# Patient Record
Sex: MALE | Race: WHITE | NOT HISPANIC OR LATINO | Employment: OTHER | URBAN - METROPOLITAN AREA
[De-identification: names, ages, dates, MRNs, and addresses within clinical notes are randomized per-mention and may not be internally consistent; named-entity substitution may affect disease eponyms.]

---

## 2017-01-13 ENCOUNTER — AMBULATORY - HEALTHEAST (OUTPATIENT)
Dept: SCHEDULING | Facility: CLINIC | Age: 32
End: 2017-01-13

## 2017-01-13 DIAGNOSIS — R69 DISEASE: ICD-10-CM

## 2017-01-19 ENCOUNTER — COMMUNICATION - HEALTHEAST (OUTPATIENT)
Dept: TELEHEALTH | Facility: CLINIC | Age: 32
End: 2017-01-19

## 2017-01-19 ENCOUNTER — OFFICE VISIT - HEALTHEAST (OUTPATIENT)
Dept: OTHER | Facility: CLINIC | Age: 32
End: 2017-01-19

## 2017-01-19 DIAGNOSIS — R69 DISEASE: ICD-10-CM

## 2017-02-25 ENCOUNTER — APPOINTMENT (OUTPATIENT)
Dept: GENERAL RADIOLOGY | Facility: CLINIC | Age: 32
End: 2017-02-25
Attending: EMERGENCY MEDICINE
Payer: COMMERCIAL

## 2017-02-25 ENCOUNTER — HOSPITAL ENCOUNTER (EMERGENCY)
Facility: CLINIC | Age: 32
Discharge: HOME | End: 2017-02-25
Attending: EMERGENCY MEDICINE | Admitting: EMERGENCY MEDICINE
Payer: COMMERCIAL

## 2017-02-25 VITALS
TEMPERATURE: 97.6 F | BODY MASS INDEX: 35.06 KG/M2 | SYSTOLIC BLOOD PRESSURE: 141 MMHG | OXYGEN SATURATION: 100 % | WEIGHT: 303 LBS | DIASTOLIC BLOOD PRESSURE: 88 MMHG | HEART RATE: 105 BPM | HEIGHT: 78 IN | RESPIRATION RATE: 16 BRPM

## 2017-02-25 DIAGNOSIS — Z95.828 STATUS POST PERIPHERALLY INSERTED CENTRAL CATHETER (PICC) CENTRAL LINE PLACEMENT: ICD-10-CM

## 2017-02-25 PROCEDURE — 93005 ELECTROCARDIOGRAM TRACING: CPT

## 2017-02-25 PROCEDURE — 99284 EMERGENCY DEPT VISIT MOD MDM: CPT

## 2017-02-25 PROCEDURE — 71020 XR CHEST 2 VW: CPT

## 2017-02-25 RX ORDER — DOXYCYCLINE 25 MG/5ML
POWDER, FOR SUSPENSION ORAL 2 TIMES DAILY
Status: ON HOLD | COMMUNITY
End: 2017-06-05

## 2017-02-25 ASSESSMENT — ENCOUNTER SYMPTOMS
COLOR CHANGE: 1
CONFUSION: 1

## 2017-02-25 NOTE — ED AVS SNAPSHOT
[facility] Emergency Department    [address]    [facility] [address]    Phone:  [phone]    Fax:  [phone]                                       Jonny Gramajo   MRN: [id number]    Department:  [facility] Emergency Department   Date of Visit:  2/25/2017           Patient Information     Date Of Birth          1985        Your diagnoses for this visit were:     Status post peripherally inserted central catheter (PICC) central line placement        You were seen by Jorden Kumari MD.      Follow-up Information     Follow up with [facility].    Why:  As needed    Contact information:    [address]  [facility] [address]        24 Hour Appointment Hotline       To make an appointment at any [facility] clinic, call [phone]). If you don't have a family doctor or clinic, we will help you find one. [facility] clinics are conveniently located to serve the needs of you and your family.             Review of your medicines      Our records show that you are taking the medicines listed below. If these are incorrect, please call your family doctor or clinic.        Dose / Directions Last dose taken    ADDERALL PO        Refills:  0        clonazePAM 0.5 MG tablet   Commonly known as:  klonoPIN   Dose:  0.25-0.5 mg   Quantity:  20 tablet        Take 0.5-1 tablets (0.25-0.5 mg) by mouth 3 times daily as needed for anxiety   Refills:  0        doxycycline monohydrate 25 MG/5ML Susr   Commonly known as:  VIBRAMYCIN        Take by mouth 2 times daily   Refills:  0        metroNIDAZOLE 5 mg/mL   Commonly known as:  FLAGYL        Inject into the vein every 6 hours   Refills:  0        ROCEPHIN IV        Refills:  0        VALTREX PO        Refills:  0                Procedures and tests performed during your visit     EKG 12 lead    XR Chest 2 Views      Orders Needing Specimen Collection     None      Pending Results     Date  and Time Order Name Status Description    2/25/2017 2019 XR Chest 2 Views Preliminary             Pending Culture Results     No orders found from 2/23/2017 to 2/26/2017.             Test Results from your hospital stay     2/25/2017  9:11 PM - Interface, Radiant Ib      Narrative     CHEST TWO VIEW   2/25/2017 8:56 PM     HISTORY: Evaluate PICC position.    COMPARISON: None.    FINDINGS: The tip of the PICC line is in good position in the mid to  distal SVC. Heart and lungs negative.        Impression     IMPRESSION: PICC line in the mid to distal SVC in good position.                Clinical Quality Measure: Blood Pressure Screening     Your blood pressure was checked while you were in the emergency department today. The last reading we obtained was  BP: 141/88 . Please read the guidelines below about what these numbers mean and what you should do about them.  If your systolic blood pressure (the top number) is less than 120 and your diastolic blood pressure (the bottom number) is less than 80, then your blood pressure is normal. There is nothing more that you need to do about it.  If your systolic blood pressure (the top number) is 120-139 or your diastolic blood pressure (the bottom number) is 80-89, your blood pressure may be higher than it should be. You should have your blood pressure rechecked within a year by a primary care provider.  If your systolic blood pressure (the top number) is 140 or greater or your diastolic blood pressure (the bottom number) is 90 or greater, you may have high blood pressure. High blood pressure is treatable, but if left untreated over time it can put you at risk for heart attack, stroke, or kidney failure. You should have your blood pressure rechecked by a primary care provider within the next 4 weeks.  If your provider in the emergency department today gave you specific instructions to follow-up with your doctor or provider even sooner than that, you should follow that  "instruction and not wait for up to 4 weeks for your follow-up visit.        Thank you for choosing [facility]       Thank you for choosing [facility] for your care. Our goal is always to provide you with excellent care. Hearing back from our patients is one way we can continue to improve our services. Please take a few minutes to complete the written survey that you may receive in the mail after you visit with us. Thank you!        [employer] Information     [employer] lets you send messages to your doctor, view your test results, renew your prescriptions, schedule appointments and more. To sign up, go to [email or web address] . Click on \"Log in\" on the left side of the screen, which will take you to the Welcome page. Then click on \"Sign up Now\" on the right side of the page.     You will be asked to enter the access code listed below, as well as some personal information. Please follow the directions to create your username and password.     Your access code is: [email or web address]  Expires: 2017  9:20 PM     Your access code will  in 90 days. If you need help or a new code, please call your [facility] clinic or [phone].        Care EveryWhere ID     This is your Care EveryWhere ID. This could be used by other organizations to access your [facility] medical records  [id number]        After Visit Summary       This is your record. Keep this with you and show to your community pharmacist(s) and doctor(s) at your next visit.                  "

## 2017-02-25 NOTE — ED AVS SNAPSHOT
[facility] Emergency Department    [address]    [facility] [address]    Phone:  [phone]    Fax:  [phone]                                       Jonny Gramajo   MRN: [id number]    Department:  [facility] Emergency Department   Date of Visit:  2/25/2017           After Visit Summary Signature Page     I have received my discharge instructions, and my questions have been answered. I have discussed any challenges I see with this plan with the nurse or doctor.    ..........................................................................................................................................  Patient/Patient Representative Signature      ..........................................................................................................................................  Patient Representative Print Name and Relationship to Patient    ..................................................               ................................................  Date                                            Time    ..........................................................................................................................................  Reviewed by Signature/Title    ...................................................              ..............................................  Date                                                            Time

## 2017-02-26 NOTE — ED PROVIDER NOTES
"  History     Chief Complaint:  Chest pain and vascular access problem    HPI   Jonny Gramajo is a 31 year old male with a history of chronic lyme disease started on aggressive treatment on 1/13/17 and pre-diabetic who presents to the emergency department today for evaluation of chest pain and vascular access problem. Mr. Gramajo had a picc line on left arm due to having lyme disease for 24 years. Today, he had his picc line came out causing bleeding and after began to feel flushed, feverish, mild chest pain, and mild confusion prompting visit. He also reports redness of the right arm at picc line site. Here, patient states he thinks chest pain does not need workup and declines workup.     CARDIAC RISK FACTORS  SEX:                  Male  Tobacco:             Negative  Hypertension:        Positive  Diabetes:             Negative  Lipids:                Negative  Personal History:  Negative  Family History:       Negative    Allergies:  NKDA    Medications:    Metronidazole (Flagyl) 5 Mg/Ml  Valacyclovir Hcl (Valtrex Po)  Ceftriaxone Sodium (Rocephin Iv)  Doxycycline Monohydrate (Vibramycin) 25 Mg/5ml Susr  Amphetamine-Dextroamphetamine (Adderall Po)  Clonazepam (Klonopin) 0.5 Mg Tablet    Past Medical History:    Anxiety  Depression  Hypertension  Lyme disease    Past Surgical History:    Testical recessed    Family History:    Parkinson's disease    Social History:  Marital Status:   [2]  Tobacco: Negative  Alcohol: Negative  Presents alone.   PCP: [facility]    Review of Systems   Cardiovascular: Positive for chest pain.   Skin: Positive for color change.   Psychiatric/Behavioral: Positive for confusion.   All other systems reviewed and are negative.    Physical Exam   First Vitals:  BP: 141/88  Pulse: 105  Heart Rate: 105  Temp: 97.6  F (36.4  C)  Resp: 16  Height: 198.1 cm (6' 6\")  Weight: (!) 137.4 kg (303 lb)  SpO2: 100 %    Physical Exam   General: Patient is alert and interactive " when I enter the room  Head:  The scalp, face, and head appear normal  Eyes:  The pupils are equal, round, and reactive to light    Conjunctivae and sclerae are normal  ENT:    External acoustic canals are normal    The oropharynx is normal without erythema.     Uvula is in the midline  Neck:  Normal range of motion  CV:  Regular rate. S1/S2. No murmurs.   Resp:  Lungs are clear without wheezes or rales. No distress  GI:  Abdomen is soft, no rigidity, guarding, or rebound    No distension. No tenderness to palpation in any quadrant.     MS:  Normal tone. Joints grossly normal without effusions. No arm swelling    No asymmetric leg swelling, calf or thigh tenderness.      Normal motor assessment of all extremities.  Skin:  No rash or lesions noted. Normal capillary refill noted. PICC site is c/d/i.  Minimal redness, no pus.    Neuro: Speech is normal and fluent. Face is symmetric.     Moving all extremities well.   Psych:  Awake. Alert.  Normal affect.  Appropriate interactions.  Lymph: No anterior cervical lymphadenopathy noted      Emergency Department Course   Imaging:  Radiographic findings were communicated with the patient who voiced understanding of the findings.    Chest X-Ray. 2 Views:   Picc line in the mid to distal SVC in good position.   Preliminary reading per radiology.     Emergency Department Course:  Nursing notes and vitals reviewed.    19:39 I performed an exam of the patient as documented above.     Nurse reports patients IV flushed well.     The patient was taken for x-ray, see imaging results above.      21:07 Recheck patient. I personally reviewed the imaging results with the Patient and answered all related questions prior to discharge. Plan explained to the Patient. Patient discharged home with instructions regarding supportive care, medications, and reasons to return. The importance of close follow-up was reviewed.   Impression & Plan    Medical Decision Making:  Jonny Gramajo is a 31 year  old male who presents for evaluation of PICC issue.  Discussed his twinges of CP and he declines workup for this.  No arm swelling to suggest DVT.  Broad differential of course considered, highest on my list includes PE, PTX, PICC infx, etc.   Xrays shows good alignment of PICC.   I doubt worrisome etiologies such as ACS, etc.    Plan is home, normal care of PICC.        Diagnosis:    ICD-10-CM    1. Status post peripherally inserted central catheter (PICC) central line placement Z95.828        Disposition:  discharged to home    Scribe Disclosure:  I, Fina Hernandez, am serving as a scribe at 07:39 PM on 2/25/2017 to document services personally performed by Jorden Kumari MD, based on my observations and the provider's statements to me.  Fina Hernandez  2/25/2017   [facility] EMERGENCY DEPARTMENT       Jorden Kumari MD  02/25/17 [id number]

## 2017-02-26 NOTE — ED NOTES
"PICC line dressing changed with sterile technique. Flushed with saline 20cc, without resistance.  Also PICC jayna blood WDL.  Pt requesting a \"vial of blood\" to take home Pt advised that is not possible and to follow up with his PCP.  "

## 2017-02-26 NOTE — ED NOTES
"Pt awoke around 1400 and had scratched his arm pulling his picc out 6\". Pt reports chest pressure after getting OOB and pulling at picc line. Pt states hx anxiety. Denies SOB or diaphoresis. Pt states PICC line is for chronic lyme disease. ABC in tact.  "

## 2017-02-27 LAB — INTERPRETATION ECG - MUSE: NORMAL

## 2017-03-03 ENCOUNTER — HOSPITAL ENCOUNTER (EMERGENCY)
Facility: CLINIC | Age: 32
Discharge: HOME | End: 2017-03-04
Attending: EMERGENCY MEDICINE | Admitting: EMERGENCY MEDICINE
Payer: COMMERCIAL

## 2017-03-03 DIAGNOSIS — Z95.828 STATUS POST PERIPHERALLY INSERTED CENTRAL CATHETER (PICC) CENTRAL LINE PLACEMENT: ICD-10-CM

## 2017-03-03 PROCEDURE — 99283 EMERGENCY DEPT VISIT LOW MDM: CPT | Mod: 25

## 2017-03-03 RX ORDER — CEFTRIAXONE 2 G/1
2 INJECTION, POWDER, FOR SOLUTION INTRAMUSCULAR; INTRAVENOUS ONCE
Status: COMPLETED | OUTPATIENT
Start: 2017-03-03 | End: 2017-03-04

## 2017-03-03 NOTE — ED AVS SNAPSHOT
[facility] Emergency Department    [address]    [facility] [address]    Phone:  [phone]    Fax:  [phone]                                       Jonny Gramajo   MRN: [id number]    Department:  [facility] Emergency Department   Date of Visit:  3/3/2017           After Visit Summary Signature Page     I have received my discharge instructions, and my questions have been answered. I have discussed any challenges I see with this plan with the nurse or doctor.    ..........................................................................................................................................  Patient/Patient Representative Signature      ..........................................................................................................................................  Patient Representative Print Name and Relationship to Patient    ..................................................               ................................................  Date                                            Time    ..........................................................................................................................................  Reviewed by Signature/Title    ...................................................              ..............................................  Date                                                            Time

## 2017-03-03 NOTE — ED AVS SNAPSHOT
[facility] Emergency Department    [address]    [facility] [address]    Phone:  [phone]    Fax:  [phone]                                       Jonny Gramajo   MRN: [id number]    Department:  [facility] Emergency Department   Date of Visit:  3/3/2017           Patient Information     Date Of Birth          1985        Your diagnoses for this visit were:     Status post peripherally inserted central catheter (PICC) central line placement        You were seen by Morgan Aguirre MD and Gaurang Fleming MD.      Follow-up Information     Follow up with [facility].    Contact information:    [address]  [facility] [address]        24 Hour Appointment Hotline       To make an appointment at any [facility] clinic, call [phone] ([phone]). If you don't have a family doctor or clinic, we will help you find one. [facility] clinics are conveniently located to serve the needs of you and your family.             Review of your medicines      Our records show that you are taking the medicines listed below. If these are incorrect, please call your family doctor or clinic.        Dose / Directions Last dose taken    ADDERALL PO        Refills:  0        clonazePAM 0.5 MG tablet   Commonly known as:  klonoPIN   Dose:  0.25-0.5 mg   Quantity:  20 tablet        Take 0.5-1 tablets (0.25-0.5 mg) by mouth 3 times daily as needed for anxiety   Refills:  0        doxycycline monohydrate 25 MG/5ML Susr   Commonly known as:  VIBRAMYCIN        Take by mouth 2 times daily   Refills:  0        metroNIDAZOLE 5 mg/mL   Commonly known as:  FLAGYL        Inject into the vein every 6 hours   Refills:  0        ROCEPHIN IV        Refills:  0        VALTREX PO        Refills:  0                Procedures and tests performed during your visit     XR Chest Port 1 View      Orders Needing Specimen Collection     None      Pending Results     No orders found for last  3 day(s).            Pending Culture Results     No orders found for last 3 day(s).             Test Results from your hospital stay     3/4/2017  2:00 AM - Interface, Radiant Ib      Narrative     XR CHEST PORT 1 VW    3/4/2017 1:57 AM     HISTORY: PICC line placement.    COMPARISON: 2/25/2017        Impression     IMPRESSION: Right PICC is in place, with tip in good position near the  SVC/RA junction.    IMANI RODRIGUES MD                Clinical Quality Measure: Blood Pressure Screening     Your blood pressure was checked while you were in the emergency department today. The last reading we obtained was  BP: (!) 155/109 . Please read the guidelines below about what these numbers mean and what you should do about them.  If your systolic blood pressure (the top number) is less than 120 and your diastolic blood pressure (the bottom number) is less than 80, then your blood pressure is normal. There is nothing more that you need to do about it.  If your systolic blood pressure (the top number) is 120-139 or your diastolic blood pressure (the bottom number) is 80-89, your blood pressure may be higher than it should be. You should have your blood pressure rechecked within a year by a primary care provider.  If your systolic blood pressure (the top number) is 140 or greater or your diastolic blood pressure (the bottom number) is 90 or greater, you may have high blood pressure. High blood pressure is treatable, but if left untreated over time it can put you at risk for heart attack, stroke, or kidney failure. You should have your blood pressure rechecked by a primary care provider within the next 4 weeks.  If your provider in the emergency department today gave you specific instructions to follow-up with your doctor or provider even sooner than that, you should follow that instruction and not wait for up to 4 weeks for your follow-up visit.        Thank you for choosing [facility]       Thank you for choosing [facility] for  "your care. Our goal is always to provide you with excellent care. Hearing back from our patients is one way we can continue to improve our services. Please take a few minutes to complete the written survey that you may receive in the mail after you visit with us. Thank you!        [employer] Information     [employer] lets you send messages to your doctor, view your test results, renew your prescriptions, schedule appointments and more. To sign up, go to [email or web address] . Click on \"Log in\" on the left side of the screen, which will take you to the Welcome page. Then click on \"Sign up Now\" on the right side of the page.     You will be asked to enter the access code listed below, as well as some personal information. Please follow the directions to create your username and password.     Your access code is: [email or web address]  Expires: 2017  9:20 PM     Your access code will  in 90 days. If you need help or a new code, please call your [facility] clinic or [phone].        Care EveryWhere ID     This is your Care EveryWhere ID. This could be used by other organizations to access your [facility] medical records  [id number]        After Visit Summary       This is your record. Keep this with you and show to your community pharmacist(s) and doctor(s) at your next visit.                  "

## 2017-03-04 ENCOUNTER — APPOINTMENT (OUTPATIENT)
Dept: GENERAL RADIOLOGY | Facility: CLINIC | Age: 32
End: 2017-03-04
Attending: EMERGENCY MEDICINE
Payer: COMMERCIAL

## 2017-03-04 VITALS
HEART RATE: 109 BPM | RESPIRATION RATE: 16 BRPM | BODY MASS INDEX: 35.02 KG/M2 | WEIGHT: 303 LBS | SYSTOLIC BLOOD PRESSURE: 133 MMHG | TEMPERATURE: 98.2 F | OXYGEN SATURATION: 100 % | DIASTOLIC BLOOD PRESSURE: 83 MMHG

## 2017-03-04 PROCEDURE — 36569 INSJ PICC 5 YR+ W/O IMAGING: CPT

## 2017-03-04 PROCEDURE — 27210209 ZZH KIT VALVED SINGLE LUMEN

## 2017-03-04 PROCEDURE — 40000940 XR CHEST PORT 1 VW

## 2017-03-04 PROCEDURE — 25000128 H RX IP 250 OP 636: Performed by: EMERGENCY MEDICINE

## 2017-03-04 PROCEDURE — 25000125 ZZHC RX 250: Performed by: EMERGENCY MEDICINE

## 2017-03-04 RX ORDER — LIDOCAINE 40 MG/G
CREAM TOPICAL
Status: DISCONTINUED | OUTPATIENT
Start: 2017-03-04 | End: 2017-03-04 | Stop reason: HOSPADM

## 2017-03-04 RX ORDER — HEPARIN SODIUM,PORCINE 10 UNIT/ML
2-5 VIAL (ML) INTRAVENOUS
Status: DISCONTINUED | OUTPATIENT
Start: 2017-03-04 | End: 2017-03-04 | Stop reason: HOSPADM

## 2017-03-04 RX ADMIN — LIDOCAINE 0.5 ML: 40 CREAM TOPICAL at 01:20

## 2017-03-04 RX ADMIN — CEFTRIAXONE 2 G: 2 INJECTION, POWDER, FOR SOLUTION INTRAMUSCULAR; INTRAVENOUS at 00:08

## 2017-03-04 NOTE — PROGRESS NOTES
[facility]   Procedure Note           Peripherally Inserted Central Line Catheter (PICC):       Jonny Gramajo  MRN# [id number]   March 4, 2017, 1:48 AM Indication: antibiotics           Pause for the cause: Consent for catheter placement procedure signed  Time out completed  Patient ID's verified using two distinct indicators  All necessary equipment is present  Site marked if extremity to be used has been predetermined   Type of line to be used: PICC   Full barrier precautions used: Yes   Skin preparation: Chloraprep   Date of insertion: March 4, 2017, 1:20 AM   Device type: Single lumen, valved, 4.0   Catheter brand: [employer]   Lot number: [id number]   Insertion location: Right basilic vein   Method of placement: MST  Ultrasound   Number of attempts: With ultrasound: 1   Without ultrasound: 0   Difficulty threading: No   Midline IV device: Dressing dry and intact  Chlorhexidine patch  Catheter securement device   Arm circumference: Adults 10 cm   Midline extremity circumference: 41 cm   Internal length: 45 cm   Midline visible catheter length: 2 cm   Total catheter length: 47 cm   Tip termination: SVC   Method of verification: Chest x-ray   Midline patency post placement: Positive blood return  Flushes without difficulty  Saline locked   Line flush: Line flush documented on the eMAR yes   Placement verified by: Physician   Catheter placed by: Susanne Hernandez PICC STAT RN   Discontinuation form initiated: Yes   Patient tolerance: Tolerated well      Summary:  This procedure was performed without difficulty and he tolerated the procedure well with no immediate complications.

## 2017-03-04 NOTE — ED PROVIDER NOTES
History     Chief Complaint:  Vascular access problem    HPI   Jonny Gramajo is a 31 year old male who is on IV Rocephin indefinitely for chronic lyme disease who presents with vascular access problem. The patient was in the ED at the beginning of the week because his PICC became dislodged. The PICC was rewrapped in the ED and the patient was sent home to make an appointment to have the PICC replaced. The patient went to sleep and woke up with the PICC completely out. The patient called his doctor to set up an appointment for 3 days ago, but is unable to provide an explanation as to why he has not gotten the PICC replaced. He has missed a weeks worth of Rocephin. He is receiving 2 grams of Rocephin twice a day. The patient would like the PICC replaced.    Allergies:  No known drug allergies.     Medications:    Flagyl  Valtrex  Rocephin IV  Vibramycin  Adderall  Klonopin     Past Medical History:    Anxiety  Depressive disorder  Hypertension  Lyme disease    Past Surgical History:    Genitourinary surgery    Family History:    History reviewed. No pertinent family history.    Social History:  Marital Status:   Presents to the ED alone  Tobacco Use: negative  Alcohol Use: negative  PCP: [facility]     Review of Systems   Constitutional:        Positive for PICC line dislodged   All other systems reviewed and are negative.      Physical Exam   First Vitals:  BP: 155/109 mmHg  Heart Rate: 109   Resp: 18  SpO2: 99% RA  Temp: 98.2  F (oral)       Physical Exam   Constitutional: He appears well-developed and well-nourished.   Cardiovascular: Normal rate, regular rhythm, normal heart sounds and intact distal pulses.  Exam reveals no gallop and no friction rub.    No murmur heard.  Pulmonary/Chest: Effort normal and breath sounds normal. No respiratory distress. He has no wheezes. He has no rales.   Musculoskeletal: Normal range of motion. He exhibits no edema.   Neurological: He is alert.   Skin:  Skin is warm and dry. No rash noted.   Psychiatric: He has a normal mood and affect.         Emergency Department Course   Imaging:  Radiographic findings were communicated with the patient who voiced understanding of the findings.    XR Chest Port 1 View  Right PICC is in place, with tip in good position near the  SVC/RA junction.  Report per radiology.    Interventions:  (0147) Rocephin, 2 g, IV  (0120) normal saline flush, 10 mLs, IV    Emergency Department Course:  Nursing notes and vitals reviewed.  I performed an exam of the patient as documented above.   A PICC line was placed.  The patient was sent for a chest x-ray while in the emergency department, findings above.   Findings and plan explained to the patient. Patient discharged home with instructions regarding supportive care, medications, and reasons to return. The importance of close follow-up was reviewed.     Impression & Plan    Medical Decision Makin year old who is requesting a PICC line placement. Apparently he needs it for Rocephin for chronic Lyme disease and he has been off it for 4-5 days now. He was unsure how his last PICC line got pulled out, but woke up with it completely out. PICC line nurse was called prior to me seeing him and they did place his new PICC line without difficulty. Patient is discharged. Follow up with his regular doctor so he can continue outpatient infusion.     Diagnosis:    ICD-10-CM    1. Status post peripherally inserted central catheter (PICC) central line placement Z95.828        Disposition:  Discharge to home.    IEliud, am serving as a scribe on 3/3/2017 at 11:34 PM to personally document services performed by Dr. Fleming based on my observations and the provider's statements to me.        Gaurang Fleming MD  17 0509

## 2017-03-04 NOTE — PROGRESS NOTES
Pt tolerated the procedure well. Good blood return. Flushes easily. Placement verified by x-ray. 4Fr single lumen, valved, power picc placed to right basilic vein. Arm circumference 41cm. Total length of catheter 47cm. Internal length 45cm, external length 2cm. SecurAcath and statlock in place for securement. OK to use.

## 2017-03-04 NOTE — ED NOTES
Pt states his PICC became dislodged last Saturday, pt states he has been unable to get into [facility] to have it replaced, has missed week's worth of Rocephin. Here for PICC placement. ABC's intact, alert and oriented X3.

## 2017-03-07 ENCOUNTER — AMBULATORY - HEALTHEAST (OUTPATIENT)
Dept: SCHEDULING | Facility: CLINIC | Age: 32
End: 2017-03-07

## 2017-03-07 DIAGNOSIS — A69.20 LYME DISEASE: ICD-10-CM

## 2017-03-08 DIAGNOSIS — A69.20 LYME DISEASE: Primary | ICD-10-CM

## 2017-03-08 LAB
BASOPHILS # BLD AUTO: 0 10E9/L (ref 0–0.2)
BASOPHILS NFR BLD AUTO: 0.1 %
DIFFERENTIAL METHOD BLD: ABNORMAL
EOSINOPHIL # BLD AUTO: 0 10E9/L (ref 0–0.7)
EOSINOPHIL NFR BLD AUTO: 0 %
ERYTHROCYTE [DISTWIDTH] IN BLOOD BY AUTOMATED COUNT: 12.1 % (ref 10–15)
HCT VFR BLD AUTO: 42.4 % (ref 40–53)
HGB BLD-MCNC: 15.1 G/DL (ref 13.3–17.7)
LYMPHOCYTES # BLD AUTO: 4.3 10E9/L (ref 0.8–5.3)
LYMPHOCYTES NFR BLD AUTO: 46.8 %
MCH RBC QN AUTO: 34.3 PG (ref 26.5–33)
MCHC RBC AUTO-ENTMCNC: 35.6 G/DL (ref 31.5–36.5)
MCV RBC AUTO: 96 FL (ref 78–100)
MONOCYTES # BLD AUTO: 1.2 10E9/L (ref 0–1.3)
MONOCYTES NFR BLD AUTO: 13.2 %
NEUTROPHILS # BLD AUTO: 3.7 10E9/L (ref 1.6–8.3)
NEUTROPHILS NFR BLD AUTO: 39.9 %
PLATELET # BLD AUTO: 207 10E9/L (ref 150–450)
RBC # BLD AUTO: 4.4 10E12/L (ref 4.4–5.9)
T3FREE SERPL-MCNC: NORMAL PG/ML (ref 2.3–4.2)
T4 SERPL-MCNC: NORMAL UG/DL (ref 4.5–13.9)
WBC # BLD AUTO: 9.2 10E9/L (ref 4–11)

## 2017-03-08 PROCEDURE — 84520 ASSAY OF UREA NITROGEN: CPT

## 2017-03-08 PROCEDURE — 36415 COLL VENOUS BLD VENIPUNCTURE: CPT

## 2017-03-08 PROCEDURE — 85025 COMPLETE CBC W/AUTO DIFF WBC: CPT

## 2017-03-08 PROCEDURE — 80076 HEPATIC FUNCTION PANEL: CPT

## 2017-03-08 PROCEDURE — 82565 ASSAY OF CREATININE: CPT

## 2017-03-08 PROCEDURE — 84443 ASSAY THYROID STIM HORMONE: CPT

## 2017-03-08 PROCEDURE — 84481 FREE ASSAY (FT-3): CPT

## 2017-03-08 PROCEDURE — 84436 ASSAY OF TOTAL THYROXINE: CPT

## 2017-03-09 LAB
ALBUMIN SERPL-MCNC: 4.3 G/DL (ref 3.4–5)
ALP SERPL-CCNC: 98 U/L (ref 40–150)
ALT SERPL W P-5'-P-CCNC: 89 U/L (ref 0–70)
AST SERPL W P-5'-P-CCNC: 35 U/L (ref 0–45)
BILIRUB DIRECT SERPL-MCNC: 0.1 MG/DL (ref 0–0.2)
BILIRUB SERPL-MCNC: 0.4 MG/DL (ref 0.2–1.3)
BUN SERPL-MCNC: 13 MG/DL (ref 7–30)
CREAT SERPL-MCNC: 0.88 MG/DL (ref 0.66–1.25)
GFR SERPL CREATININE-BSD FRML MDRD: NORMAL ML/MIN/1.7M2
PROT SERPL-MCNC: 6.6 G/DL (ref 6.8–8.8)
TSH SERPL DL<=0.05 MIU/L-ACNC: 1.18 MU/L (ref 0.4–4)

## 2017-03-09 NOTE — NURSING NOTE
Called [facility] at [phone] and talked with Bria informing her the correct tube for the free T3 & T4 total was not drawn. [facility]

## 2017-04-05 DIAGNOSIS — A69.20 LYME DISEASE: Primary | ICD-10-CM

## 2017-04-05 LAB
T3FREE SERPL-MCNC: 2.9 PG/ML (ref 2.3–4.2)
T4 SERPL-MCNC: 12.1 UG/DL (ref 4.5–13.9)

## 2017-04-05 PROCEDURE — 84436 ASSAY OF TOTAL THYROXINE: CPT | Performed by: INTERNAL MEDICINE

## 2017-04-05 PROCEDURE — 84481 FREE ASSAY (FT-3): CPT | Performed by: INTERNAL MEDICINE

## 2017-04-05 PROCEDURE — 36415 COLL VENOUS BLD VENIPUNCTURE: CPT | Performed by: INTERNAL MEDICINE

## 2017-04-05 PROCEDURE — 84443 ASSAY THYROID STIM HORMONE: CPT | Performed by: INTERNAL MEDICINE

## 2017-04-06 LAB — TSH SERPL DL<=0.05 MIU/L-ACNC: 0.87 MU/L (ref 0.4–4)

## 2017-05-04 DIAGNOSIS — A69.20 LYME DISEASE: Primary | ICD-10-CM

## 2017-05-04 LAB
ALBUMIN SERPL-MCNC: 4.3 G/DL (ref 3.4–5)
ALP SERPL-CCNC: 81 U/L (ref 40–150)
ALT SERPL W P-5'-P-CCNC: 74 U/L (ref 0–70)
ANION GAP SERPL CALCULATED.3IONS-SCNC: 10 MMOL/L (ref 3–14)
AST SERPL W P-5'-P-CCNC: 30 U/L (ref 0–45)
BILIRUB DIRECT SERPL-MCNC: <0.1 MG/DL (ref 0–0.2)
BILIRUB SERPL-MCNC: 0.5 MG/DL (ref 0.2–1.3)
BUN SERPL-MCNC: 8 MG/DL (ref 7–30)
CALCIUM SERPL-MCNC: 9.1 MG/DL (ref 8.5–10.1)
CHLORIDE SERPL-SCNC: 110 MMOL/L (ref 94–109)
CO2 SERPL-SCNC: 20 MMOL/L (ref 20–32)
CREAT SERPL-MCNC: 0.76 MG/DL (ref 0.66–1.25)
GFR SERPL CREATININE-BSD FRML MDRD: ABNORMAL ML/MIN/1.7M2
GLUCOSE SERPL-MCNC: 122 MG/DL (ref 70–99)
POTASSIUM SERPL-SCNC: 3.9 MMOL/L (ref 3.4–5.3)
PROT SERPL-MCNC: 7.1 G/DL (ref 6.8–8.8)
SODIUM SERPL-SCNC: 140 MMOL/L (ref 133–144)

## 2017-05-04 PROCEDURE — 36415 COLL VENOUS BLD VENIPUNCTURE: CPT | Performed by: FAMILY MEDICINE

## 2017-05-04 PROCEDURE — 82248 BILIRUBIN DIRECT: CPT | Performed by: FAMILY MEDICINE

## 2017-05-04 PROCEDURE — 80053 COMPREHEN METABOLIC PANEL: CPT | Performed by: FAMILY MEDICINE

## 2017-05-10 ENCOUNTER — APPOINTMENT (OUTPATIENT)
Dept: GENERAL RADIOLOGY | Facility: CLINIC | Age: 32
End: 2017-05-10
Attending: EMERGENCY MEDICINE
Payer: COMMERCIAL

## 2017-05-10 ENCOUNTER — HOSPITAL ENCOUNTER (EMERGENCY)
Facility: CLINIC | Age: 32
Discharge: HOME | End: 2017-05-10
Attending: EMERGENCY MEDICINE | Admitting: EMERGENCY MEDICINE
Payer: COMMERCIAL

## 2017-05-10 VITALS
BODY MASS INDEX: 34.7 KG/M2 | DIASTOLIC BLOOD PRESSURE: 84 MMHG | WEIGHT: 300.27 LBS | SYSTOLIC BLOOD PRESSURE: 139 MMHG | TEMPERATURE: 96.7 F | RESPIRATION RATE: 22 BRPM | OXYGEN SATURATION: 98 %

## 2017-05-10 DIAGNOSIS — Z95.828 STATUS POST PICC CENTRAL LINE PLACEMENT: ICD-10-CM

## 2017-05-10 PROCEDURE — 99283 EMERGENCY DEPT VISIT LOW MDM: CPT | Mod: 25

## 2017-05-10 PROCEDURE — 40000986 XR CHEST PORT 1 VW

## 2017-05-10 PROCEDURE — 99284 EMERGENCY DEPT VISIT MOD MDM: CPT | Mod: 25

## 2017-05-10 PROCEDURE — 27210209 ZZH KIT VALVED SINGLE LUMEN

## 2017-05-10 PROCEDURE — 36569 INSJ PICC 5 YR+ W/O IMAGING: CPT

## 2017-05-10 RX ORDER — LIDOCAINE 40 MG/G
CREAM TOPICAL
Status: DISCONTINUED | OUTPATIENT
Start: 2017-05-10 | End: 2017-05-10 | Stop reason: HOSPADM

## 2017-05-10 RX ORDER — HEPARIN SODIUM,PORCINE 10 UNIT/ML
2-5 VIAL (ML) INTRAVENOUS
Status: DISCONTINUED | OUTPATIENT
Start: 2017-05-10 | End: 2017-05-10 | Stop reason: HOSPADM

## 2017-05-10 ASSESSMENT — ENCOUNTER SYMPTOMS: FEVER: 0

## 2017-05-10 NOTE — ED PROVIDER NOTES
History     Chief Complaint:  Wound check     HPI   Jonny Gramajo is a 31 year old male with a history of lyme disease who presents to the emergency department today for evaluation of a wound check. The patient accidentally scratched out his PICC line two days ago and subsequently has not been receiving his antibiotic infusions. He has missed a total of 4-6 doses. Upon presentation, there is redness around the Pik site and comes to the ED for a new PICC line to be placed. The patient has no other concerns at this time.     Allergies:  No Known Drug Allergies     Medications:    Flagyl  Valtrex  Vibramycin  Klonopin  Ceftriaxone  Adderall    Past Medical History:    Anxiety  Depressive disorder  Hypertension  Lyme disease    Past Surgical History:    Testicle recessed     Family History:    History reviewed. No pertinent family history.     Social History:  The patient was accompanied to the ED by himself.  Smoking Status: Never  Smokeless Tobacco: Never  Alcohol Use: No  Marital Status:        Review of Systems   Constitutional: Negative for fever.   Skin:        Redness surrounding pik like right upper extremity   All other systems reviewed and are negative.    Physical Exam   First Vitals:  BP: 139/84  Heart Rate: 88  Temp: 96.7  F (35.9  C)  Resp: 22  Weight: (!) 136.2 kg (300 lb 4.3 oz)  SpO2: 98 %      Physical Exam  Constitutional:  Oriented to person, place, and time. Well appearing, comfortable.   HENT:   Head:    Normocephalic.   Mouth/Throat:   Oropharynx is clear and moist.   Eyes:    EOM are normal. Pupils are equal, round, and reactive to light.   Neck:    Neck supple.   Musculoskeletal:  Normal range of motion. No tenderness right upper extremity.   Neurological:   Alert and oriented to person, place, and time.           Moves all 4 extremities spontaneously    Skin:    Mild erythema irritation around previous PICC line right upper extremity.     Emergency Department Course      Imaging:  Radiology findings were communicated with the patient who voiced understanding of the findings.  Chest XR Port 1 View   IMPRESSION: A left upper extremity peripherally inserted central  catheter is present with distal tip in the superior vena cava.  Report per radiology     Emergency Department Course:  Nursing notes and vitals reviewed.  The patient was sent for a Chest XR Port 1 View while in the emergency department, results above.   I performed an exam of the patient as documented above.   0500: PICC line nurse arrives.   0558: Patient rechecked.   I discussed the treatment plan with the patient. They expressed understanding of this plan and consented to discharge. They will be discharged home with instructions for care and follow up. In addition, the patient will return to the emergency department if their symptoms persist, worsen, if new symptoms arise or if there is any concern.  All questions were answered.  I personally reviewed the imaging results with the Patient and answered all related questions prior to discharge.    Impression & Plan      Medical Decision Making:  Jonny Gramajo is a 31 year old male that has PICC line for chronic lyme disease for which he receives IV antibiotics. Patient likely accidentally pulled his line his line out and is her simply to have it replaced. He has mild local irritation likely due to adhesives. He has no pain along his right upper extremity. No concerns for DVT. PICC nurse was nice enough to drive from the [facility] to replace PICC line which was confirmed by chest x-ray. Patient will take his antibiotics as prescribed at home. I do not believe dosing needs to be given here. He is told to return for any worsening symptoms or new concerns.  Follow up with PMD.     Diagnosis:    ICD-10-CM    1. Status post PICC central line placement Z95.828      Disposition:   Discharged to home    Scribe Disclosure:  Yady DAVIS, am serving as a  scribe at 3:25 AM on 5/10/2017 to document services personally performed by Arash Gutierrez MD, based on my observations and the provider's statements to me.    5/10/2017   [facility] EMERGENCY DEPARTMENT       Arash Gutierrez MD  05/10/17 0642

## 2017-05-10 NOTE — ED NOTES
IN TRIAGE airway,breathing and circulation intact, without need for intervention . Alert and interacting appropriately for age and situation. Had a PIK line in and accidentally scratched out over night on early Monday . Has been getting treatments for Lyme disease. Missed 4-6 doses . Right arm looks red. Reaction to dressings needs new PIK line

## 2017-05-10 NOTE — ED AVS SNAPSHOT
[facility] Emergency Department    [address]    [facility] [address]    Phone:  [phone]    Fax:  [phone]                                       Jonny Gramajo   MRN: [id number]    Department:  [facility] Emergency Department   Date of Visit:  5/10/2017           After Visit Summary Signature Page     I have received my discharge instructions, and my questions have been answered. I have discussed any challenges I see with this plan with the nurse or doctor.    ..........................................................................................................................................  Patient/Patient Representative Signature      ..........................................................................................................................................  Patient Representative Print Name and Relationship to Patient    ..................................................               ................................................  Date                                            Time    ..........................................................................................................................................  Reviewed by Signature/Title    ...................................................              ..............................................  Date                                                            Time

## 2017-05-10 NOTE — ED NOTES
"Discussed patient concerns with him at length. Patient states he was exposed to lyme disease at 7 years old and diagnosed several years ago; states he has had multiple courses of antibiotics over the past several years and has been receiving IV antibiotics since January of this year; states he sees \"a Lyme-literate physician in a private practice\" who prescribes antibiotics based on his symptoms. States he feels since the PICC line was pulled out he has been experiencing \" a flare\" evidenced by \"tiredness, anxiety, and circadian rhythm disturbance\".  States he urgently needs PICC to be replace so he can continue his therapy.  "

## 2017-05-10 NOTE — PROGRESS NOTES
[facility] P[facility]  Procedure Note           Peripherally Inserted Central Line Catheter (PICC):       Jonny Gramajo  [id number]   May 10, 2017, 6:18 AM Indication: Medication administration           Pause for the cause: Consent for catheter placement procedure signed  Time out completed  Patient ID's verified using two distinct indicators  All necessary equipment is present  Site marked if extremity to be used has been predetermined   Type of line to be used: PICC   Full barrier precautions used: Yes   Skin preparation: Chloraprep   Date of insertion: May 10, 2017, 6:19 AM   Device type: Single lumen, valved, 4.0   Catheter brand: [employer]   Lot number: [id number]   Insertion location: Left basilic vein   Method of placement: Venipuncture  MST  Ultrasound   Number of attempts: With ultrasound: 1   Without ultrasound: 0   Difficulty threading: No   Midline IV device: Dressing dry and intact  Transparent semmipermeable dressing applied  Chlorhexidine patch  Catheter securement device   Arm circumference: Adults 10 cm   Midline extremity circumference: 41 cm   Internal length: 53 cm   Midline visible catheter length: 2 cm   Total catheter length: 55 cm   Tip termination: svc   Method of verification: Chest x-ray   Midline patency post placement: Positive blood return  Flushes without difficulty  Saline locked   Line flush: Line flush documented on the eMAR   Placement verified by: Physician   Catheter placed by: Jose Guadalupe Fowler   Discontinuation form initiated: Yes   Patient tolerance: Tolerated well  Intradermal injection   PICC Educational information to patient (Information from PICC package):  Yes   VAD flushing orders entered:  No     Summary:  This procedure was performed without difficulty. There were no complications. CXR showed tip in good position per ED MD. OK to use.      Recorded by Jose Guadalupe Fowler    Attestation:

## 2017-05-10 NOTE — ED AVS SNAPSHOT
[facility] Emergency Department    [address]    [facility] [address]    Phone:  [phone]    Fax:  [phone]                                       Jonny Gramajo   MRN: [id number]    Department:  [facility] Emergency Department   Date of Visit:  5/10/2017           Patient Information     Date Of Birth          1985        Your diagnoses for this visit were:     Status post PICC central line placement        You were seen by Arash Gutierrez MD.      Follow-up Information     Follow up with [facility]. Call in 1 week.    Contact information:    [address]  [facility] [id number]        Discharge References/Attachments     PERIPHERALLY INSERTED CENTRAL CATHETER (PICC), DISCHARGE INSTRUCTIONS FOR CARING FOR YOUR (ENGLISH)      24 Hour Appointment Hotline       To make an appointment at any [facility] clinic, call [phone] ([phone]). If you don't have a family doctor or clinic, we will help you find one. [facility] clinics are conveniently located to serve the needs of you and your family.             Review of your medicines      Our records show that you are taking the medicines listed below. If these are incorrect, please call your family doctor or clinic.        Dose / Directions Last dose taken    ADDERALL PO        Refills:  0        clonazePAM 0.5 MG tablet   Commonly known as:  klonoPIN   Dose:  0.25-0.5 mg   Quantity:  20 tablet        Take 0.5-1 tablets (0.25-0.5 mg) by mouth 3 times daily as needed for anxiety   Refills:  0        doxycycline monohydrate 25 MG/5ML Susr   Commonly known as:  VIBRAMYCIN        Take by mouth 2 times daily   Refills:  0        metroNIDAZOLE 5 mg/mL   Commonly known as:  FLAGYL        Inject into the vein every 6 hours   Refills:  0        ROCEPHIN IV        Refills:  0        VALTREX PO        Refills:  0                Procedures and tests performed during your visit     XR Chest Port 1 View       Orders Needing Specimen Collection     None      Pending Results     No orders found from 5/8/2017 to 5/11/2017.            Pending Culture Results     No orders found from 5/8/2017 to 5/11/2017.            Pending Results Instructions     If you had any lab results that were not finalized at the time of your Discharge, you can call the ED Lab Result RN at [phone]. You will be contacted by this team for any positive Lab results or changes in treatment. The nurses are available 7 days a week from 10A to 6:30P.  You can leave a message 24 hours per day and they will return your call.        Test Results From Your Hospital Stay        5/10/2017  6:18 AM      Narrative     CHEST SINGLE VIEW PORTABLE  5/10/2017 6:03 AM     HISTORY: Nurse placed peripherally inserted central catheter. Verify  tip placement.    COMPARISON: 3/4/2017.    FINDINGS: A left upper extremity peripherally inserted central  catheter is present with distal catheter tip in the superior vena  cava, near its junction with the right atrium. The lungs are clear.  Normal-sized cardiac silhouette.        Impression     IMPRESSION: A left upper extremity peripherally inserted central  catheter is present with distal tip in the superior vena cava.    EVI HIGUERA MD                Clinical Quality Measure: Blood Pressure Screening     Your blood pressure was checked while you were in the emergency department today. The last reading we obtained was  BP: 139/84 . Please read the guidelines below about what these numbers mean and what you should do about them.  If your systolic blood pressure (the top number) is less than 120 and your diastolic blood pressure (the bottom number) is less than 80, then your blood pressure is normal. There is nothing more that you need to do about it.  If your systolic blood pressure (the top number) is 120-139 or your diastolic blood pressure (the bottom number) is 80-89, your blood pressure may be higher than it  "should be. You should have your blood pressure rechecked within a year by a primary care provider.  If your systolic blood pressure (the top number) is 140 or greater or your diastolic blood pressure (the bottom number) is 90 or greater, you may have high blood pressure. High blood pressure is treatable, but if left untreated over time it can put you at risk for heart attack, stroke, or kidney failure. You should have your blood pressure rechecked by a primary care provider within the next 4 weeks.  If your provider in the emergency department today gave you specific instructions to follow-up with your doctor or provider even sooner than that, you should follow that instruction and not wait for up to 4 weeks for your follow-up visit.        Thank you for choosing [facility]       Thank you for choosing [facility] for your care. Our goal is always to provide you with excellent care. Hearing back from our patients is one way we can continue to improve our services. Please take a few minutes to complete the written survey that you may receive in the mail after you visit with us. Thank you!        [facility] Information     [facility] lets you send messages to your doctor, view your test results, renew your prescriptions, schedule appointments and more. To sign up, go to [email or web address] . Click on \"Log in\" on the left side of the screen, which will take you to the Welcome page. Then click on \"Sign up Now\" on the right side of the page.     You will be asked to enter the access code listed below, as well as some personal information. Please follow the directions to create your username and password.     Your access code is: [email or web address]  Expires: 2017 10:20 PM     Your access code will  in 90 days. If you need help or a new code, please call your [facility] clinic or [phone].        Care EveryWhere ID     This is your Care EveryWhere ID. This could be used by other organizations to access your [facility] " medical records  [id number]        After Visit Summary       This is your record. Keep this with you and show to your community pharmacist(s) and doctor(s) at your next visit.

## 2017-05-12 ENCOUNTER — HOSPITAL ENCOUNTER (EMERGENCY)
Facility: CLINIC | Age: 32
Discharge: HOME | End: 2017-05-13
Attending: NURSE PRACTITIONER | Admitting: NURSE PRACTITIONER
Payer: COMMERCIAL

## 2017-05-12 DIAGNOSIS — Z45.2 PERIPHERALLY INSERTED CENTRAL CATHETER IN PLACE: ICD-10-CM

## 2017-05-12 PROCEDURE — 99285 EMERGENCY DEPT VISIT HI MDM: CPT | Mod: 25

## 2017-05-12 PROCEDURE — 93005 ELECTROCARDIOGRAM TRACING: CPT

## 2017-05-12 RX ORDER — LORAZEPAM 2 MG/ML
1 INJECTION INTRAMUSCULAR ONCE
Status: COMPLETED | OUTPATIENT
Start: 2017-05-12 | End: 2017-05-13

## 2017-05-12 NOTE — ED AVS SNAPSHOT
[facility] Emergency Department    [address]    [facility] [address]    Phone:  [phone]    Fax:  [phone]                                       Jonny Gramajo   MRN: [id number]    Department:  [facility] Emergency Department   Date of Visit:  5/12/2017           Patient Information     Date Of Birth          1985        Your diagnoses for this visit were:     Peripherally inserted central catheter in place        You were seen by Maribell Joe, DAMIAN CNP.      Follow-up Information     Follow up with [facility] In 3 days.    Contact information:    [address]  [facility] [address]          Discharge Instructions       PICC placement. Be careful with the PICC, keep area clean and dry. Monitor for signs of infection.     24 Hour Appointment Hotline       To make an appointment at any [facility] clinic, call [phone] ([phone]). If you don't have a family doctor or clinic, we will help you find one. [facility] clinics are conveniently located to serve the needs of you and your family.             Review of your medicines      Our records show that you are taking the medicines listed below. If these are incorrect, please call your family doctor or clinic.        Dose / Directions Last dose taken    ADDERALL PO        Refills:  0        clonazePAM 0.5 MG tablet   Commonly known as:  klonoPIN   Dose:  0.25-0.5 mg   Quantity:  20 tablet        Take 0.5-1 tablets (0.25-0.5 mg) by mouth 3 times daily as needed for anxiety   Refills:  0        doxycycline monohydrate 25 MG/5ML Susr   Commonly known as:  VIBRAMYCIN        Take by mouth 2 times daily   Refills:  0        metroNIDAZOLE 5 mg/mL   Commonly known as:  FLAGYL        Inject into the vein every 6 hours   Refills:  0        ROCEPHIN IV        Refills:  0        VALTREX PO        Refills:  0                Procedures and tests performed during your visit     Chest  XR, 1 view  PORTABLE    EKG 12-lead, tracing only      Orders Needing Specimen Collection     None      Pending Results     Date and Time Order Name Status Description    5/13/2017 0137 Chest  XR, 1 view PORTABLE Preliminary             Pending Culture Results     No orders found for last 3 day(s).            Pending Results Instructions     If you had any lab results that were not finalized at the time of your Discharge, you can call the ED Lab Result RN at [phone]. You will be contacted by this team for any positive Lab results or changes in treatment. The nurses are available 7 days a week from 10A to 6:30P.  You can leave a message 24 hours per day and they will return your call.        Test Results From Your Hospital Stay        5/13/2017  1:58 AM      Narrative     XR CHEST PORT 1 VW  5/13/2017 1:55 AM      HISTORY: Check PICC line placement.     COMPARISON: 5/10/2017.    FINDINGS: Upright portable chest. A right PICC has been placed with  tip in the distal SVC in good position. The lungs are clear. No  pneumothorax. The heart size is normal.        Impression     IMPRESSION: Right PICC to distal SVC.                Clinical Quality Measure: Blood Pressure Screening     Your blood pressure was checked while you were in the emergency department today. The last reading we obtained was  BP: (!) 151/97 . Please read the guidelines below about what these numbers mean and what you should do about them.  If your systolic blood pressure (the top number) is less than 120 and your diastolic blood pressure (the bottom number) is less than 80, then your blood pressure is normal. There is nothing more that you need to do about it.  If your systolic blood pressure (the top number) is 120-139 or your diastolic blood pressure (the bottom number) is 80-89, your blood pressure may be higher than it should be. You should have your blood pressure rechecked within a year by a primary care provider.  If your systolic blood pressure (the  "top number) is 140 or greater or your diastolic blood pressure (the bottom number) is 90 or greater, you may have high blood pressure. High blood pressure is treatable, but if left untreated over time it can put you at risk for heart attack, stroke, or kidney failure. You should have your blood pressure rechecked by a primary care provider within the next 4 weeks.  If your provider in the emergency department today gave you specific instructions to follow-up with your doctor or provider even sooner than that, you should follow that instruction and not wait for up to 4 weeks for your follow-up visit.        Thank you for choosing [facility]       Thank you for choosing [facility] for your care. Our goal is always to provide you with excellent care. Hearing back from our patients is one way we can continue to improve our services. Please take a few minutes to complete the written survey that you may receive in the mail after you visit with us. Thank you!        [employer] Information     [employer] lets you send messages to your doctor, view your test results, renew your prescriptions, schedule appointments and more. To sign up, go to [email or web address] . Click on \"Log in\" on the left side of the screen, which will take you to the Welcome page. Then click on \"Sign up Now\" on the right side of the page.     You will be asked to enter the access code listed below, as well as some personal information. Please follow the directions to create your username and password.     Your access code is: [email or web address]  Expires: 2017 10:20 PM     Your access code will  in 90 days. If you need help or a new code, please call your [facility] clinic or [phone].        Care EveryWhere ID     This is your Care EveryWhere ID. This could be used by other organizations to access your [facility] medical records  [id number]        After Visit Summary       This is your record. Keep this with you and show to your community pharmacist(s) " and doctor(s) at your next visit.

## 2017-05-12 NOTE — ED AVS SNAPSHOT
[facility] Emergency Department    [address]    [facility] [address]    Phone:  [phone]    Fax:  [phone]                                       Jonny Gramajo   MRN: [id number]    Department:  [facility] Emergency Department   Date of Visit:  5/12/2017           After Visit Summary Signature Page     I have received my discharge instructions, and my questions have been answered. I have discussed any challenges I see with this plan with the nurse or doctor.    ..........................................................................................................................................  Patient/Patient Representative Signature      ..........................................................................................................................................  Patient Representative Print Name and Relationship to Patient    ..................................................               ................................................  Date                                            Time    ..........................................................................................................................................  Reviewed by Signature/Title    ...................................................              ..............................................  Date                                                            Time

## 2017-05-13 ENCOUNTER — APPOINTMENT (OUTPATIENT)
Dept: GENERAL RADIOLOGY | Facility: CLINIC | Age: 32
End: 2017-05-13
Attending: NURSE PRACTITIONER
Payer: COMMERCIAL

## 2017-05-13 VITALS
HEART RATE: 110 BPM | TEMPERATURE: 99 F | DIASTOLIC BLOOD PRESSURE: 94 MMHG | WEIGHT: 300.27 LBS | OXYGEN SATURATION: 100 % | RESPIRATION RATE: 20 BRPM | SYSTOLIC BLOOD PRESSURE: 153 MMHG | BODY MASS INDEX: 34.7 KG/M2

## 2017-05-13 PROCEDURE — 25000128 H RX IP 250 OP 636: Performed by: NURSE PRACTITIONER

## 2017-05-13 PROCEDURE — 96365 THER/PROPH/DIAG IV INF INIT: CPT

## 2017-05-13 PROCEDURE — 71010 XR CHEST PORT 1 VW: CPT

## 2017-05-13 PROCEDURE — 96375 TX/PRO/DX INJ NEW DRUG ADDON: CPT | Mod: 59

## 2017-05-13 PROCEDURE — 27210209 ZZH KIT VALVED SINGLE LUMEN

## 2017-05-13 PROCEDURE — 25000125 ZZHC RX 250: Performed by: NURSE PRACTITIONER

## 2017-05-13 PROCEDURE — 36569 INSJ PICC 5 YR+ W/O IMAGING: CPT

## 2017-05-13 RX ORDER — LIDOCAINE 40 MG/G
CREAM TOPICAL
Status: DISCONTINUED | OUTPATIENT
Start: 2017-05-13 | End: 2017-05-13 | Stop reason: HOSPADM

## 2017-05-13 RX ADMIN — LIDOCAINE HYDROCHLORIDE 1 ML: 10 INJECTION, SOLUTION EPIDURAL; INFILTRATION; INTRACAUDAL; PERINEURAL at 01:15

## 2017-05-13 RX ADMIN — FOLIC ACID: 5 INJECTION, SOLUTION INTRAMUSCULAR; INTRAVENOUS; SUBCUTANEOUS at 01:01

## 2017-05-13 RX ADMIN — LORAZEPAM 1 MG: 2 INJECTION INTRAMUSCULAR; INTRAVENOUS at 00:00

## 2017-05-13 NOTE — PROGRESS NOTES
[facility]   Procedure Note           Peripherally Inserted Central Line Catheter (PICC):       Jonny Gramajo  MRN# [id number]   May 13, 2017, 1:44 AM Indication: Medication administration           Pause for the cause: Consent for catheter placement procedure signed  Time out completed  Patient ID's verified using two distinct indicators  All necessary equipment is present  Site marked if extremity to be used has been predetermined   Type of line to be used: PICC   Full barrier precautions used: Yes   Skin preparation: Chloraprep   Date of insertion: May 13, 2017, 1:15 AM   Device type: Single lumen, valved, 4.0   Catheter brand: [email or web address]   Lot number: [id number]   Insertion location: Right basilic vein   Method of placement: MST  Ultrasound   Number of attempts: With ultrasound: 1   Without ultrasound: 0   Difficulty threading: No   PICC IV device: Dressing dry and intact  Chlorhexidine patch  Catheter securement device   Arm circumference: Adults 10 cm proximal to AC   PICC extremity circumference: 37 cm   Internal length: 46 cm   PICC visible catheter length: 2 cm   Total catheter length: 48 cm   Tip termination: SVC   Method of verification: Chest x-ray   PICC patency post placement: Positive blood return  Flushes without difficulty  Saline locked   Line flush: Line flush documented on the eMAR Yes   Placement verified by: Physician   Catheter placed by: Susanne Hernandez PICC STAT RN   Discontinuation form initiated: Yes   Patient tolerance: Tolerated well      Summary:  This procedure was performed without difficulty and he tolerated the procedure well with no immediate complications.

## 2017-05-13 NOTE — ED NOTES
Reinforced PICC care instructions. Pt given multiple stockingettes to cover PICC line. Pt given cab voucher and escorted to lobby by this writer.

## 2017-05-13 NOTE — PLAN OF CARE
Pt tolerated the procedure well. Good blood return. Flushes easily. Placement verified by xray. OK to use. Arm circumference 37cm. 4Fr single lumen valved power picc placed to right arm basilic. Catheter length 48cm, internal 46cm and external 2cm. Securacath used for securement. Vein diameter 7.1mm

## 2017-05-13 NOTE — ED PROVIDER NOTES
History     Chief Complaint:  Alcohol intoxication    HPI : History limited due to patient's alcohol intoxication.  oJnny Gramajo is a 31 year old male who presents with alcohol intoxication. The patient drank a lot of alcohol today, and his PICC line came out of his left side. He states it was not sutured in place. He uses the PICC line for antibiotics to treat his chronic lyme's disease. He had his morning dose, but had not had evening dose. He also complains of chest pain. He did not fall. He states he is dehydrated. He has drank the past 7-10 days.     Allergies:  The patient has no known drug allergies.    Medications:    Flagyl  Valtrex  Rocephin  Vibramycin  Adderall  Klonopin    Past Medical History:    Anxiety  Depression  HTN  Lyme disease    Past Surgical History:     surgery    Family History:    History reviewed.  No significant family history.    Social History:  Relationship status:   Tobacco use: Negative  Alcohol use: Negative  The patient presents via EMS.     Review of Systems   Unable to perform ROS: Mental status change       Physical Exam   First Vitals:  BP: (!) 140/96  Pulse: 110  Temp: 99  F (37.2  C)  Resp: 20  Weight: (!) 136.2 kg (300 lb 4.3 oz)  SpO2: 100 %    Physical Exam  General: Appears stated age  HENT: Atraumatic  Eyes: No scleral injection, conjunctivitis, or drainage.    Neck: Supple with normal ROM.   Cardio: Regular rate and rhythm, no murmurs, rubs, or gallops  Pulmonary/Chest: Clear to ausculation bilaterally.    Abdomen: Soft, non-tender, normal bowel sounds, no rebound or guarding  Musculoskeletal: PICC line site in left upper arm,non-tender. New PICC line right upper arm.  Neuro: Alert and oriented, no focal deficits noted.   Skin: Normal color and temperature, no rashes to visible exposed skin.   Psych: Mood and affect normal.        Emergency Department Course   Procedures:  PICC nurse placed PICC    XR PICC  A right PICC has been placed with  tip in the  distal SVC in good position        Interventions:  0000: Ativan, 1.0 mg, IV injection  Medications   lidocaine 1 % 1 mL (1 mL Other Given 5/13/17 0115)   lidocaine (LMX4) kit (not administered)   sodium chloride (PF) 0.9% PF flush 10-20 mL (20 mLs Intracatheter Given 5/13/17 0115)   sodium chloride (PF) 0.9% PF flush 10 mL (not administered)   LORazepam (ATIVAN) injection 1 mg (1 mg Intravenous Given 5/13/17 0000)   NaCl 0.9 % 1,000 mL with multivitamin-ADULT (INFUVITE) 10 mL, thiamine 100 mg, folic acid 1 mg infusion ( Intravenous New Bag 5/13/17 0101)         Emergency Department Course:  Nursing notes and vitals reviewed.  I performed an exam of the patient as documented above.  The above workup was undertaken.   I rechecked the patient and discussed results.  0048: The nurse arrived to place the PICC line.    Findings and plan explained to the patient. Patient discharged home, status improved, with instructions regarding supportive care, medications, and reasons to return as well as the importance of close follow-up was reviewed.        Impression & Plan      Medical Decision Making:    Jonny Cochran male presents with alcohol intoxication and states the PICC line came out. Patient needs the PICC line for antibiotics to treat his chronic lyme's disease. He had his morning dose, but had not had evening dose patient does not want the medication to be administered in the ED has own supplies at home. Would like a new PICC line placed. No there medical concerns for indication for further work up. No signs of infection to PICC site, no pain, PICC nurse came to ED placed a new PICC right arm. Position confirmed via XR ready to use. Patient ambulated in ED with steady, able to tolerate oral fluids was hydrated and given a banana bag.  Patient is safe to be discharge home.     Diagnosis:    ICD-10-CM    1. Peripherally inserted central catheter in place Z45.2        Disposition:  discharged to home    Ronal DAVIS am  serving as a scribe on 5/12/2017 at 10:45 PM to personally document services performed by Maribell Joe, DAMIAN SCHULTZ, based on my observations and the provider's statements to me.    [facility] EMERGENCY DEPARTMENT       Maribell Joe APRN CNP  05/13/17 0212

## 2017-05-13 NOTE — ED NOTES
Ambulated pt with stand by assist to bathroom. Gait steady, denies dizziness or lightheadedness. Pt drinking water and interacting with staff appropriately. No other concerns at this time.

## 2017-05-13 NOTE — DISCHARGE INSTRUCTIONS
PICC placement. Be careful with the PICC, keep area clean and dry. Monitor for signs of infection.

## 2017-05-13 NOTE — ED NOTES
Patient had PICC line placed in left arm on Wednesday.  Today blacked out from drinking, woke up 2 hours ago and found he had pulled his PICC line out.  Patient showered, dressed, called 911, then drank some vodka while he waited for medics.  ABCDs intact.  No PICC line in place in left arm upon arrival in  ED.

## 2017-05-13 NOTE — ED NOTES
Answered call light, pt states he is becoming more anxious. Updated provider and given ativan per order.

## 2017-05-13 NOTE — ED NOTES
"Pt yelling at people as they are passing by his room \"help!\". Informed pt to use call light and not yell at people as they walked by as it disturbs the department. Pt resting on bed comfortably and voices no needs at this time.  "

## 2017-05-14 LAB — INTERPRETATION ECG - MUSE: NORMAL

## 2017-06-01 ENCOUNTER — HOSPITAL ENCOUNTER (INPATIENT)
Facility: CLINIC | Age: 32
LOS: 4 days | Discharge: HOME | DRG: 897 | End: 2017-06-06
Attending: EMERGENCY MEDICINE | Admitting: INTERNAL MEDICINE
Payer: COMMERCIAL

## 2017-06-01 DIAGNOSIS — A69.20 LYME DISEASE: Primary | ICD-10-CM

## 2017-06-01 DIAGNOSIS — F13.930 BENZODIAZEPINE WITHDRAWAL, UNCOMPLICATED (H): ICD-10-CM

## 2017-06-01 DIAGNOSIS — F10.920 ALCOHOL INTOXICATION, UNCOMPLICATED (H): ICD-10-CM

## 2017-06-01 LAB — ALCOHOL BREATH TEST: 0.2 (ref 0–0.01)

## 2017-06-01 PROCEDURE — S0166 INJ OLANZAPINE 2.5MG: HCPCS | Performed by: EMERGENCY MEDICINE

## 2017-06-01 PROCEDURE — 99285 EMERGENCY DEPT VISIT HI MDM: CPT | Mod: 25 | Performed by: EMERGENCY MEDICINE

## 2017-06-01 PROCEDURE — 99285 EMERGENCY DEPT VISIT HI MDM: CPT | Mod: Z6 | Performed by: EMERGENCY MEDICINE

## 2017-06-01 PROCEDURE — 25000125 ZZHC RX 250: Performed by: EMERGENCY MEDICINE

## 2017-06-01 PROCEDURE — 96372 THER/PROPH/DIAG INJ SC/IM: CPT | Performed by: EMERGENCY MEDICINE

## 2017-06-01 PROCEDURE — 82075 ASSAY OF BREATH ETHANOL: CPT | Performed by: EMERGENCY MEDICINE

## 2017-06-01 RX ORDER — OLANZAPINE 10 MG/2ML
10 INJECTION, POWDER, FOR SOLUTION INTRAMUSCULAR ONCE
Status: COMPLETED | OUTPATIENT
Start: 2017-06-01 | End: 2017-06-01

## 2017-06-01 RX ADMIN — OLANZAPINE 10 MG: 10 INJECTION, POWDER, LYOPHILIZED, FOR SOLUTION INTRAMUSCULAR at 23:40

## 2017-06-01 ASSESSMENT — ENCOUNTER SYMPTOMS
ABDOMINAL PAIN: 0
SHORTNESS OF BREATH: 0
LIGHT-HEADEDNESS: 1
COUGH: 0
VOMITING: 0
NAUSEA: 1

## 2017-06-01 NOTE — IP AVS SNAPSHOT
MRN:[id number]                      After Visit Summary   6/1/2017    Jonny Lynn    MRN: [id number]           Thank you!     Thank you for choosing [facility] for your care. Our goal is always to provide you with excellent care. Hearing back from our patients is one way we can continue to improve our services. Please take a few minutes to complete the written survey that you may receive in the mail after you visit with us. Thank you!        Patient Information     Date Of Birth          1985        Designated Caregiver       Most Recent Value    Caregiver    Will someone help with your care after discharge? yes    Name of designated caregiver Wife Marce lynn    Phone number of caregiver [phone]    Caregiver [address] [address]      About your hospital stay     You were admitted on:  June 2, 2017 You last received care in the:  [facility]    You were discharged on:  June 6, 2017        Reason for your hospital stay       Alcohol and benzodiazepine detox                  Who to Call     For medical emergencies, please call 911.  For non-urgent questions about your medical care, please call your primary care provider or clinic, [phone]          Attending Provider     Provider Specialty    Roxann Coates MD Emergency Medicine    ital, MD [facility] Internal Medicine    Cruz Gomez MD Internal Medicine       Primary Care Provider Office Phone # Fax #    Kenneth G Pallas, [phone]      After Care Instructions     Activity       Your activity upon discharge: activity as tolerated            Diet       Follow this diet upon discharge: Orders Placed This Encounter      Snacks/Supplements Adult: [employer] Fiber; Between Meals      Regular Diet Adult                  Follow-up Appointments     Adult [facility] Follow-up and recommended labs and tests       Please follow up with your ID physician   Please follow up with resources  "provided to you to pursue outpatinet alcohol treatment     Appointments on [facility] and/or [facility] (with [facility] or [facility] provider or service). Call [phone] if you haven't heard regarding these appointments within 7 days of discharge.                  Pending Results     No orders found from 2017 to 2017.            Statement of Approval     Ordered          17 1736  I have reviewed and agree with all the recommendations and orders detailed in this document.  EFFECTIVE NOW     Approved and electronically signed by:  Radha Lord MD             Admission Information     Date & Time Provider Department Dept. Phone    2017 Cruz Gomez MD [facility] [phone]      Your Vitals Were     Blood Pressure Pulse Temperature Respirations Pulse Oximetry       143/77 (BP Location: Left arm) 87 98.5  F (36.9  C) (Oral) 18 98%       MyChart Information     [email or web address] lets you send messages to your doctor, view your test results, renew your prescriptions, schedule appointments and more. To sign up, go to [email or web address] . Click on \"Log in\" on the left side of the screen, which will take you to the Welcome page. Then click on \"Sign up Now\" on the right side of the page.     You will be asked to enter the access code listed below, as well as some personal information. Please follow the directions to create your username and password.     Your access code is: [email or web address]  Expires: 2017  5:40 PM     Your access code will  in 90 days. If you need help or a new code, please call your [facility] clinic or [phone].        Care EveryWhere ID     This is your Care EveryWhere ID. This could be used by other organizations to access your [facility] medical records  [id number]           Review of your medicines      CONTINUE these medicines which have NOT CHANGED        Dose / Directions    ATENOLOL PO        Dose:  50 mg   Take 50 mg by mouth daily   Refills:  0    "    BACTRIM DS PO        Dose:  1 tablet   Take 1 tablet by mouth 2 times daily   Refills:  0       cariprazine 1.5 MG Caps capsule   Commonly known as:  VRAYLAR        Dose:  1.5 mg   Take 1.5 mg by mouth daily   Refills:  0       CEFTAROLINE FOSAMIL IV        Dose:  600 mg   Inject 600 mg into the vein every 12 hours   Refills:  0       DOXYCYCLINE HYCLATE IV        Dose:  400 mg   Inject 400 mg into the vein daily   Refills:  0       FLAGYL PO        Dose:  250 mg   Take 250 mg by mouth 2 times daily   Refills:  0       GABAPENTIN PO        Dose:  200 mg   Take 200 mg by mouth 3 times daily   Refills:  0       nystatin 530945 UNIT/ML suspension   Commonly known as:  MYCOSTATIN        Dose:  655817 Units   Take 500,000 Units by mouth 4 times daily as needed (when patient feels candidia is flaring up)   Refills:  0       VALTREX PO        Dose:  500 mg   Take 500 mg by mouth 2 times daily   Refills:  0       VIIBRYD PO        Dose:  40 mg   Take 40 mg by mouth daily   Refills:  0       ZANTAC PO        Dose:  300 mg   Take 300 mg by mouth 2 times daily   Refills:  0                Protect others around you: Learn how to safely use, store and throw away your medicines at www.disposemymeds.org.             Medication List: This is a list of all your medications and when to take them. Check marks below indicate your daily home schedule. Keep this list as a reference.      Medications           Morning Afternoon Evening Bedtime As Needed    ATENOLOL PO   Take 50 mg by mouth daily   Last time this was given:  50 mg on 6/6/2017  2:52 PM                                BACTRIM DS PO   Take 1 tablet by mouth 2 times daily   Last time this was given:  1 tablet on 6/6/2017  7:55 AM                                cariprazine 1.5 MG Caps capsule   Commonly known as:  VRAYLAR   Take 1.5 mg by mouth daily                                CEFTAROLINE FOSAMIL IV   Inject 600 mg into the vein every 12 hours   Last time this was given:   600 mg on 6/6/2017  1:25 PM                                DOXYCYCLINE HYCLATE IV   Inject 400 mg into the vein daily   Last time this was given:  400 mg on 6/6/2017  5:28 PM                                FLAGYL PO   Take 250 mg by mouth 2 times daily   Last time this was given:  250 mg on 6/6/2017  7:56 AM                                GABAPENTIN PO   Take 200 mg by mouth 3 times daily   Last time this was given:  200 mg on 6/6/2017  1:25 PM                                nystatin 276551 UNIT/ML suspension   Commonly known as:  MYCOSTATIN   Take 500,000 Units by mouth 4 times daily as needed (when patient feels candidia is flaring up)                                VALTREX PO   Take 500 mg by mouth 2 times daily   Last time this was given:  500 mg on 6/6/2017  7:55 AM                                VIIBRYD PO   Take 40 mg by mouth daily   Last time this was given:  40 mg on 6/6/2017  7:56 AM                                ZANTAC PO   Take 300 mg by mouth 2 times daily   Last time this was given:  300 mg on 6/6/2017  7:55 AM

## 2017-06-01 NOTE — IP AVS SNAPSHOT
[facility]    [address]    [facility] [place] [address]    Phone:  [phone]                                       After Visit Summary   6/1/2017    Jonny Gramajo    MRN: [id number]           After Visit Summary Signature Page     I have received my discharge instructions, and my questions have been answered. I have discussed any challenges I see with this plan with the nurse or doctor.    ..........................................................................................................................................  Patient/Patient Representative Signature      ..........................................................................................................................................  Patient Representative Print Name and Relationship to Patient    ..................................................               ................................................  Date                                            Time    ..........................................................................................................................................  Reviewed by Signature/Title    ...................................................              ..............................................  Date                                                            Time

## 2017-06-02 PROBLEM — F13.939 BENZODIAZEPINE WITHDRAWAL (H): Status: ACTIVE | Noted: 2017-06-02

## 2017-06-02 LAB
ALBUMIN SERPL-MCNC: 3.9 G/DL (ref 3.4–5)
ALP SERPL-CCNC: 68 U/L (ref 40–150)
ALT SERPL W P-5'-P-CCNC: 84 U/L (ref 0–70)
AMPHETAMINES UR QL SCN: ABNORMAL
ANION GAP SERPL CALCULATED.3IONS-SCNC: 10 MMOL/L (ref 3–14)
ANION GAP SERPL CALCULATED.3IONS-SCNC: 9 MMOL/L (ref 3–14)
AST SERPL W P-5'-P-CCNC: 32 U/L (ref 0–45)
BARBITURATES UR QL: ABNORMAL
BASOPHILS # BLD AUTO: 0 10E9/L (ref 0–0.2)
BASOPHILS NFR BLD AUTO: 0 %
BENZODIAZ UR QL: ABNORMAL
BILIRUB SERPL-MCNC: 0.3 MG/DL (ref 0.2–1.3)
BUN SERPL-MCNC: 13 MG/DL (ref 7–30)
BUN SERPL-MCNC: 14 MG/DL (ref 7–30)
CALCIUM SERPL-MCNC: 8.7 MG/DL (ref 8.5–10.1)
CALCIUM SERPL-MCNC: 8.9 MG/DL (ref 8.5–10.1)
CANNABINOIDS UR QL SCN: ABNORMAL
CHLORIDE SERPL-SCNC: 108 MMOL/L (ref 94–109)
CHLORIDE SERPL-SCNC: 110 MMOL/L (ref 94–109)
CO2 SERPL-SCNC: 27 MMOL/L (ref 20–32)
CO2 SERPL-SCNC: 28 MMOL/L (ref 20–32)
COCAINE UR QL: ABNORMAL
CREAT SERPL-MCNC: 0.83 MG/DL (ref 0.66–1.25)
CREAT SERPL-MCNC: 0.86 MG/DL (ref 0.66–1.25)
DIFFERENTIAL METHOD BLD: ABNORMAL
EOSINOPHIL # BLD AUTO: 0 10E9/L (ref 0–0.7)
EOSINOPHIL NFR BLD AUTO: 0.5 %
ERYTHROCYTE [DISTWIDTH] IN BLOOD BY AUTOMATED COUNT: 13.9 % (ref 10–15)
ERYTHROCYTE [DISTWIDTH] IN BLOOD BY AUTOMATED COUNT: 13.9 % (ref 10–15)
ETHANOL UR QL SCN: ABNORMAL
GFR SERPL CREATININE-BSD FRML MDRD: ABNORMAL ML/MIN/1.7M2
GFR SERPL CREATININE-BSD FRML MDRD: ABNORMAL ML/MIN/1.7M2
GLUCOSE SERPL-MCNC: 111 MG/DL (ref 70–99)
GLUCOSE SERPL-MCNC: 113 MG/DL (ref 70–99)
HCT VFR BLD AUTO: 44.4 % (ref 40–53)
HCT VFR BLD AUTO: 45.9 % (ref 40–53)
HGB BLD-MCNC: 15.6 G/DL (ref 13.3–17.7)
HGB BLD-MCNC: 16.6 G/DL (ref 13.3–17.7)
IMM GRANULOCYTES # BLD: 0 10E9/L (ref 0–0.4)
IMM GRANULOCYTES NFR BLD: 0.5 %
LYMPHOCYTES # BLD AUTO: 3.8 10E9/L (ref 0.8–5.3)
LYMPHOCYTES NFR BLD AUTO: 49 %
MAGNESIUM SERPL-MCNC: 2.2 MG/DL (ref 1.6–2.3)
MCH RBC QN AUTO: 32.9 PG (ref 26.5–33)
MCH RBC QN AUTO: 34.2 PG (ref 26.5–33)
MCHC RBC AUTO-ENTMCNC: 35.1 G/DL (ref 31.5–36.5)
MCHC RBC AUTO-ENTMCNC: 36.2 G/DL (ref 31.5–36.5)
MCV RBC AUTO: 94 FL (ref 78–100)
MCV RBC AUTO: 94 FL (ref 78–100)
MONOCYTES # BLD AUTO: 1.2 10E9/L (ref 0–1.3)
MONOCYTES NFR BLD AUTO: 14.7 %
NEUTROPHILS # BLD AUTO: 2.8 10E9/L (ref 1.6–8.3)
NEUTROPHILS NFR BLD AUTO: 35.3 %
NRBC # BLD AUTO: 0 10*3/UL
NRBC BLD AUTO-RTO: 0 /100
OPIATES UR QL SCN: ABNORMAL
PCP UR QL SCN: ABNORMAL
PLATELET # BLD AUTO: 202 10E9/L (ref 150–450)
PLATELET # BLD AUTO: 203 10E9/L (ref 150–450)
POTASSIUM SERPL-SCNC: 3.7 MMOL/L (ref 3.4–5.3)
POTASSIUM SERPL-SCNC: 3.9 MMOL/L (ref 3.4–5.3)
PROT SERPL-MCNC: 6.9 G/DL (ref 6.8–8.8)
RBC # BLD AUTO: 4.74 10E12/L (ref 4.4–5.9)
RBC # BLD AUTO: 4.86 10E12/L (ref 4.4–5.9)
SODIUM SERPL-SCNC: 145 MMOL/L (ref 133–144)
SODIUM SERPL-SCNC: 147 MMOL/L (ref 133–144)
WBC # BLD AUTO: 5.9 10E9/L (ref 4–11)
WBC # BLD AUTO: 7.8 10E9/L (ref 4–11)

## 2017-06-02 PROCEDURE — 80053 COMPREHEN METABOLIC PANEL: CPT | Performed by: INTERNAL MEDICINE

## 2017-06-02 PROCEDURE — 85027 COMPLETE CBC AUTOMATED: CPT | Performed by: INTERNAL MEDICINE

## 2017-06-02 PROCEDURE — 99221 1ST HOSP IP/OBS SF/LOW 40: CPT | Performed by: PSYCHIATRY & NEUROLOGY

## 2017-06-02 PROCEDURE — 25000132 ZZH RX MED GY IP 250 OP 250 PS 637: Performed by: EMERGENCY MEDICINE

## 2017-06-02 PROCEDURE — 99207 ZZC CDG-EXAM COMPONENT: MEETS DETAILED - DOWN CODED: CPT | Performed by: PSYCHIATRY & NEUROLOGY

## 2017-06-02 PROCEDURE — 80048 BASIC METABOLIC PNL TOTAL CA: CPT | Performed by: EMERGENCY MEDICINE

## 2017-06-02 PROCEDURE — 25000132 ZZH RX MED GY IP 250 OP 250 PS 637: Performed by: INTERNAL MEDICINE

## 2017-06-02 PROCEDURE — 80307 DRUG TEST PRSMV CHEM ANLYZR: CPT | Performed by: INTERNAL MEDICINE

## 2017-06-02 PROCEDURE — 12000001 ZZH R&B MED SURG/OB UMMC

## 2017-06-02 PROCEDURE — 25800025 ZZH RX 258: Performed by: INTERNAL MEDICINE

## 2017-06-02 PROCEDURE — 99207 ZZC MOONLIGHTING INDICATOR: CPT | Performed by: INTERNAL MEDICINE

## 2017-06-02 PROCEDURE — 99233 SBSQ HOSP IP/OBS HIGH 50: CPT | Performed by: INTERNAL MEDICINE

## 2017-06-02 PROCEDURE — HZ2ZZZZ DETOXIFICATION SERVICES FOR SUBSTANCE ABUSE TREATMENT: ICD-10-PCS | Performed by: INTERNAL MEDICINE

## 2017-06-02 PROCEDURE — 80320 DRUG SCREEN QUANTALCOHOLS: CPT | Performed by: INTERNAL MEDICINE

## 2017-06-02 PROCEDURE — 83735 ASSAY OF MAGNESIUM: CPT | Performed by: EMERGENCY MEDICINE

## 2017-06-02 PROCEDURE — 85025 COMPLETE CBC W/AUTO DIFF WBC: CPT | Performed by: EMERGENCY MEDICINE

## 2017-06-02 PROCEDURE — 36415 COLL VENOUS BLD VENIPUNCTURE: CPT | Performed by: INTERNAL MEDICINE

## 2017-06-02 PROCEDURE — 99207 ZZC CDG-HISTORY COMP: MEETS EXP. PROBLEM FOCUSED - DOWN CODED LACK OF HPI: CPT | Performed by: INTERNAL MEDICINE

## 2017-06-02 RX ORDER — ONDANSETRON 4 MG/1
4 TABLET, ORALLY DISINTEGRATING ORAL EVERY 6 HOURS PRN
Status: DISCONTINUED | OUTPATIENT
Start: 2017-06-02 | End: 2017-06-06 | Stop reason: HOSPADM

## 2017-06-02 RX ORDER — NICOTINE 21 MG/24HR
1 PATCH, TRANSDERMAL 24 HOURS TRANSDERMAL DAILY
Status: DISCONTINUED | OUTPATIENT
Start: 2017-06-02 | End: 2017-06-06 | Stop reason: HOSPADM

## 2017-06-02 RX ORDER — CALCIUM CARBONATE 500 MG/1
500-1000 TABLET, CHEWABLE ORAL 4 TIMES DAILY PRN
Status: DISCONTINUED | OUTPATIENT
Start: 2017-06-02 | End: 2017-06-06 | Stop reason: HOSPADM

## 2017-06-02 RX ORDER — DIAZEPAM 5 MG
5-20 TABLET ORAL EVERY 30 MIN PRN
Status: DISCONTINUED | OUTPATIENT
Start: 2017-06-02 | End: 2017-06-06 | Stop reason: HOSPADM

## 2017-06-02 RX ORDER — NALOXONE HYDROCHLORIDE 0.4 MG/ML
.1-.4 INJECTION, SOLUTION INTRAMUSCULAR; INTRAVENOUS; SUBCUTANEOUS
Status: DISCONTINUED | OUTPATIENT
Start: 2017-06-02 | End: 2017-06-06 | Stop reason: HOSPADM

## 2017-06-02 RX ORDER — PHENOBARBITAL 32.4 MG/1
30 TABLET ORAL ONCE
Status: COMPLETED | OUTPATIENT
Start: 2017-06-02 | End: 2017-06-02

## 2017-06-02 RX ORDER — LANOLIN ALCOHOL/MO/W.PET/CERES
100 CREAM (GRAM) TOPICAL DAILY
Status: COMPLETED | OUTPATIENT
Start: 2017-06-02 | End: 2017-06-04

## 2017-06-02 RX ORDER — PHENOBARBITAL 32.4 MG/1
32.4 TABLET ORAL 3 TIMES DAILY
Status: DISCONTINUED | OUTPATIENT
Start: 2017-06-02 | End: 2017-06-02

## 2017-06-02 RX ORDER — ONDANSETRON 2 MG/ML
4 INJECTION INTRAMUSCULAR; INTRAVENOUS EVERY 6 HOURS PRN
Status: DISCONTINUED | OUTPATIENT
Start: 2017-06-02 | End: 2017-06-06 | Stop reason: HOSPADM

## 2017-06-02 RX ORDER — MULTIPLE VITAMINS W/ MINERALS TAB 9MG-400MCG
1 TAB ORAL DAILY
Status: DISCONTINUED | OUTPATIENT
Start: 2017-06-02 | End: 2017-06-06 | Stop reason: HOSPADM

## 2017-06-02 RX ORDER — LIDOCAINE 40 MG/G
CREAM TOPICAL
Status: DISCONTINUED | OUTPATIENT
Start: 2017-06-02 | End: 2017-06-06 | Stop reason: HOSPADM

## 2017-06-02 RX ORDER — QUETIAPINE FUMARATE 25 MG/1
25 TABLET, FILM COATED ORAL EVERY MORNING
Status: DISCONTINUED | OUTPATIENT
Start: 2017-06-02 | End: 2017-06-05

## 2017-06-02 RX ORDER — HEPARIN SODIUM,PORCINE 10 UNIT/ML
5-10 VIAL (ML) INTRAVENOUS
Status: DISCONTINUED | OUTPATIENT
Start: 2017-06-02 | End: 2017-06-06 | Stop reason: HOSPADM

## 2017-06-02 RX ORDER — LORAZEPAM 1 MG/1
1-4 TABLET ORAL EVERY 30 MIN PRN
Status: DISCONTINUED | OUTPATIENT
Start: 2017-06-02 | End: 2017-06-02

## 2017-06-02 RX ORDER — FOLIC ACID 1 MG/1
1 TABLET ORAL DAILY
Status: DISCONTINUED | OUTPATIENT
Start: 2017-06-02 | End: 2017-06-06 | Stop reason: HOSPADM

## 2017-06-02 RX ORDER — QUETIAPINE FUMARATE 25 MG/1
50 TABLET, FILM COATED ORAL AT BEDTIME
Status: DISCONTINUED | OUTPATIENT
Start: 2017-06-02 | End: 2017-06-05

## 2017-06-02 RX ORDER — DEXTROSE MONOHYDRATE, SODIUM CHLORIDE, AND POTASSIUM CHLORIDE 50; 1.49; 9 G/1000ML; G/1000ML; G/1000ML
INJECTION, SOLUTION INTRAVENOUS CONTINUOUS
Status: DISCONTINUED | OUTPATIENT
Start: 2017-06-02 | End: 2017-06-06 | Stop reason: HOSPADM

## 2017-06-02 RX ORDER — HEPARIN SODIUM,PORCINE 10 UNIT/ML
5-10 VIAL (ML) INTRAVENOUS EVERY 24 HOURS
Status: DISCONTINUED | OUTPATIENT
Start: 2017-06-02 | End: 2017-06-06 | Stop reason: HOSPADM

## 2017-06-02 RX ORDER — QUETIAPINE FUMARATE 25 MG/1
25 TABLET, FILM COATED ORAL EVERY MORNING
Status: DISCONTINUED | OUTPATIENT
Start: 2017-06-03 | End: 2017-06-02

## 2017-06-02 RX ORDER — GABAPENTIN 400 MG/1
800 CAPSULE ORAL 3 TIMES DAILY
Status: DISCONTINUED | OUTPATIENT
Start: 2017-06-02 | End: 2017-06-05

## 2017-06-02 RX ADMIN — POTASSIUM CHLORIDE, DEXTROSE MONOHYDRATE AND SODIUM CHLORIDE: 150; 5; 900 INJECTION, SOLUTION INTRAVENOUS at 09:49

## 2017-06-02 RX ADMIN — NICOTINE POLACRILEX 2 MG: 2 GUM, CHEWING ORAL at 23:45

## 2017-06-02 RX ADMIN — DIAZEPAM 10 MG: 5 TABLET ORAL at 18:34

## 2017-06-02 RX ADMIN — GABAPENTIN 800 MG: 400 CAPSULE ORAL at 13:27

## 2017-06-02 RX ADMIN — PHENOBARBITAL 32.4 MG: 32.4 TABLET ORAL at 00:56

## 2017-06-02 RX ADMIN — QUETIAPINE FUMARATE 25 MG: 25 TABLET, FILM COATED ORAL at 13:27

## 2017-06-02 RX ADMIN — MULTIPLE VITAMINS W/ MINERALS TAB 1 TABLET: TAB at 08:25

## 2017-06-02 RX ADMIN — PHENOBARBITAL 32.4 MG: 32.4 TABLET ORAL at 08:25

## 2017-06-02 RX ADMIN — GABAPENTIN 800 MG: 400 CAPSULE ORAL at 20:39

## 2017-06-02 RX ADMIN — POTASSIUM CHLORIDE, DEXTROSE MONOHYDRATE AND SODIUM CHLORIDE: 150; 5; 900 INJECTION, SOLUTION INTRAVENOUS at 02:16

## 2017-06-02 RX ADMIN — NICOTINE 1 PATCH: 21 PATCH, EXTENDED RELEASE TRANSDERMAL at 15:15

## 2017-06-02 RX ADMIN — DIAZEPAM 10 MG: 5 TABLET ORAL at 23:30

## 2017-06-02 RX ADMIN — NICOTINE POLACRILEX 2 MG: 2 GUM, CHEWING ORAL at 20:39

## 2017-06-02 RX ADMIN — POTASSIUM CHLORIDE, DEXTROSE MONOHYDRATE AND SODIUM CHLORIDE: 150; 5; 900 INJECTION, SOLUTION INTRAVENOUS at 16:55

## 2017-06-02 RX ADMIN — QUETIAPINE FUMARATE 50 MG: 25 TABLET, FILM COATED ORAL at 21:26

## 2017-06-02 RX ADMIN — FOLIC ACID 1 MG: 1 TABLET ORAL at 08:25

## 2017-06-02 RX ADMIN — Medication 100 MG: at 08:26

## 2017-06-02 RX ADMIN — DIAZEPAM 5 MG: 5 TABLET ORAL at 10:44

## 2017-06-02 RX ADMIN — DIAZEPAM 10 MG: 5 TABLET ORAL at 13:27

## 2017-06-02 ASSESSMENT — ACTIVITIES OF DAILY LIVING (ADL)
COGNITION: 0 - NO COGNITION ISSUES REPORTED
SWALLOWING: 0-->SWALLOWS FOODS/LIQUIDS WITHOUT DIFFICULTY
BATHING: 0-->INDEPENDENT
DRESS: 0-->INDEPENDENT
RETIRED_COMMUNICATION: 0-->UNDERSTANDS/COMMUNICATES WITHOUT DIFFICULTY
TOILETING: 0-->INDEPENDENT
TRANSFERRING: 0-->INDEPENDENT
AMBULATION: 0-->INDEPENDENT
RETIRED_EATING: 0-->INDEPENDENT
FALL_HISTORY_WITHIN_LAST_SIX_MONTHS: NO

## 2017-06-02 NOTE — ED NOTES
Bed: HW05  Expected date: 6/1/17  Expected time: 10:03 PM  Means of arrival: Ambulance  Comments:  [facility]  32y M ETOH clonazepam withdrawls

## 2017-06-02 NOTE — CONSULTS
"Social Work: Assessment with Discharge Plan    Patient Name:  Jonny Gramajo  :  1985  Age:  32 year old  MRN:  [id number]  Risk/Complexity Score:  Filed Complexity Screen Score: 3  Completed assessment with:  Pt, his sisters, Dr Fatima and Dr. Holden    Presenting Information   Reason for Referral:  Substance abuse concerns  Date of Intake:  2017  Referral Source:  Physician  Decision Maker:  pt  Alternate Decision Maker:  Mother Lacey (pt's wife Marce is \"Struggling with her own issues right now\" [phone]  Health Care Directive:  Declined completing  Living Situation:  House  Previous Functional Status:  Independent  Patient and family understanding of hospitalization:  Seeking assistance with Detox and to re engage in sobriety. Pt has had 10 years successful sobriety  Cultural/Language/Spiritual Considerations:    Adjustment to Illness:  Pt is emotion, accepting, knowing he needs help is finding comfort in his Anab[employer]ist Karlene    Physical Health  Reason for Admission:    1. Benzodiazepine withdrawal, uncomplicated (H)    2. Alcohol intoxication, uncomplicated (H)      Services Needed/Recommended:  Other:  home w/homecare    Mental Health/Chemical Dependency  Diagnosis:  Psychosis, Alcohol Abuse disorder  Support/Services in Place:  Pt has several therapists and counselors  Services Needed/Recommended:  CD treatment, pt is interested in pursuing IOP. Would be agreeable to having IOP     Support System  Significant relationship at present time:  Pt's sisters were at bedside, pt wanted them in room for interview, provided consent for his mother, Lacey to be primary contact  Family of origin is available for support:  yes  Other support available:  Pt has behavioral health support through therapists  Gaps in support system:  None noted  Patient is caregiver to:  None     Provider Information   Primary Care Physician:  Pallas, Kenneth G   [phone]   Clinic:  [facility] " [address]      :      Financial   Income Source:  Self employed  Financial Concerns:  None noted  Insurance:    Payor/Plan Subscriber Name Rel Member # Group #   [employer] * AMY SUAREZ  [id number]      [address]       Discharge Plan   Patient and family discharge goal:  Pt agreeable to DC home. Hoping for RN to set up medications in lock box so he has some supervision with medications. His sister supported this, stating pt has history of not being able to manage his own medications due to cognitive issues related to or caused by tick born illness  Provided education on discharge plan:  YES  Patient agreeable to discharge plan:  YES  A list of Medicare Certified Facilities was provided to the patient and/or family to encourage patient choice. Patient's choices for facility are:  NA  Will NH provide Skilled rehabilitation or complex medical:  NA  General information regarding anticipated insurance coverage and possible out of pocket cost was discussed. Patient and patient's family are aware patient may incur the cost of transportation to the facility, pending insurance payment: YES  Barriers to discharge:  Medical stability    Discharge Recommendations   Anticipated Disposition:  Home with services  Transportation Needs:  Other:  to be determined, family may be present  Name of Transportation Company and Phone:  To be determined    Additional comments   Pt states intent to complete medical detox and pursue IOP treatment to re-engage in sobriety. Writer provided education to pt with his family present to have CD Eval completed. Pt would be interested in having CD Eval at [facility] and having CD treatment with them. Pt had 10 years of sobriety with not abusing his clonazepam; he understands he may need to find another medication to assist with his anxiety or psychosis.    Pt's sisters endorse that pt has support and involvement of his family; they support that pt  has 2 tick born illnesses and has been seeking cures for them with some experimental treatments that have left him with side effects.  This has caused confusion about what is organically depression, mental illness and what is side effect of treatments or what has been caused by Lyme's disease.    Pt has several outpt therapists and psychiatrists. He requests writer contact his Therapist, Mickie Fox ([facility] Dr. Castellano) at .     Writer provided support, reassurance and validation. SW con't to follow for further CD resources.

## 2017-06-02 NOTE — PLAN OF CARE
"Problem: Goal Outcome Summary  Goal: Goal Outcome Summary  Outcome: No Change  A/O x 4. VSS. Pt has had mood swings this shift from calm, to excitable and anxious. Concerned that he was going to \"seize\" from  klonopin withdrawal. Seen by psych( see note) , pt started on gabapentin and seraquil. MSSA 8 and 10, valium given per protocol. Pt states he feels better at this time. PICC R ac for home abx due to lyme's disease. Pt states he has a nurse that has been coming out to his house to help managed it. Pt is able to make needs known. Continue POC.      "

## 2017-06-02 NOTE — PLAN OF CARE
Problem: Goal Outcome Summary  Goal: Goal Outcome Summary  Admit from ED at 0100. Slurred speech, lethargic, oriented.VSS.   Patient reports diffuse body pain. Patient sleeping majority of shift.  PIV infusing. Clear liquid diet, tolerating well.  Voiding well on own. Urine drug screen positive for ethanol.  Plan to transfer to detox.  Call light within reach, continue with plan of care.

## 2017-06-02 NOTE — H&P
History and Physical     Jonny Gramajo MRN# [id number]   YOB: 1985 Age: 32 year old      Date of Admission:  6/1/2017      CC:  Clonazepam withdrawal.     HPI: Obtained from the patient, chart review, ED Provider.   Limited as patient very sleepy, unable to provide much history.     Jonny Gramajo is a 32 year old male who presents to the ED intoxicated with alcohol, stating that he is going through benzodiazepine (clonazepam)  withdrawal.    Per ED:   He states he s been prescribed Klonopin for about 10 years now.  He states he had been using it as directed up until the last 2 months or so.  He states that he has gone through about a 90 day supply in less than 1 month.  He states most recently he has been using about 4mg per day.  He states that his last use was about 30 hours ago.  He states he had been sober for a couple of weeks from alcohol, but started drinking today as he was afraid of having Klonopin withdrawal and having a seizure.      No history of seizures in the past.  He has had problems with alcohol for a long time but states that he s had long periods of sobriety as well.  He states that he drank about 12 shots of vodka today.  During my eval, patient very sleepy , arousable w repeated verbal tactile stimuli however goes back to sleep quickly. Says he is fine, just sleepy from medication he had in ED. Denies any NV or pain abdomen or cp or sob or cough.   Noted picc line in patient R arm. Says its for his chronic lyme treatment.   Says he is on iv antibiotics at home, but unable to provide me any further details.       Given, Zyprexa, Phenobarbital in ED,     Past Medical History:  Past Medical History:   Diagnosis Date     Anxiety      Depressive disorder      Hypertension      Lyme disease     - Benzodiazepine and alcohol use disorder.     Past Surgical History:  Past Surgical History:   Procedure Laterality Date     GENITOURINARY SURGERY      testical recessed       Allergies:    No Known Allergies    Medications:    No current facility-administered medications on file prior to encounter.   Current Outpatient Prescriptions on File Prior to Encounter:  metroNIDAZOLE (FLAGYL) 5 mg/mL Inject into the vein every 6 hours   ValACYclovir HCl (VALTREX PO)    CefTRIAXone Sodium (ROCEPHIN IV)    doxycycline monohydrate (VIBRAMYCIN) 25 MG/5ML SUSR Take by mouth 2 times daily   Amphetamine-Dextroamphetamine (ADDERALL PO)    clonazePAM (KLONOPIN) 0.5 MG tablet Take 0.5-1 tablets (0.25-0.5 mg) by mouth 3 times daily as needed for anxiety       Social History:  Social History     Social History     Marital status:      Spouse name: N/A     Number of children: N/A     Years of education: N/A     Occupational History     Not on file.     Social History Main Topics     Smoking status: Never Smoker     Smokeless tobacco: Never Used     Alcohol use No     Drug use: No     Sexual activity: Not on file     Other Topics Concern     Not on file     Social History Narrative       Family History:   Reviewed   No Pertinent hx reported.     ROS:  The remainder of the complete ROS was negative unless noted in the HPI.      Exam:    /63  Pulse 71  Temp 96.4  F (35.8  C) (Oral)  Resp 16  SpO2 94%    Temp (24hrs), Av.2  F (36.2  C), Min:96.4  F (35.8  C), Max:98  F (36.7  C)      Wt Readings from Last 5 Encounters:   17 (!) 136.2 kg (300 lb 4.3 oz)   05/10/17 (!) 136.2 kg (300 lb 4.3 oz)   17 (!) 137.4 kg (303 lb)   17 (!) 137.4 kg (303 lb)   10/21/16 (!) 142 kg (313 lb)       General: lying on bed, sleeping, snoring, arousable & goes back to sleep quickly.  NAD  HEENT: AT/NC, sclera anicteric, PERRL, Moist MM  Neck: Supple, no JVD or LNpathy  Resp/chest: clear to auscultation bilaterally, no crackles or wheezes  Cardiac/Heart: s1s2 regular rate and rhythm, no murmur  GI/Abdomen: Soft, nontender, nondistended. +BS.  No rebound or guarding.  MSK/Extremities: distally warm, well  perfused.   Skin: Warm and dry, no jaundice or rash around R arm picc line    Neuro: Alert & oriented x 3, Cns 2-12 intact  R arm: picc line.       Labs:    BMP    Recent Labs  Lab 06/02/17  0004   *   POTASSIUM 3.7   CHLORIDE 108   JAYLEN 8.9   CO2 27   BUN 13   CR 0.83   *     CBC    Recent Labs  Lab 06/02/17  0621 06/02/17  0004   WBC 5.9 7.8   RBC 4.74 4.86   HGB 15.6 16.6   HCT 44.4 45.9   MCV 94 94   MCH 32.9 34.2*   MCHC 35.1 36.2   RDW 13.9 13.9    202       Imaging: No results found for this or any previous visit (from the past 24 hour(s)).        Assessment/ Plan:    Admitted with concern for Benzodiazepine (clonazepam) withdrawal and alcohol intoxication. ABT: 0.204    # Benzodiazepine dependence and withdrawal     Alcohol abuse.   - @ current stable vitals. Sleepy after phenobarb and zyprexa in ED.   - Keep on Pheno barbital 32.4 TID   - Monitor using MSSA.   - MVI, thiamine, folic acid daily.   - Seizure precautions, pads, Tele.   - fall precautions. Bed alarm.   - Psychiatry/CD/SW consult in am.   - sitter per rn discretion.     # Chronic lyme disease:   - on iv antibiotics, unable to provide further details  - Clarify in am, and continue as PTA  - picc site care    * Please verify home medications and any other medical hx in am when patient more awake.     FEN: adat to clears when awake. IVF d5ns w kcl.   Prophylaxis: mechanical .  IV Access: PICC R arm  Full code.     Disposition: inpatient. Tele. (no detox bed available at current)    D/w RN    Mustapha Browning MD  House Physician (Staff Hospitalist)  [facility]  Pager: [phone]    6/2/2017

## 2017-06-02 NOTE — PROGRESS NOTES
"SPIRITUAL HEALTH SERVICES  SPIRITUAL ASSESSMENT Progress Note  [facility] ([facility]) 10A     PRIMARY FOCUS:     Support for coping    ILLNESS CIRCUMSTANCES:   Reviewed documentation. Initial visit due to pt request for  on admission.  Reflective conversation shared with Jonny which integrated elements of illness, hope and karlene.    Context of Serious Illness/Symptom(s) - Jonny did not recall asking to see a  but asked me to visit. I engaged him in reflective conversation around his hospitalization, his experience of karlene and hope in the midst of a \"new recovery journey.\" Jonny shared of his work in AA and that he is \"at the bottom of another mountain\" as he needs to recover from two addictions.    Resources for Support - Jonny spoke of his wife as a strong support for him.    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 I was sober for 10 years.\" He reflected on how people's misunderstanding and hurtful responses to the symptoms he experiences with Lyme's disease caused him to isolate, lose his source of support, and eventually begin drinking and misusing his medication. Jonny states that he knows that he wants to \"beat this\" and get into a treatment program but struggles because \"I can't see the steps from here to how to get there (the treatment program.)    Congregation Distress - Jonny noted \"I wonder almost every day if God is real.\" and reflected on his upbringing in the Anglican Catholic and how he's \"explored a bit and found my way back.\"    Social/Cultural/Economic Distress -     SPIRITUAL/Episcopalian COPING:     Yarsanism/Karlene - Advent    Spiritual Practice(s) - Jonny shared of a helpful moment recently where he experienced the closeness of God and noted how helpful this is in affirming his karlene. I explored with him ways that he could bring together the experiential " "nature of his wife's background in Yarsani Scientologist with a grounding in truth / \"the Word\" that comes from his Faith upbringing.    Emotional/Relational/Existential Connections -      GOALS OF CARE:    Goals of Care - Jonny was wondering how long he may need to stay in the hospital due to withdrawal, he asked me to check with staff and I followed up with his nurse to get back to him.    Meaning/Sense-Making - Jonny noted his sense of hope as we affirmed that it is an important first step that he has a sense of what brought him to this point and that \"I have the skills to cope and recover. I have been through that program already. I am ready for something deeper.\"    PLAN: Jonny is aware of how to reach the  through his nurse, [facility] remains available for support as Jonny is on my unit.    Cleopatra Montelongo MDiv  Chaplain Resident  Pager [phone]    "

## 2017-06-02 NOTE — CONSULTS
"DATE OF SERVICE:  06/02/2017      IDENTIFICATION:  Mr. Jonny Gramajo is a 32-year-old  white male who is currently hospitalized for intoxication/withdrawal.  He apparently used a 90-day supply of Klonopin too quickly.  He reports he was taking about 4 mg a day, but the total dose is somewhat unclear.  In looking through the state prescription record, it does appear that he was receiving prescriptions from 3 different providers including [place], [place] and [place].  He is also treated with an atypical antipsychotic Vraylar for \"images and apparitions\".      Apparently the patient became concerned that he might have withdrawal from clonazepam and began drinking.  He reports that 2 weeks ago he was drinking large quantities every other day, was then off of alcohol for 2 weeks, and prior to coming in last night took 12-14 shots of vodka and blew a 0.204 on admission.      Obviously this story may be somewhat inaccurate.  It seems unlikely that he would be able to tolerate 14 shots of vodka had he truly been sober for 2 weeks.  He has a PICC line in for \"chronic Lyme's disease\" and treats himself with antibiotics 4 times a day.      When I first went in to see the patient he reported he just wanted to leave the hospital.  The second time I checked on him he was asking for help claiming that he was \"seizing up.\"  He did score on the MSSA protocol and received 5 mg of Valium.  I attempted to reassure him that he is very unlikely to \"seize up\" from Klonopin withdrawal since its half life is significantly longer than he has been off the drug.      The patient reports that he does take gabapentin at home and he has had Seroquel in the past.  Because we do not have Vraylar available in this hospital, I will be recommending treating him with the high dose gabapentin protocol along with the MSSA and also Seroquel.  If the patient demands to leave, it would be much safer for him if he continued on the Neurontin " protocol and that way we would not need to give him benzodiazepines should he demand to leave the hospital.      We know very little about this gentleman's history from the electronic medical record, but we do know from the prescription monitoring service that he is on Adderall, that has been held, and I would continue to hold it.  I am very unlikely to prescribe Adderall to individuals who are on antipsychotic medications.  Antipsychotics tend to block dopamine, Adderall releases dopamine and it seems like a poor combination.      PAST MEDICAL HISTORY:  Anxiety, depression, hypertension, Lyme's disease and the patient reports history of psychosis.      PAST SURGICAL HISTORY:  Includes genitourinary surgery.      FAMILY HISTORY:  The patient denies a positive family history for drugs or psychiatric issues.      SOCIAL HISTORY:  The patient reports that he lives with his wife in [place].  He has had intermittent problems with alcohol and also has a past history of high-dose marijuana.  He is not a cigarette smoker and has quit using marijuana for several years.      ALLERGIES:  The patient has no known drug allergies.      REVIEW OF SYSTEMS:  On my interview, the patient denied headache or problems with vision or hearing.  He said he had mild shortness of breath but no chest pain.  He denied abdominal pain, diarrhea, constipation, genitourinary symptoms or problems with muscles, skin or joints.  There are no known endocrinopathies.      MENTAL STATUS EXAMINATION:  This patient's mental status exam is somewhat labile.  When I first saw him, he was quite jovial and wanting to leave the hospital.  His mood was good.  His affect was full.  His speech was coherent and goal oriented.  Content of thought was without psychosis or suicidal ideation.  Recent and remote memory, concentration, fund of knowledge and use of language were within normal limits.  He was alert and oriented x3.  Insight and judgment was somewhat guarded.  " Muscle strength and tone appear to be within normal limits.  This changed slightly on my second interview which was approximately 20 minutes later when he appeared acutely anxious, claimed he was \"seizing up\" and appeared at the edge of a panic attack but calmed down quickly when he received reassurance from both the nurse and myself.      ASSESSMENT:  Apparently, this patient has a history of a mood disorder and psychosis, not otherwise specified.  He has a history of alcohol use disorder and benzodiazepine use disorder.  He was admitted with alcohol intoxication and is now apparently in some form of withdrawal.  I am quite concerned about his treatment for Lyme's disease and will be recommending an Infectious Disease consultation.      RECOMMENDATION   1.  Continue MSSA protocol and make sure the patient is getting thiamine.   2.  Neurontin 800 mg p.o. t.i.d.   3.  Seroquel 25 mg in the morning and 50 mg at bedtime along with 50 mg p.o. b.i.d. p.r.n. for psychosis.        Finally, if the patient changes his mind and again demands to leave, I would discharge him on the high dose Neurontin protocol.  If he develops any signs of hypertension, I would add clonidine.         JAD FINCH MD             D: 2017 11:13   T: 2017 12:27   MT: PARISA      Name:     JONNY SUAREZ   MRN:      [id number]        Account:       [id number]   :      1985           Consult Date:  2017      Document: [id number]      "

## 2017-06-02 NOTE — ED NOTES
ED to Floor Handoff      S:  Jonny Gramajo is a 32 year old male who speaks English and lives with a spouse,  in a home  They arrived in the ED by ambulance from home with a complaint of Alcohol Problem (Withdrawl from ativan, drinking today to help with clonazepam withdrawl.)    Initial vitals were:   BP: 121/67  Pulse: 85  Heart Rate: 85  Temp: 97.1  F (36.2  C)  Resp: 20  SpO2: 95 %  Allergies: No Known Allergies.  The meds given in the ED and their home medications are:   No current facility-administered medications for this encounter.      Current Outpatient Prescriptions   Medication     metroNIDAZOLE (FLAGYL) 5 mg/mL     ValACYclovir HCl (VALTREX PO)     CefTRIAXone Sodium (ROCEPHIN IV)     doxycycline monohydrate (VIBRAMYCIN) 25 MG/5ML SUSR     Amphetamine-Dextroamphetamine (ADDERALL PO)     clonazePAM (KLONOPIN) 0.5 MG tablet     Social demographics are   Social History     Social History     Marital status:      Spouse name: N/A     Number of children: N/A     Years of education: N/A     Social History Main Topics     Smoking status: Never Smoker     Smokeless tobacco: Never Used     Alcohol use No     Drug use: No     Sexual activity: Not Asked     Other Topics Concern     None     Social History Narrative       B:   The patient has been ill for 1 day(s) and during this time the symptoms have remained the same.  In the ED was diagnosed with   Final diagnoses:   Benzodiazepine withdrawal, uncomplicated (H)   Alcohol intoxication, uncomplicated (H)    Infection/sepsis suspected:No Isolation type; No active isolations   A:   In the ED these meds were given:   Medications   OLANZapine (zyPREXA) injection 10 mg (10 mg Intramuscular Given 6/1/17 8233)   PHENobarbital (LUMINAL) tablet 32.4 mg (32.4 mg Oral Given 6/2/17 0056)     Drips running?  No  Labs results   Labs Ordered and Resulted from Time of ED Arrival Up to the Time of Departure from the ED   CBC WITH PLATELETS DIFFERENTIAL - Abnormal; Notable  for the following:        Result Value    MCH 34.2 (*)     All other components within normal limits   BASIC METABOLIC PANEL - Abnormal; Notable for the following:     Sodium 145 (*)     Glucose 111 (*)     All other components within normal limits   ALCOHOL BREATH TEST POCT - Abnormal; Notable for the following:     Alcohol Breath Test 0.204 (*)     All other components within normal limits     Imaging Studies: No results found for this or any previous visit (from the past 24 hour(s)).  Recent vital signs /75  Pulse 74  Temp 98  F (36.7  C) (Oral)  Resp 18  SpO2 99%  Cardiac Rhythm: ,      Abnormal labs/tests/findings requiring intervention:---  Pain control: pt had none  Nausea control: pt had none  R:   Transfer assistance needed: Independent  Family present during ED course? No   Family currently present? No  Pt needs tele? No  Code Status: Full Code  Tasks needing to be completed:---    Luisana Ortiz asc-- [phone]  [phone] [facility]  [phone] [facility] ED

## 2017-06-02 NOTE — ED PROVIDER NOTES
History     Chief Complaint   Patient presents with     Alcohol Problem     Withdrawl from ativan, drinking today to help with clonazepam withdrawl.     HPI  Jonny Gramajo is a 32 year old male who presents to the Emergency Department today intoxicated, stating that he is having benzodiazepine withdrawal.  He states he s been prescribed Klonopin for about 10 years now.  He states he had been using it as directed up until the last 2 months or so.  He states that he has gone through about a 90 day supply in less than 1 month.  He states most recently he has been using about 4mg per day.  He states that his last use was about 30 hours ago.  He states he had been sober for a couple of weeks from alcohol, but started drinking today as he was afraid of having Klonopin withdrawal and having a seizure.  No history of seizures in the past.  He has had problems with alcohol for a long time but states that he s had long periods of sobriety as well.  He states that he drank about 12 shots of vodka today.  He does have some nausea but no vomiting.  No abdominal pain, cough, shortness of breath, or any specific complaints other than he feels somewhat lightheaded.  He denies suicidal ideation.    I have reviewed the Medications, Allergies, Past Medical and Surgical History, and Social History in the [employer] system.    Past Medical History:   Diagnosis Date     Anxiety      Depressive disorder      Hypertension      Lyme disease        Past Surgical History:   Procedure Laterality Date     GENITOURINARY SURGERY      testical recessed       No family history on file.    Social History   Substance Use Topics     Smoking status: Never Smoker     Smokeless tobacco: Never Used     Alcohol use No     Current Facility-Administered Medications   Medication     naloxone (NARCAN) injection 0.1-0.4 mg     dextrose 5% and 0.9% NaCl with potassium chloride 20 mEq infusion     ondansetron (ZOFRAN-ODT) ODT tab 4 mg    Or     ondansetron (ZOFRAN)  injection 4 mg     calcium carbonate (TUMS) chewable tablet 500-1,000 mg     thiamine tablet 100 mg     folic acid (FOLVITE) tablet 1 mg     multivitamin, therapeutic with minerals (THERA-VIT-M) tablet 1 tablet     lidocaine 1 % 1 mL     lidocaine (LMX4) kit     sodium chloride (PF) 0.9% PF flush 10-20 mL     heparin lock flush 10 UNIT/ML injection 5-10 mL     heparin lock flush 10 UNIT/ML injection 5-10 mL     diazepam (VALIUM) tablet 5-20 mg      No Known Allergies    Review of Systems   Respiratory: Negative for cough and shortness of breath.    Gastrointestinal: Positive for nausea. Negative for abdominal pain and vomiting.   Neurological: Positive for light-headedness.   Psychiatric/Behavioral: Negative for suicidal ideas.   All other systems reviewed and are negative.      Physical Exam   BP: 121/67  Pulse: 85  Heart Rate: 85  Temp: 97.1  F (36.2  C)  Resp: 20  SpO2: 95 %  Physical Exam   Constitutional: No distress.   Grossly intoxicated   HENT:   Head: Atraumatic.   Mouth/Throat: Oropharynx is clear and moist. No oropharyngeal exudate.   Eyes: Pupils are equal, round, and reactive to light. No scleral icterus.   Cardiovascular: Normal heart sounds and intact distal pulses.    Pulmonary/Chest: Breath sounds normal. No respiratory distress.   Abdominal: Soft. There is no tenderness.   Musculoskeletal: He exhibits no edema or tenderness.   Skin: Skin is warm. No rash noted. He is not diaphoretic.   Psychiatric:   Anxious appearing       ED Course     ED Course     Procedures             Critical Care time:  none               Labs Ordered and Resulted from Time of ED Arrival Up to the Time of Departure from the ED   CBC WITH PLATELETS DIFFERENTIAL - Abnormal; Notable for the following:        Result Value    MCH 34.2 (*)     All other components within normal limits   BASIC METABOLIC PANEL - Abnormal; Notable for the following:     Sodium 145 (*)     Glucose 111 (*)     All other components within normal limits    ALCOHOL BREATH TEST POCT - Abnormal; Notable for the following:     Alcohol Breath Test 0.204 (*)     All other components within normal limits            Assessments & Plan (with Medical Decision Making)   The patient is currently intoxicated.  He did get somewhat agitated, seemed quite afraid of withdrawals.  No evidence of seizures here.  There are no beds here or elsewhere in the [facility].  I am concerned about possible seizure potential if I discharge him.  I will admit him to medicine.  He will likely need a phenobarbital withdrawal protocol.  He did get a dose of Zyprexa here and I do want to wait at least an hour prior to giving the phenobarbital after the Zyprexa.  I will discuss with pharmacy.     I have reviewed the nursing notes.    I have reviewed the findings, diagnosis, plan and need for follow up with the patient.    Current Discharge Medication List          Final diagnoses:   Benzodiazepine withdrawal, uncomplicated (H)   Alcohol intoxication, uncomplicated (H)   IAyana, am serving as a trained medical scribe to document services personally performed by Roxann Coates MD, based on the provider's statements to me.      Roxann DAVIS MD, was physically present and have reviewed and verified the accuracy of this note documented by Ayana Calzada.       6/1/2017   [facility], [place], EMERGENCY DEPARTMENT     Roxann Coates MD  06/02/17 4508

## 2017-06-03 LAB
C DIFF TOX B STL QL: NORMAL
GLUCOSE BLDC GLUCOMTR-MCNC: 104 MG/DL (ref 70–99)
LACTATE BLD-SCNC: 1.8 MMOL/L (ref 0.7–2.1)
SPECIMEN SOURCE: NORMAL

## 2017-06-03 PROCEDURE — 83605 ASSAY OF LACTIC ACID: CPT | Performed by: INTERNAL MEDICINE

## 2017-06-03 PROCEDURE — 36415 COLL VENOUS BLD VENIPUNCTURE: CPT | Performed by: INTERNAL MEDICINE

## 2017-06-03 PROCEDURE — 25000132 ZZH RX MED GY IP 250 OP 250 PS 637: Performed by: INTERNAL MEDICINE

## 2017-06-03 PROCEDURE — 12000001 ZZH R&B MED SURG/OB UMMC

## 2017-06-03 PROCEDURE — 99233 SBSQ HOSP IP/OBS HIGH 50: CPT | Performed by: INTERNAL MEDICINE

## 2017-06-03 PROCEDURE — 25800025 ZZH RX 258: Performed by: INTERNAL MEDICINE

## 2017-06-03 PROCEDURE — 87493 C DIFF AMPLIFIED PROBE: CPT | Performed by: INTERNAL MEDICINE

## 2017-06-03 PROCEDURE — 25000128 H RX IP 250 OP 636: Performed by: INTERNAL MEDICINE

## 2017-06-03 PROCEDURE — 99232 SBSQ HOSP IP/OBS MODERATE 35: CPT | Performed by: PSYCHIATRY & NEUROLOGY

## 2017-06-03 PROCEDURE — 00000146 ZZHCL STATISTIC GLUCOSE BY METER IP

## 2017-06-03 RX ORDER — QUETIAPINE FUMARATE 25 MG/1
50 TABLET, FILM COATED ORAL 2 TIMES DAILY PRN
Status: DISCONTINUED | OUTPATIENT
Start: 2017-06-03 | End: 2017-06-06 | Stop reason: HOSPADM

## 2017-06-03 RX ORDER — HYDRALAZINE HYDROCHLORIDE 25 MG/1
25 TABLET, FILM COATED ORAL EVERY 6 HOURS PRN
Status: DISCONTINUED | OUTPATIENT
Start: 2017-06-03 | End: 2017-06-06 | Stop reason: HOSPADM

## 2017-06-03 RX ORDER — LOPERAMIDE HCL 2 MG
2 CAPSULE ORAL 4 TIMES DAILY PRN
Status: DISCONTINUED | OUTPATIENT
Start: 2017-06-03 | End: 2017-06-06 | Stop reason: HOSPADM

## 2017-06-03 RX ADMIN — DIAZEPAM 10 MG: 5 TABLET ORAL at 03:55

## 2017-06-03 RX ADMIN — POTASSIUM CHLORIDE, DEXTROSE MONOHYDRATE AND SODIUM CHLORIDE: 150; 5; 900 INJECTION, SOLUTION INTRAVENOUS at 20:19

## 2017-06-03 RX ADMIN — MULTIPLE VITAMINS W/ MINERALS TAB 1 TABLET: TAB at 08:26

## 2017-06-03 RX ADMIN — NICOTINE POLACRILEX 2 MG: 2 GUM, CHEWING ORAL at 17:07

## 2017-06-03 RX ADMIN — GABAPENTIN 800 MG: 400 CAPSULE ORAL at 19:05

## 2017-06-03 RX ADMIN — CEFTAROLINE FOSAMIL 600 MG: 600 POWDER, FOR SOLUTION INTRAVENOUS at 14:11

## 2017-06-03 RX ADMIN — NICOTINE POLACRILEX 2 MG: 2 GUM, CHEWING ORAL at 22:03

## 2017-06-03 RX ADMIN — DIAZEPAM 10 MG: 5 TABLET ORAL at 08:24

## 2017-06-03 RX ADMIN — DIAZEPAM 10 MG: 5 TABLET ORAL at 12:09

## 2017-06-03 RX ADMIN — DIAZEPAM 5 MG: 5 TABLET ORAL at 20:18

## 2017-06-03 RX ADMIN — QUETIAPINE FUMARATE 50 MG: 25 TABLET, FILM COATED ORAL at 22:01

## 2017-06-03 RX ADMIN — NICOTINE 1 PATCH: 21 PATCH, EXTENDED RELEASE TRANSDERMAL at 08:27

## 2017-06-03 RX ADMIN — QUETIAPINE FUMARATE 50 MG: 25 TABLET, FILM COATED ORAL at 18:09

## 2017-06-03 RX ADMIN — CEFTAROLINE FOSAMIL 600 MG: 600 POWDER, FOR SOLUTION INTRAVENOUS at 01:12

## 2017-06-03 RX ADMIN — CALCIUM CARBONATE (ANTACID) CHEW TAB 500 MG 500 MG: 500 CHEW TAB at 09:12

## 2017-06-03 RX ADMIN — CALCIUM CARBONATE (ANTACID) CHEW TAB 500 MG 500 MG: 500 CHEW TAB at 05:48

## 2017-06-03 RX ADMIN — DIAZEPAM 10 MG: 5 TABLET ORAL at 16:17

## 2017-06-03 RX ADMIN — NICOTINE POLACRILEX 2 MG: 2 GUM, CHEWING ORAL at 16:17

## 2017-06-03 RX ADMIN — POTASSIUM CHLORIDE, DEXTROSE MONOHYDRATE AND SODIUM CHLORIDE: 150; 5; 900 INJECTION, SOLUTION INTRAVENOUS at 01:09

## 2017-06-03 RX ADMIN — POTASSIUM CHLORIDE, DEXTROSE MONOHYDRATE AND SODIUM CHLORIDE: 150; 5; 900 INJECTION, SOLUTION INTRAVENOUS at 11:15

## 2017-06-03 RX ADMIN — GABAPENTIN 800 MG: 400 CAPSULE ORAL at 08:26

## 2017-06-03 RX ADMIN — NICOTINE POLACRILEX 2 MG: 2 GUM, CHEWING ORAL at 18:06

## 2017-06-03 RX ADMIN — NICOTINE POLACRILEX 2 MG: 2 GUM, CHEWING ORAL at 19:05

## 2017-06-03 RX ADMIN — NICOTINE POLACRILEX 2 MG: 2 GUM, CHEWING ORAL at 20:18

## 2017-06-03 RX ADMIN — FOLIC ACID 1 MG: 1 TABLET ORAL at 08:26

## 2017-06-03 RX ADMIN — QUETIAPINE FUMARATE 25 MG: 25 TABLET, FILM COATED ORAL at 11:11

## 2017-06-03 RX ADMIN — Medication 100 MG: at 08:26

## 2017-06-03 RX ADMIN — HYDRALAZINE HYDROCHLORIDE 25 MG: 25 TABLET ORAL at 15:04

## 2017-06-03 RX ADMIN — GABAPENTIN 800 MG: 400 CAPSULE ORAL at 13:37

## 2017-06-03 RX ADMIN — NICOTINE POLACRILEX 2 MG: 2 GUM, CHEWING ORAL at 11:54

## 2017-06-03 NOTE — PLAN OF CARE
"Problem: Goal Outcome Summary  Goal: Goal Outcome Summary  Outcome: Improving  A&O. VSS. Labile mood. \" I feel like I have definitely turned a corner, I'm much better than yesterday!\" and later \"I feel like I'm falling off the edge of a kary\" Patient anxious and agitated at times and lethargic at others. MSSAs 11-14.  Patient complaint of diarrhea and indigestion. Regular diet, voiding well on own.   PICC infusing D5+NS+20K.  Call light within reach, continue with plan of care.          "

## 2017-06-03 NOTE — PROGRESS NOTES
SW called Detox intake to add pt to the referral list for Detox [phone].  Brief pt review with intake. Pt was added to the list. Transfer not likely today since detox is currently at capacity, but intake will contact [facility] ([phone]) directly when they can accept Pt to their unit.  Ana Nichols

## 2017-06-03 NOTE — PLAN OF CARE
Problem: Goal Outcome Summary  Goal: Goal Outcome Summary  Outcome: No Change  Alert and oriented. Tachycardic with HR in 100s-110s. BP elevated in afternoon at 150s-170s systolic/80s-90s diastolic, provider notified. Pt extremely anxious at times, calming techniques and re-direction used. Scheduled gabapentin and seroquel given. MSSA scores 10 and 12, given valium per MSSA protocol. Tolerating regular diet and fluids with no N/V. Voiding without difficulty. C/o diarrhea, C. diff came back negative. Up independently in room. PICC infusing. Able to make needs known. Continue with POC.       1500: Pt's BP still elevated (181/101), pt given prn hydralazine. Continue to monitor.

## 2017-06-03 NOTE — PROGRESS NOTES
Patient seen and examined with RN     S- feels better than yesterday but says still very anxious from withdrawal  And requiring frequent valium  Some diarrhea from withdrawal. No fever/ chills  4 point ROS done and neg unless mentioned     O-   /89 (BP Location: Left arm)  Pulse 72  Temp 96.7  F (35.9  C) (Oral)  Resp 22  SpO2 100%   Gen- pleasant   HEENT- NAD, MENDOZA  Neck- supple, no JVD elevation, no thyromegaly  CVS- I+II+ no m/r/g  RS- CTAB  Abdo- soft, no tenderness . No g/r/r  Ext- no edema   CNS- no gross focal signs. Oriented to person, place and time      LABS:   BMP  Recent Labs  Lab 06/02/17 0621 06/02/17  0004   * 145*   POTASSIUM 3.9 3.7   CHLORIDE 110* 108   JAYELN 8.7 8.9   CO2 28 27   BUN 14 13   CR 0.86 0.83   * 111*     CBC  Recent Labs  Lab 06/02/17  0621 06/02/17  0004   WBC 5.9 7.8   RBC 4.74 4.86   HGB 15.6 16.6   HCT 44.4 45.9   MCV 94 94   MCH 32.9 34.2*   MCHC 35.1 36.2   RDW 13.9 13.9    202     INRNo lab results found in last 7 days.  LFTs  Recent Labs  Lab 06/02/17  0621   ALKPHOS 68   AST 32   ALT 84*   BILITOTAL 0.3   PROTTOTAL 6.9   ALBUMIN 3.9        A/P:  #  Severe anxiety: on newer antipsychotics. D/W Psych- appreciate recs - started on Gabapentin (800 mg tid- as per patient was on 600mg tid) and Seroquel   # Alcohol withdrawal and BZD withdrawal ( has been taking 4 mg Ativan/ day)- ct MSSA protocol, monitor for withdrawal seizure  # Chronic lyme d/s- HOLD Abx- risk outweigh benefit this admission (was taking flagyl and Alcohol, has PICC and gives himself Abx with concerns for OD with BZD and Alcohol !)  - discussed with patient. Need treatment for detox and than follow with his ID MD as outpatient     _ dispo: will try to transfer to detox unit     Gianfranco Holden MD (Pager- [phone])   Internal Medicine/ Hospitalist

## 2017-06-03 NOTE — CONSULTS
"Psychiatry Follow-Up Consultation    Referred by Dr. Holden  Reason for follow up: management of substance use problems    ID: Patient was seen by Dr. Fatima yesterday. Pt.is a 32-year-old  white male who is currently hospitalized for intoxication/withdrawal.  He had a 90-day supply of Klonopin (obtained through multiple providers) and was taking 8 mg/day until past week or so when he was running short and decreased to 4 mg./day.  He reports being prescribed 2.5 mg /day Klonopin for 10 yrs for anxiety(?).  He reports to have neuropsychiatric complications of Lyme's disease.  When he ran out of Klonopin he resumed heavy alcohol use (for 2 week period early May drank in excess of 12/day) at >12 drinks/day.      Currently he is comfortable; he has received 20 mg Valium today per MSSA and yesterday 35 mg.  Also started on high dose gabapentin per Dr. Fatima, a good choice for both alcohol withdrawal and anxiety management.  Patient reports it helps his anxiety.  The patient is agreeable to further treatment of withdrawal on detox and desires IOP (though given severity of problem , inpatient/residential/lodging plus would be best option).      The patient has a long h/o substance use disorders, primarily alcohol.  He has had multiple treatments but none since 2005 and reports 10 yrs sobriety.  Use of alcohol resumed in 2016.  Prescribers of Klonopin are:  Mickie Fox, NP (seen 2x), Dr. Casiano, PCP, and Dr. Anthony (sp?) who prescribes \"floating prns.\" from [place].  Wife also has an alcohol problem.     PSYCH HX: murky.  He states he has been dxed with MDD with psychosis, schizoaffective disorder but sees most of his problems as secondary to Lyme's.  He endorces hearing voices even when not drinking, and seeing silhouettes an spheres of light.  Not currently.  Vylar (new anti-psychotic ) has completed \"turned them off.\". Mood now \"jovial\" and interested, motivated. No SI.  He reports being on \"over 70\" psych meds " by age 20.     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    MSE:  /89 (BP Location: Left arm)  Pulse 72  Temp 96.7  F (35.9  C) (Oral)  Resp 22  SpO2 100%   Mental Status:   Oriented person, place, time   General appearance:  Heavy Appropriate, well kept   Mood: jovial, full range of affect   Cognition: Appropriate for age   Orientation: Times three   Insight: limited   Memory: Appropriate long term / Short term limited by recent use   Psychosis: Denies   Suicidal / Homicidal Thoughts: Denies   Concentration good   Fund of knowl: good   Speech RRR, language intact   Musculo- normal , ambulatory    A: Patient with long h/o alcohol use d/o and now sedative use disorder complicated by an ill defined psychiatric diisorder and Lyme's disease and treatment with IV antibiotics via PICC line. I have some concern about hypomania given his expansiveness but does not exhibit motor/ speech patterns/lack of sleep.  DX:  Alcohol, sedative use disorders and withdrawal  Schizoaffective disorder vs MDD with psychosis, current fairly stable.  Anxiety NEC  Lyme's disease  REC:  Transfer to detox when bed available for completion of withdrawal and determination of further treatment.  OK to continue with Valium for mssa as it seems to be effective .  If problems with management, phenobarb is often used for BZ withdrawal. Continue gabapentin.  Seroquel ok unless too sedating then would dc the daytime doses and increase the night dose to 75 (he has been on this previously).

## 2017-06-03 NOTE — PROGRESS NOTES
Progress note:     Talked to Mr Gramajo regarding his chronic lyme treatment.   He states he was taking iv Ceftaroline 600 bid, flagyl and one more antibiotics (not sure)  Unable to find in [facility] (accessed after patient verbally consented)  Ordered iv Ceftaroline.  Hold flagyl given recent alcohol use. May resume after 48 hours.   Need to verify another medications either home infusion or ID clinic.   Will sign out to primary provider in the morning.      Moonlighter.

## 2017-06-04 PROBLEM — F13.930: Status: ACTIVE | Noted: 2017-06-02

## 2017-06-04 PROBLEM — F10.920 ALCOHOL INTOXICATION, UNCOMPLICATED (H): Status: ACTIVE | Noted: 2017-06-04

## 2017-06-04 LAB — LACTATE BLD-SCNC: 1.7 MMOL/L (ref 0.7–2.1)

## 2017-06-04 PROCEDURE — 25000132 ZZH RX MED GY IP 250 OP 250 PS 637: Performed by: INTERNAL MEDICINE

## 2017-06-04 PROCEDURE — 12000001 ZZH R&B MED SURG/OB UMMC

## 2017-06-04 PROCEDURE — 36415 COLL VENOUS BLD VENIPUNCTURE: CPT | Performed by: INTERNAL MEDICINE

## 2017-06-04 PROCEDURE — 99233 SBSQ HOSP IP/OBS HIGH 50: CPT | Performed by: INTERNAL MEDICINE

## 2017-06-04 PROCEDURE — 25800025 ZZH RX 258: Performed by: INTERNAL MEDICINE

## 2017-06-04 PROCEDURE — 25000128 H RX IP 250 OP 636: Performed by: INTERNAL MEDICINE

## 2017-06-04 PROCEDURE — 83605 ASSAY OF LACTIC ACID: CPT | Performed by: INTERNAL MEDICINE

## 2017-06-04 RX ADMIN — CEFTAROLINE FOSAMIL 600 MG: 600 POWDER, FOR SOLUTION INTRAVENOUS at 12:48

## 2017-06-04 RX ADMIN — GABAPENTIN 800 MG: 400 CAPSULE ORAL at 19:25

## 2017-06-04 RX ADMIN — POTASSIUM CHLORIDE, DEXTROSE MONOHYDRATE AND SODIUM CHLORIDE: 150; 5; 900 INJECTION, SOLUTION INTRAVENOUS at 16:00

## 2017-06-04 RX ADMIN — QUETIAPINE FUMARATE 50 MG: 25 TABLET, FILM COATED ORAL at 19:25

## 2017-06-04 RX ADMIN — DIAZEPAM 5 MG: 5 TABLET ORAL at 05:53

## 2017-06-04 RX ADMIN — NICOTINE 1 PATCH: 21 PATCH, EXTENDED RELEASE TRANSDERMAL at 07:53

## 2017-06-04 RX ADMIN — POTASSIUM CHLORIDE, DEXTROSE MONOHYDRATE AND SODIUM CHLORIDE: 150; 5; 900 INJECTION, SOLUTION INTRAVENOUS at 05:53

## 2017-06-04 RX ADMIN — QUETIAPINE FUMARATE 50 MG: 25 TABLET, FILM COATED ORAL at 05:09

## 2017-06-04 RX ADMIN — NICOTINE POLACRILEX 2 MG: 2 GUM, CHEWING ORAL at 19:25

## 2017-06-04 RX ADMIN — CALCIUM CARBONATE (ANTACID) CHEW TAB 500 MG 500 MG: 500 CHEW TAB at 00:08

## 2017-06-04 RX ADMIN — DIAZEPAM 5 MG: 5 TABLET ORAL at 01:15

## 2017-06-04 RX ADMIN — DIAZEPAM 10 MG: 5 TABLET ORAL at 00:09

## 2017-06-04 RX ADMIN — NICOTINE POLACRILEX 2 MG: 2 GUM, CHEWING ORAL at 18:17

## 2017-06-04 RX ADMIN — NICOTINE POLACRILEX 2 MG: 2 GUM, CHEWING ORAL at 17:15

## 2017-06-04 RX ADMIN — MULTIPLE VITAMINS W/ MINERALS TAB 1 TABLET: TAB at 07:52

## 2017-06-04 RX ADMIN — DIAZEPAM 10 MG: 5 TABLET ORAL at 14:12

## 2017-06-04 RX ADMIN — NICOTINE POLACRILEX 2 MG: 2 GUM, CHEWING ORAL at 05:10

## 2017-06-04 RX ADMIN — NICOTINE POLACRILEX 2 MG: 2 GUM, CHEWING ORAL at 15:42

## 2017-06-04 RX ADMIN — DIAZEPAM 10 MG: 5 TABLET ORAL at 10:07

## 2017-06-04 RX ADMIN — Medication 100 MG: at 07:52

## 2017-06-04 RX ADMIN — DIAZEPAM 10 MG: 5 TABLET ORAL at 17:42

## 2017-06-04 RX ADMIN — NICOTINE POLACRILEX 2 MG: 2 GUM, CHEWING ORAL at 12:50

## 2017-06-04 RX ADMIN — HYDRALAZINE HYDROCHLORIDE 25 MG: 25 TABLET ORAL at 12:47

## 2017-06-04 RX ADMIN — NICOTINE POLACRILEX 2 MG: 2 GUM, CHEWING ORAL at 00:09

## 2017-06-04 RX ADMIN — CEFTAROLINE FOSAMIL 600 MG: 600 POWDER, FOR SOLUTION INTRAVENOUS at 00:09

## 2017-06-04 RX ADMIN — DIAZEPAM 15 MG: 5 TABLET ORAL at 05:09

## 2017-06-04 RX ADMIN — GABAPENTIN 800 MG: 400 CAPSULE ORAL at 07:52

## 2017-06-04 RX ADMIN — NICOTINE POLACRILEX 2 MG: 2 GUM, CHEWING ORAL at 01:14

## 2017-06-04 RX ADMIN — FOLIC ACID 1 MG: 1 TABLET ORAL at 07:52

## 2017-06-04 RX ADMIN — GABAPENTIN 800 MG: 400 CAPSULE ORAL at 14:12

## 2017-06-04 NOTE — PLAN OF CARE
Problem: Goal Outcome Summary  Goal: Goal Outcome Summary  Outcome: No Change  Alert and oriented. Tachycardic with HR in low 100s. BPs elevated in afternoon, one dose prn hydralazine given. Pt anxious at times, calming techniques and re-direction used. MSSA scores 12 and 11, given valium per MSSA protocol. Tolerating regular diet and fluids with no N/V. Voiding without difficulty. BM today, pt states diarrhea is improving. Up independently in room and halls. PICC infusing. Able to make needs known. Continue with POC.

## 2017-06-04 NOTE — PROGRESS NOTES
Patient seen and examined with RN     S- no change, still very anxious with mild improvement.  No fever/ chills  4 point ROS done and neg unless mentioned     O-   /60 (BP Location: Left arm)  Pulse 88  Temp 99  F (37.2  C) (Oral)  Resp 24  SpO2 98%   Gen- pleasant   HEENT- NAD, MENDOZA  Neck- supple, no JVD elevation, no thyromegaly  CVS- I+II+ no m/r/g  RS- CTAB  Abdo- soft, no tenderness . No g/r/r  Ext- no edema   CNS- no gross focal signs. Oriented to person, place and time      LABS:   BMP    Recent Labs  Lab 06/02/17  0621 06/02/17  0004   * 145*   POTASSIUM 3.9 3.7   CHLORIDE 110* 108   JAYLEN 8.7 8.9   CO2 28 27   BUN 14 13   CR 0.86 0.83   * 111*     CBC    Recent Labs  Lab 06/02/17  0621 06/02/17  0004   WBC 5.9 7.8   RBC 4.74 4.86   HGB 15.6 16.6   HCT 44.4 45.9   MCV 94 94   MCH 32.9 34.2*   MCHC 35.1 36.2   RDW 13.9 13.9    202     INRNo lab results found in last 7 days.  LFTs    Recent Labs  Lab 06/02/17 0621   ALKPHOS 68   AST 32   ALT 84*   BILITOTAL 0.3   PROTTOTAL 6.9   ALBUMIN 3.9        A/P:  #  Severe anxiety: on newer antipsychotics. D/W Psych- appreciate recs - started on Gabapentin (800 mg tid- as per patient was on 600mg tid) and Seroquel  # Alcohol withdrawal and BZD withdrawal ( has been taking 4 mg Ativan/ day)- ct MSSA protocol, monitor for withdrawal seizure . Had 70 mg valium yesterday   - diarrhea from withdrawal- c.diff neg, ct prn imodium   # Chronic lyme d/s- HOLD Abx on transfer to detox - risk outweigh benefit this admission (was taking flagyl and Alcohol, has PICC and gives himself Abx with concerns for OD with BZD and Alcohol !)  - discussed with patient. Need treatment for detox and than follow with his ID MD as outpatient     _ dispo: A/W bed in detox unit     Gianfranco Holden MD (Pager- [phone])   Internal Medicine/ Hospitalist

## 2017-06-04 NOTE — PLAN OF CARE
Problem: Goal Outcome Summary  Goal: Goal Outcome Summary  Outcome: No Change  A&O, Pt complains of feeling anxious and agitated. Claims having continuous oxygen does help him feel less anxious and will put on and pull off as needed. Ambulates independently encouraged Pt to ambulate in rodriguez and sit in lounge for a change of scenery. Tachycardic and BP elevated. PRN seroquel given along with nicoderm gum hourly throughout shift. MSSA scores 11 and 8, given valium per MSSA protocol 10mg then 5mg. Pt will ask for re-evaluations and states it makes him nervous to decrease valium dosage as he feels he really needs it to function. Tolerating regular diet and fluids with no N/V. Voiding without difficulty. PICC infusing. Able to make needs known. Continue with POC.

## 2017-06-04 NOTE — PLAN OF CARE
"Problem: Goal Outcome Summary  Goal: Goal Outcome Summary  Patient extremely anxious, at start of shift patient had put himself on 11LPM of O2. Patient able to calm down and now on RA. Pacing hallway from 1900 to 0630 stating \"I feel like a fish in a fishbowl\". Following staff in hallway. PICC dressing replaced per pt request. Repeatedly asking when he can be MSSA scored again, and requesting that vitals be rechecked frequently. MSSA 20-8 this shift. High scores due to agitation and insomnia. Ambulates well independently, voiding well on own. No complaint of diarrhea. Continue POC.      "

## 2017-06-04 NOTE — PROGRESS NOTES
"SPIRITUAL HEALTH SERVICES  SPIRITUAL ASSESSMENT Progress Note  [facility] ([facility]) [facility]     REFERRAL SOURCE: Patient requested to see a  on Sunday, if possible, when he met with on-call  on Saturday.    Engaged in reflective conversation around Jonny's hospitalization and events leading up to it.  For a long time Jonny \"struggled feeling connected to God like when I was in AA.\"  But while in the ED he had a feeling of God's presence and also awareness of Phillip and the Holy Spirit near him.  He continues to feel similarly today and feels \"very bright and good.\"  He plans to return to AA and take the steps necessary to care for himself and he knows \"I almost lost my life.\"  I gave Jonny the Gnosticism bulletin from this mornings service in the hospital .    PLAN: No follow-up planned.  [facility] remains available for the duration of Jonny's hospitalization.     Muna Kraus MDiv.  Chaplain Resident  Pager [phone]  "

## 2017-06-05 PROCEDURE — 25000128 H RX IP 250 OP 636: Performed by: INTERNAL MEDICINE

## 2017-06-05 PROCEDURE — 25000132 ZZH RX MED GY IP 250 OP 250 PS 637: Performed by: INTERNAL MEDICINE

## 2017-06-05 PROCEDURE — 99233 SBSQ HOSP IP/OBS HIGH 50: CPT | Performed by: INTERNAL MEDICINE

## 2017-06-05 PROCEDURE — 25000125 ZZHC RX 250: Performed by: INTERNAL MEDICINE

## 2017-06-05 PROCEDURE — 12000001 ZZH R&B MED SURG/OB UMMC

## 2017-06-05 PROCEDURE — 93010 ELECTROCARDIOGRAM REPORT: CPT | Performed by: INTERNAL MEDICINE

## 2017-06-05 PROCEDURE — 93005 ELECTROCARDIOGRAM TRACING: CPT | Performed by: INTERNAL MEDICINE

## 2017-06-05 PROCEDURE — 25800025 ZZH RX 258: Performed by: INTERNAL MEDICINE

## 2017-06-05 RX ORDER — VILAZODONE HYDROCHLORIDE 40 MG/1
40 TABLET ORAL DAILY
Status: DISCONTINUED | OUTPATIENT
Start: 2017-06-05 | End: 2017-06-06 | Stop reason: HOSPADM

## 2017-06-05 RX ORDER — SULFAMETHOXAZOLE/TRIMETHOPRIM 800-160 MG
1 TABLET ORAL 2 TIMES DAILY
Status: DISCONTINUED | OUTPATIENT
Start: 2017-06-05 | End: 2017-06-06 | Stop reason: HOSPADM

## 2017-06-05 RX ORDER — NYSTATIN 100000/ML
500000 SUSPENSION, ORAL (FINAL DOSE FORM) ORAL 4 TIMES DAILY PRN
Status: DISCONTINUED | OUTPATIENT
Start: 2017-06-05 | End: 2017-06-06 | Stop reason: HOSPADM

## 2017-06-05 RX ORDER — METRONIDAZOLE 250 MG/1
250 TABLET ORAL EVERY 12 HOURS SCHEDULED
Status: DISCONTINUED | OUTPATIENT
Start: 2017-06-05 | End: 2017-06-06 | Stop reason: HOSPADM

## 2017-06-05 RX ORDER — VILAZODONE HYDROCHLORIDE 40 MG/1
40 TABLET ORAL DAILY
Status: DISCONTINUED | OUTPATIENT
Start: 2017-06-05 | End: 2017-06-05

## 2017-06-05 RX ORDER — NYSTATIN 100000/ML
500000 SUSPENSION, ORAL (FINAL DOSE FORM) ORAL 4 TIMES DAILY PRN
COMMUNITY
End: 2017-09-19

## 2017-06-05 RX ORDER — LACTOBACILLUS RHAMNOSUS GG 10B CELL
1 CAPSULE ORAL 2 TIMES DAILY
Status: DISCONTINUED | OUTPATIENT
Start: 2017-06-05 | End: 2017-06-06 | Stop reason: HOSPADM

## 2017-06-05 RX ORDER — VALACYCLOVIR HYDROCHLORIDE 500 MG/1
500 TABLET, FILM COATED ORAL 2 TIMES DAILY
Status: DISCONTINUED | OUTPATIENT
Start: 2017-06-05 | End: 2017-06-06 | Stop reason: HOSPADM

## 2017-06-05 RX ORDER — GABAPENTIN 100 MG/1
200 CAPSULE ORAL 3 TIMES DAILY
Status: DISCONTINUED | OUTPATIENT
Start: 2017-06-05 | End: 2017-06-06 | Stop reason: HOSPADM

## 2017-06-05 RX ADMIN — GABAPENTIN 200 MG: 100 CAPSULE ORAL at 20:03

## 2017-06-05 RX ADMIN — POTASSIUM CHLORIDE, DEXTROSE MONOHYDRATE AND SODIUM CHLORIDE: 150; 5; 900 INJECTION, SOLUTION INTRAVENOUS at 00:25

## 2017-06-05 RX ADMIN — MULTIPLE VITAMINS W/ MINERALS TAB 1 TABLET: TAB at 08:52

## 2017-06-05 RX ADMIN — NICOTINE POLACRILEX 2 MG: 2 GUM, CHEWING ORAL at 06:25

## 2017-06-05 RX ADMIN — NICOTINE POLACRILEX 2 MG: 2 GUM, CHEWING ORAL at 14:40

## 2017-06-05 RX ADMIN — CALCIUM CARBONATE (ANTACID) CHEW TAB 500 MG 500 MG: 500 CHEW TAB at 09:08

## 2017-06-05 RX ADMIN — VALACYCLOVIR HYDROCHLORIDE 500 MG: 500 TABLET, FILM COATED ORAL at 20:05

## 2017-06-05 RX ADMIN — POTASSIUM CHLORIDE, DEXTROSE MONOHYDRATE AND SODIUM CHLORIDE: 150; 5; 900 INJECTION, SOLUTION INTRAVENOUS at 22:33

## 2017-06-05 RX ADMIN — Medication 1 CAPSULE: at 09:38

## 2017-06-05 RX ADMIN — DIAZEPAM 10 MG: 5 TABLET ORAL at 02:30

## 2017-06-05 RX ADMIN — SULFAMETHOXAZOLE AND TRIMETHOPRIM 1 TABLET: 800; 160 TABLET ORAL at 20:03

## 2017-06-05 RX ADMIN — DIAZEPAM 5 MG: 5 TABLET ORAL at 16:27

## 2017-06-05 RX ADMIN — NICOTINE 1 PATCH: 21 PATCH, EXTENDED RELEASE TRANSDERMAL at 08:53

## 2017-06-05 RX ADMIN — RANITIDINE HYDROCHLORIDE 300 MG: 150 TABLET, FILM COATED ORAL at 20:03

## 2017-06-05 RX ADMIN — Medication 1 CAPSULE: at 20:03

## 2017-06-05 RX ADMIN — NICOTINE POLACRILEX 2 MG: 2 GUM, CHEWING ORAL at 20:04

## 2017-06-05 RX ADMIN — NICOTINE POLACRILEX 2 MG: 2 GUM, CHEWING ORAL at 09:08

## 2017-06-05 RX ADMIN — QUETIAPINE FUMARATE 25 MG: 25 TABLET, FILM COATED ORAL at 08:51

## 2017-06-05 RX ADMIN — QUETIAPINE FUMARATE 50 MG: 25 TABLET, FILM COATED ORAL at 12:28

## 2017-06-05 RX ADMIN — NICOTINE POLACRILEX 2 MG: 2 GUM, CHEWING ORAL at 16:33

## 2017-06-05 RX ADMIN — DOXYCYCLINE 400 MG: 100 INJECTION, POWDER, LYOPHILIZED, FOR SOLUTION INTRAVENOUS at 16:27

## 2017-06-05 RX ADMIN — CEFTAROLINE FOSAMIL 600 MG: 600 POWDER, FOR SOLUTION INTRAVENOUS at 14:31

## 2017-06-05 RX ADMIN — DIAZEPAM 10 MG: 5 TABLET ORAL at 06:24

## 2017-06-05 RX ADMIN — METRONIDAZOLE 250 MG: 250 TABLET ORAL at 20:05

## 2017-06-05 RX ADMIN — CEFTAROLINE FOSAMIL 600 MG: 600 POWDER, FOR SOLUTION INTRAVENOUS at 00:32

## 2017-06-05 RX ADMIN — DIAZEPAM 5 MG: 5 TABLET ORAL at 20:16

## 2017-06-05 RX ADMIN — DIAZEPAM 10 MG: 5 TABLET ORAL at 09:08

## 2017-06-05 RX ADMIN — GABAPENTIN 200 MG: 100 CAPSULE ORAL at 14:31

## 2017-06-05 RX ADMIN — QUETIAPINE FUMARATE 50 MG: 25 TABLET, FILM COATED ORAL at 02:45

## 2017-06-05 RX ADMIN — NICOTINE POLACRILEX 2 MG: 2 GUM, CHEWING ORAL at 12:24

## 2017-06-05 RX ADMIN — POTASSIUM CHLORIDE, DEXTROSE MONOHYDRATE AND SODIUM CHLORIDE: 150; 5; 900 INJECTION, SOLUTION INTRAVENOUS at 09:38

## 2017-06-05 RX ADMIN — GABAPENTIN 800 MG: 400 CAPSULE ORAL at 08:52

## 2017-06-05 RX ADMIN — FOLIC ACID 1 MG: 1 TABLET ORAL at 08:52

## 2017-06-05 RX ADMIN — NICOTINE POLACRILEX 2 MG: 2 GUM, CHEWING ORAL at 02:45

## 2017-06-05 NOTE — PLAN OF CARE
Problem: Goal Outcome Summary  Goal: Goal Outcome Summary  Outcome: No Change  VSS, bp elevated at start of shift. Alert and oriented. LS clear. BS active. Tolerating diet, no n/v. Denies pain. PICC infusing, skin pink around PICC. MSSA score 10, gave 10mg valium. PRN seroquel given for increased anxiety. Ambulating with SBA. Pt is able to make needs known. Continue to monitor.

## 2017-06-05 NOTE — PROGRESS NOTES
Social Work Services Progress Note    Hospital Day: 5  Date of Initial Social Work Evaluation:  6/2/17  Collaborated with:  Pt, Dr Jacome, Mobile CD  Nida [phone]    Data:  CD Eval    Intervention:  Pt remains agreeable to attending outpt CD Treatment to re-engage in sobriety. He would like to attend outpt CD Treatment at [facility]. Writer left voicemail message for [facility] mobile Independent  w/request to conduct CD Eval. Pt was agreeable to this    Assessment:  pt wants to re-engage in sobriety and is willing to have CD Eval for outpt cd treatment    Plan:    Anticipated Disposition:  Home with services    Barriers to d/c plan:  Medical stability (anticipate DC 6/6/17 or 6/7/17)    Follow Up:  SW con't to remain available per request/referral

## 2017-06-05 NOTE — PHARMACY-ADMISSION MEDICATION HISTORY
Admission medication history interview status for the 6/1/2017 admission is complete. See Epic admission navigator for allergy information, pharmacy, prior to admission medications and immunization status.     Medication history interview sources:  [facility], [facility] ([place]), [facility] ([place]), [facility] ([place]); Dr. Dann Frank's office [phone], Mickie Fox's office (behavioral health np) [phone]    Changes made to PTA medication list (reason)  Added: Atenolol, Vraylar, ceftaroline, doxycycline IV, Flagyl PO, Nystatin PRN, Zantac, Bactrim, Viibryd  Deleted: Clonazepam, Adderall, Rocephin IV, Flagyl IV, Doxycycline oral  Changed: gabapentin dose, Valtrex dose added    Additional medication history information (including reliability of information, actions taken by pharmacist):  -Patient no longer taking clonazepam, Adderall, or Concerta. He is trying to detox off of these meds.  -Rocephin was changed to Ceftaroline end of March. All antibiotic doses were confirmed with Dr. Frank's office who manages the patient's lyme's disease.   -Patient gets Vraylar as a sample from Mickie Fox's office. I confirmed the dose with their office.  -Nystatin is PRN per patient for when he feels his oral/throat candidiasis is flaring up. He can take this up to four times daily per Dr. Frank's office. The patient says he normally takes it 1-2 times per day.  -Zantac dose was confirmed with Dr. Frank's office too as no pharmacy had record of filling this prescription.  -Patient reported his gabapentin dose was 200mg TID. He did have a prescription filled for 400mg TID in April and a prescription for 200mg TID though.  -Patient had tried a Belsomra sample from Mickie Fox's office, but he did not find it effective, so he does not take it.   -Dr. Pallas in [place] manages the patient's atenolol.  -Dr. Clark is the patient's behavioral health provider from [place] whom the patient has known since he was  10.  -Patient was on Klonopin, but says he should not be on it anymore because he wants to detox off of it. His dose was 2mg at bedtime and 0.5mg in the morning.  -Patient reports his IV meds could be changed to oral form if needed. The ceftaroline would be changed to Omnicef and the doxycycline just changed to oral. This would need to be confirmed with Dr. Frank's office though before changing this regimen.    Prior to Admission medications    Medication Sig Last Dose Taking? Auth Provider   GABAPENTIN PO Take 200 mg by mouth 3 times daily   Yes Unknown, Entered By History   MetroNIDAZOLE (FLAGYL PO) Take 250 mg by mouth 2 times daily  Yes Unknown, Entered By History   ATENOLOL PO Take 50 mg by mouth daily  Yes Unknown, Entered By History   Vilazodone HCl (VIIBRYD PO) Take 40 mg by mouth daily  Yes Unknown, Entered By History   RaNITidine HCl (ZANTAC PO) Take 300 mg by mouth 2 times daily  Yes Unknown, Entered By History   CEFTAROLINE FOSAMIL IV Inject 600 mg into the vein every 12 hours  Yes Unknown, Entered By History   DOXYCYCLINE HYCLATE IV Inject 400 mg into the vein daily  Yes Unknown, Entered By History   cariprazine (VRAYLAR) 1.5 MG CAPS capsule Take 1.5 mg by mouth daily  Yes Unknown, Entered By History   Sulfamethoxazole-Trimethoprim (BACTRIM DS PO) Take 1 tablet by mouth 2 times daily  Yes Unknown, Entered By History   nystatin (MYCOSTATIN) 319022 UNIT/ML suspension Take 500,000 Units by mouth 4 times daily as needed (when patient feels candidia is flaring up)   Yes Unknown, Entered By History   ValACYclovir HCl (VALTREX PO) Take 500 mg by mouth 2 times daily   Yes Reported, Patient     Medication history completed by:   Princess rBadley, Pharm.D.

## 2017-06-05 NOTE — PROGRESS NOTES
Patient seen and examined with RN      This is the first time I am meeting patient. Discussed in length about patient's current antibiotic regimen. He has been on abx for 10 years and has neuropsychiatric manifestations of the same when  he is off abx  No fevers or chills   Has some withdrawal symptoms     O-   /72 (BP Location: Left arm)  Pulse 72  Temp 99.3  F (37.4  C) (Oral)  Resp 20  SpO2 99%   Gen- pleasant   HEENT- NAD, MENDOZA  Neck- supple, no JVD elevation, no thyromegaly  CVS- I+II+ no m/r/g  RS- CTAB  Abdo- soft, no tenderness . No g/r/r  Ext- no edema   CNS- no gross focal signs. Oriented to person, place and time      LABS:   BMP    Recent Labs  Lab 06/02/17 0621 06/02/17  0004   * 145*   POTASSIUM 3.9 3.7   CHLORIDE 110* 108   JAYLEN 8.7 8.9   CO2 28 27   BUN 14 13   CR 0.86 0.83   * 111*     CBC    Recent Labs  Lab 06/02/17  0621 06/02/17  0004   WBC 5.9 7.8   RBC 4.74 4.86   HGB 15.6 16.6   HCT 44.4 45.9   MCV 94 94   MCH 32.9 34.2*   MCHC 35.1 36.2   RDW 13.9 13.9    202     INRNo lab results found in last 7 days.  LFTs    Recent Labs  Lab 06/02/17  0621   ALKPHOS 68   AST 32   ALT 84*   BILITOTAL 0.3   PROTTOTAL 6.9   ALBUMIN 3.9        A/P:  #  Severe anxiety:   on newer antipsychotics. D/W Psych- appreciate recs   Also looked into patients prior to admission medications. I have put back patient on gabapentin 200 mg TID and  Newly initiated on Seroquel has now been discontinued as patient has been started on Vibriid and Cariprazine    Also added Viibryd  And cariprazine which are his home medications   Will need to monitor QT intrebal while on this regimen. EKG ordered for 6/6    # Alcohol withdrawal and BZD withdrawal   ( has been taking 4 mg Ativan/ day)  ct MSSA protocol, monitor for withdrawal seizure . Had 70 mg valium yesterday   Diarrhea from withdrawal- c.diff neg, ct prn imodium     # Chronic lyme disease:  Patient is on a complex regimen of IV and oral  antibiotics, confirmed with pharmacist.  They include:  Ceftaroline 600 mg IV  Q 12 hrs  Metronidazole 250 mg PO Q 12 hrs   Bactrim DS 1 tab PO Q 12 hrs  Doxycycline 400 mg IV Q 24 hrs     Given the lack of expertise with patient's chronic lyme regimen and a complex medications as well as a presence of PICC line , We prefer that the patient undergo detox in the medical meehan and go home ( where he has resources for alcohol treatment)     Dr BRENT Jacome MD, [facility]  Hospitalist ( Internal medicine)  Pager: [phone]

## 2017-06-05 NOTE — PLAN OF CARE
Problem: Goal Outcome Summary  Goal: Goal Outcome Summary  Outcome: No Change  Alert and oriented X 4. Able to make needs known. Call light in reach.  Denies pain.  MSSA  score of  8 and 11, received Valium 10 mg PO X 2 along with PRN Seroquel. Nicotine patch in place. Also requesting Nicorette gum.  Incontinent of urine X 1, bed linens changed. Ambulated in hallway and sat in [facility]. PICC patent, IV antibiotic and fluids infusing as ordered. Tolerating PO. Denies nausea, headache, dizziness, chest pain or SOB. Sleeping off and on during night. Continue with plan of care..

## 2017-06-06 VITALS
HEART RATE: 87 BPM | TEMPERATURE: 98.5 F | DIASTOLIC BLOOD PRESSURE: 77 MMHG | SYSTOLIC BLOOD PRESSURE: 143 MMHG | RESPIRATION RATE: 18 BRPM | OXYGEN SATURATION: 98 %

## 2017-06-06 LAB
INTERPRETATION ECG - MUSE: NORMAL
INTERPRETATION ECG - MUSE: NORMAL

## 2017-06-06 PROCEDURE — 25000128 H RX IP 250 OP 636: Performed by: INTERNAL MEDICINE

## 2017-06-06 PROCEDURE — 25000132 ZZH RX MED GY IP 250 OP 250 PS 637: Performed by: INTERNAL MEDICINE

## 2017-06-06 PROCEDURE — 93010 ELECTROCARDIOGRAM REPORT: CPT | Performed by: INTERNAL MEDICINE

## 2017-06-06 PROCEDURE — 25000125 ZZHC RX 250: Performed by: INTERNAL MEDICINE

## 2017-06-06 PROCEDURE — 93005 ELECTROCARDIOGRAM TRACING: CPT

## 2017-06-06 PROCEDURE — 25800025 ZZH RX 258: Performed by: INTERNAL MEDICINE

## 2017-06-06 PROCEDURE — 99207 ZZC CDG-CODE INCORRECT PER BILLING BASED ON TIME: CPT | Performed by: INTERNAL MEDICINE

## 2017-06-06 PROCEDURE — 99238 HOSP IP/OBS DSCHRG MGMT 30/<: CPT | Performed by: INTERNAL MEDICINE

## 2017-06-06 RX ORDER — ATENOLOL 50 MG/1
50 TABLET ORAL DAILY
Status: DISCONTINUED | OUTPATIENT
Start: 2017-06-06 | End: 2017-06-06 | Stop reason: HOSPADM

## 2017-06-06 RX ADMIN — METRONIDAZOLE 250 MG: 250 TABLET ORAL at 07:56

## 2017-06-06 RX ADMIN — POTASSIUM CHLORIDE, DEXTROSE MONOHYDRATE AND SODIUM CHLORIDE: 150; 5; 900 INJECTION, SOLUTION INTRAVENOUS at 16:26

## 2017-06-06 RX ADMIN — Medication 1 CAPSULE: at 07:55

## 2017-06-06 RX ADMIN — ATENOLOL 50 MG: 50 TABLET ORAL at 14:52

## 2017-06-06 RX ADMIN — FOLIC ACID 1 MG: 1 TABLET ORAL at 07:56

## 2017-06-06 RX ADMIN — VILAZODONE HYDROCHLORIDE 40 MG: 40 TABLET ORAL at 07:56

## 2017-06-06 RX ADMIN — MULTIPLE VITAMINS W/ MINERALS TAB 1 TABLET: TAB at 07:56

## 2017-06-06 RX ADMIN — SULFAMETHOXAZOLE AND TRIMETHOPRIM 1 TABLET: 800; 160 TABLET ORAL at 07:55

## 2017-06-06 RX ADMIN — CEFTAROLINE FOSAMIL 600 MG: 600 POWDER, FOR SOLUTION INTRAVENOUS at 01:21

## 2017-06-06 RX ADMIN — QUETIAPINE FUMARATE 50 MG: 25 TABLET, FILM COATED ORAL at 01:27

## 2017-06-06 RX ADMIN — GABAPENTIN 200 MG: 100 CAPSULE ORAL at 13:25

## 2017-06-06 RX ADMIN — CEFTAROLINE FOSAMIL 600 MG: 600 POWDER, FOR SOLUTION INTRAVENOUS at 13:25

## 2017-06-06 RX ADMIN — VALACYCLOVIR HYDROCHLORIDE 500 MG: 500 TABLET, FILM COATED ORAL at 07:55

## 2017-06-06 RX ADMIN — NICOTINE POLACRILEX 2 MG: 2 GUM, CHEWING ORAL at 01:31

## 2017-06-06 RX ADMIN — QUETIAPINE FUMARATE 50 MG: 25 TABLET, FILM COATED ORAL at 13:25

## 2017-06-06 RX ADMIN — RANITIDINE HYDROCHLORIDE 300 MG: 150 TABLET, FILM COATED ORAL at 07:55

## 2017-06-06 RX ADMIN — POTASSIUM CHLORIDE, DEXTROSE MONOHYDRATE AND SODIUM CHLORIDE: 150; 5; 900 INJECTION, SOLUTION INTRAVENOUS at 07:04

## 2017-06-06 RX ADMIN — NICOTINE 1 PATCH: 21 PATCH, EXTENDED RELEASE TRANSDERMAL at 07:55

## 2017-06-06 RX ADMIN — GABAPENTIN 200 MG: 100 CAPSULE ORAL at 07:56

## 2017-06-06 NOTE — PROGRESS NOTES
"[facility] Cover Note    Evaluated patient for chest tightness. Pt reports that he gets this from time to time, where he feels like his \"lungs hurt\" when he breathes. He says he usually gets this sensation when he stops smoking. He reports this happened earlier this evening, but dissipated by the time he was evaluated. Vitals stable. EKG obtained which showed sinus rhythm    Continue to monitor. Very unlikely to be anything malignant. Instructed pt to call RN if recurrent symptoms.    Andriy Aguirre MD PGY3  Internal Medicine Resident  Damarislighter/House Officer   [phone]      "

## 2017-06-06 NOTE — PLAN OF CARE
Problem: Goal Outcome Summary  Goal: Goal Outcome Summary  Outcome: No Change  Alert and oriented.  VSS.  Lung sounds clear.  Bowel sounds active.  Pt had complaint of chest tightness evening 6/5.  Dr. Timothy Pearce notified.  See providers note.  No recurrent chest pain reported by patient.  MSSA 9 and 6, given valium per protocol.  Pt reports anxiety and requesting PRN seroquel, given.  PICC infusing.  Pt has been up ambulating independently.  Pt is able to make needs known.  Will continue plan of care.

## 2017-06-06 NOTE — PLAN OF CARE
"Problem: Goal Outcome Summary  Goal: Goal Outcome Summary  Outcome: Improving  /70s this AM. MSSA 7. Continues to ask for \"reassessment\" of MSSA multiple times before available to reassess.    Continues to report high anxiety levels, have asked Dr Jacome for any other meds but wants patient to see Psychaitry. Given PRN Seroquel x1. PICC line infusing IVF and antibiotics for chronic Lymes disease. Up independently ambulating around unit. Possible d/c home tomorrow. Continue to monitor.         "

## 2017-06-06 NOTE — PROGRESS NOTES
Patient seen and examined with RN     Uneventful night. Very pleasant this morning  Ambulating the halls  Looking forward to see a psychiatrist to help with planning his detox regimen  No fevers or chills       O-   /77 (BP Location: Left arm)  Pulse 87  Temp 98.5  F (36.9  C) (Oral)  Resp 18  SpO2 98%   Gen- pleasant   HEENT- NAD, MENDOZA  Neck- supple, no JVD elevation, no thyromegaly  CVS- I+II+ no m/r/g  RS- CTAB  Abdo- soft, no tenderness . No g/r/r  Ext- no edema   CNS- no gross focal signs. Oriented to person, place and time      LABS:   BMP    Recent Labs  Lab 06/02/17 0621 06/02/17  0004   * 145*   POTASSIUM 3.9 3.7   CHLORIDE 110* 108   JAYLEN 8.7 8.9   CO2 28 27   BUN 14 13   CR 0.86 0.83   * 111*     CBC    Recent Labs  Lab 06/02/17  0621 06/02/17  0004   WBC 5.9 7.8   RBC 4.74 4.86   HGB 15.6 16.6   HCT 44.4 45.9   MCV 94 94   MCH 32.9 34.2*   MCHC 35.1 36.2   RDW 13.9 13.9    202     INRNo lab results found in last 7 days.  LFTs    Recent Labs  Lab 06/02/17 0621   ALKPHOS 68   AST 32   ALT 84*   BILITOTAL 0.3   PROTTOTAL 6.9   ALBUMIN 3.9        A/P:  #  Severe anxiety:   on newer antipsychotics. D/W Psych- appreciate recs   Also looked into patients prior to admission medications. I have put back patient on gabapentin 200 mg TID and  Newly initiated on Seroquel has now been discontinued as patient has been started on Vibriid and Cariprazine    Also added Viibryd  And cariprazine which are his home medications   Will need to monitor QT intrebal while on this regimen. EKG with QT at 445     # Alcohol withdrawal and BZD withdrawal   ( has been taking 4 mg Ativan/ day)  ct MSSA protocol, monitor for withdrawal seizure . Had 70 mg valium yesterday   Diarrhea from withdrawal- c.diff neg, ct prn imodium    Given the complex nature of his detox as well as withdrawals with known psych history, we have requested psych to evalaute him for a detox plan     # Chronic lyme  disease:  Patient is on a complex regimen of IV and oral antibiotics, confirmed with pharmacist.  They include:  Ceftaroline 600 mg IV  Q 12 hrs  Metronidazole 250 mg PO Q 12 hrs   Bactrim DS 1 tab PO Q 12 hrs  Doxycycline 400 mg IV Q 24 hrs         Dr BRENT Jacome MD, [facility]  Hospitalist ( Internal medicine)  Pager: [phone]

## 2017-06-06 NOTE — PLAN OF CARE
Problem: Goal Outcome Summary  Goal: Goal Outcome Summary  Outcome: Adequate for Discharge Date Met:  06/06/17  VSS, A&O. MSSA below 8 held valium and has not been requested for re-assessment or PRN medication. Denies pain. Calm and cooperative. Dr. Jacome asked Pt if he felt stable enough to be discharged. Pt agreed to be discharged and did not want to wait for antibiotics to run or have any medications filled. Stated he has all medications he needs at home. Discharge paperwork reviewed and signed. No questions or concerned. Pt declined transport and discharged off unit at 1815.

## 2017-06-06 NOTE — DISCHARGE SUMMARY
[facility] Discharge Summary    Jonny Gramajo MRN# [id number]   Age: 32 year old YOB: 1985     Date of Admission:  6/1/2017  Date of Discharge::  6/6/2017  Admitting Physician:  Mustapha Browning MD  Discharge Physician:         Dr Daryn Jacome MD   Primary Physician: Pallas, Kenneth G  Transferring Facility: [facility]            Discharge Diagnosis:   Principle diagnosis: Alcohol and benzodiazepine withdrawal   Secondary diagnoses:  Chronic lyme disease  Depression   hypertension           Procedures:   No procedures performed during this admission         Allergies:    No Known Allergies            Discharge Medications:     Current Discharge Medication List      CONTINUE these medications which have NOT CHANGED    Details   GABAPENTIN PO Take 200 mg by mouth 3 times daily       MetroNIDAZOLE (FLAGYL PO) Take 250 mg by mouth 2 times daily      ATENOLOL PO Take 50 mg by mouth daily      Vilazodone HCl (VIIBRYD PO) Take 40 mg by mouth daily      RaNITidine HCl (ZANTAC PO) Take 300 mg by mouth 2 times daily      CEFTAROLINE FOSAMIL IV Inject 600 mg into the vein every 12 hours      DOXYCYCLINE HYCLATE IV Inject 400 mg into the vein daily      cariprazine (VRAYLAR) 1.5 MG CAPS capsule Take 1.5 mg by mouth daily      Sulfamethoxazole-Trimethoprim (BACTRIM DS PO) Take 1 tablet by mouth 2 times daily      nystatin (MYCOSTATIN) 458778 UNIT/ML suspension Take 500,000 Units by mouth 4 times daily as needed (when patient feels candidia is flaring up)       ValACYclovir HCl (VALTREX PO) Take 500 mg by mouth 2 times daily                    Consultations:   No consultations were requested during this admission          Brief History of Presenting Illness:   Jonny Gramajo is a 32 year old male who presents to the ED intoxicated with alcohol, stating that he is going through benzodiazepine (clonazepam)  withdrawal.     Please see detailed H&P by Dr Browning on 6/2  for further details           Hospital Course:   #  Severe anxiety:   on newer antipsychotics. D/W Psych- appreciate recs   Also looked into patients prior to admission medications. I have put back patient on gabapentin 200 mg TID and  Newly initiated on Seroquel has now been discontinued as patient has been started on Vibriid and Cariprazine    Also added Viibryd  And cariprazine which are his home medications   Will need to monitor QT intrebal while on this regimen. EKG with QT at 445      # Alcohol withdrawal and BZD withdrawal   ( has been taking 4 mg Ativan/ day)  ct MSSA protocol, monitor for withdrawal seizure . Had 70 mg valium yesterday   Diarrhea from withdrawal- c.diff neg, ct prn imodium    Given the complex nature of his detox as well as withdrawals with known psych history, we requested psych to evalaute him for a detox plan. However, patient clinically felt that he was done with detox and preferred to go home and start with treatment.   he has resources available for him for alcohol treatment      # Chronic lyme disease:  Patient is on a complex regimen of IV and oral antibiotics, confirmed with pharmacist.  They include:  Ceftaroline 600 mg IV  Q 12 hrs  Metronidazole 250 mg PO Q 12 hrs   Bactrim DS 1 tab PO Q 12 hrs  Doxycycline 400 mg IV Q 24 hrs      Patient does home infusion himself and has all necessary supplies .           PROGRESS ON DISCHARGE DAY   Feels well today  By the ebd of the day, thought he was clinically detoxed  Did not think he needed a further detox regimen for home     /77 (BP Location: Left arm)  Pulse 87  Temp 98.5  F (36.9  C) (Oral)  Resp 18  SpO2 98%    CVS: Normal Heart sounds   RS: Clear breath sounds bilaterally   Abdomen: Soft and non tender. Normal bowel sounds.   Neuro: Alert and orientated X 3                  Pending Tests at Discharge:     Unresulted Labs Ordered in the Past 30 Days of this Admission     No orders found from 4/2/2017 to 6/2/2017.                  Discharge instructions and Follow up:       Discharge Procedure Orders  Reason for your hospital stay   Order Comments: Alcohol and benzodiazepine detox     Adult [facility] Follow-up and recommended labs and tests   Order Comments: Please follow up with your ID physician  Please follow up with resources provided to you to pursue outpatinet alcohol treatment     Appointments on [place] and/or [facility] (with [facility] or [facility] provider or service). Call [phone] if you haven't heard regarding these appointments within 7 days of discharge.     Activity   Order Comments: Your activity upon discharge: activity as tolerated   Order Specific Question Answer Comments   Is discharge order? Yes      Full Code     Diet   Order Comments: Follow this diet upon discharge: Orders Placed This Encounter     Snacks/Supplements Adult: Nut[employer] Fiber; Between Meals     Regular Diet Adult   Order Specific Question Answer Comments   Is discharge order? Yes                Discharge Disposition:   Discharged to home           Time spent : 25  mts total with patient and coordination of care       Dr BRENT Jacome MD  Hospitalist ( Internal medicine)  Pager: [phone]

## 2017-06-06 NOTE — PLAN OF CARE
"Problem: Goal Outcome Summary  Goal: Goal Outcome Summary  /100s in AM, MDs aware. Medicated for MSSA per Valium protocol, scores of 8, 6 and 8. Patient frequently asking when reassessment available and question whether patient exaggerating tremor assessment. Reports being very anxious \"due to detox\". Given PRN Seroquel x1. PICC line infusing IVF and antibiotics for chronic Lymes disease. Up independent ambulating unit. Possible d/c tomorrow. Continue to monitor.      "

## 2017-06-08 DIAGNOSIS — A69.20 LYME DISEASE: Primary | ICD-10-CM

## 2017-06-08 LAB
BASOPHILS # BLD AUTO: 0 10E9/L (ref 0–0.2)
BASOPHILS NFR BLD AUTO: 0 %
DIFFERENTIAL METHOD BLD: NORMAL
EOSINOPHIL # BLD AUTO: 0 10E9/L (ref 0–0.7)
EOSINOPHIL NFR BLD AUTO: 0.1 %
ERYTHROCYTE [DISTWIDTH] IN BLOOD BY AUTOMATED COUNT: 14.4 % (ref 10–15)
HCT VFR BLD AUTO: 44.3 % (ref 40–53)
HGB BLD-MCNC: 15.4 G/DL (ref 13.3–17.7)
LYMPHOCYTES # BLD AUTO: 2 10E9/L (ref 0.8–5.3)
LYMPHOCYTES NFR BLD AUTO: 18.3 %
MCH RBC QN AUTO: 32.8 PG (ref 26.5–33)
MCHC RBC AUTO-ENTMCNC: 34.8 G/DL (ref 31.5–36.5)
MCV RBC AUTO: 95 FL (ref 78–100)
MONOCYTES # BLD AUTO: 1.1 10E9/L (ref 0–1.3)
MONOCYTES NFR BLD AUTO: 10.4 %
NEUTROPHILS # BLD AUTO: 7.7 10E9/L (ref 1.6–8.3)
NEUTROPHILS NFR BLD AUTO: 71.2 %
PLATELET # BLD AUTO: 175 10E9/L (ref 150–450)
RBC # BLD AUTO: 4.69 10E12/L (ref 4.4–5.9)
WBC # BLD AUTO: 10.9 10E9/L (ref 4–11)

## 2017-06-08 PROCEDURE — 80053 COMPREHEN METABOLIC PANEL: CPT | Performed by: FAMILY MEDICINE

## 2017-06-08 PROCEDURE — 85025 COMPLETE CBC W/AUTO DIFF WBC: CPT | Performed by: FAMILY MEDICINE

## 2017-06-08 PROCEDURE — 36415 COLL VENOUS BLD VENIPUNCTURE: CPT | Performed by: FAMILY MEDICINE

## 2017-06-08 PROCEDURE — 82248 BILIRUBIN DIRECT: CPT | Performed by: FAMILY MEDICINE

## 2017-06-09 LAB
ALBUMIN SERPL-MCNC: 4.4 G/DL (ref 3.4–5)
ALP SERPL-CCNC: 83 U/L (ref 40–150)
ALT SERPL W P-5'-P-CCNC: 68 U/L (ref 0–70)
ANION GAP SERPL CALCULATED.3IONS-SCNC: 11 MMOL/L (ref 3–14)
AST SERPL W P-5'-P-CCNC: 26 U/L (ref 0–45)
BILIRUB DIRECT SERPL-MCNC: <0.1 MG/DL (ref 0–0.2)
BILIRUB SERPL-MCNC: 0.4 MG/DL (ref 0.2–1.3)
BUN SERPL-MCNC: 12 MG/DL (ref 7–30)
CALCIUM SERPL-MCNC: 8.8 MG/DL (ref 8.5–10.1)
CHLORIDE SERPL-SCNC: 108 MMOL/L (ref 94–109)
CO2 SERPL-SCNC: 22 MMOL/L (ref 20–32)
CREAT SERPL-MCNC: 0.64 MG/DL (ref 0.66–1.25)
GFR SERPL CREATININE-BSD FRML MDRD: ABNORMAL ML/MIN/1.7M2
GLUCOSE SERPL-MCNC: 170 MG/DL (ref 70–99)
POTASSIUM SERPL-SCNC: 3.9 MMOL/L (ref 3.4–5.3)
PROT SERPL-MCNC: 7 G/DL (ref 6.8–8.8)
SODIUM SERPL-SCNC: 141 MMOL/L (ref 133–144)

## 2017-06-21 ENCOUNTER — HOSPITAL ENCOUNTER (EMERGENCY)
Facility: CLINIC | Age: 32
Discharge: HOME | End: 2017-06-21
Attending: EMERGENCY MEDICINE | Admitting: EMERGENCY MEDICINE
Payer: COMMERCIAL

## 2017-06-21 VITALS
WEIGHT: 309 LBS | BODY MASS INDEX: 35.71 KG/M2 | RESPIRATION RATE: 20 BRPM | HEART RATE: 90 BPM | TEMPERATURE: 98.1 F | OXYGEN SATURATION: 100 % | SYSTOLIC BLOOD PRESSURE: 163 MMHG | DIASTOLIC BLOOD PRESSURE: 100 MMHG

## 2017-06-21 DIAGNOSIS — Z45.2 PIC LINE (PERIPHERALLY INSERTED CENTRAL CATHETER) REMOVAL: ICD-10-CM

## 2017-06-21 DIAGNOSIS — T80.219A PICC LINE INFECTION, INITIAL ENCOUNTER: ICD-10-CM

## 2017-06-21 PROCEDURE — 87106 FUNGI IDENTIFICATION YEAST: CPT | Performed by: EMERGENCY MEDICINE

## 2017-06-21 PROCEDURE — 87181 SC STD AGAR DILUTION PER AGT: CPT | Performed by: EMERGENCY MEDICINE

## 2017-06-21 PROCEDURE — 27210209 ZZH KIT VALVED SINGLE LUMEN

## 2017-06-21 PROCEDURE — 36569 INSJ PICC 5 YR+ W/O IMAGING: CPT

## 2017-06-21 PROCEDURE — 87070 CULTURE OTHR SPECIMN AEROBIC: CPT | Performed by: EMERGENCY MEDICINE

## 2017-06-21 PROCEDURE — 99283 EMERGENCY DEPT VISIT LOW MDM: CPT | Mod: 25

## 2017-06-21 ASSESSMENT — ENCOUNTER SYMPTOMS
JOINT SWELLING: 0
VOMITING: 0
CHILLS: 0
COLOR CHANGE: 1
FEVER: 0

## 2017-06-21 NOTE — ED AVS SNAPSHOT
[facility] Emergency Department    [address]    [facility] [address]    Phone:  [phone]    Fax:  [phone]                                       Jonny Gramajo   MRN: [id number]    Department:  [facility] Emergency Department   Date of Visit:  6/21/2017           After Visit Summary Signature Page     I have received my discharge instructions, and my questions have been answered. I have discussed any challenges I see with this plan with the nurse or doctor.    ..........................................................................................................................................  Patient/Patient Representative Signature      ..........................................................................................................................................  Patient Representative Print Name and Relationship to Patient    ..................................................               ................................................  Date                                            Time    ..........................................................................................................................................  Reviewed by Signature/Title    ...................................................              ..............................................  Date                                                            Time

## 2017-06-21 NOTE — DISCHARGE INSTRUCTIONS
You should be reassessed in 2 days by your primary clinic to ensure improvement in what appeared to be a possible superficial infection/inflammation due to your PICC line in the right arm.    Ongoing care of the left PICC as per previous.

## 2017-06-21 NOTE — ED PROVIDER NOTES
History     Chief Complaint:  Redness around PICC line    HPI   Jonny Gramajo is a 32 year old male with history of chronic lyme disease who presents with concern for redness around his PICC line. The patient had the dressing around his PICC line changed today by home health nurse who was concerned the area may be infected due to new redness and recommended evaluation prompting visit to the emergency department.  VS were normal for home health care nurse including temp of 98.8 and blood pressure in the 130's.  He did have his normal infusion through the line this morning, his next dose is to be at 2000 tonight.  He denies any pain around the site. He notes he wouldn't have known anything was different if the home health nurse hadn't changed the bandage.  When the bandages was changed last week he felt it looked normal. He denies swelling of the arms, fever, chills, vomiting, or other complaint.     Allergies:  Abilify [Aripiprazole]     Medications:    Ceftaroline fosamil  Doxycycline hyclate  Nystatin suspension  Valtrex  Gabapentin  Atenolol  Flagyl  Viibryd   Zantac  Bactrim     Past Medical History:    Anxiety  Depression  HTN  Lyme disease  Benzodiazepine withdrawal    Past Surgical History:    Testicle recessed     Social History:  Presents alone   Tobacco use: Never  Alcohol use: Negative  PCP: Kenneth G. Pallas Lyme's Doctor: Dr. Antonieta Chopra  Marital Status:        Review of Systems   Constitutional: Negative for chills and fever.   Gastrointestinal: Negative for vomiting.   Musculoskeletal: Negative for joint swelling.   Skin: Positive for color change.       Physical Exam     Patient Vitals for the past 24 hrs:   BP Temp Pulse Resp SpO2 Weight   06/21/17 1121 141/87 98.1  F (36.7  C) 90 20 100 % (!) 140.2 kg (309 lb)       Physical Exam  General: Adult male sitting upright.  CV: 2+ radial pulse right wrist.  Resp: Clear to auscultation bilaterally, no wheezes, rales or rhonchi. Normal  respiratory effort.  MSK: No edema. Nontender. Normal active range of motion. No tenderness around PICC line site, no palpable mass.  Skin: Warm and dry.PICC line in site in right upper extremity, there is erythema surrounding the site, at the entrance there is a small amount of yellowish discharge, no palpable fluid collection or fluctuance, no crepitus.  Neuro: Alert and oriented. Responds appropriately to all questions and commands. No focal findings appreciated. Normal muscle tone.  Psych: Normal mood and affect. Pleasant.     Emergency Department Course   Laboratory:  Catheter tip culture aerobic bacterial: pending    Emergency Department Course:  Past medical records, nursing notes, and vitals reviewed.  1130: I performed an exam of the patient and obtained history as documented above.    Above workup undertaken.  1147: Discussed patient with PICC line nurse.   PICC line nurse changed line in ED.   1313: Discussed patient with his lyme's doctor, Dr. Antonieta Frank.   1323: I rechecked the patient.  Findings and plan explained to the Patient. Patient discharged home with instructions regarding supportive care, medications, and reasons to return. The importance of close follow-up was reviewed.      Impression & Plan    Medical Decision Making:  Jonny Gramajo is a 32 year old male with history of chronic lyme disease, on 5 different antibiotics including 1 through the IV, who presents to the emergency room with concerns for increasing redness around the PICC line site in his right arm. This was noticed by home health nurse. The patient himself denies any symptoms. He is afebrile. He does not appear systemically ill. There is induration and erythema spreading about the right arm. Multiple etiologies were considered including cellulitis, pain from the bandages, and inflammatory response due to the PICC line itself. PICC line was removed from the right arm and culture of the tip of sent at request of the patient. A  new PICC line was placed in the left arm by the PICC RN. Please see her note for full details. Patient tolerated this well and on reassessment was asymptomatic. Some of the indurated appearance appeared to be already improving therefore I suspect more inflammatory response as opposed to cellulitis. It seems less likely cellulitic given the broad spectrum antibiotics he is on, although local infection cannot be excluded to to ongoing foreign body. Localized inflammation/irritation is a possibility as well. He should return immediately to the emergency department with worsening symptoms and otherwise get reassessed in 2 days to ensure improvement. He felt comfortable with this plan and was discharged home in improved condition. All questions were answered prior to discharge.     Diagnosis:    ICD-10-CM    1. PIC line (peripherally inserted central catheter) removal Z45.2    2. PICC line infection, initial encounter T80.219A        Disposition:  Discharged to home with plan as outlined.      Bao Howell  6/21/2017   [facility] EMERGENCY DEPARTMENT  I, Bao Howell, am serving as a scribe at 11:30 AM on 6/21/2017 to document services personally performed by Tigist Deras MD based on my observations and the provider's statements to me.       Tigist Deras MD  06/23/17 2154

## 2017-06-21 NOTE — ED AVS SNAPSHOT
[facility] Department    [address] [facility] [address]    Phone:  [phone]    Fax:  [phone]                                       Jonny Gramajo   MRN: [id number]    Department:  [facility] Emergency Department   Date of Visit:  6/21/2017           Patient Information     Date Of Birth          1985        Your diagnoses for this visit were:     PIC line (peripherally inserted central catheter) removal     PICC line infection, initial encounter        You were seen by Tigist Deras MD.      Follow-up Information     Follow up with Pallas, Kenneth G, MD.    Specialty:  Family Practice    Why:  2 days for reassessment    Contact information:    [facility]  [address]  [facility] [address] [phone]          Follow up with [facility].    Specialty:  EMERGENCY MEDICINE    Why:  As needed, If symptoms worsen    Contact information:    [address] [facility] [address] [phone]        Discharge Instructions       You should be reassessed in 2 days by your primary clinic to ensure improvement in what appeared to be a possible superficial infection/inflammation due to your PICC line in the right arm.    Ongoing care of the left PICC as per previous.    24 Hour Appointment Hotline       To make an appointment at any [facility], call [phone] ([phone]). If you don't have a family doctor or clinic, we will help you find one. [facility] clinics are conveniently located to serve the needs of you and your family.             Review of your medicines      Our records show that you are taking the medicines listed below. If these are incorrect, please call your family doctor or clinic.        Dose / Directions Last dose taken    ATENOLOL PO   Dose:  50 mg        Take 50 mg by mouth daily   Refills:  0        BACTRIM DS PO   Dose:  1 tablet         Take 1 tablet by mouth 2 times daily   Refills:  0        cariprazine 1.5 MG Caps capsule   Commonly known as:  VRAYLAR   Dose:  1.5 mg        Take 1.5 mg by mouth daily   Refills:  0        CEFTAROLINE FOSAMIL IV   Dose:  600 mg        Inject 600 mg into the vein every 12 hours   Refills:  0        DOXYCYCLINE HYCLATE IV   Dose:  400 mg        Inject 400 mg into the vein daily   Refills:  0        FLAGYL PO   Dose:  250 mg        Take 250 mg by mouth 2 times daily   Refills:  0        GABAPENTIN PO   Dose:  200 mg        Take 200 mg by mouth 3 times daily   Refills:  0        nystatin 396312 UNIT/ML suspension   Commonly known as:  MYCOSTATIN   Dose:  442806 Units        Take 500,000 Units by mouth 4 times daily as needed (when patient feels candidia is flaring up)   Refills:  0        VALTREX PO   Dose:  500 mg        Take 500 mg by mouth 2 times daily   Refills:  0        VIIBRYD PO   Dose:  40 mg        Take 40 mg by mouth daily   Refills:  0        ZANTAC PO   Dose:  300 mg        Take 300 mg by mouth 2 times daily   Refills:  0                Procedures and tests performed during your visit     Catheter Tip Culture Aerobic Bacterial      Orders Needing Specimen Collection     None      Pending Results     Date and Time Order Name Status Description    6/21/2017 1211 Catheter Tip Culture Aerobic Bacterial In process             Pending Culture Results     Date and Time Order Name Status Description    6/21/2017 1211 Catheter Tip Culture Aerobic Bacterial In process             Pending Results Instructions     If you had any lab results that were not finalized at the time of your Discharge, you can call the ED Lab Result RN at [phone]. You will be contacted by this team for any positive Lab results or changes in treatment. The nurses are available 7 days a week from 10A to 6:30P.  You can leave a message 24 hours per day and they will return your call.        Test Results From Your Hospital Stay         6/21/2017  1:16 PM                Clinical Quality Measure: Blood Pressure Screening     Your blood pressure was checked while you were in the emergency department today. The last reading we obtained was  BP: 141/87 . Please read the guidelines below about what these numbers mean and what you should do about them.  If your systolic blood pressure (the top number) is less than 120 and your diastolic blood pressure (the bottom number) is less than 80, then your blood pressure is normal. There is nothing more that you need to do about it.  If your systolic blood pressure (the top number) is 120-139 or your diastolic blood pressure (the bottom number) is 80-89, your blood pressure may be higher than it should be. You should have your blood pressure rechecked within a year by a primary care provider.  If your systolic blood pressure (the top number) is 140 or greater or your diastolic blood pressure (the bottom number) is 90 or greater, you may have high blood pressure. High blood pressure is treatable, but if left untreated over time it can put you at risk for heart attack, stroke, or kidney failure. You should have your blood pressure rechecked by a primary care provider within the next 4 weeks.  If your provider in the emergency department today gave you specific instructions to follow-up with your doctor or provider even sooner than that, you should follow that instruction and not wait for up to 4 weeks for your follow-up visit.        Thank you for choosing [facility]       Thank you for choosing [facility] for your care. Our goal is always to provide you with excellent care. Hearing back from our patients is one way we can continue to improve our services. Please take a few minutes to complete the written survey that you may receive in the mail after you visit with us. Thank you!        [employer] Information     [employer] lets you send messages to your doctor, view your test results, renew your prescriptions, schedule  "appointments and more. To sign up, go to [email or web address] . Click on \"Log in\" on the left side of the screen, which will take you to the Welcome page. Then click on \"Sign up Now\" on the right side of the page.     You will be asked to enter the access code listed below, as well as some personal information. Please follow the directions to create your username and password.     Your access code is: [email or web address]  Expires: 2017  5:40 PM     Your access code will  in 90 days. If you need help or a new code, please call your [place] clinic or [phone].        Care EveryWhere ID     This is your Care EveryWhere ID. This could be used by other organizations to access your [facility] [place] medical records  [id number]        Equal Access to Services     MARIANNE BURROUGHS : Hadvalerie Reich, dagoberto davila, khris araiza, batsheva luu. So [facility] [phone].    ATENCIÓN: Si habla español, tiene a castro disposición servicios gratuitos de asistencia lingüística. Llame al [phone].    We comply with applicable federal civil rights laws and [place] laws. We do not discriminate on the basis of race, color, national origin, age, disability sex, sexual orientation or gender identity.            After Visit Summary       This is your record. Keep this with you and show to your community pharmacist(s) and doctor(s) at your next visit.                  "

## 2017-06-21 NOTE — PROGRESS NOTES
PICC Insertion Time-Out   Proceduralist prior to procedure:    Confirmed provider order for procedure    Verified appropriate supplies/equipment are available for procedure    Verified appropriate assessments have been completed     Prior to procedure the proceduralist instituted a 'Cease all activity' to confirm with a second nursing staff member the following:     Confirm patient identifiers using patient name and date of birth    Verify procedure to be performed    Verify consent was signed and witnessed    Verbalize any allergies    Verify code status     [Co-signature verification:  IV Access flowsheet > click any insertion column associated to a note > view Value Information)     Bria Pandey RN

## 2017-06-21 NOTE — ED NOTES
Patient stats had Picc line dressing changed today and home health nurse thought it looked infected. Red warm and swollen area around Picc line. ABC intact alert and no distress.

## 2017-06-22 ENCOUNTER — HOSPITAL ENCOUNTER (EMERGENCY)
Facility: CLINIC | Age: 32
Discharge: HOME | End: 2017-06-22
Attending: EMERGENCY MEDICINE | Admitting: EMERGENCY MEDICINE
Payer: COMMERCIAL

## 2017-06-22 VITALS
BODY MASS INDEX: 35.75 KG/M2 | TEMPERATURE: 98.8 F | HEIGHT: 78 IN | DIASTOLIC BLOOD PRESSURE: 95 MMHG | RESPIRATION RATE: 18 BRPM | WEIGHT: 309 LBS | HEART RATE: 109 BPM | OXYGEN SATURATION: 99 % | SYSTOLIC BLOOD PRESSURE: 141 MMHG

## 2017-06-22 DIAGNOSIS — T80.219D PICC LINE INFECTION, SUBSEQUENT ENCOUNTER: ICD-10-CM

## 2017-06-22 DIAGNOSIS — F41.0 ANXIETY ATTACK: ICD-10-CM

## 2017-06-22 LAB
ANION GAP SERPL CALCULATED.3IONS-SCNC: 8 MMOL/L (ref 3–14)
BASOPHILS # BLD AUTO: 0 10E9/L (ref 0–0.2)
BASOPHILS NFR BLD AUTO: 0.2 %
BUN SERPL-MCNC: 16 MG/DL (ref 7–30)
CALCIUM SERPL-MCNC: 8.7 MG/DL (ref 8.5–10.1)
CHLORIDE SERPL-SCNC: 107 MMOL/L (ref 94–109)
CO2 SERPL-SCNC: 27 MMOL/L (ref 20–32)
CREAT SERPL-MCNC: 1.02 MG/DL (ref 0.66–1.25)
CRP SERPL-MCNC: <2.9 MG/L (ref 0–8)
DIFFERENTIAL METHOD BLD: ABNORMAL
EOSINOPHIL # BLD AUTO: 0 10E9/L (ref 0–0.7)
EOSINOPHIL NFR BLD AUTO: 0.2 %
ERYTHROCYTE [DISTWIDTH] IN BLOOD BY AUTOMATED COUNT: 13.5 % (ref 10–15)
ERYTHROCYTE [SEDIMENTATION RATE] IN BLOOD BY WESTERGREN METHOD: 4 MM/H (ref 0–15)
GFR SERPL CREATININE-BSD FRML MDRD: 85 ML/MIN/1.7M2
GLUCOSE SERPL-MCNC: 161 MG/DL (ref 70–99)
HCT VFR BLD AUTO: 39.6 % (ref 40–53)
HGB BLD-MCNC: 13.8 G/DL (ref 13.3–17.7)
IMM GRANULOCYTES # BLD: 0.1 10E9/L (ref 0–0.4)
IMM GRANULOCYTES NFR BLD: 1.2 %
LACTATE BLD-SCNC: 1.3 MMOL/L (ref 0.7–2.1)
LYMPHOCYTES # BLD AUTO: 1.6 10E9/L (ref 0.8–5.3)
LYMPHOCYTES NFR BLD AUTO: 24.1 %
MCH RBC QN AUTO: 32.9 PG (ref 26.5–33)
MCHC RBC AUTO-ENTMCNC: 34.8 G/DL (ref 31.5–36.5)
MCV RBC AUTO: 95 FL (ref 78–100)
MONOCYTES # BLD AUTO: 0.9 10E9/L (ref 0–1.3)
MONOCYTES NFR BLD AUTO: 13.4 %
NEUTROPHILS # BLD AUTO: 4.1 10E9/L (ref 1.6–8.3)
NEUTROPHILS NFR BLD AUTO: 60.9 %
NRBC # BLD AUTO: 0 10*3/UL
NRBC BLD AUTO-RTO: 0 /100
PLATELET # BLD AUTO: 178 10E9/L (ref 150–450)
POTASSIUM SERPL-SCNC: 3.7 MMOL/L (ref 3.4–5.3)
RBC # BLD AUTO: 4.19 10E12/L (ref 4.4–5.9)
SODIUM SERPL-SCNC: 142 MMOL/L (ref 133–144)
WBC # BLD AUTO: 6.6 10E9/L (ref 4–11)

## 2017-06-22 PROCEDURE — 85652 RBC SED RATE AUTOMATED: CPT | Performed by: EMERGENCY MEDICINE

## 2017-06-22 PROCEDURE — 25000128 H RX IP 250 OP 636: Performed by: EMERGENCY MEDICINE

## 2017-06-22 PROCEDURE — 80048 BASIC METABOLIC PNL TOTAL CA: CPT | Performed by: EMERGENCY MEDICINE

## 2017-06-22 PROCEDURE — 96375 TX/PRO/DX INJ NEW DRUG ADDON: CPT

## 2017-06-22 PROCEDURE — 85025 COMPLETE CBC W/AUTO DIFF WBC: CPT | Performed by: EMERGENCY MEDICINE

## 2017-06-22 PROCEDURE — 36415 COLL VENOUS BLD VENIPUNCTURE: CPT | Performed by: EMERGENCY MEDICINE

## 2017-06-22 PROCEDURE — 83605 ASSAY OF LACTIC ACID: CPT | Performed by: EMERGENCY MEDICINE

## 2017-06-22 PROCEDURE — 96374 THER/PROPH/DIAG INJ IV PUSH: CPT

## 2017-06-22 PROCEDURE — 36415 COLL VENOUS BLD VENIPUNCTURE: CPT

## 2017-06-22 PROCEDURE — 96361 HYDRATE IV INFUSION ADD-ON: CPT

## 2017-06-22 PROCEDURE — 99285 EMERGENCY DEPT VISIT HI MDM: CPT | Mod: 25

## 2017-06-22 PROCEDURE — 87040 BLOOD CULTURE FOR BACTERIA: CPT | Mod: 91 | Performed by: EMERGENCY MEDICINE

## 2017-06-22 PROCEDURE — 86140 C-REACTIVE PROTEIN: CPT | Performed by: EMERGENCY MEDICINE

## 2017-06-22 RX ORDER — HEPARIN SODIUM (PORCINE) LOCK FLUSH IV SOLN 100 UNIT/ML 100 UNIT/ML
100 SOLUTION INTRAVENOUS ONCE
Status: COMPLETED | OUTPATIENT
Start: 2017-06-22 | End: 2017-06-22

## 2017-06-22 RX ORDER — SODIUM CHLORIDE 9 MG/ML
1000 INJECTION, SOLUTION INTRAVENOUS CONTINUOUS
Status: DISCONTINUED | OUTPATIENT
Start: 2017-06-22 | End: 2017-06-22 | Stop reason: HOSPADM

## 2017-06-22 RX ORDER — LORAZEPAM 2 MG/ML
1 INJECTION INTRAMUSCULAR ONCE
Status: COMPLETED | OUTPATIENT
Start: 2017-06-22 | End: 2017-06-22

## 2017-06-22 RX ORDER — ONDANSETRON 2 MG/ML
4 INJECTION INTRAMUSCULAR; INTRAVENOUS EVERY 30 MIN PRN
Status: DISCONTINUED | OUTPATIENT
Start: 2017-06-22 | End: 2017-06-22 | Stop reason: HOSPADM

## 2017-06-22 RX ORDER — LIDOCAINE 40 MG/G
CREAM TOPICAL
Status: DISCONTINUED | OUTPATIENT
Start: 2017-06-22 | End: 2017-06-22 | Stop reason: HOSPADM

## 2017-06-22 RX ADMIN — SODIUM CHLORIDE 1000 ML: 9 INJECTION, SOLUTION INTRAVENOUS at 00:36

## 2017-06-22 RX ADMIN — LORAZEPAM 1 MG: 2 INJECTION INTRAMUSCULAR; INTRAVENOUS at 01:12

## 2017-06-22 RX ADMIN — SODIUM CHLORIDE, PRESERVATIVE FREE 100 UNITS: 5 INJECTION INTRAVENOUS at 03:55

## 2017-06-22 RX ADMIN — SODIUM CHLORIDE 1000 ML: 9 INJECTION, SOLUTION INTRAVENOUS at 02:18

## 2017-06-22 RX ADMIN — ONDANSETRON 4 MG: 2 INJECTION INTRAMUSCULAR; INTRAVENOUS at 01:12

## 2017-06-22 ASSESSMENT — ENCOUNTER SYMPTOMS
CHILLS: 1
NAUSEA: 1
NERVOUS/ANXIOUS: 1
TREMORS: 1
LIGHT-HEADEDNESS: 1
FATIGUE: 1
FEVER: 0
CONFUSION: 1

## 2017-06-22 NOTE — ED AVS SNAPSHOT
[facility] Emergency Department    [address]    [facility] [address]    Phone:  [phone]    Fax:  [phone]                                       Jonny Gramajo   MRN: [id number]    Department:  [facility] Emergency Department   Date of Visit:  6/22/2017           After Visit Summary Signature Page     I have received my discharge instructions, and my questions have been answered. I have discussed any challenges I see with this plan with the nurse or doctor.    ..........................................................................................................................................  Patient/Patient Representative Signature      ..........................................................................................................................................  Patient Representative Print Name and Relationship to Patient    ..................................................               ................................................  Date                                            Time    ..........................................................................................................................................  Reviewed by Signature/Title    ...................................................              ..............................................  Date                                                            Time

## 2017-06-22 NOTE — ED PROVIDER NOTES
"  History     Chief Complaint:  PICC line problem    HPI:   Jonny Gramajo is a 32 year old male with a history of chronic Lyme's disease who presents for a PICC line problem. The patient was recently hospitalized from 6/2-6/6 for alcohol and benzodiazapine withdrawal. In addition, he has been infected with Lyme's disease since 1993, which was reconfirmed via Western blot in 2005. His most recent PICC line has been in his upper right arm for the past few months for broad-spectrum antibiotic treatment for his chronic Lyme's disease. Yesterday, he presented to the ED where he had his old PICC line removed since it was becoming infected and he had a new PICC line placed in his upper left arm; this was functional upon discharge. His old PICC line was cultured which has yet to show any growth at this time. However, a couple hours ago, he developed chills, fatigue, tremors, nausea, lightheadedness, anxiety, and confusion that he describes as \"brain fog,\" prompting his second ED visit. He presents with concerns for possible sepsis. He denies recent fever.     Allergies:  Abilify     Medications:    Gabapentin  Flagyl  Atenolol  Vilazodone  Ranitidine  Ceftraroline  Doxycycline  Vraylar  Bactril  Mycostatin  Valtrex     Past Medical History:    Anxiety  Depression  Hypertension  Lyme's disease  Alcohol Abuse    Past Surgical History:    Genitourinary surgery    Family History:    History reviewed. No pertinent family history.      Social History:  Smoking status: Never Smoker  Alcohol use: No -- Former alcoholic, sober since 6/1   Marital Status:     Presents with wife at bedside.      Review of Systems   Constitutional: Positive for chills and fatigue. Negative for fever.   Gastrointestinal: Positive for nausea.   Neurological: Positive for tremors and light-headedness.   Psychiatric/Behavioral: Positive for confusion. The patient is nervous/anxious.    All other systems reviewed and are negative.      Physical Exam " "    Patient Vitals for the past 24 hrs:   BP Temp Temp src Pulse Heart Rate Resp SpO2 Height Weight   06/22/17 0315 137/90 - - - - - 98 % - -   06/22/17 0300 122/72 - - - 104 - 92 % - -   06/22/17 0245 114/58 - - - 80 - 95 % - -   06/22/17 0230 126/78 - - - 82 - 99 % - -   06/22/17 0215 140/85 - - - 85 - 93 % - -   06/22/17 0200 132/75 - - - 76 - 97 % - -   06/22/17 0145 137/84 - - - 89 - - - -   06/22/17 0130 147/82 - - - 94 - 97 % - -   06/22/17 0115 143/89 - - - 93 - 98 % - -   06/22/17 0013 (!) 150/110 98.8  F (37.1  C) Temporal 109 - 22 99 % 1.981 m (6' 6\") (!) 140.2 kg (309 lb)      Physical Exam:  Constitutional:  Appears well-developed and well-nourished. Alert. Conversant, but anxious, somewhat tangential historian. Non toxic.  HENT:   Head: Atraumatic.   Nose: Nose normal.  Mouth/Throat: Oral mucosa is clear and moist. no trismus. Pharynx normal. Tonsils symmetric. No tonsillar enlargement, erythema, or exudate.  Eyes: Conjunctivae normal. EOM normal. Pupils equal, round, and reactive to light. No scleral icterus.   Neck: Normal range of motion. Neck supple. No tracheal deviation present.   Cardiovascular: Normal rate, regular rhythm. No gallop. No friction rub. No murmur heard. Symmetric radial artery pulses   Pulmonary/Chest: Effort normal. No stridor. No respiratory distress. No wheezes. No rales. No rhonchi . No tenderness.   Abdominal: Soft. Bowel sounds normal. No distension. No mass. No tenderness. No rebound. No guarding.   Musculoskeletal: Normal range of motion. No edema. No tenderness. No deformity   Lymph: No cervical or axillary adenopathy. no ascending lymphangitis  Neurological: Alert and oriented to person, place, and time. Normal strength. CN II-VII intact. No sensory deficit. GCS eye subscore is 4. GCS verbal subscore is 5. GCS motor subscore is 6. Normal coordination   Skin: PICC line site, right upper medial arm. Dressing removed. There are irregular erythematous patches of skin in a " distribution c/w superficial contact dermatitis to tape adhesive. Some of these are excoriated. The actual PICC line site is slightly raised and erythematous. There is some graunulation tissue in the base with a tiny amount of purulent drainage. No crepitus or fluctuance. Skin is otherwise warm and dry. No rash noted. No pallor. Normal capillary refill.  Psychiatric:  Normal mood. Anxious. H/o anxiety, and had been on Klonipin 2.5mg BID for years until 3 weeks ago, when he was stopped abruptly. He was hospitalized for withdrawal and has been sober from benzos, EtOH for 3 weeks. Jittery and anxious tonight. Feels nervous and foggy.        Emergency Department Course     Laboratory:  Blood Culture x2: Pending  CBC: RBC 4.19 (L), HCT 39.6 (L), o/w WNL (WBC 6.6, HGB 13.8, )    Erythrocyte Sedimentation: 4  CRP Inflammation: <2.9  Basic Metabolic Panel: Glucose 161 (H), o/w WNL (Creatinine 1.02)   Lactic Acid: 1.3    Interventions:  0036- NS 1L IV Bolus   0112- Ativan 1 mg IV  0112- Zofran 4 mg IV  0218- NS 1L IV Bolus     Emergency Department Course:  Past medical records, nursing notes, and vitals reviewed.  0025: I performed an exam of the patient and obtained history, as documented above. GCS 15.     IV inserted and blood drawn.     The above interventions were administered.     0342: I rechecked the patient. Laboratory findings and plan explained to the Patient and spouse. Patient discharged home with instructions regarding supportive care, medications, and reasons to return. The importance of close follow-up was reviewed.      Impression & Plan      Medical Decision Making:  Jonny Gramajo is a 32 year old male with a history of chronic Lyme's disease who is currently on multiple IV antibiotics including Ceftraroline and has a PICC line in place who came to the ED today with concerns for an evolving infection from his PICC line. Yesterday, he noticed redness around his PICC line and was seen in the ED for  "this. A culture from the PICC tip was sent and this line was removed and a new one was placed. There are no results back from that culture. The new PICC line seems to be functioning well. However, he has since developed vague systemic symptoms including dizziness, \"foggy thinking\", anxiety, lightheadedness and overall feeling unwell. The patient also has a history of anxiety and substance abuse and had been chronically on benzodiazepines until about three weeks ago when his doctor took him off these medications (it sounds as if he was either abusing this medication or mixing it with alcohol). There was some underlying anxiety to the patient's presentation today. He feels tremendously improved after 1 mg of Ativan and 1L of IV saline. His vitals have been stable throughout his stay and he is not febrile. His laboratory work up shows reassuring inflammatory markers and a normal metabolic profile. Additional blood cultures are pending. At this point, I do not see any evidence by my clinical impression or my laboratory evaluation to suggest bacteriemia or sepsis. I discussed with the patient that we could admit him to the hospital for observation pending the results of his blood cultures. However, he would much prefer to return home as he is feeling improved. I believe that this is safe and reasonable. He was counseled to return for fever or chill and to otherwise follow up with his doctor tomorrow.     Diagnosis:    ICD-10-CM    1. Anxiety attack F41.0    2. PICC line infection, subsequent encounter T80.219D     possible     Disposition:  Discharged to home.    Jennifer Duggan  6/22/2017   [facility] EMERGENCY DEPARTMENT    I, Jennifer Duggan, am serving as a scribe at 12:25 AM on 6/22/2017 to document services personally performed by Andriy Shukla MD based on my observations and the provider's statements to me.       Andriy Shukla MD  06/22/17 0619    "

## 2017-06-22 NOTE — ED AVS SNAPSHOT
[facility]    [address] [place] [address]    Phone:  [phone]    Fax:  [phone]                                       Jonny Gramajo   MRN: [id number]    Department:  [facility]   Date of Visit:  6/22/2017           Patient Information     Date Of Birth          1985        Your diagnoses for this visit were:     Anxiety attack     PICC line infection, subsequent encounter possible       You were seen by Andriy Shukla MD.      Follow-up Information     Follow up with Pallas, Kenneth G, MD In 1 day.    Specialty:  Family Practice    Contact information:    [facility]  [address]  [facility] [address] [phone]        Discharge References/Attachments     ANXIETY REACTION (ENGLISH)    BACTEREMIA, SUSPECTED (ADULT) (ENGLISH)      24 Hour Appointment Hotline       To make an appointment at any [place] clinic, call [phone] ([phone]). If you don't have a family doctor or clinic, we will help you find one. [place] clinics are conveniently located to serve the needs of you and your family.             Review of your medicines      Our records show that you are taking the medicines listed below. If these are incorrect, please call your family doctor or clinic.        Dose / Directions Last dose taken    ATENOLOL PO   Dose:  50 mg        Take 50 mg by mouth daily   Refills:  0        BACTRIM DS PO   Dose:  1 tablet        Take 1 tablet by mouth 2 times daily   Refills:  0        cariprazine 1.5 MG Caps capsule   Commonly known as:  VRAYLAR   Dose:  1.5 mg        Take 1.5 mg by mouth daily   Refills:  0        CEFTAROLINE FOSAMIL IV   Dose:  600 mg        Inject 600 mg into the vein every 12 hours   Refills:  0        DOXYCYCLINE HYCLATE IV   Dose:  400 mg        Inject 400 mg into the vein daily   Refills:  0        FLAGYL PO   Dose:  250 mg        Take 250 mg by mouth  2 times daily   Refills:  0        GABAPENTIN PO   Dose:  200 mg        Take 200 mg by mouth 3 times daily   Refills:  0        nystatin 633723 UNIT/ML suspension   Commonly known as:  MYCOSTATIN   Dose:  988034 Units        Take 500,000 Units by mouth 4 times daily as needed (when patient feels candidia is flaring up)   Refills:  0        VALTREX PO   Dose:  500 mg        Take 500 mg by mouth 2 times daily   Refills:  0        VIIBRYD PO   Dose:  40 mg        Take 40 mg by mouth daily   Refills:  0        ZANTAC PO   Dose:  300 mg        Take 300 mg by mouth 2 times daily   Refills:  0                Procedures and tests performed during your visit     Procedure/Test Number of Times Performed    Basic metabolic panel 1    Blood culture 2    CBC with platelets differential 1    CRP inflammation 1    Cardiac Continuous Monitoring 1    Erythrocyte sedimentation rate auto 1    Lactic acid whole blood 1    Peripheral IV catheter 2      Orders Needing Specimen Collection     None      Pending Results     Date and Time Order Name Status Description    6/22/2017 0031 Blood culture Preliminary     6/22/2017 0017 Blood culture Preliminary     6/21/2017 1211 Catheter Tip Culture Aerobic Bacterial Preliminary             Pending Culture Results     Date and Time Order Name Status Description    6/22/2017 0031 Blood culture Preliminary     6/22/2017 0017 Blood culture Preliminary     6/21/2017 1211 Catheter Tip Culture Aerobic Bacterial Preliminary             Pending Results Instructions     If you had any lab results that were not finalized at the time of your Discharge, you can call the ED Lab Result RN at [phone]. You will be contacted by this team for any positive Lab results or changes in treatment. The nurses are available 7 days a week from 10A to 6:30P.  You can leave a message 24 hours per day and they will return your call.        Test Results From Your Hospital Stay        6/22/2017 12:49 AM      Component  Results     Component Value Ref Range & Units Status    WBC 6.6 4.0 - 11.0 10e9/L Final    RBC Count 4.19 (L) 4.4 - 5.9 10e12/L Final    Hemoglobin 13.8 13.3 - 17.7 g/dL Final    Hematocrit 39.6 (L) 40.0 - 53.0 % Final    MCV 95 78 - 100 fl Final    MCH 32.9 26.5 - 33.0 pg Final    MCHC 34.8 31.5 - 36.5 g/dL Final    RDW 13.5 10.0 - 15.0 % Final    Platelet Count 178 150 - 450 10e9/L Final    Diff Method Automated Method  Final    % Neutrophils 60.9 % Final    % Lymphocytes 24.1 % Final    % Monocytes 13.4 % Final    % Eosinophils 0.2 % Final    % Basophils 0.2 % Final    % Immature Granulocytes 1.2 % Final    Nucleated RBCs 0 0 /100 Final    Absolute Neutrophil 4.1 1.6 - 8.3 10e9/L Final    Absolute Lymphocytes 1.6 0.8 - 5.3 10e9/L Final    Absolute Monocytes 0.9 0.0 - 1.3 10e9/L Final    Absolute Eosinophils 0.0 0.0 - 0.7 10e9/L Final    Absolute Basophils 0.0 0.0 - 0.2 10e9/L Final    Abs Immature Granulocytes 0.1 0 - 0.4 10e9/L Final    Absolute Nucleated RBC 0.0  Final         6/22/2017 12:55 AM      Component Results     Component Value Ref Range & Units Status    Sed Rate 4 0 - 15 mm/h Final         6/22/2017  1:04 AM      Component Results     Component Value Ref Range & Units Status    CRP Inflammation <2.9 0.0 - 8.0 mg/L Final         6/22/2017  2:45 AM      Component Results     Component    Specimen Description    Blood Left Arm PICC    Special Requests    Aerobic and anaerobic bottles received    Culture Micro    Pending    Micro Report Status    Pending         6/22/2017  1:04 AM      Component Results     Component Value Ref Range & Units Status    Sodium 142 133 - 144 mmol/L Final    Potassium 3.7 3.4 - 5.3 mmol/L Final    Chloride 107 94 - 109 mmol/L Final    Carbon Dioxide 27 20 - 32 mmol/L Final    Anion Gap 8 3 - 14 mmol/L Final    Glucose 161 (H) 70 - 99 mg/dL Final    Urea Nitrogen 16 7 - 30 mg/dL Final    Creatinine 1.02 0.66 - 1.25 mg/dL Final    GFR Estimate 85 >60 mL/min/1.7m2 Final    Non   GFR Calc    GFR Estimate If Black >90   GFR Calc   >60 mL/min/1.7m2 Final    Calcium 8.7 8.5 - 10.1 mg/dL Final         6/22/2017 12:50 AM      Component Results     Component Value Ref Range & Units Status    Lactic Acid 1.3 0.7 - 2.1 mmol/L Final         6/22/2017  2:44 AM      Component Results     Component    Specimen Description    Blood Left Wrist    Special Requests    Aerobic and anaerobic bottles received    Culture Micro    Pending    Micro Report Status    Pending                Clinical Quality Measure: Blood Pressure Screening     Your blood pressure was checked while you were in the emergency department today. The last reading we obtained was  BP: 137/90 . Please read the guidelines below about what these numbers mean and what you should do about them.  If your systolic blood pressure (the top number) is less than 120 and your diastolic blood pressure (the bottom number) is less than 80, then your blood pressure is normal. There is nothing more that you need to do about it.  If your systolic blood pressure (the top number) is 120-139 or your diastolic blood pressure (the bottom number) is 80-89, your blood pressure may be higher than it should be. You should have your blood pressure rechecked within a year by a primary care provider.  If your systolic blood pressure (the top number) is 140 or greater or your diastolic blood pressure (the bottom number) is 90 or greater, you may have high blood pressure. High blood pressure is treatable, but if left untreated over time it can put you at risk for heart attack, stroke, or kidney failure. You should have your blood pressure rechecked by a primary care provider within the next 4 weeks.  If your provider in the emergency department today gave you specific instructions to follow-up with your doctor or provider even sooner than that, you should follow that instruction and not wait for up to 4 weeks for your follow-up visit.    "     Thank you for choosing [facility]       Thank you for choosing [facility] for your care. Our goal is always to provide you with excellent care. Hearing back from our patients is one way we can continue to improve our services. Please take a few minutes to complete the written survey that you may receive in the mail after you visit with us. Thank you!        [employer] Information     [employer] lets you send messages to your doctor, view your test results, renew your prescriptions, schedule appointments and more. To sign up, go to [email or web address] . Click on \"Log in\" on the left side of the screen, which will take you to the Welcome page. Then click on \"Sign up Now\" on the right side of the page.     You will be asked to enter the access code listed below, as well as some personal information. Please follow the directions to create your username and password.     Your access code is: [email or web address]  Expires: 2017  5:40 PM     Your access code will  in 90 days. If you need help or a new code, please call your [facility] clinic or [phone].        Care EveryWhere ID     This is your Care EveryWhere ID. This could be used by other organizations to access your [facility] medical records  [id number]        Equal Access to Services     MARIANNE BURROUGHS : Ghazala de leóno Rachana, waaxda luqadaha, qaybta kaalmada adelydiayada, batsheva luu. So [facility] [phone].    ATENCIÓN: Si habla español, tiene a castro disposición servicios gratuitos de asistencia lingüística. Llame al [phone].    We comply with applicable federal civil rights laws and [place] laws. We do not discriminate on the basis of race, color, national origin, age, disability sex, sexual orientation or gender identity.            After Visit Summary       This is your record. Keep this with you and show to your community pharmacist(s) and doctor(s) at your next visit.                  "

## 2017-06-22 NOTE — ED NOTES
Pt to ER with c/o fever, chills lightheaded, pt had PIC line pulled yest for infection, pt has new PIC line in place that is functional, concerned for sepsis

## 2017-06-22 NOTE — ED NOTES
Pt resting comfortably in bed, ice water and warm blanket provided to pt per pt request, appears well resting without any distress, VS remains stable, call lights remains in reach, will continue to monitor.

## 2017-06-26 ENCOUNTER — TELEPHONE (OUTPATIENT)
Dept: EMERGENCY MEDICINE | Facility: CLINIC | Age: 32
End: 2017-06-26

## 2017-06-26 NOTE — TELEPHONE ENCOUNTER
[facility] Emergency Department Lab result notification [Adult-Male]    [facility] ED lab result protocol used  General Culture Protocol - Catheter Tip    Reason for call  Notify of lab results, assess symptoms,  review ED providers recommendations/discharge instructions (if necessary) and advise per ED lab result f/u protocol    Lab Result (including Rx patient on, if applicable)  Preliminary AEROBIC BACTERIA culture report on 06/26/2017 shows the presence of bacteria(s):  <15 colonies Yeast   [facility] ED discharge antibiotic: none  As per  ED lab result protocol, consult with ED provider, if indicated, or await final culture result.    Information table from ED Provider visit on 06/21/2017  ED diagnosis: PIC line (peripherally inserted central catheter) removal, PIC line infection   ED provider Tigist Deras MD   Symptoms reported at ED visit (Chief complaint, HPI) a 32 year old male with history of chronic lyme disease who presents with concern for redness around his PICC line. The patient had the dressing around his PICC line changed today by home health nurse who was concerned the area may be infected due to new redness and recommended evaluation prompting visit to the emergency department.  VS were normal for home health care nurse including temp of 98.8 and blood pressure in the 130's.  He did have his normal infusion through the line this morning, his next dose is to be at 2000 tonight.  He denies any pain around the site. He notes he wouldn't have known anything was different if the home health nurse hadn't changed the bandage.  When the bandages was changed last week he felt it looked normal. He denies swelling of the arms, fever, chills, vomiting, or other complaint.    ED providers Impression and Plan (applicable information) a 32 year old male with history of chronic lyme disease, on 5 different antibiotics including 1 through the IV, who presents to the emergency room with concerns for  increasing redness around the PICC line site in his right arm. This was noticed by home health nurse. The patient himself denies any symptoms. He is afebrile. He does not appear systemically ill. There is induration and erythema spreading about the right arm. Multiple etiologies were considered including cellulitis, pain from the bandages, and inflammatory response due to the PICC line itself. PICC line was removed from the right arm and culture of the tip of sent at request of the patient. A new PICC line was placed in the left arm by the PICC RN. Please see her note for full details. Patient tolerated this well and on reassessment was asymptomatic. Some of the indurated appearance appeared to be already improving therefore I suspect more inflammatory response as opposed to cellulitis. It seems less likely cellulitic given the broad spectrum antibiotics he is on, although local infection cannot be excluded to to ongoing foreign body. Localized inflammation/irritation is a possibility as well. He should return immediately to the emergency department with worsening symptoms and otherwise get reassessed in 2 days to ensure improvement. He felt comfortable with this plan and was discharged home in improved condition. All questions were answered prior to discharge.    Significant Medical hx, if applicable Lyme's on several antibiotics currently, rest reviewed   Coumadin/Warfarin [Yes or No] no   Creatinine Level (mg/dl) 1.02   Creatinine clearance (ml/min), if applicable 206   Allergies Abilify   Weight, if applicable 140.2 kg      RN Assessment (Patient s current Symptoms), include time called.  [Insert Left message here if message left]  At 1045 Left voicemail message requesting a call back to [phone] between 10 a.m. and 6:30 p.m., 7 days a week for patient's ED/UC lab results.  May leave a message 24/7, if no one available.     Leyla Wasserman, RN  [facility] RN  Lung Nodule and ED Lab Result F/u  "RN  Epic pool (ED late result f/u RN): [id number]  [facility] INCIDENTAL RADIOLOGY F/U NURSES: P [id number]  # [phone]       Copy of Lab result   Preliminary Result   Exam Information   Exam Date Exam Time Accession # Results    6/21/17 12:10 PM [id number]    Component Results   Component Collected Lab   Specimen Description 06/21/2017 12:10 PM 75   Catheter tip PICC   Culture Micro (Abnormal) 06/21/2017 12:10    <15 colonies  Candida albicans / dubliniensis Speciation in progress   Micro Report Status 06/21/2017 12:10    Pending   Organism: 06/21/2017 12:10    <15 colonies  Candida albicans / dubliniensis Speciation in progress   Culture & Susceptibility   <15 COLONIES  CANDIDA ALBICANS / DUBLINIENSIS SPECIATION IN PROGRESS (E TEST)   Antibiotic Sensitivity Unit Status   Amphotericin B 0.125 No interp available ug/mL Final   Comment: See comment below  MICs (minimum inhibitory concentrations) of antibiotics which include \"No   Interpretation\" means there are no national regulatory guidelines for   interpretation available.  MICs with a greater than sign (>) should be   considered resistant for safety reasons.  Further advice can be sought from   IDDL, Pharm Ds, and Infectious Diseases consultants. Testing for amphotericin B,   caspofungin, micafungin, and posaconazole was developed and validated in house   by the [facility] ([facility]) [place], [place].  Final   Fluconazole 0.023 Susceptible ug/mL Final   Itraconazole 0.006 No interp available ug/mL Final   Micafungin 0.032 Susceptible ug/mL Final   Voriconazole 0.004 Susceptible ug/mL Final            "

## 2017-06-27 LAB
BACTERIA SPEC CULT: ABNORMAL
MICRO REPORT STATUS: ABNORMAL
MICROORGANISM SPEC CULT: ABNORMAL
SPECIMEN SOURCE: ABNORMAL

## 2017-06-27 NOTE — TELEPHONE ENCOUNTER
[facility] Emergency Department Lab result notification     Patient/parent Name  Jonny Gramajo  [facility] ED discharge antibiotic (if prescribed): none  Final Aerobic bacterial culture on 06/27/2017 shows the presence of bacteria(s):  <15 colonies Candida albicans.  Patient to be notified of result, symptoms's assessed and advised per [facility] ED lab result protocol.  RN Assessment (Patient s current Symptoms), include time called.  [Insert Left message here if message left]  At 1320 pt states he feels good.  No problems at this time noted.    Questioning is this good/bad what does it mean.  RN will consult ER provider to let pt know what MD believes.     At 1327 consulted ED provider Dr. Boyce, have pt follow up with pcp, this result is not concerning to her as pt is feeling good.     RN Recommendations/Instructions per [facility] ED lab result protocol  Follow up with pcp no changes to treatment.     Please Contact your PCP clinic or return to the Emergency department if your:    Symptoms worsen or other concerning symptom's.    PCP follow-up Questions asked: YES       Leyla Wasserman RN  [facility] RN  Lung Nodule and ED Lab Result F/u RN  Epic pool (ED late result f/u RN): [id number]  [facility] INCIDENTAL RADIOLOGY F/U NURSES: P [id number]  # [phone]

## 2017-06-28 LAB
BACTERIA SPEC CULT: NO GROWTH
BACTERIA SPEC CULT: NO GROWTH
Lab: NORMAL
Lab: NORMAL
MICRO REPORT STATUS: NORMAL
MICRO REPORT STATUS: NORMAL
SPECIMEN SOURCE: NORMAL
SPECIMEN SOURCE: NORMAL

## 2017-07-06 DIAGNOSIS — A69.20 LYME DISEASE: Primary | ICD-10-CM

## 2017-07-06 LAB
ALBUMIN SERPL-MCNC: 4.6 G/DL (ref 3.4–5)
ALP SERPL-CCNC: 81 U/L (ref 40–150)
ALT SERPL W P-5'-P-CCNC: 93 U/L (ref 0–70)
ANION GAP SERPL CALCULATED.3IONS-SCNC: 9 MMOL/L (ref 3–14)
AST SERPL W P-5'-P-CCNC: 33 U/L (ref 0–45)
BASOPHILS # BLD AUTO: 0 10E9/L (ref 0–0.2)
BASOPHILS NFR BLD AUTO: 0.2 %
BILIRUB DIRECT SERPL-MCNC: 0.1 MG/DL (ref 0–0.2)
BILIRUB SERPL-MCNC: 0.5 MG/DL (ref 0.2–1.3)
BUN SERPL-MCNC: 12 MG/DL (ref 7–30)
CALCIUM SERPL-MCNC: 10 MG/DL (ref 8.5–10.1)
CHLORIDE SERPL-SCNC: 108 MMOL/L (ref 94–109)
CO2 SERPL-SCNC: 21 MMOL/L (ref 20–32)
CREAT SERPL-MCNC: 0.92 MG/DL (ref 0.66–1.25)
DIFFERENTIAL METHOD BLD: NORMAL
EOSINOPHIL # BLD AUTO: 0 10E9/L (ref 0–0.7)
EOSINOPHIL NFR BLD AUTO: 0.2 %
ERYTHROCYTE [DISTWIDTH] IN BLOOD BY AUTOMATED COUNT: 13.7 % (ref 10–15)
GFR SERPL CREATININE-BSD FRML MDRD: ABNORMAL ML/MIN/1.7M2
GLUCOSE SERPL-MCNC: 115 MG/DL (ref 70–99)
HCT VFR BLD AUTO: 46.7 % (ref 40–53)
HGB BLD-MCNC: 16.4 G/DL (ref 13.3–17.7)
LYMPHOCYTES # BLD AUTO: 1.6 10E9/L (ref 0.8–5.3)
LYMPHOCYTES NFR BLD AUTO: 26.2 %
MCH RBC QN AUTO: 32.8 PG (ref 26.5–33)
MCHC RBC AUTO-ENTMCNC: 35.1 G/DL (ref 31.5–36.5)
MCV RBC AUTO: 93 FL (ref 78–100)
MONOCYTES # BLD AUTO: 0.6 10E9/L (ref 0–1.3)
MONOCYTES NFR BLD AUTO: 10.2 %
NEUTROPHILS # BLD AUTO: 3.8 10E9/L (ref 1.6–8.3)
NEUTROPHILS NFR BLD AUTO: 63.2 %
PLATELET # BLD AUTO: 160 10E9/L (ref 150–450)
POTASSIUM SERPL-SCNC: 3.9 MMOL/L (ref 3.4–5.3)
PROT SERPL-MCNC: 7.9 G/DL (ref 6.8–8.8)
RBC # BLD AUTO: 5 10E12/L (ref 4.4–5.9)
SODIUM SERPL-SCNC: 138 MMOL/L (ref 133–144)
WBC # BLD AUTO: 6.1 10E9/L (ref 4–11)

## 2017-07-06 PROCEDURE — 85025 COMPLETE CBC W/AUTO DIFF WBC: CPT | Performed by: FAMILY MEDICINE

## 2017-07-06 PROCEDURE — 36415 COLL VENOUS BLD VENIPUNCTURE: CPT | Performed by: FAMILY MEDICINE

## 2017-07-06 PROCEDURE — 82248 BILIRUBIN DIRECT: CPT | Performed by: FAMILY MEDICINE

## 2017-07-06 PROCEDURE — 80053 COMPREHEN METABOLIC PANEL: CPT | Performed by: FAMILY MEDICINE

## 2017-07-15 ENCOUNTER — APPOINTMENT (OUTPATIENT)
Dept: GENERAL RADIOLOGY | Facility: CLINIC | Age: 32
End: 2017-07-15
Attending: EMERGENCY MEDICINE
Payer: COMMERCIAL

## 2017-07-15 ENCOUNTER — APPOINTMENT (OUTPATIENT)
Dept: ULTRASOUND IMAGING | Facility: CLINIC | Age: 32
End: 2017-07-15
Attending: EMERGENCY MEDICINE
Payer: COMMERCIAL

## 2017-07-15 ENCOUNTER — HOSPITAL ENCOUNTER (EMERGENCY)
Facility: CLINIC | Age: 32
Discharge: HOME | End: 2017-07-16
Attending: EMERGENCY MEDICINE | Admitting: EMERGENCY MEDICINE
Payer: COMMERCIAL

## 2017-07-15 VITALS
HEART RATE: 127 BPM | HEIGHT: 78 IN | TEMPERATURE: 99.7 F | RESPIRATION RATE: 20 BRPM | OXYGEN SATURATION: 97 % | SYSTOLIC BLOOD PRESSURE: 140 MMHG | WEIGHT: 315 LBS | BODY MASS INDEX: 36.45 KG/M2 | DIASTOLIC BLOOD PRESSURE: 67 MMHG

## 2017-07-15 DIAGNOSIS — T80.219A PICC LINE INFECTION, INITIAL ENCOUNTER: ICD-10-CM

## 2017-07-15 LAB
ALBUMIN SERPL-MCNC: 3.7 G/DL (ref 3.4–5)
ALP SERPL-CCNC: 71 U/L (ref 40–150)
ALT SERPL W P-5'-P-CCNC: 60 U/L (ref 0–70)
ANION GAP SERPL CALCULATED.3IONS-SCNC: 3 MMOL/L (ref 3–14)
AST SERPL W P-5'-P-CCNC: 25 U/L (ref 0–45)
BASOPHILS # BLD AUTO: 0 10E9/L (ref 0–0.2)
BASOPHILS NFR BLD AUTO: 0.2 %
BILIRUB SERPL-MCNC: 0.2 MG/DL (ref 0.2–1.3)
BUN SERPL-MCNC: 16 MG/DL (ref 7–30)
CALCIUM SERPL-MCNC: 9.2 MG/DL (ref 8.5–10.1)
CHLORIDE SERPL-SCNC: 109 MMOL/L (ref 94–109)
CO2 SERPL-SCNC: 29 MMOL/L (ref 20–32)
CREAT SERPL-MCNC: 0.96 MG/DL (ref 0.66–1.25)
DIFFERENTIAL METHOD BLD: ABNORMAL
EOSINOPHIL # BLD AUTO: 0 10E9/L (ref 0–0.7)
EOSINOPHIL NFR BLD AUTO: 0 %
ERYTHROCYTE [DISTWIDTH] IN BLOOD BY AUTOMATED COUNT: 12.5 % (ref 10–15)
GFR SERPL CREATININE-BSD FRML MDRD: ABNORMAL ML/MIN/1.7M2
GLUCOSE SERPL-MCNC: 131 MG/DL (ref 70–99)
HCT VFR BLD AUTO: 39.6 % (ref 40–53)
HGB BLD-MCNC: 14.1 G/DL (ref 13.3–17.7)
IMM GRANULOCYTES # BLD: 0 10E9/L (ref 0–0.4)
IMM GRANULOCYTES NFR BLD: 0.3 %
LACTATE BLD-SCNC: 1.5 MMOL/L (ref 0.7–2.1)
LYMPHOCYTES # BLD AUTO: 1.6 10E9/L (ref 0.8–5.3)
LYMPHOCYTES NFR BLD AUTO: 24.6 %
MCH RBC QN AUTO: 32.9 PG (ref 26.5–33)
MCHC RBC AUTO-ENTMCNC: 35.6 G/DL (ref 31.5–36.5)
MCV RBC AUTO: 93 FL (ref 78–100)
MONOCYTES # BLD AUTO: 0.6 10E9/L (ref 0–1.3)
MONOCYTES NFR BLD AUTO: 9.8 %
NEUTROPHILS # BLD AUTO: 4.2 10E9/L (ref 1.6–8.3)
NEUTROPHILS NFR BLD AUTO: 65.1 %
NRBC # BLD AUTO: 0 10*3/UL
NRBC BLD AUTO-RTO: 0 /100
PLATELET # BLD AUTO: 188 10E9/L (ref 150–450)
POTASSIUM SERPL-SCNC: 4.3 MMOL/L (ref 3.4–5.3)
PROT SERPL-MCNC: 6.4 G/DL (ref 6.8–8.8)
RBC # BLD AUTO: 4.28 10E12/L (ref 4.4–5.9)
SODIUM SERPL-SCNC: 141 MMOL/L (ref 133–144)
WBC # BLD AUTO: 6.5 10E9/L (ref 4–11)

## 2017-07-15 PROCEDURE — 87076 CULTURE ANAEROBE IDENT EACH: CPT | Performed by: EMERGENCY MEDICINE

## 2017-07-15 PROCEDURE — 87075 CULTR BACTERIA EXCEPT BLOOD: CPT | Mod: XS | Performed by: EMERGENCY MEDICINE

## 2017-07-15 PROCEDURE — 87070 CULTURE OTHR SPECIMN AEROBIC: CPT | Performed by: EMERGENCY MEDICINE

## 2017-07-15 PROCEDURE — 83605 ASSAY OF LACTIC ACID: CPT | Performed by: EMERGENCY MEDICINE

## 2017-07-15 PROCEDURE — 87186 SC STD MICRODIL/AGAR DIL: CPT | Performed by: EMERGENCY MEDICINE

## 2017-07-15 PROCEDURE — 87077 CULTURE AEROBIC IDENTIFY: CPT | Performed by: EMERGENCY MEDICINE

## 2017-07-15 PROCEDURE — 27210209 ZZH KIT VALVED SINGLE LUMEN

## 2017-07-15 PROCEDURE — 40000986 XR CHEST 1 VW

## 2017-07-15 PROCEDURE — 80053 COMPREHEN METABOLIC PANEL: CPT | Performed by: EMERGENCY MEDICINE

## 2017-07-15 PROCEDURE — 93971 EXTREMITY STUDY: CPT | Mod: LT

## 2017-07-15 PROCEDURE — 99285 EMERGENCY DEPT VISIT HI MDM: CPT | Mod: 25

## 2017-07-15 PROCEDURE — 36569 INSJ PICC 5 YR+ W/O IMAGING: CPT

## 2017-07-15 PROCEDURE — 87040 BLOOD CULTURE FOR BACTERIA: CPT | Performed by: EMERGENCY MEDICINE

## 2017-07-15 PROCEDURE — 85025 COMPLETE CBC W/AUTO DIFF WBC: CPT | Performed by: EMERGENCY MEDICINE

## 2017-07-15 PROCEDURE — 25000132 ZZH RX MED GY IP 250 OP 250 PS 637: Performed by: EMERGENCY MEDICINE

## 2017-07-15 RX ORDER — LIDOCAINE 40 MG/G
CREAM TOPICAL
Status: DISCONTINUED | OUTPATIENT
Start: 2017-07-15 | End: 2017-07-16 | Stop reason: HOSPADM

## 2017-07-15 RX ORDER — LORAZEPAM 0.5 MG/1
0.5 TABLET ORAL ONCE
Status: COMPLETED | OUTPATIENT
Start: 2017-07-15 | End: 2017-07-15

## 2017-07-15 RX ORDER — HEPARIN SODIUM,PORCINE 10 UNIT/ML
2-5 VIAL (ML) INTRAVENOUS
Status: DISCONTINUED | OUTPATIENT
Start: 2017-07-15 | End: 2017-07-16 | Stop reason: HOSPADM

## 2017-07-15 RX ORDER — TRAZODONE HYDROCHLORIDE 50 MG/1
50-150 TABLET, FILM COATED ORAL
Status: ON HOLD | COMMUNITY
Start: 2014-07-15 | End: 2017-09-04

## 2017-07-15 RX ADMIN — LORAZEPAM 0.5 MG: 0.5 TABLET ORAL at 18:36

## 2017-07-15 RX ADMIN — LORAZEPAM 0.5 MG: 0.5 TABLET ORAL at 21:58

## 2017-07-15 ASSESSMENT — ENCOUNTER SYMPTOMS
FEVER: 0
COUGH: 0
COLOR CHANGE: 1

## 2017-07-15 NOTE — ED NOTES
Pt presents for evaluation of PICC line erythema and warmth above site and pain at site with palpation and a bump at insertion site with drainage. PICC was placed 3 weeks ago. Pt had dressing changed on Thursday which is when symptoms were noticed. Pt states he may have had a rash around tape sites when dressing is changed about a week prior to other symptoms. Pt denies any fevers.

## 2017-07-15 NOTE — ED AVS SNAPSHOT
[facility] Emergency Department    [address]    [facility] [address]    Phone:  [phone]    Fax:  [phone]                                       Jonny Gramajo   MRN: [id number]    Department:  [facility] Emergency Department   Date of Visit:  7/15/2017           Patient Information     Date Of Birth          1985        Your diagnoses for this visit were:     PICC line infection, initial encounter        You were seen by Tigist Deras MD and Odalys Penny MD.        Discharge Instructions       Continue all your antibiotics as instructed, and your nystatin.    If you have any worse or different symptoms including fevers, dizziness, confusion, nausea, seek medical care right away.      PICC Line Care  PICC stands for peripherally inserted central catheter. This is a short-term (temporary) tube that is used instead of a regular IV (intravenous) line.   Reasons for using a PICC line  A PICC line may be used because:    It reduces the discomfort of putting in a new IV every time one is needed.    Medicine or nutrition needs to be given over a period of weeks or even months.    A PICC can stay in place longer than an IV, so it reduces needle sticks.    It reduces damage to small veins, where an IV is normally inserted.    A PICC may have more than 1 channel, so different fluids or medicines can be given at the same time.    A PICC line allows for home therapy.  Your PICC will need some care to keep it clean and working. This care includes:    Changing the bandage (dressing)    Flushing the catheter with fluids    Changing the cap on the end of the catheter  A nurse or other healthcare provider will teach you how to do each of these things.    Home care  The following are general care guidelines that will help you care for your PICC line at home:    You can use your arm. But avoid any activity that causes mild pain.    Do not pick at it or pull on the  tubing.    Don t lift anything heavier than 10 pounds with the arm on the side of the PICC line.    When you bathe or shower, tape plastic wrap over the site to keep it dry.    Do not put the PICC site under water. No swimming or hot tubs. If the dressing gets wet, change it right away.    Always wash your hands before and after touching any part of your PICC.    Do not allow the tubing to hang freely. Make sure to keep the tubing covered and secured to your arm to prevent the PICC line from being pulled out by accident.  The following tips will help you with dressing changes:    Change the dressing over the site as directed. This is usually once a week. Change it sooner if the dressing gets wet or soiled. Check the dressing daily.    You or a family member may be able to do the dressing change at home. Or you may be instructed to return to the office or clinic for dressing changes.    Sterile technique must be used for PICC dressing change. If your dressing is changed at home, be sure you or your family member knows sterile dressing technique. Call your health care provider for instructions if you need them.  Follow-up care  Follow up as advised by your healthcare provider or our staff.  When to seek medical advice  Call your healthcare provider right away if any of these occur:    Fever of 100.4 F (38 C) or higher    Drainage from the PICC site    Swelling or bulging around the PICC site    Bleeding from the PICC site    Skin pulls away from the PICC site    Redness, warmth, or pus at the PICC site    Tubing breaks, splits, or leaks    More exposed tubing (tubing seems longer) or the tubing is pulled out completely    Medicine or fluids do not drain from the bag into your PICC  Date Last Reviewed: 7/1/2016 2000-2017 [employer]. [address] [place] [address], [place] [address]. All rights reserved. This information is not intended as a substitute for professional medical care. Always follow your  healthcare professional's instructions.          24 Hour Appointment Hotline       To make an appointment at any [facility] (formerly [facility]3], call [phone] ([phone]). If you don't have a family doctor or clinic, we will help you find one. [facility] clinics are conveniently located to serve the needs of you and your family.             Review of your medicines      Our records show that you are taking the medicines listed below. If these are incorrect, please call your family doctor or clinic.        Dose / Directions Last dose taken    BACTRIM DS PO   Dose:  1 tablet        Take 1 tablet by mouth 2 times daily   Refills:  0        cefTAZidime 1 G Solr   Dose:  1000 mg        Inject 1,000 mg into the vein   Refills:  0        FLAGYL PO   Dose:  250 mg        Take 250 mg by mouth 2 times daily   Refills:  0        nystatin [id number] UNIT/ML suspension   Commonly known as:  MYCOSTATIN   Dose:  [id number] Units        Take 500,000 Units by mouth 4 times daily as needed (when patient feels candidia is flaring up)   Refills:  0        traZODone 50 MG tablet   Commonly known as:  DESYREL        Refills:  0        VALTREX PO   Dose:  500 mg        Take 500 mg by mouth 2 times daily   Refills:  0        VIIBRYD PO   Dose:  40 mg        Take 40 mg by mouth daily   Refills:  0        ZANTAC PO   Dose:  300 mg        Take 300 mg by mouth 2 times daily   Refills:  0                Procedures and tests performed during your visit     Procedure/Test Number of Times Performed    Anaerobic catheter tip 1    Apply warm pack 1    Arm, left, venous US 1    Blood culture 2    CBC with platelets differential 1    Cardiac Continuous Monitoring 1    Catheter Tip Culture Aerobic Bacterial 1    Central venous catheter: PICC's e.gDuy aHndy, PASV, Solo Tunneled e.gDuy Handy 1    Comprehensive metabolic panel 1    Discharge planning 1    Dressing Change 1    Lactic acid whole blood 1    Measure and record arm circumference 1    Measure and record  external catheter length 1    Measure urine output 1    No blood pressure to be taken on the arm with the PICC 1    Obtain affirmation of informed consent 1    Patient care order 1    Pulse oximetry nursing 1    Vascular Access Adult IP Consult 1    Verification of Catheter Tip Placement 1    XR Chest 1 View 1      Orders Needing Specimen Collection     None      Pending Results     Date and Time Order Name Status Description    7/15/2017 2155 Catheter Tip Culture Aerobic Bacterial In process     7/15/2017 2155 Anaerobic catheter tip In process     7/15/2017 1938 Blood culture Preliminary     7/15/2017 1831 Blood culture Preliminary             Pending Culture Results     Date and Time Order Name Status Description    7/15/2017 2155 Catheter Tip Culture Aerobic Bacterial In process     7/15/2017 2155 Anaerobic catheter tip In process     7/15/2017 1938 Blood culture Preliminary     7/15/2017 1831 Blood culture Preliminary             Pending Results Instructions     If you had any lab results that were not finalized at the time of your Discharge, you can call the ED Lab Result RN at [phone]. You will be contacted by this team for any positive Lab results or changes in treatment. The nurses are available 7 days a week from 10A to 6:30P.  You can leave a message 24 hours per day and they will return your call.        Test Results From Your Hospital Stay        7/15/2017  7:52 PM      Component Results     Component Value Ref Range & Units Status    WBC 6.5 4.0 - 11.0 10e9/L Final    RBC Count 4.28 (L) 4.4 - 5.9 10e12/L Final    Hemoglobin 14.1 13.3 - 17.7 g/dL Final    Hematocrit 39.6 (L) 40.0 - 53.0 % Final    MCV 93 78 - 100 fl Final    MCH 32.9 26.5 - 33.0 pg Final    MCHC 35.6 31.5 - 36.5 g/dL Final    RDW 12.5 10.0 - 15.0 % Final    Platelet Count 188 150 - 450 10e9/L Final    Diff Method Automated Method  Final    % Neutrophils 65.1 % Final    % Lymphocytes 24.6 % Final    % Monocytes 9.8 % Final    %  Eosinophils 0.0 % Final    % Basophils 0.2 % Final    % Immature Granulocytes 0.3 % Final    Nucleated RBCs 0 0 /100 Final    Absolute Neutrophil 4.2 1.6 - 8.3 10e9/L Final    Absolute Lymphocytes 1.6 0.8 - 5.3 10e9/L Final    Absolute Monocytes 0.6 0.0 - 1.3 10e9/L Final    Absolute Eosinophils 0.0 0.0 - 0.7 10e9/L Final    Absolute Basophils 0.0 0.0 - 0.2 10e9/L Final    Abs Immature Granulocytes 0.0 0 - 0.4 10e9/L Final    Absolute Nucleated RBC 0.0  Final         7/15/2017  8:08 PM      Component Results     Component Value Ref Range & Units Status    Sodium 141 133 - 144 mmol/L Final    Potassium 4.3 3.4 - 5.3 mmol/L Final    Chloride 109 94 - 109 mmol/L Final    Carbon Dioxide 29 20 - 32 mmol/L Final    Anion Gap 3 3 - 14 mmol/L Final    Glucose 131 (H) 70 - 99 mg/dL Final    Urea Nitrogen 16 7 - 30 mg/dL Final    Creatinine 0.96 0.66 - 1.25 mg/dL Final    GFR Estimate >90  Non  GFR Calc   >60 mL/min/1.7m2 Final    GFR Estimate If Black >90   GFR Calc   >60 mL/min/1.7m2 Final    Calcium 9.2 8.5 - 10.1 mg/dL Final    Bilirubin Total 0.2 0.2 - 1.3 mg/dL Final    Albumin 3.7 3.4 - 5.0 g/dL Final    Protein Total 6.4 (L) 6.8 - 8.8 g/dL Final    Alkaline Phosphatase 71 40 - 150 U/L Final    ALT 60 0 - 70 U/L Final    AST 25 0 - 45 U/L Final         7/15/2017  7:53 PM      Component Results     Component Value Ref Range & Units Status    Lactic Acid 1.5 0.7 - 2.1 mmol/L Final         7/15/2017 10:43 PM      Component Results     Component    Specimen Description    Blood Left Arm PICC    Special Requests    Aerobic and anaerobic bottles received    Culture Micro    No growth after 1 hour    Micro Report Status    Pending         7/15/2017  9:31 PM      Narrative     US UPPER EXTREMITY VENOUS DUPLEX LEFT   7/15/2017 7:20 PM     HISTORY: Evaluate DVT. Prior PICC line infection. Current pain and  redness around PICC line.    COMPARISON: None.    FINDINGS: Gray-scale, color and Doppler  spectral analysis ultrasound  was performed of the left upper extremity. Compression and  augmentation imaging was performed where anatomically possible.  Subclavian vein could only be visualized with color and spectral  evaluation due to patient body habitus.    The deep venous system of the left upper extremity is fully  compressible where compressibility is anatomically possible.  Spectral  Doppler demonstrates normal phasicity and excellent augmentation with  forearm compression.  Visualized cephalic and deep forearm veins are  patent.        Impression     IMPRESSION:   1. No evidence for DVT within the left upper extremity.  2. Patient's reported PICC line is not well seen on the provided  images.    TIARA PARSON MD         7/15/2017 10:43 PM      Component Results     Component    Specimen Description    Blood Left Hand    Special Requests    Aerobic and anaerobic bottles received    Culture Micro    No growth after 1 hour    Micro Report Status    Pending         7/15/2017 10:48 PM         7/15/2017 10:49 PM         7/15/2017 11:44 PM      Narrative     CHEST SINGLE VIEW   7/15/2017 11:21 PM     HISTORY: Nurse placed peripherally inserted central catheter.    COMPARISON: 5/13/2017.    FINDINGS: The lungs are clear. Normal-sized cardiac silhouette. A  right upper extremity peripherally inserted central catheter is  present with distal catheter tip not well-visualized, but at least to  the superior vena cava near its junction with the right atrium.        Impression     IMPRESSION:   1. A right upper extremity peripherally inserted central catheter is  present with distal catheter tip not well-visualized, but at least to  the superior vena cava near its junction with the right atrium.  2. No evidence of active pulmonary disease.    EVI HIGUERA MD                Clinical Quality Measure: Blood Pressure Screening     Your blood pressure was checked while you were in the emergency department today. The last  "reading we obtained was  BP: 140/67 . Please read the guidelines below about what these numbers mean and what you should do about them.  If your systolic blood pressure (the top number) is less than 120 and your diastolic blood pressure (the bottom number) is less than 80, then your blood pressure is normal. There is nothing more that you need to do about it.  If your systolic blood pressure (the top number) is 120-139 or your diastolic blood pressure (the bottom number) is 80-89, your blood pressure may be higher than it should be. You should have your blood pressure rechecked within a year by a primary care provider.  If your systolic blood pressure (the top number) is 140 or greater or your diastolic blood pressure (the bottom number) is 90 or greater, you may have high blood pressure. High blood pressure is treatable, but if left untreated over time it can put you at risk for heart attack, stroke, or kidney failure. You should have your blood pressure rechecked by a primary care provider within the next 4 weeks.  If your provider in the emergency department today gave you specific instructions to follow-up with your doctor or provider even sooner than that, you should follow that instruction and not wait for up to 4 weeks for your follow-up visit.        Thank you for choosing [facility]       Thank you for choosing [facility] for your care. Our goal is always to provide you with excellent care. Hearing back from our patients is one way we can continue to improve our services. Please take a few minutes to complete the written survey that you may receive in the mail after you visit with us. Thank you!        [employer] Information     [employer] lets you send messages to your doctor, view your test results, renew your prescriptions, schedule appointments and more. To sign up, go to [email or web address] . Click on \"Log in\" on the left side of the screen, which will take you to the Welcome page. Then click on \"Sign up " "Now\" on the right side of the page.     You will be asked to enter the access code listed below, as well as some personal information. Please follow the directions to create your username and password.     Your access code is: [email or web address]  Expires: 2017  5:40 PM     Your access code will  in 90 days. If you need help or a new code, please call your [facility] clinic or [phone].        Care EveryWhere ID     This is your Care EveryWhere ID. This could be used by other organizations to access your [facility] medical records  [id number]        Equal Access to Services     [facility] : Hadvalerie Reich, dagoberto davila, khris araiza, batsheva amador . So [facility] [phone].    ATENCIÓN: Si habla español, tiene a castro disposición servicios gratuitos de asistencia lingüística. Llame al [phone].    We comply with applicable federal civil rights laws and [place] laws. We do not discriminate on the basis of race, color, national origin, age, disability sex, sexual orientation or gender identity.            After Visit Summary       This is your record. Keep this with you and show to your community pharmacist(s) and doctor(s) at your next visit.                  "

## 2017-07-15 NOTE — ED PROVIDER NOTES
"  History     Chief Complaint:  PICC Line Infection    HPI   Jonny Gramajo is a 32 year old male, history of chronic lyme disease and hypertension, who presents to the emergency department for evaluation of possible PICC line infection. The patient had the PICC placed approximately 3 weeks ago and typically has it routinely changed by a home health care nurse. The patient reported that approximately a week ago he noticed some drainage, and two days ago reported some erythema and warmth above the site, in addition to palpation and a bump at insertion site with drainage. Patient states he may have had a rash around the tape when the dressing was chagned about a week prior to the other symptoms. The patient also reports feeling more fatigued than usually. To note, the patient presented to the ED on 6/21, where the PICC was placed in the right arm at the time and was replaced by the PICC RN that day. The patient also reported on 6/22 for simmilar complaints, but blood work obtained that day was negative.  Apparently a culture of the tip of the catheter later grew out a few colonies of Candida and he was told to take his nystatin, but did not do so until less than a week ago.  The patient denies any fever, rash or cough. To note, the patient also reports that he has not been taking Atenolol for his blood pressure, due to not refilling his prescription. In addition, the patient does report he consistently has some \"memory fog\" and has difficulty remembering.     Allergies:  Abilify      Medications:    Desyrel  Ceftaidime  Flagyl  Viibryd  Zantac  Bactrim  Mycostatin  Valtrex    Past Medical History:    Anxiety  Depressive disorder  Hypertension  Lyme Disease    Past Surgical History:    Testicle recessed     Family History:    The patient denies any relevant family medical history.     Social History:  The patient was accompanied to the ED alone.  Smoking Status: No, but yes to smoke exposure  Smokeless Tobacco: " "No  Alcohol Use: No   Marital Status:   [2]     Review of Systems   Constitutional: Negative for fever.   Respiratory: Negative for cough.    Skin: Positive for color change. Negative for rash. Pallor: Redness around PICC line site.         PICC line placed in upper left arm.   All other systems reviewed and are negative.    Physical Exam   Vitals:  Patient Vitals for the past 24 hrs:   BP Temp Temp src Pulse Heart Rate Resp SpO2 Height Weight   07/15/17 2320 140/67 - - - - - - - -   07/15/17 2200 - - - - 77 - 97 % - -   07/15/17 2140 136/81 - - - 83 - 94 % - -   07/15/17 2115 - - - - 86 - 99 % - -   07/15/17 2110 127/70 - - - 85 - 98 % - -   07/15/17 2040 140/77 - - - - - 99 % - -   07/15/17 2010 137/90 - - - 97 - 98 % - -   07/15/17 1930 - - - - 93 - - - -   07/15/17 1929 138/70 - - - 105 - - - -   07/15/17 1845 - - - - 103 - 95 % - -   07/15/17 1840 161/86 - - - 102 - 98 % - -   07/15/17 1744 165/83 99.7  F (37.6  C) Oral 127 - 20 98 % 1.981 m (6' 6\") (!) 145.2 kg (320 lb)        Physical Exam  VITAL SIGNS: /67  Pulse 127  Temp 99.7  F (37.6  C) (Oral)  Resp 20  Ht 1.981 m (6' 6\")  Wt (!) 145.2 kg (320 lb)  SpO2 97%  BMI 36.98 kg/m2   Constitutional:  Well developed, Well nourished, anxious   HENT:  Bilateral external ears normal, Mucous membranes moist, Nose normal. Neck- Normal range of motion, Supple  Respiratory:  Normal breath sounds, No respiratory distress, No wheezing,  Cardiovascular:  Normal heart rate, mild tachycardic rhythm, No murmurs,    GI:  Bowel sounds normal, Soft, No tenderness,   Musculoskeletal:  Intact distal pulses, No edema, grossly unremarkable range of motion. PICC line in place to left upper arm.    Integument: Erythema surrounding the site, no palpable mass.  Neurologic:  Alert, attentive and appropriately oriented  Psychiatric:  Anxious.    Emergency Department Course     Imaging:  Radiology findings were communicated with the patient who voiced understanding of " the findings.  XR Chest 1 View:  1. A right upper extremity peripherally inserted central catheter is  present with distal catheter tip not well-visualized, but at least to  the superior vena cava near its junction with the right atrium.  2. No evidence of active pulmonary disease.  Reading per radiology.     US Arm Left Venous:  IMPRESSION:   1. No evidence for DVT within the left upper extremity.  2. Patient's reported PICC line is not well seen on the provided  images.  Reading per radiology.     Laboratory:  Laboratory findings were communicated with the patient who voiced understanding of the findings.  Blood Cultures: Pending  CBC: AWNL (WBC 6.5, HGB 14.1, )   CMP: Glucose 131 (H), Protein Total 6.4 (L) o/w WNL (Creatinine 0.96)   Lactic Acid: 1.5    Interventions:  1836 Ativan 0.5 mg PO  2158 Ativan 0.5 mg PO     Emergency Department Course:  Nursing notes and vitals reviewed.  I performed an exam of the patient as documented above.   The patient was sent for a XR Chest and US Arm Left while in the emergency department, results above.    IV was inserted and blood was drawn for laboratory testing, results above.     I discussed the treatment plan with the patient. They expressed understanding of this plan and consented to discharge. They will be discharged home with instructions for care and follow up. In addition, the patient will return to the emergency department if their symptoms persist, worsen, if new symptoms arise or if there is any concern.  All questions were answered.     I personally reviewed the laboratory results with the Patient and answered all related questions prior to discharge.    Impression & Plan      Medical Decision Makin-year-old male with a history of requiring a PICC line for antibiotics for chronic Lyme disease presents for concerns of a line infection, with erythema and swelling around the site.  There was erythema although no palpable mass.  Ultrasound showed no DVT  as an alternative cause.  Labs showed no signs of systemic infection, and patient was afebrile.  PICC RN replaced the PICC line.  Patient is on broad-spectrum antibiotics and antifungals.  We discussed he will continue the treatments and watch carefully for any worsening erythema or other systemic symptoms, and contact his Lyme provider if anything should occur.  He was quite comfortable with this plan and questions were answered.    Diagnosis:    ICD-10-CM    1. PICC line infection, initial encounter T80.219A         Disposition:   Discharged.    Scribe Disclosure:  I, Winifred Mckeon, am serving as a scribe at 6:22 PM on 7/15/2017 to document services personally performed by Odalys Penny MD, based on my observations and the provider's statements to me. 7/15/2017   [facility] EMERGENCY DEPARTMENT       Odalys Penny MD  07/16/17 0147

## 2017-07-15 NOTE — ED AVS SNAPSHOT
[facility] Emergency Department    [address]    [facility] [address]    Phone:  [phone]    Fax:  [phone]                                       Jonny Gramajo   MRN: [id number]    Department:  [facility] Emergency Department   Date of Visit:  7/15/2017           After Visit Summary Signature Page     I have received my discharge instructions, and my questions have been answered. I have discussed any challenges I see with this plan with the nurse or doctor.    ..........................................................................................................................................  Patient/Patient Representative Signature      ..........................................................................................................................................  Patient Representative Print Name and Relationship to Patient    ..................................................               ................................................  Date                                            Time    ..........................................................................................................................................  Reviewed by Signature/Title    ...................................................              ..............................................  Date                                                            Time

## 2017-07-16 NOTE — PROGRESS NOTES
[facility]  Procedure Note           Peripherally Inserted Central Line Catheter (PICC):       Jonny Gramajo  [id number]   July 15, 2017, 10:33 PM Indication: Medication administration           Pause for the cause: Consent for catheter placement procedure signed  Time out completed  Patient ID's verified using two distinct indicators  All necessary equipment is present   Type of line to be used: PICC   Full barrier precautions used: Yes   Skin preparation: Povidone iodine solution   Date of insertion: July 15, 2017, 10:33 PM   Device type: Single lumen, valved, 4.0   Catheter brand: [employer] (formerly [employer] BO[employer])   Lot number: [id number]   Insertion location: Right basilic vein   Method of placement: Venipuncture  MST  Ultrasound   Number of attempts: With ultrasound: 1   Without ultrasound: 0   Difficulty threading: No   Midline IV device: Dressing dry and intact  Gauze dressing applied  Transparent semmipermeable dressing applied  Chlorhexidine patch  Catheter securement device   Arm circumference: Adults 15 cm   Midline extremity circumference: 40 cm   Internal length: 55 cm   Midline visible catheter length: 0 cm   Total catheter length: 55 cm   Tip termination: CAJ   Method of verification: Chest x-ray   Midline patency post placement: Positive blood return  Flushes without difficulty  Saline locked   Line flush: Line flush documented on the eMAR yes   Placement verified by: Physician   Catheter placed by: Samaria Pacheco   Discontinuation form initiated: No   Patient tolerance: Tolerated well   PICC Educational information to patient (Information from PICC package):  Yes   VAD flushing orders entered:  Yes     Summary:  This procedure was performed without difficulty. There were no immediate complications.       Recorded by Samaria Pacheco    Attestation:  This patient has been seen and evaluated by Samaria sweeney RN.  ISamaria RN, personally performed the above procedure on this the patient.  Samaria Pacheco  RN

## 2017-07-16 NOTE — DISCHARGE INSTRUCTIONS
Continue all your antibiotics as instructed, and your nystatin.    If you have any worse or different symptoms including fevers, dizziness, confusion, nausea, seek medical care right away.      PICC Line Care  PICC stands for peripherally inserted central catheter. This is a short-term (temporary) tube that is used instead of a regular IV (intravenous) line.   Reasons for using a PICC line  A PICC line may be used because:    It reduces the discomfort of putting in a new IV every time one is needed.    Medicine or nutrition needs to be given over a period of weeks or even months.    A PICC can stay in place longer than an IV, so it reduces needle sticks.    It reduces damage to small veins, where an IV is normally inserted.    A PICC may have more than 1 channel, so different fluids or medicines can be given at the same time.    A PICC line allows for home therapy.  Your PICC will need some care to keep it clean and working. This care includes:    Changing the bandage (dressing)    Flushing the catheter with fluids    Changing the cap on the end of the catheter  A nurse or other healthcare provider will teach you how to do each of these things.    Home care  The following are general care guidelines that will help you care for your PICC line at home:    You can use your arm. But avoid any activity that causes mild pain.    Do not pick at it or pull on the tubing.    Don t lift anything heavier than 10 pounds with the arm on the side of the PICC line.    When you bathe or shower, tape plastic wrap over the site to keep it dry.    Do not put the PICC site under water. No swimming or hot tubs. If the dressing gets wet, change it right away.    Always wash your hands before and after touching any part of your PICC.    Do not allow the tubing to hang freely. Make sure to keep the tubing covered and secured to your arm to prevent the PICC line from being pulled out by accident.  The following tips will help you with  dressing changes:    Change the dressing over the site as directed. This is usually once a week. Change it sooner if the dressing gets wet or soiled. Check the dressing daily.    You or a family member may be able to do the dressing change at home. Or you may be instructed to return to the office or clinic for dressing changes.    Sterile technique must be used for PICC dressing change. If your dressing is changed at home, be sure you or your family member knows sterile dressing technique. Call your health care provider for instructions if you need them.  Follow-up care  Follow up as advised by your healthcare provider or our staff.  When to seek medical advice  Call your healthcare provider right away if any of these occur:    Fever of 100.4 F (38 C) or higher    Drainage from the PICC site    Swelling or bulging around the PICC site    Bleeding from the PICC site    Skin pulls away from the PICC site    Redness, warmth, or pus at the PICC site    Tubing breaks, splits, or leaks    More exposed tubing (tubing seems longer) or the tubing is pulled out completely    Medicine or fluids do not drain from the bag into your PICC  Date Last Reviewed: 7/1/2016 2000-2017 [employer]. [address] [place] [address], [place] [address]. All rights reserved. This information is not intended as a substitute for professional medical care. Always follow your healthcare professional's instructions.

## 2017-07-18 ENCOUNTER — TELEPHONE (OUTPATIENT)
Dept: EMERGENCY MEDICINE | Facility: CLINIC | Age: 32
End: 2017-07-18

## 2017-07-18 NOTE — TELEPHONE ENCOUNTER
[facility] Emergency Department Lab result notification:    [facility] ED lab result protocol used  General culture protocol    Reason for call  Notify of lab results, assess symptoms,  review ED providers recommendations/discharge instructions (if necessary) and advise per ED lab result f/u protocol    Lab Result  Final catheter tip aerobic bacteria culture report on 7/18/17  [facility] ED discharge antibiotic: Will need to confirm   #1. Bacteria, >100 colonies Staphylococcus haemolyticus,   Patient to be notified of result, symptoms's assessed and advised per [facility] ED lab result protocol.    Information table from ED Provider visit on 7/15/17  Symptoms reported at ED visit (Chief complaint, HPI) 32 year old male, history of chronic lyme disease and hypertension, who presents to the emergency department for evaluation of possible PICC line infection. The patient had the PICC placed approximately 3 weeks ago and typically has it routinely changed by a home health care nurse. The patient reported that approximately a week ago he noticed some drainage, and two days ago reported some erythema and warmth above the site, in addition to palpation and a bump at insertion site with drainage. Patient states he may have had a rash around the tape when the dressing was chagned about a week prior to the other symptoms. The patient also reports feeling more fatigued than usually. To note, the patient presented to the ED on 6/21, where the PICC was placed in the right arm at the time and was replaced by the PICC RN that day. The patient also reported on 6/22 for simmilar complaints, but blood work obtained that day was negative.  Apparently a culture of the tip of the catheter later grew out a few colonies of Candida and he was told to take his nystatin, but did not do so until less than a week ago.  The patient denies any fever, rash or cough. To note, the patient also reports that he has not been taking Atenolol for his  "blood pressure, due to not refilling his prescription. In addition, the patient does report he consistently has some \"memory fog\" and has difficulty remembering.    ED providers Impression and Plan (applicable information) 32-year-old male with a history of requiring a PICC line for antibiotics for chronic Lyme disease presents for concerns of a line infection, with erythema and swelling around the site.  There was erythema although no palpable mass.  Ultrasound showed no DVT as an alternative cause.  Labs showed no signs of systemic infection, and patient was afebrile.  PICC RN replaced the PICC line.  Patient is on broad-spectrum antibiotics and antifungals.  We discussed he will continue the treatments and watch carefully for any worsening erythema or other systemic symptoms, and contact his Lyme provider if anything should occur.  He was quite comfortable with this plan and questions were answered.   Miscellaneous information NA     RN Assessment (Patient s current Symptoms), include time called.  [Insert Left message here if message left]  At 1:15 Left voicemail message requesting a call back to [phone] between 10 a.m. and 6:30 p.m., 7 days a week for patient's ED/UC lab results.  May leave a message 24/7, if no one available.     [RN Name]  Jamin Alexandre RN  [facility] RN  Lung Nodule and ED Lab Result F/u RN  Epic pool (ED late result f/u RN): [id number]  [facility] INCIDENTAL RADIOLOGY F/U NURSES: P [id number]  Ph# [phone]      Copy of Lab result   Catheter Tip Culture Aerobic Bacterial [[id number]] (Order [id number])   Exam Information   Exam Date Exam Time Accession # Results    7/15/17 10:19 PM [id number]    Component Results   Component Collected Lab   Specimen Description 07/15/2017 10:19 PM 75   Catheter tip   Culture Micro (Abnormal) 07/15/2017 10:19    >100 colonies Staphylococcus haemolyticus This isolate is presumed to be    clindamycin resistant based on detection of inducible " clindamycin resistance.    Erythromycin and clindamycin are resistant, therefore, they are not recommended    for use.      Micro Report Status 07/15/2017 10:19    FINAL 07/17/2017   Organism: 07/15/2017 10:19    >100 colonies Staphylococcus haemolyticus This isolate is presumed to be    clindamycin resistant based on detection of inducible clindamycin resistance.    Erythromycin and clindamycin are resistant, therefore, they are not recommended    for use.      Culture & Susceptibility   >100 COLONIES STAPHYLOCOCCUS HAEMOLYTICUS THIS ISOLATE IS PRESUMED TO BE  CLINDAMYCIN RESISTANT BASED ON DETECTION OF INDUCIBLE CLINDAMYCIN RESISTANCE.  ERYTHROMYCIN AND CLINDAMYCIN ARE RESISTANT, THEREFORE, THEY ARE NOT RECOMMENDED  FOR USE. (RANULFO)   Antibiotic Sensitivity Unit Status   CIPROFLOXACIN >=8 Resistant ug/mL Final   CLINDAMYCIN >8.0 Resistant ug/mL Final   ERYTHROMYCIN >=8 Resistant ug/mL Final   GENTAMICIN 4 Susceptible ug/mL Final   LEVOFLOXACIN >=8 Resistant ug/mL Final   OXACILLIN >=4 Resistant ug/mL Final   PENICILLIN >=0.5 Resistant ug/mL Final   TETRACYCLINE >=16 Resistant ug/mL Final   VANCOMYCIN 1 Susceptible ug/mL Final

## 2017-07-18 NOTE — TELEPHONE ENCOUNTER
[facility] Emergency Department Lab result notification     Patient/parent Name  Jonny    RN Assessment (Patient s current Symptoms), include time called.  [Insert Left message here if message left]  At 1341 pt states he is feeling well, no fever no vomiting, site of removed PICC is healing fine no signs of infection, and new PICC looks good.  He confirms he is on Ceftazidime infusion, Flagyl po,  Clarithromycin po, Bactrim po, Doxycycline PO and is currently taking them all    Discussed results and susceptibilities with pt and he would like the results faxed to Dr. Frank clinic number [phone].  Fax number  [phone].   He will discuss with Dr. Frank,  He verbalizes he will monitor for a fever.    At 1351 Results were faxed.     RN Recommendations/Instructions per [facility] ED lab result protocol  Monitor for symptoms, and follow up with pcp for further recommendations.      Please Contact your PCP clinic or return to the Emergency department if your:    Symptoms worsen or other concerning symptom's.    PCP follow-up Questions asked: YES       Leyla Wasserman RN  [facility] RN  Lung Nodule and ED Lab Result F/u RN  Epic pool (ED late result f/u RN): [id number]  [facility] INCIDENTAL RADIOLOGY F/U NURSES: P [id number]  # [phone]

## 2017-07-25 DIAGNOSIS — A69.20 LYME DISEASE: ICD-10-CM

## 2017-07-25 DIAGNOSIS — A41.50 GRAM NEGATIVE SEPSIS (H): Primary | ICD-10-CM

## 2017-07-25 LAB
ALBUMIN SERPL-MCNC: 4.3 G/DL (ref 3.4–5)
ALP SERPL-CCNC: 95 U/L (ref 40–150)
ALT SERPL W P-5'-P-CCNC: 91 U/L (ref 0–70)
AST SERPL W P-5'-P-CCNC: 37 U/L (ref 0–45)
BASOPHILS # BLD AUTO: 0 10E9/L (ref 0–0.2)
BASOPHILS NFR BLD AUTO: 0 %
BILIRUB DIRECT SERPL-MCNC: 0.2 MG/DL (ref 0–0.2)
BILIRUB SERPL-MCNC: 0.8 MG/DL (ref 0.2–1.3)
BUN SERPL-MCNC: 10 MG/DL (ref 7–30)
CREAT SERPL-MCNC: 0.8 MG/DL (ref 0.66–1.25)
DIFFERENTIAL METHOD BLD: ABNORMAL
EOSINOPHIL # BLD AUTO: 0 10E9/L (ref 0–0.7)
EOSINOPHIL NFR BLD AUTO: 0.3 %
ERYTHROCYTE [DISTWIDTH] IN BLOOD BY AUTOMATED COUNT: 12.7 % (ref 10–15)
GFR SERPL CREATININE-BSD FRML MDRD: NORMAL ML/MIN/1.7M2
HCT VFR BLD AUTO: 42.8 % (ref 40–53)
HGB BLD-MCNC: 15.7 G/DL (ref 13.3–17.7)
LYMPHOCYTES # BLD AUTO: 1.7 10E9/L (ref 0.8–5.3)
LYMPHOCYTES NFR BLD AUTO: 23.9 %
MCH RBC QN AUTO: 33 PG (ref 26.5–33)
MCHC RBC AUTO-ENTMCNC: 36.7 G/DL (ref 31.5–36.5)
MCV RBC AUTO: 90 FL (ref 78–100)
MONOCYTES # BLD AUTO: 0.7 10E9/L (ref 0–1.3)
MONOCYTES NFR BLD AUTO: 9.5 %
NEUTROPHILS # BLD AUTO: 4.6 10E9/L (ref 1.6–8.3)
NEUTROPHILS NFR BLD AUTO: 66.3 %
PLATELET # BLD AUTO: 203 10E9/L (ref 150–450)
PROT SERPL-MCNC: 7.3 G/DL (ref 6.8–8.8)
RBC # BLD AUTO: 4.76 10E12/L (ref 4.4–5.9)
VANCOMYCIN SERPL-MCNC: 5.1 MG/L
WBC # BLD AUTO: 6.9 10E9/L (ref 4–11)

## 2017-07-25 PROCEDURE — 82565 ASSAY OF CREATININE: CPT | Performed by: FAMILY MEDICINE

## 2017-07-25 PROCEDURE — 80202 ASSAY OF VANCOMYCIN: CPT | Performed by: FAMILY MEDICINE

## 2017-07-25 PROCEDURE — 85025 COMPLETE CBC W/AUTO DIFF WBC: CPT | Performed by: FAMILY MEDICINE

## 2017-07-25 PROCEDURE — 80076 HEPATIC FUNCTION PANEL: CPT | Performed by: FAMILY MEDICINE

## 2017-07-25 PROCEDURE — 84520 ASSAY OF UREA NITROGEN: CPT | Performed by: FAMILY MEDICINE

## 2017-07-25 PROCEDURE — 36415 COLL VENOUS BLD VENIPUNCTURE: CPT | Performed by: FAMILY MEDICINE

## 2017-07-26 LAB
BACTERIA SPEC CULT: ABNORMAL
Lab: ABNORMAL
MICRO REPORT STATUS: ABNORMAL
SPECIMEN SOURCE: ABNORMAL

## 2017-07-27 NOTE — TELEPHONE ENCOUNTER
[facility] Emergency Department Lab result notification     Patient/parent Name  Jonny AHMADI Assessment (Patient s current Symptoms), include time called.  [Insert Left message here if message left]  1:00 pm Pt has been tired and does have a new PICC line in. Denies fever or more concerning symptoms at this time. Discussed results with pt and he would like the results faxed to Dr. Frank: clinic number [phone].  Fax number  [phone]. Done.  He will discuss with Dr. Frank. Pt verbalizes he will monitor for a fever and knows if any symptoms increase to be see in ED. Wanted me to let his home care [place] pharmacist know about test results at [phone]. Done.     Please Contact your PCP clinic or return to the Emergency department if your:    Symptoms return.    Symptoms do not improve after 3 days on antibiotic.    Symptoms do not resolve after completing antibiotic.    Symptoms worsen or other concerning symptom's.    PCP follow-up Questions asked: YES       [RN Name]  Aissatou Schwartz RN  [facility] RN  Lung Nodule and ED Lab Result F/u RN  Epic pool (ED late result f/u RN): [id number]  # [phone]

## 2017-08-01 ENCOUNTER — MEDICAL CORRESPONDENCE (OUTPATIENT)
Dept: HEALTH INFORMATION MANAGEMENT | Facility: CLINIC | Age: 32
End: 2017-08-01

## 2017-08-01 DIAGNOSIS — A41.50 GRAM NEGATIVE SEPSIS (H): Primary | ICD-10-CM

## 2017-08-01 LAB — VANCOMYCIN SERPL-MCNC: 1.6 MG/L

## 2017-08-01 PROCEDURE — 80202 ASSAY OF VANCOMYCIN: CPT | Performed by: INTERNAL MEDICINE

## 2017-08-01 PROCEDURE — 36415 COLL VENOUS BLD VENIPUNCTURE: CPT | Performed by: INTERNAL MEDICINE

## 2017-08-04 ENCOUNTER — APPOINTMENT (OUTPATIENT)
Dept: GENERAL RADIOLOGY | Facility: CLINIC | Age: 32
End: 2017-08-04
Attending: EMERGENCY MEDICINE
Payer: COMMERCIAL

## 2017-08-04 ENCOUNTER — HOSPITAL ENCOUNTER (EMERGENCY)
Facility: CLINIC | Age: 32
Discharge: HOME | End: 2017-08-04
Attending: EMERGENCY MEDICINE | Admitting: EMERGENCY MEDICINE
Payer: COMMERCIAL

## 2017-08-04 VITALS
OXYGEN SATURATION: 96 % | WEIGHT: 315 LBS | BODY MASS INDEX: 36.45 KG/M2 | HEIGHT: 78 IN | DIASTOLIC BLOOD PRESSURE: 69 MMHG | RESPIRATION RATE: 16 BRPM | SYSTOLIC BLOOD PRESSURE: 119 MMHG | HEART RATE: 83 BPM | TEMPERATURE: 98.8 F

## 2017-08-04 DIAGNOSIS — F13.930 BENZODIAZEPINE WITHDRAWAL, UNCOMPLICATED (H): ICD-10-CM

## 2017-08-04 DIAGNOSIS — R07.89 CHEST WALL PAIN: ICD-10-CM

## 2017-08-04 LAB
ANION GAP SERPL CALCULATED.3IONS-SCNC: 8 MMOL/L (ref 3–14)
BASOPHILS # BLD AUTO: 0 10E9/L (ref 0–0.2)
BASOPHILS NFR BLD AUTO: 0.2 %
BUN SERPL-MCNC: 15 MG/DL (ref 7–30)
CALCIUM SERPL-MCNC: 8 MG/DL (ref 8.5–10.1)
CHLORIDE SERPL-SCNC: 107 MMOL/L (ref 94–109)
CO2 SERPL-SCNC: 24 MMOL/L (ref 20–32)
CREAT SERPL-MCNC: 0.73 MG/DL (ref 0.66–1.25)
DIFFERENTIAL METHOD BLD: ABNORMAL
EOSINOPHIL # BLD AUTO: 0.1 10E9/L (ref 0–0.7)
EOSINOPHIL NFR BLD AUTO: 1.2 %
ERYTHROCYTE [DISTWIDTH] IN BLOOD BY AUTOMATED COUNT: 12.6 % (ref 10–15)
GFR SERPL CREATININE-BSD FRML MDRD: ABNORMAL ML/MIN/1.7M2
GLUCOSE SERPL-MCNC: 185 MG/DL (ref 70–99)
HCT VFR BLD AUTO: 38.7 % (ref 40–53)
HGB BLD-MCNC: 14.4 G/DL (ref 13.3–17.7)
IMM GRANULOCYTES # BLD: 0.1 10E9/L (ref 0–0.4)
IMM GRANULOCYTES NFR BLD: 1 %
INTERPRETATION ECG - MUSE: NORMAL
LYMPHOCYTES # BLD AUTO: 0.9 10E9/L (ref 0.8–5.3)
LYMPHOCYTES NFR BLD AUTO: 15.5 %
MCH RBC QN AUTO: 34.1 PG (ref 26.5–33)
MCHC RBC AUTO-ENTMCNC: 37.2 G/DL (ref 31.5–36.5)
MCV RBC AUTO: 92 FL (ref 78–100)
MONOCYTES # BLD AUTO: 0.4 10E9/L (ref 0–1.3)
MONOCYTES NFR BLD AUTO: 6.6 %
NEUTROPHILS # BLD AUTO: 4.6 10E9/L (ref 1.6–8.3)
NEUTROPHILS NFR BLD AUTO: 75.5 %
NRBC # BLD AUTO: 0 10*3/UL
NRBC BLD AUTO-RTO: 0 /100
PLATELET # BLD AUTO: 126 10E9/L (ref 150–450)
POTASSIUM SERPL-SCNC: 3.7 MMOL/L (ref 3.4–5.3)
RBC # BLD AUTO: 4.22 10E12/L (ref 4.4–5.9)
SODIUM SERPL-SCNC: 139 MMOL/L (ref 133–144)
TROPONIN I SERPL-MCNC: NORMAL UG/L (ref 0–0.04)
WBC # BLD AUTO: 6.1 10E9/L (ref 4–11)

## 2017-08-04 PROCEDURE — 93005 ELECTROCARDIOGRAM TRACING: CPT

## 2017-08-04 PROCEDURE — 85025 COMPLETE CBC W/AUTO DIFF WBC: CPT | Performed by: EMERGENCY MEDICINE

## 2017-08-04 PROCEDURE — 25000132 ZZH RX MED GY IP 250 OP 250 PS 637: Performed by: EMERGENCY MEDICINE

## 2017-08-04 PROCEDURE — 99285 EMERGENCY DEPT VISIT HI MDM: CPT | Mod: 25

## 2017-08-04 PROCEDURE — 71020 XR CHEST 2 VW: CPT

## 2017-08-04 PROCEDURE — 84484 ASSAY OF TROPONIN QUANT: CPT | Performed by: EMERGENCY MEDICINE

## 2017-08-04 PROCEDURE — 80048 BASIC METABOLIC PNL TOTAL CA: CPT | Performed by: EMERGENCY MEDICINE

## 2017-08-04 RX ORDER — IBUPROFEN 600 MG/1
600 TABLET, FILM COATED ORAL ONCE
Status: COMPLETED | OUTPATIENT
Start: 2017-08-04 | End: 2017-08-04

## 2017-08-04 RX ORDER — CLONAZEPAM 0.5 MG/1
0.5 TABLET, ORALLY DISINTEGRATING ORAL 4 TIMES DAILY
Qty: 14 TABLET | Refills: 0 | Status: ON HOLD | OUTPATIENT
Start: 2017-08-04 | End: 2017-09-04

## 2017-08-04 RX ORDER — ATENOLOL 50 MG/1
50 TABLET ORAL DAILY
COMMUNITY
End: 2017-09-19

## 2017-08-04 RX ORDER — CLONAZEPAM 0.5 MG/1
2 TABLET ORAL ONCE
Status: COMPLETED | OUTPATIENT
Start: 2017-08-04 | End: 2017-08-04

## 2017-08-04 RX ADMIN — CLONAZEPAM 2 MG: 0.5 TABLET ORAL at 11:42

## 2017-08-04 RX ADMIN — IBUPROFEN 600 MG: 600 TABLET ORAL at 11:42

## 2017-08-04 ASSESSMENT — ENCOUNTER SYMPTOMS
NUMBNESS: 1
SHORTNESS OF BREATH: 0
FEVER: 0
COUGH: 0
NERVOUS/ANXIOUS: 1
HEADACHES: 1

## 2017-08-04 NOTE — ED NOTES
Pt has been being treated for Lymes disease for the last 7 months, a staff infection for the last 3 weeks, and has been withdrawing from Clonazepam for the last two and a half day. The pt is currently infusing Vancomycin for his staff infection. Pt reports chest pain, headache, fatigue, anxiety, drowsiness over the last 10 days. Over the last three days the Pt stated that he has had intermittent periods of numbness and tingling in his left arm. VSS.

## 2017-08-04 NOTE — ED AVS SNAPSHOT
[facility] Emergency Department    [address]    [facility] [address]    Phone:  [phone]    Fax:  [phone]                                       Jonny Gramajo   MRN: [id number]    Department:  [facility] Emergency Department   Date of Visit:  8/4/2017           After Visit Summary Signature Page     I have received my discharge instructions, and my questions have been answered. I have discussed any challenges I see with this plan with the nurse or doctor.    ..........................................................................................................................................  Patient/Patient Representative Signature      ..........................................................................................................................................  Patient Representative Print Name and Relationship to Patient    ..................................................               ................................................  Date                                            Time    ..........................................................................................................................................  Reviewed by Signature/Title    ...................................................              ..............................................  Date                                                            Time

## 2017-08-04 NOTE — ED AVS SNAPSHOT
[facility]    [address] [place] [address]    Phone:  [phone]    Fax:  [phone]                                       Jonny Gramajo   MRN: [id number]    Department:  [facility]   Date of Visit:  8/4/2017           Patient Information     Date Of Birth          1985        Your diagnoses for this visit were:     Chest wall pain     Benzodiazepine withdrawal, uncomplicated (H)        You were seen by Odalys Penny MD.      Follow-up Information     Follow up with Pallas, Kenneth G, MD. Schedule an appointment as soon as possible for a visit in 3 days.    Specialty:  Family Practice    Contact information:    [facility]  [address]  [facility] [address] [phone]          Discharge Instructions         Chest Wall Pain: Costochondritis    The chest pain that you have had today is caused by costochondritis. This condition is caused by an inflammation of the cartilage joining your ribs to your breastbone. It is not caused by heart or lung problems. Your healthcare team has made sure that the chest pain you feel is not from a life threatening cause of chest pain such as heart attack, collapsed lung, blood clot in the lung, tear in the aorta, or esophageal rupture. The inflammation may have been brought on by a blow to the chest, lifting heavy objects, intense exercise, or an illness that made you cough and sneeze a lot. It often occurs during times of emotional stress. It can be painful, but it is not dangerous. It usually goes away in 1 to 2 weeks. But it may happen again. Rarely, a more serious condition may cause symptoms similar to costochondritis. That s why it s important to watch for the warning signs listed below.  Home care  Follow these guidelines when caring for yourself at home:    If you feel that emotional stress is a cause of your condition, try to figure out the sources  of that stress. It may not be obvious. Learn ways to deal with the stress in your life. This can include regular exercise, muscle relaxation, meditation, or simply taking time out for yourself.    You may use acetaminophen, ibuprofen, or naproxen to control pain, unless another pain medicine was prescribed. If you have liver or kidney disease or ever had a stomach ulcer, talk with your healthcare provider before using these medicines.    You can also help ease pain by using a hot, wet compress or heating pad. Use this with or without a medicated skin cream that helps relieves pain.    Do stretching exercise as advised by your provider.    Take any prescribed medicines as directed.  Follow-up care  Follow up with your healthcare provider, or as advised, if you do not start to get better in the next 2 days.  When to seek medical advice  Call your healthcare provider right away if any of these occur:    A change in the type of pain. Call if it feels different, becomes more serious, lasts longer, or spreads into your shoulder, arm, neck, jaw, or back.    Shortness of breath or pain gets worse when you breathe    Weakness, dizziness, or fainting    Cough with dark-colored sputum (phlegm) or blood    Abdominal pain    Dark red or black stools    Fever of 100.4 F (38 C) or higher, or as directed by your healthcare provider  Date Last Reviewed: 12/1/2016 2000-2017 [employer]. [address] [place] [address]. All rights reserved. This information is not intended as a substitute for professional medical care. Always follow your healthcare professional's instructions.          24 Hour Appointment Hotline       To make an appointment at any [facility] call [phone] ([phone]). If you don't have a family doctor or clinic, we will help you find one. [facility] clinics are conveniently located to serve the needs of you and your family.             Review of your medicines      START  taking        Dose / Directions Last dose taken    clonazePAM 0.5 MG ODT tab   Commonly known as:  klonoPIN   Dose:  0.5 mg   Quantity:  14 tablet        Take 1 tablet (0.5 mg) by mouth 4 times daily   Refills:  0          Our records show that you are taking the medicines listed below. If these are incorrect, please call your family doctor or clinic.        Dose / Directions Last dose taken    atenolol 50 MG tablet   Commonly known as:  TENORMIN   Dose:  50 mg        Take 50 mg by mouth daily   Refills:  0        BACTRIM DS PO   Dose:  1 tablet        Take 1 tablet by mouth 2 times daily   Refills:  0        cefTAZidime 1 G Solr   Dose:  1000 mg        Inject 1,000 mg into the vein   Refills:  0        FLAGYL PO   Dose:  250 mg        Take 250 mg by mouth 2 times daily   Refills:  0        nystatin 037312 UNIT/ML suspension   Commonly known as:  MYCOSTATIN   Dose:  550354 Units        Take 500,000 Units by mouth 4 times daily as needed (when patient feels candidia is flaring up)   Refills:  0        traZODone 50 MG tablet   Commonly known as:  DESYREL        Refills:  0        VALTREX PO   Dose:  500 mg        Take 500 mg by mouth 2 times daily   Refills:  0        VIIBRYD PO   Dose:  40 mg        Take 40 mg by mouth daily   Refills:  0        ZANTAC PO   Dose:  300 mg        Take 300 mg by mouth 2 times daily   Refills:  0                Prescriptions were sent or printed at these locations (1 Prescription)                   Other Prescriptions                Printed at Department/Unit printer (1 of 1)         clonazePAM (KLONOPIN) 0.5 MG ODT tab                Procedures and tests performed during your visit     Basic metabolic panel    CBC with platelets differential    EKG 12 lead    Troponin I    XR Chest 2 Views      Orders Needing Specimen Collection     None      Pending Results     No orders found from 8/2/2017 to 8/5/2017.            Pending Culture Results     No orders found from 8/2/2017 to 8/5/2017.             Pending Results Instructions     If you had any lab results that were not finalized at the time of your Discharge, you can call the ED Lab Result RN at [phone]. You will be contacted by this team for any positive Lab results or changes in treatment. The nurses are available 7 days a week from 10A to 6:30P.  You can leave a message 24 hours per day and they will return your call.        Test Results From Your Hospital Stay        8/4/2017 11:45 AM      Component Results     Component Value Ref Range & Units Status    WBC 6.1 4.0 - 11.0 10e9/L Final    RBC Count 4.22 (L) 4.4 - 5.9 10e12/L Final    Hemoglobin 14.4 13.3 - 17.7 g/dL Final    Hematocrit 38.7 (L) 40.0 - 53.0 % Final    MCV 92 78 - 100 fl Final    MCH 34.1 (H) 26.5 - 33.0 pg Final    MCHC 37.2 (H) 31.5 - 36.5 g/dL Final    RDW 12.6 10.0 - 15.0 % Final    Platelet Count 126 (L) 150 - 450 10e9/L Final    Diff Method Automated Method  Final    % Neutrophils 75.5 % Final    % Lymphocytes 15.5 % Final    % Monocytes 6.6 % Final    % Eosinophils 1.2 % Final    % Basophils 0.2 % Final    % Immature Granulocytes 1.0 % Final    Nucleated RBCs 0 0 /100 Final    Absolute Neutrophil 4.6 1.6 - 8.3 10e9/L Final    Absolute Lymphocytes 0.9 0.8 - 5.3 10e9/L Final    Absolute Monocytes 0.4 0.0 - 1.3 10e9/L Final    Absolute Eosinophils 0.1 0.0 - 0.7 10e9/L Final    Absolute Basophils 0.0 0.0 - 0.2 10e9/L Final    Abs Immature Granulocytes 0.1 0 - 0.4 10e9/L Final    Absolute Nucleated RBC 0.0  Final         8/4/2017 12:06 PM      Component Results     Component Value Ref Range & Units Status    Sodium 139 133 - 144 mmol/L Final    Potassium 3.7 3.4 - 5.3 mmol/L Final    Chloride 107 94 - 109 mmol/L Final    Carbon Dioxide 24 20 - 32 mmol/L Final    Anion Gap 8 3 - 14 mmol/L Final    Glucose 185 (H) 70 - 99 mg/dL Final    Urea Nitrogen 15 7 - 30 mg/dL Final    Creatinine 0.73 0.66 - 1.25 mg/dL Final    GFR Estimate >90  Non  GFR Calc   >60  mL/min/1.7m2 Final    GFR Estimate If Black >90   GFR Calc   >60 mL/min/1.7m2 Final    Calcium 8.0 (L) 8.5 - 10.1 mg/dL Final         8/4/2017 12:06 PM      Component Results     Component Value Ref Range & Units Status    Troponin I ES  0.000 - 0.045 ug/L Final    <0.015  The 99th percentile for upper reference range is 0.045 ug/L.  Troponin values in   the range of 0.045 - 0.120 ug/L may be associated with risks of adverse   clinical events.           8/4/2017 12:14 PM      Narrative     XR CHEST 2 VW  8/4/2017 11:58 AM    HISTORY:  chest pain    COMPARISON:  7/15/2017        Impression     IMPRESSION:  Right subclavian PICC line tip is poorly visualized but  appears to be in the right atrium. Otherwise negative.    BRUCE PARRY MD                Clinical Quality Measure: Blood Pressure Screening     Your blood pressure was checked while you were in the emergency department today. The last reading we obtained was  BP: 121/73 . Please read the guidelines below about what these numbers mean and what you should do about them.  If your systolic blood pressure (the top number) is less than 120 and your diastolic blood pressure (the bottom number) is less than 80, then your blood pressure is normal. There is nothing more that you need to do about it.  If your systolic blood pressure (the top number) is 120-139 or your diastolic blood pressure (the bottom number) is 80-89, your blood pressure may be higher than it should be. You should have your blood pressure rechecked within a year by a primary care provider.  If your systolic blood pressure (the top number) is 140 or greater or your diastolic blood pressure (the bottom number) is 90 or greater, you may have high blood pressure. High blood pressure is treatable, but if left untreated over time it can put you at risk for heart attack, stroke, or kidney failure. You should have your blood pressure rechecked by a primary care provider within the next 4  "weeks.  If your provider in the emergency department today gave you specific instructions to follow-up with your doctor or provider even sooner than that, you should follow that instruction and not wait for up to 4 weeks for your follow-up visit.        Thank you for choosing [facility]       Thank you for choosing [facility] for your care. Our goal is always to provide you with excellent care. Hearing back from our patients is one way we can continue to improve our services. Please take a few minutes to complete the written survey that you may receive in the mail after you visit with us. Thank you!        [facility] Information     [facility] lets you send messages to your doctor, view your test results, renew your prescriptions, schedule appointments and more. To sign up, go to [email or web address] . Click on \"Log in\" on the left side of the screen, which will take you to the Welcome page. Then click on \"Sign up Now\" on the right side of the page.     You will be asked to enter the access code listed below, as well as some personal information. Please follow the directions to create your username and password.     Your access code is: [email or web address]  Expires: 2017  5:40 PM     Your access code will  in 90 days. If you need help or a new code, please call your [facility] clinic or [phone].        Care EveryWhere ID     This is your Care EveryWhere ID. This could be used by other organizations to access your [facility] medical records  [id number]        Equal Access to Services     MARIANNE BURROUGHS : Hadii kolby Reich, waaxda luzahida, qaybta kaalmada teodora, batsheva luu. So [facility] [phone].    ATENCIÓN: Si habla español, tiene a castro disposición servicios gratuitos de asistencia lingüística. Llame al [phone].    We comply with applicable federal civil rights laws and [place] laws. We do not discriminate on the basis of race, color, national origin, age, disability " sex, sexual orientation or gender identity.            After Visit Summary       This is your record. Keep this with you and show to your community pharmacist(s) and doctor(s) at your next visit.

## 2017-08-04 NOTE — DISCHARGE INSTRUCTIONS
Chest Wall Pain: Costochondritis    The chest pain that you have had today is caused by costochondritis. This condition is caused by an inflammation of the cartilage joining your ribs to your breastbone. It is not caused by heart or lung problems. Your healthcare team has made sure that the chest pain you feel is not from a life threatening cause of chest pain such as heart attack, collapsed lung, blood clot in the lung, tear in the aorta, or esophageal rupture. The inflammation may have been brought on by a blow to the chest, lifting heavy objects, intense exercise, or an illness that made you cough and sneeze a lot. It often occurs during times of emotional stress. It can be painful, but it is not dangerous. It usually goes away in 1 to 2 weeks. But it may happen again. Rarely, a more serious condition may cause symptoms similar to costochondritis. That s why it s important to watch for the warning signs listed below.  Home care  Follow these guidelines when caring for yourself at home:    If you feel that emotional stress is a cause of your condition, try to figure out the sources of that stress. It may not be obvious. Learn ways to deal with the stress in your life. This can include regular exercise, muscle relaxation, meditation, or simply taking time out for yourself.    You may use acetaminophen, ibuprofen, or naproxen to control pain, unless another pain medicine was prescribed. If you have liver or kidney disease or ever had a stomach ulcer, talk with your healthcare provider before using these medicines.    You can also help ease pain by using a hot, wet compress or heating pad. Use this with or without a medicated skin cream that helps relieves pain.    Do stretching exercise as advised by your provider.    Take any prescribed medicines as directed.  Follow-up care  Follow up with your healthcare provider, or as advised, if you do not start to get better in the next 2 days.  When to seek medical  advice  Call your healthcare provider right away if any of these occur:    A change in the type of pain. Call if it feels different, becomes more serious, lasts longer, or spreads into your shoulder, arm, neck, jaw, or back.    Shortness of breath or pain gets worse when you breathe    Weakness, dizziness, or fainting    Cough with dark-colored sputum (phlegm) or blood    Abdominal pain    Dark red or black stools    Fever of 100.4 F (38 C) or higher, or as directed by your healthcare provider  Date Last Reviewed: 12/1/2016 2000-2017 [employer]. [address] [place] [address]. All rights reserved. This information is not intended as a substitute for professional medical care. Always follow your healthcare professional's instructions.

## 2017-08-04 NOTE — ED PROVIDER NOTES
"  History     Chief Complaint:  Chest Pain; Headache; and Numbness    HPI   Jonny Gramajo is a 32 year old male with a history of lyme disease who presents to the emergency department today for evaluation of chest pain, headache, and numbness. The patient has been treated for Lyme Disease for the past 7 months and was started on vancomycin 2.5 weeks ago for a staph infection. The patient reports for the past 10 days he has had intermittent fatigue, left sided chest pain, headache, anxious feeling, left arm numbness and prickling. It lasts for several hours at a time and couple of times a day. Furthermore, he has had similar chest pain in the past although not to this severity. He took advil last night with no significant improvement. He also states he is going through withdrawal from his Clonazepam as he took more than prescribed and ran out 2.5 days ago.  He states that he is prescribed \"2 mg four times a day\". He denies fevers, dyspnea, and cough.    Allergies:  Abilify [Aripiprazole]  Wellbutrin [Bupropion]     Medications:    atenolol (TENORMIN) 50 MG tablet  traZODone (DESYREL) 50 MG tablet  cefTAZidime 1 G SOLR  MetroNIDAZOLE (FLAGYL PO)  Vilazodone HCl (VIIBRYD PO)  RaNITidine HCl (ZANTAC PO)  Sulfamethoxazole-Trimethoprim (BACTRIM DS PO)  nystatin (MYCOSTATIN) 055932 UNIT/ML suspension  ValACYclovir HCl (VALTREX PO)    Past Medical History:   Anxiety  Depressive disorder  Hypertension  Lyme Disease    Past Surgical History:    Genitourinary surgery - testicle recessed     Family History:    History reviewed. No pertinent family history.     Social History:  The patient was alone.  Smoking Status: Never  Smokeless Tobacco: Never  Alcohol Use: No  Marital Status:        Review of Systems   Constitutional: Negative for fever.   Respiratory: Negative for cough and shortness of breath.    Cardiovascular: Positive for chest pain.   Neurological: Positive for numbness and headaches. " "  Psychiatric/Behavioral: The patient is nervous/anxious.    All other systems reviewed and are negative.    Physical Exam   First Vitals:  BP: 121/73  Pulse: 83  Heart Rate: 83  Temp: 98.8  F (37.1  C)  Resp: 16  Height: 198.1 cm (6' 6\")  Weight: (!) 149.7 kg (330 lb)      Physical Exam  Constitutional:  Well developed, Well nourished, Completely comfortable appearing, anxious   HENT:  Bilateral external ears normal, Mucous membranes moist, Nose normal. Neck- Normal range of motion, Supple  Respiratory:  Normal breath sounds, No respiratory distress, No wheezing,  Cardiovascular:  Normal heart rate, Normal rhythm, No murmurs,    GI:  Bowel sounds normal, Soft, No tenderness,   Musculoskeletal:  Intact distal pulses, No edema, grossly unremarkable range of motion. Reproducible moderate chest pain over the left pectoral muscle, discomfort in this area with raising left arm.  Integument:  Warm, Dry, PICC line in place in right arm, site unremarkable appearing.   Neurologic:  Alert, attentive and appropriately oriented  Psychiatric:  Anxious affect.     Emergency Department Course     ECG:  Indication: chest pain   Completed at 1049.  Read at 1051.   Normal sinus rhythm  Normal ECG   Rate 85 bpm. MT interval 134. QRS duration 86. QT/QTc 382/454. P-R-T axes 35 38 39.    Imaging:  Radiology findings were communicated with the patient who voiced understanding of the findings.  XR Chest 2 Views  IMPRESSION:  Right subclavian PICC line tip is poorly visualized but  appears to be in the right atrium. Otherwise negative.  Report per radiology     Laboratory:  Laboratory findings were communicated with the patient who voiced understanding of the findings.  CBC: (L) o/w WNL. (WBC 6.1, HGB 14.4)   BMP: Glucose 185(H), Calcium 8.0(L) o/w WNL (Creatinine 0.73)  Troponin (Collected 1130): <0.015    Interventions:  1142 Klonopin 2mg  PO  1142 Ibuprofen 600mg PO     Emergency Department Course:  Nursing notes and vitals " reviewed.  The patient was sent for a XR Chest 2 Views while in the emergency department, results above.   Blood was drawn for laboratory testing, results above.  1100: I performed an exam of the patient as documented above.   1156: I spoke with Dr. Pallas of the patient's PCP service regarding patient's presentation, findings, and plan of care. We spoke about the patient's chronic benzodiazepine use.  I discussed that we do not write refills for benzodiazepines or narcotics, but the patient is at some risk for withdrawal symptoms; Dr. Pallas notes the patient has had actual withdrawal in the past.  Therefore, he requests to give the patient a prescription refill through Monday and the patient will follow up with him on Monday.   1230: Patient rechecked and updated.  I clarified with him his Klonopin dosing.  He told me that he takes 2 mg four times a day, and told the nurse that he takes 2 g once a day.  Could not really verify his prescription, so gave him his dose as prescribed, however he is only prescribed 0.5 mg q.i.d. and I emphasized that he needs to take his medications as prescribed.  He is awake, and in no distress.  I discussed the treatment plan with the patient. They expressed understanding of this plan and consented to discharge. They will be discharged home with instructions for care and follow up. In addition, the patient will return to the emergency department if their symptoms persist, worsen, if new symptoms arise or if there is any concern.  All questions were answered.  I personally reviewed the laboratory and imaging results with the Patient and answered all related questions prior to discharge.    Impression & Plan      Medical Decision Making:   Jonny Gramajo is a 32 year old male presents for evaluation of chest pain.  Signs and symptoms are consistent with a chest wall pain as it is reproducible with palpation.  A broad differential was considered including pneumothorax, rib fracture,  hemothorax, sprain, strain, other fracture, nerve impingement/compromise, referred pain. A chest xray and EKG are negative.  Troponin after 10 days of pain is unremarkable.  Supportive outpatient management is indicated.  The patients head to toe trauma exam is otherwise negative and reassuring; no further workup indicated. Regarding the patient's concern for withdrawal, I spoke with Dr. Pallas who recommended fill his klonopin script and the patient will follow up closely with him on Monday.  I discussed with him that we do not refill prescriptions for benzodiazepines, but this was done as a one-time courtesy at the explicit request of his primary physician.      Diagnosis:    ICD-10-CM    1. Chest wall pain R07.89    2. Benzodiazepine withdrawal, uncomplicated (H) F13.230      Disposition:  discharged to home    Discharge Medications:  New Prescriptions    CLONAZEPAM (KLONOPIN) 0.5 MG ODT TAB    Take 1 tablet (0.5 mg) by mouth 4 times daily     Scribe Disclosure:  Yady DAVIS, am serving as a scribe at 11:01 AM on 8/4/2017 to document services personally performed by Odalys Penny MD based on my observations and the provider's statements to me.     8/4/2017   [facility] EMERGENCY DEPARTMENT       Odalys Penny MD  08/04/17 7547

## 2017-08-30 ENCOUNTER — HOSPITAL ENCOUNTER (INPATIENT)
Facility: CLINIC | Age: 32
LOS: 6 days | Discharge: HOME | DRG: 897 | End: 2017-09-05
Attending: FAMILY MEDICINE | Admitting: FAMILY MEDICINE
Payer: COMMERCIAL

## 2017-08-30 DIAGNOSIS — F13.930 BENZODIAZEPINE WITHDRAWAL, UNCOMPLICATED (H): ICD-10-CM

## 2017-08-30 DIAGNOSIS — F10.239 ALCOHOL DEPENDENCE WITH WITHDRAWAL WITH COMPLICATION (H): ICD-10-CM

## 2017-08-30 DIAGNOSIS — A69.20 LYME DISEASE: Primary | ICD-10-CM

## 2017-08-30 DIAGNOSIS — F13.20 BENZODIAZEPINE DEPENDENCE (H): ICD-10-CM

## 2017-08-30 PROBLEM — F19.20 CHEMICAL DEPENDENCY (H): Status: ACTIVE | Noted: 2017-08-30

## 2017-08-30 LAB
ALCOHOL BREATH TEST: 0.04 (ref 0–0.01)
AMPHETAMINES UR QL SCN: NEGATIVE
BARBITURATES UR QL: NEGATIVE
BENZODIAZ UR QL: NEGATIVE
CANNABINOIDS UR QL SCN: NEGATIVE
COCAINE UR QL: NEGATIVE
ETHANOL UR QL SCN: NEGATIVE
OPIATES UR QL SCN: NEGATIVE

## 2017-08-30 PROCEDURE — 25000132 ZZH RX MED GY IP 250 OP 250 PS 637: Performed by: PSYCHIATRY & NEUROLOGY

## 2017-08-30 PROCEDURE — 99285 EMERGENCY DEPT VISIT HI MDM: CPT | Mod: Z6 | Performed by: FAMILY MEDICINE

## 2017-08-30 PROCEDURE — 25000132 ZZH RX MED GY IP 250 OP 250 PS 637: Performed by: FAMILY MEDICINE

## 2017-08-30 PROCEDURE — 80320 DRUG SCREEN QUANTALCOHOLS: CPT | Performed by: FAMILY MEDICINE

## 2017-08-30 PROCEDURE — 99285 EMERGENCY DEPT VISIT HI MDM: CPT | Performed by: FAMILY MEDICINE

## 2017-08-30 PROCEDURE — 80307 DRUG TEST PRSMV CHEM ANLYZR: CPT | Performed by: FAMILY MEDICINE

## 2017-08-30 PROCEDURE — 82075 ASSAY OF BREATH ETHANOL: CPT | Performed by: FAMILY MEDICINE

## 2017-08-30 PROCEDURE — 12800008 ZZH R&B CD ADULT

## 2017-08-30 RX ORDER — SULFAMETHOXAZOLE/TRIMETHOPRIM 800-160 MG
1 TABLET ORAL 2 TIMES DAILY
Status: DISCONTINUED | OUTPATIENT
Start: 2017-08-30 | End: 2017-09-05 | Stop reason: HOSPADM

## 2017-08-30 RX ORDER — ACETAMINOPHEN 325 MG/1
650 TABLET ORAL EVERY 4 HOURS PRN
Status: DISCONTINUED | OUTPATIENT
Start: 2017-08-30 | End: 2017-09-05 | Stop reason: HOSPADM

## 2017-08-30 RX ORDER — TRAZODONE HYDROCHLORIDE 50 MG/1
50-150 TABLET, FILM COATED ORAL
Status: DISCONTINUED | OUTPATIENT
Start: 2017-08-30 | End: 2017-09-05 | Stop reason: HOSPADM

## 2017-08-30 RX ORDER — ATENOLOL 50 MG/1
50 TABLET ORAL DAILY
Status: DISCONTINUED | OUTPATIENT
Start: 2017-08-30 | End: 2017-09-05 | Stop reason: HOSPADM

## 2017-08-30 RX ORDER — HYDROXYZINE HYDROCHLORIDE 25 MG/1
25-50 TABLET, FILM COATED ORAL EVERY 4 HOURS PRN
Status: DISCONTINUED | OUTPATIENT
Start: 2017-08-30 | End: 2017-09-05 | Stop reason: HOSPADM

## 2017-08-30 RX ORDER — MILK THISTLE 150 MG
CAPSULE ORAL 2 TIMES DAILY
Status: DISCONTINUED | OUTPATIENT
Start: 2017-08-30 | End: 2017-08-30 | Stop reason: RX

## 2017-08-30 RX ORDER — SULFAMETHOXAZOLE/TRIMETHOPRIM 800-160 MG
1 TABLET ORAL 2 TIMES DAILY
Status: ON HOLD | COMMUNITY
End: 2017-09-25

## 2017-08-30 RX ORDER — METRONIDAZOLE 250 MG/1
250 TABLET ORAL 2 TIMES DAILY
Status: DISCONTINUED | OUTPATIENT
Start: 2017-08-30 | End: 2017-09-05 | Stop reason: HOSPADM

## 2017-08-30 RX ORDER — CLARITHROMYCIN 500 MG
500 TABLET ORAL 2 TIMES DAILY
Status: ON HOLD | COMMUNITY
End: 2017-09-25

## 2017-08-30 RX ORDER — METRONIDAZOLE 250 MG/1
250 TABLET ORAL 2 TIMES DAILY
Status: ON HOLD | COMMUNITY
End: 2017-09-25

## 2017-08-30 RX ORDER — VILAZODONE HYDROCHLORIDE 40 MG/1
40 TABLET ORAL DAILY
Status: DISCONTINUED | OUTPATIENT
Start: 2017-08-30 | End: 2017-09-05 | Stop reason: HOSPADM

## 2017-08-30 RX ORDER — DOXYCYCLINE 100 MG/1
100 CAPSULE ORAL 2 TIMES DAILY
Status: ON HOLD | COMMUNITY
End: 2017-09-25

## 2017-08-30 RX ORDER — LORAZEPAM 1 MG/1
2 TABLET ORAL ONCE
Status: COMPLETED | OUTPATIENT
Start: 2017-08-30 | End: 2017-08-30

## 2017-08-30 RX ORDER — NYSTATIN 100000/ML
500000 SUSPENSION, ORAL (FINAL DOSE FORM) ORAL 4 TIMES DAILY PRN
Status: DISCONTINUED | OUTPATIENT
Start: 2017-08-30 | End: 2017-09-05 | Stop reason: HOSPADM

## 2017-08-30 RX ORDER — PHENOBARBITAL 16.2 MG/1
16.2 TABLET ORAL ONCE
Status: COMPLETED | OUTPATIENT
Start: 2017-08-30 | End: 2017-08-30

## 2017-08-30 RX ORDER — PSYLLIUM SEED (WITH DEXTROSE)
2 POWDER (GRAM) ORAL DAILY
COMMUNITY
End: 2017-10-10

## 2017-08-30 RX ORDER — CEFDINIR 300 MG/1
300 CAPSULE ORAL 2 TIMES DAILY
Status: ON HOLD | COMMUNITY
End: 2017-09-25

## 2017-08-30 RX ORDER — DIAZEPAM 5 MG
5-20 TABLET ORAL EVERY 30 MIN PRN
Status: DISCONTINUED | OUTPATIENT
Start: 2017-08-30 | End: 2017-09-05 | Stop reason: HOSPADM

## 2017-08-30 RX ORDER — CEFUROXIME AXETIL 500 MG/1
500 TABLET ORAL EVERY 12 HOURS SCHEDULED
Status: DISCONTINUED | OUTPATIENT
Start: 2017-08-30 | End: 2017-09-05 | Stop reason: HOSPADM

## 2017-08-30 RX ORDER — NICOTINE 21 MG/24HR
1 PATCH, TRANSDERMAL 24 HOURS TRANSDERMAL DAILY
Status: DISCONTINUED | OUTPATIENT
Start: 2017-08-30 | End: 2017-09-05 | Stop reason: HOSPADM

## 2017-08-30 RX ORDER — VALACYCLOVIR HYDROCHLORIDE 500 MG/1
500 TABLET, FILM COATED ORAL 2 TIMES DAILY
Status: DISCONTINUED | OUTPATIENT
Start: 2017-08-30 | End: 2017-09-05 | Stop reason: HOSPADM

## 2017-08-30 RX ORDER — BISACODYL 10 MG
10 SUPPOSITORY, RECTAL RECTAL DAILY PRN
Status: DISCONTINUED | OUTPATIENT
Start: 2017-08-30 | End: 2017-09-05 | Stop reason: HOSPADM

## 2017-08-30 RX ORDER — ALUMINA, MAGNESIA, AND SIMETHICONE 2400; 2400; 240 MG/30ML; MG/30ML; MG/30ML
30 SUSPENSION ORAL EVERY 4 HOURS PRN
Status: DISCONTINUED | OUTPATIENT
Start: 2017-08-30 | End: 2017-09-05 | Stop reason: HOSPADM

## 2017-08-30 RX ORDER — PHENOBARBITAL 16.2 MG/1
16.2 TABLET ORAL 3 TIMES DAILY
Status: DISCONTINUED | OUTPATIENT
Start: 2017-08-30 | End: 2017-08-31

## 2017-08-30 RX ORDER — CLARITHROMYCIN 500 MG
500 TABLET ORAL 2 TIMES DAILY
Status: DISCONTINUED | OUTPATIENT
Start: 2017-08-30 | End: 2017-09-05 | Stop reason: HOSPADM

## 2017-08-30 RX ORDER — DOXYCYCLINE 100 MG/1
100 CAPSULE ORAL 2 TIMES DAILY
Status: DISCONTINUED | OUTPATIENT
Start: 2017-08-30 | End: 2017-09-05 | Stop reason: HOSPADM

## 2017-08-30 RX ADMIN — NICOTINE 1 PATCH: 21 PATCH, EXTENDED RELEASE TRANSDERMAL at 16:30

## 2017-08-30 RX ADMIN — PHENOBARBITAL 16.2 MG: 16.2 TABLET ORAL at 22:27

## 2017-08-30 RX ADMIN — METRONIDAZOLE 250 MG: 250 TABLET ORAL at 20:49

## 2017-08-30 RX ADMIN — TRAZODONE HYDROCHLORIDE 150 MG: 50 TABLET ORAL at 22:27

## 2017-08-30 RX ADMIN — DOXYCYCLINE HYCLATE 100 MG: 100 CAPSULE ORAL at 20:49

## 2017-08-30 RX ADMIN — LORAZEPAM 2 MG: 1 TABLET ORAL at 11:30

## 2017-08-30 RX ADMIN — CEFUROXIME AXETIL 500 MG: 500 TABLET, FILM COATED ORAL at 20:48

## 2017-08-30 RX ADMIN — PSYLLIUM HUSK 1 PACKET: 3.4 POWDER ORAL at 16:30

## 2017-08-30 RX ADMIN — PHENOBARBITAL 16.2 MG: 16.2 TABLET ORAL at 20:49

## 2017-08-30 RX ADMIN — DIAZEPAM 10 MG: 5 TABLET ORAL at 16:30

## 2017-08-30 RX ADMIN — SULFAMETHOXAZOLE AND TRIMETHOPRIM 1 TABLET: 800; 160 TABLET ORAL at 20:50

## 2017-08-30 RX ADMIN — VILAZODONE HYDROCHLORIDE 40 MG: 40 TABLET ORAL at 16:49

## 2017-08-30 RX ADMIN — CLARITHROMYCIN 500 MG: 500 TABLET ORAL at 20:49

## 2017-08-30 RX ADMIN — VALACYCLOVIR HYDROCHLORIDE 500 MG: 500 TABLET, FILM COATED ORAL at 20:50

## 2017-08-30 RX ADMIN — RANITIDINE HYDROCHLORIDE 300 MG: 150 TABLET, FILM COATED ORAL at 20:50

## 2017-08-30 ASSESSMENT — ENCOUNTER SYMPTOMS
ABDOMINAL PAIN: 0
SHORTNESS OF BREATH: 0
FEVER: 0

## 2017-08-30 ASSESSMENT — ACTIVITIES OF DAILY LIVING (ADL)
ORAL_HYGIENE: INDEPENDENT
DRESS: STREET CLOTHES
LAUNDRY: UNABLE TO COMPLETE
GROOMING: INDEPENDENT

## 2017-08-30 NOTE — PROGRESS NOTES
08/30/17 1434   Patient Belongings   Did you bring any home meds/supplements to the hospital?  No   Patient Belongings clothing   Disposition of Belongings see note   Belongings Search Yes     Shoes, power outlet in storage    Cell in locked drawer    No other belongings     ADMISSION:  I am responsible for any personal items that are not sent to the safe or pharmacy. [facility] is not responsible for loss, theft or damage of any property in my possession.    Patient Signature _____________________ Date/Time _____________________    Staff Signature _______________________ Date/Time _____________________    [facility] Staff person, if patient is unable/unwilling to sign  ___________________________________ Date/Time _____________________  DISCHARGE:  All personal items have been returned to me.    Patient Signature _____________________ Date/Time _____________________    Staff Signature _______________________ Date/Time _____________________

## 2017-08-30 NOTE — IP AVS SNAPSHOT
[facility]    [address] [place] [address]    Phone:  [phone]                                       After Visit Summary   8/30/2017    Jonny Gramajo    MRN: [id number]           After Visit Summary Signature Page     I have received my discharge instructions, and my questions have been answered. I have discussed any challenges I see with this plan with the nurse or doctor.    ..........................................................................................................................................  Patient/Patient Representative Signature      ..........................................................................................................................................  Patient Representative Print Name and Relationship to Patient    ..................................................               ................................................  Date                                            Time    ..........................................................................................................................................  Reviewed by Signature/Title    ...................................................              ..............................................  Date                                                            Time

## 2017-08-30 NOTE — PHARMACY-ADMISSION MEDICATION HISTORY
"Admission medication history interview status for the 8/30/2017 admission is complete. See Epic admission navigator for allergy information, pharmacy, prior to admission medications and immunization status.     Medication history interview sources:  Patient, Epic electronic medical record - discharged (6/6/17), [facility] in [employer] in [place] ([phone]), empty prescription bottles for clonazepam    Changes made to PTA medication list (reason)  Added: cefdinir, doxycycline, ceftibuten, clarithromycin, milk thistle, Metamucil waefer, \"boluek\" supplement (from [facility])  Deleted: ceftazidime (patient reported transition from IV to oral antibiotics)  Changed: trazodone (clarification per patient)    Additional medication history information (including reliability of information, actions taken by pharmacist): The patient was a moderately reliable historian due to his intoxication and non compliance with medications. The patient had two empty bottles of clonazepam in his ER room. This writer spoke with the outpatient [facility] pharmacist to verify recent prescription filling. The pharmacist reported the patient was recently prescribed several antibiotics but had not been filling three of the prescriptions recently. The outpatient pharmacist reported not being able to obtain the cefibuten for the patient. The patient reported he should be taking all six antibiotics for his Lyme's disease but had not been compliant with them due to his drinking alcohol.     Metronidazole 250 mg last filled 06/17/17  Bactrim DS last filled 7/13/17  Clarithromycin last filled 06/17/17    More recently prescribed: cefdinir 300 mg, doxycycline 100 mg and ceftibuten 400 mg    The patient reported he had taken all of his clonazepam 0.5 mg prescribed on 8/9/17 (236 tablets) and took two atenolol (100 mg) today.      Prior to Admission medications    Medication Sig Last Dose Taking? Auth Provider   cefdinir (OMNICEF) 300 MG capsule Take 300 mg by " "mouth 2 times daily Unknown Yes Unknown, Entered By History   doxycycline Monohydrate 100 MG CAPS Take 100 mg by mouth 2 times daily Unknown Yes Unknown, Entered By History   ceftibuten (CEDAX) 400 MG capsule Take 400 mg by mouth 2 times daily Not started due to outpatient pharmacy can't obtain Yes Unknown, Entered By History   MILK THISTLE PO Take 4 tablets by mouth 2 times daily Strength unknown  Yes Unknown, Entered By History   psyllium (METAMUCIL) WAFR Take 2 Wafers by mouth every other day  Yes Unknown, Entered By History   UNABLE TO FIND Take 1 tablet by mouth daily MEDICATION NAME: \"boluek\" supplement (patient was unsure of spelling)  Yes Unknown, Entered By History   metroNIDAZOLE (FLAGYL) 250 MG tablet Take 250 mg by mouth 2 times daily \"not for awhile\" Yes Unknown, Entered By History   sulfamethoxazole-trimethoprim (BACTRIM DS/SEPTRA DS) 800-160 MG per tablet Take 1 tablet by mouth 2 times daily \"not for awhile Yes Unknown, Entered By History   clarithromycin (BIAXIN) 500 MG tablet Take 500 mg by mouth 2 times daily \"not for awhile Yes Unknown, Entered By History   atenolol (TENORMIN) 50 MG tablet Take 50 mg by mouth daily 8/30/2017 at AM Yes Reported, Patient   clonazePAM (KLONOPIN) 0.5 MG ODT tab Take 1 tablet (0.5 mg) by mouth 4 times daily 8/28/2017 Yes Odalys Penny MD   traZODone (DESYREL) 50 MG tablet Take  mg by mouth nightly as needed for sleep  Past Week at Unknown time Yes Reported, Patient   Vilazodone HCl (VIIBRYD PO) Take 40 mg by mouth daily Past Month at Unknown time Yes Unknown, Entered By History   RaNITidine HCl (ZANTAC PO) Take 300 mg by mouth 2 times daily 8/30/2017 at AM Yes Unknown, Entered By History   nystatin (MYCOSTATIN) 207649 UNIT/ML suspension Take 500,000 Units by mouth 4 times daily as needed (when patient feels candidia is flaring up)   Yes Unknown, Entered By History   ValACYclovir HCl (VALTREX PO) Take 500 mg by mouth 2 times daily  Past Week at Unknown time Yes " Reported, Patient         Medication history completed by: Irina Serna, PharmD, BCPP

## 2017-08-30 NOTE — IP AVS SNAPSHOT
MRN:[id number]                      After Visit Summary   8/30/2017    Jonny Gramajo    MRN: [id number]           Thank you!     Thank you for choosing [facility] for your care. Our goal is always to provide you with excellent care.        Patient Information     Date Of Birth          1985        Designated Caregiver       Most Recent Value    Caregiver    Will someone help with your care after discharge? no      About your hospital stay     You were admitted on:  August 30, 2017 You last received care in the:  [facility]    You were discharged on:  September 5, 2017       Who to Call     For medical emergencies, please call 911.  For non-urgent questions about your medical care, please call your primary care provider or clinic, [phone]          Attending Provider     Provider Specialty    Jonny Butts MD Critical access hospital, Eliud Marin MD Family [facility]       Primary Care Provider Office Phone # Fax #    Kenneth G Pallas, [phone]      Further instructions from your care team       Behavioral Discharge Planning and Instructions  THANK YOU FOR CHOOSING THE [address]  [phone]    Summary: You were admitted to [facility] on 8/30/2017 for detoxification from benzodiazepines and alcohol.  A medical exam was performed that included lab work. You have met with a  and opted to discharge home and follow up with [facility]. In addition, you will recommit to active participation and regular attendance in  and work with your sponsor.  Please take care and make your recovery a daily priority, Jonny!    Recommendation:  As stated above.    Main Diagnoses:  Per Dr. Vital;  1. Alcohol Dependence with withdrawal with complication  2. Benzodiazepine Dependence    Major Treatments, Procedures and Findings:  You were treated for Alcohol detoxification using Valium administered based  on the [facility] protocol and for Benzodiazepine withdrawal using Phenobarbital. As an outpatient you will be prescribed Phenobarbital, please take this medication as prescribed until it is gone. You did not have a chemical dependency assessment completed during your hospitalization.  You have had labs drawn and those results have been reviewed with you. Please take a copy of your lab work with you to your next primary care physician appointment.    Symptoms to Report:  If you experience more anxiety, confusion, sleeplessness, deep sadness or thoughts of suicide, notify your treatment team or notify your primary care physician. IF ANY OF THE SYMPTOMS YOU ARE EXPERIENCING ARE A MEDICAL EMERGENCY CALL 911 IMMEDIATELY.     Lifestyle Adjustment: Adjust your lifestyle to get enough sleep, relaxation, exercise and good nutrition. Continue to develop healthy coping skills to decrease stress and promote a sober living environment. Do not use mood altering substances including alcohol, illegal drugs or addictive medications other than what is currently prescribed.     Treatment Program Provider:  [facility] [address]  [address]  Ph:  [phone]    Follow-up Appointment:   You report you will be appointed a Doctor at treatment at [facility] after discharge.    Resources:   SMART Recovery - self management for addiction recovery:  Www.smartrecovery.org  Pathways ~ A Health Crisis Resource & Support Center:  283.665.6328.   http://www.aastpaul.org/?topic=8  http://a[place].org/meetings/  http://www.nam[place].org/index.php/meeting-list-pdf  http://www.harmreduction.org  [facility] [phone]  Support Group:  AA/NA and Sponsor/support  Crisis Intervention: 316.431.7354 or 137-358-2150 (TTY: 347.881.6006).  Call anytime for help.  National [place] on Mental Illness (www.[place].org): 130.592.8215 or 521-145-2675.  Alcoholics Anonymous  (www.alcoholics-anonymous.org): Check your phone book for your local chapter.  Suicide Awareness Voices of Education (SAVE) (www.save.org): 707-864-QEDG (3331)  National Suicide Prevention Line (www.mentalhealthmn.org): 700-591-YKDX (4255)  Mental Health Consumer/Survivor Network of [place] (www.mhcsn.net): 758.269.7312 or 293-624-0936  Mental Health Association of [place] (www.mentalhealth.org): 999.827.1657 or 478-250-5179   Substance Abuse and Mental Health Services (www.samhsa.gov)    [place] Recovery Connection ([facility])  [facility] connects people seeking recovery to resources that help foster and sustain long-term recovery.  Whether you are seeking resources for treatment, transportation, housing, job training, education, health care or other pathways to recovery, [facility] is a great place to start.  133.580.3211.  www.[facility].org    General Medication Instructions:   See your medication sheet(s) for instructions.   Take all medicines as directed.  Make no changes unless your doctor suggests them.   Go to all your doctor visits.  Be sure to have all your required lab tests. This way, your medicines can be refilled on time.  AA/NA and Sponsors are excellent resources for support and you can find one at any support group meeting.  Do not use any forms of alcohol.    Please Note:  If you have any questions at anytime after you are discharged please call the [facility] [place] detox unit 3AW unit at [phone].  [facility], Behavioral Intake [phone]  Please take this discharge folder with you to all your follow up appointments, it contains your lab results, diagnosis, medication list and discharge recommendations.      THANK YOU FOR CHOOSING THE [facility]             Pending Results     No orders found from 8/28/2017 to 8/31/2017.            Statement of Approval     Ordered          09/05/17 0838  I have reviewed and agree with all the  "recommendations and orders detailed in this document.  EFFECTIVE NOW     Approved and electronically signed by:  Eliud Vital MD             Admission Information     Date & Time Provider Department Dept. Phone    2017 Eliud Vital MD [facility] [phone]      Your Vitals Were     Blood Pressure Pulse Temperature Respirations Height Weight    138/82 68 98.1  F (36.7  C) (Oral) 16 1.981 m (6' 6\") 144.2 kg (318 lb)    Pulse Oximetry BMI (Body Mass Index)                97% 36.75 kg/m2          MyChart Information     [employer] lets you send messages to your doctor, view your test results, renew your prescriptions, schedule appointments and more. To sign up, go to [email or web address] . Click on \"Log in\" on the left side of the screen, which will take you to the Welcome page. Then click on \"Sign up Now\" on the right side of the page.     You will be asked to enter the access code listed below, as well as some personal information. Please follow the directions to create your username and password.     Your access code is: [email or web address]  Expires: 2017  1:46 PM     Your access code will  in 90 days. If you need help or a new code, please call your [place] clinic or [phone].        Care EveryWhere ID     This is your Care EveryWhere ID. This could be used by other organizations to access your [place] medical records  [id number]        Equal Access to Services     [facility] : Hadii kolby de leóno Sowoody, waaxda luqadaha, qaybta kaalmada teodora, batsheva amador . So [facility] [phone].    ATENCIÓN: Si habla español, tiene a castro disposición servicios gratuitos de asistencia lingüística. Llame al [phone].    We comply with applicable federal civil rights laws and [place] laws. We do not discriminate on the basis of race, color, national origin, age, disability sex, sexual orientation or gender identity.               Review " of your medicines      START taking        Dose / Directions    PHENobarbital 32.4 MG Tabs tablet   Commonly known as:  LUMINAL   Used for:  Benzodiazepine dependence (H), Benzodiazepine withdrawal, uncomplicated (H)   Notes to Patient:  How Phenobarb is administered in the hospital if it is ordered for     4 times per day:      8am, 12pm, 4pm and 8pm  3 times per day:      8am, 2pm, and 8pm  2 times per day:      8am and 4pm  1 time per day:        8am        Take 1 tab 4 times daily for 5 days, then Take 1 tab 3 times daily for 5 days, then Take 1 tab 2 times daily for 5 days, then Take 1 tab 1 times daily for 5 days   Quantity:  50 tablet   Refills:  0         CONTINUE these medicines which may have CHANGED, or have new prescriptions. If we are uncertain of the size of tablets/capsules you have at home, strength may be listed as something that might have changed.        Dose / Directions    ceftibuten 400 MG capsule   Commonly known as:  CEDAX   This may have changed:  when to take this   Used for:  Lyme disease        Dose:  400 mg   Take 1 capsule (400 mg) by mouth daily   Refills:  0         CONTINUE these medicines which have NOT CHANGED        Dose / Directions    atenolol 50 MG tablet   Commonly known as:  TENORMIN        Dose:  50 mg   Take 50 mg by mouth daily   Refills:  0       cefdinir 300 MG capsule   Commonly known as:  OMNICEF        Dose:  300 mg   Take 300 mg by mouth 2 times daily   Refills:  0       clarithromycin 500 MG tablet   Commonly known as:  BIAXIN        Dose:  500 mg   Take 500 mg by mouth 2 times daily   Refills:  0       doxycycline Monohydrate 100 MG Caps        Dose:  100 mg   Take 100 mg by mouth 2 times daily   Refills:  0       FLAGYL 250 MG tablet   Generic drug:  metroNIDAZOLE        Dose:  250 mg   Take 250 mg by mouth 2 times daily   Refills:  0       MILK THISTLE PO        Dose:  4 tablet   Take 4 tablets by mouth 2 times daily Strength unknown   Refills:  0       nystatin  430472 UNIT/ML suspension   Commonly known as:  MYCOSTATIN        Dose:  453568 Units   Take 500,000 Units by mouth 4 times daily as needed (when patient feels candidia is flaring up)   Refills:  0       psyllium Wafr        Dose:  2 Wafer   Take 2 Wafers by mouth every other day   Refills:  0       sulfamethoxazole-trimethoprim 800-160 MG per tablet   Commonly known as:  BACTRIM DS/SEPTRA DS        Dose:  1 tablet   Take 1 tablet by mouth 2 times daily   Refills:  0       traZODone 50 MG tablet   Commonly known as:  DESYREL   Used for:  Alcohol dependence with withdrawal with complication (H)        Dose:   mg   Take 1-3 tablets ( mg) by mouth nightly as needed for sleep   Quantity:  90 tablet   Refills:  0       VALTREX PO        Dose:  500 mg   Take 500 mg by mouth 2 times daily   Refills:  0       VIIBRYD PO        Dose:  40 mg   Take 40 mg by mouth daily   Refills:  0       VRAYLAR 1.5 MG Caps capsule   Generic drug:  cariprazine        Dose:  1.5 mg   Take 1.5 mg by mouth daily   Refills:  0       ZANTAC PO        Dose:  300 mg   Take 300 mg by mouth 2 times daily   Refills:  0         STOP taking     clonazePAM 0.5 MG ODT tab   Commonly known as:  klonoPIN           UNABLE TO FIND                Where to get your medicines      These medications were sent to [facility] [place], [place] - [address]  [address] [facility] [address]     Phone:  [phone]     traZODone 50 MG tablet         Some of these will need a paper prescription and others can be bought over the counter. Ask your nurse if you have questions.     Bring a paper prescription for each of these medications     PHENobarbital 32.4 MG Tabs tablet                Protect others around you: Learn how to safely use, store and throw away your medicines at www.disposemymeds.org.             Medication List: This is a list of all your medications and when to take them. Check marks below indicate your daily  home schedule. Keep this list as a reference.      Medications           Morning Afternoon Evening Bedtime As Needed    atenolol 50 MG tablet   Commonly known as:  TENORMIN   Take 50 mg by mouth daily   Last time this was given:  50 mg on 9/5/2017  8:58 AM                                cefdinir 300 MG capsule   Commonly known as:  OMNICEF   Take 300 mg by mouth 2 times daily                                ceftibuten 400 MG capsule   Commonly known as:  CEDAX   Take 1 capsule (400 mg) by mouth daily                                clarithromycin 500 MG tablet   Commonly known as:  BIAXIN   Take 500 mg by mouth 2 times daily   Last time this was given:  500 mg on 9/5/2017  8:58 AM                                doxycycline Monohydrate 100 MG Caps   Take 100 mg by mouth 2 times daily                                FLAGYL 250 MG tablet   Take 250 mg by mouth 2 times daily   Last time this was given:  250 mg on 9/5/2017  8:58 AM   Generic drug:  metroNIDAZOLE                                MILK THISTLE PO   Take 4 tablets by mouth 2 times daily Strength unknown                                nystatin 618161 UNIT/ML suspension   Commonly known as:  MYCOSTATIN   Take 500,000 Units by mouth 4 times daily as needed (when patient feels candidia is flaring up)                                PHENobarbital 32.4 MG Tabs tablet   Commonly known as:  LUMINAL   Take 1 tab 4 times daily for 5 days, then Take 1 tab 3 times daily for 5 days, then Take 1 tab 2 times daily for 5 days, then Take 1 tab 1 times daily for 5 days   Last time this was given:  32.4 mg on 9/5/2017 12:23 PM   Next Dose Due:  4pm today   Notes to Patient:  How Phenobarb is administered in the hospital if it is ordered for     4 times per day:      8am, 12pm, 4pm and 8pm  3 times per day:      8am, 2pm, and 8pm  2 times per day:      8am and 4pm  1 time per day:        8am                                psyllium Wafr   Take 2 Wafers by mouth every other day                                 sulfamethoxazole-trimethoprim 800-160 MG per tablet   Commonly known as:  BACTRIM DS/SEPTRA DS   Take 1 tablet by mouth 2 times daily   Last time this was given:  1 tablet on 9/5/2017  8:58 AM                                traZODone 50 MG tablet   Commonly known as:  DESYREL   Take 1-3 tablets ( mg) by mouth nightly as needed for sleep   Last time this was given:  150 mg on 9/4/2017 10:37 PM                                VALTREX PO   Take 500 mg by mouth 2 times daily   Last time this was given:  500 mg on 9/5/2017  8:58 AM                                VIIBRYD PO   Take 40 mg by mouth daily   Last time this was given:  40 mg on 9/5/2017  8:58 AM                                VRAYLAR 1.5 MG Caps capsule   Take 1.5 mg by mouth daily   Generic drug:  cariprazine                                ZANTAC PO   Take 300 mg by mouth 2 times daily   Last time this was given:  300 mg on 9/5/2017  8:58 AM

## 2017-08-30 NOTE — ED PROVIDER NOTES
History     Chief Complaint   Patient presents with     Addiction Problem     alcohol and misuse of Klonopin,  seeking detox,  breathalyzer for EMS 0.16 , given ativan 2mg IV by EMS     HPI  Jonny Gramajo is a 32 year old male with a history of anxiety, Lyme's disease, HTN, and depression who presents to the Emergency Department via EMS for evaluation of addiction problem. The patient reports he is here seeking detox for alcohol and Klonopin. He states he is prescribed 2 mg per day of Klonopin, but is currently taking around 6 mg per day. His last use was about 48 hours ago. He also reports drinking around 12 shots of vodka per day with his last drink this morning. Of note, the patient was hospitalized 6/1/17-6/6/17 for benzodiazepine and alcohol withdrawal. He has also had multiple ED visits due to complications with his PICC line used to administer antibiotics for his chronic Lyme's.    Past Medical History:   Diagnosis Date     Anxiety      Depressive disorder      Hypertension      Lyme disease        Past Surgical History:   Procedure Laterality Date     GENITOURINARY SURGERY      testical recessed       No family history on file.    Social History   Substance Use Topics     Smoking status: Never Smoker     Smokeless tobacco: Never Used     Alcohol use No       No current facility-administered medications for this encounter.      Current Outpatient Prescriptions   Medication     atenolol (TENORMIN) 50 MG tablet     clonazePAM (KLONOPIN) 0.5 MG ODT tab     traZODone (DESYREL) 50 MG tablet     cefTAZidime 1 G SOLR     MetroNIDAZOLE (FLAGYL PO)     Vilazodone HCl (VIIBRYD PO)     RaNITidine HCl (ZANTAC PO)     Sulfamethoxazole-Trimethoprim (BACTRIM DS PO)     nystatin (MYCOSTATIN) 123398 UNIT/ML suspension     ValACYclovir HCl (VALTREX PO)        Allergies   Allergen Reactions     Abilify [Aripiprazole]      Wellbutrin [Bupropion] Anxiety         I have reviewed the Medications, Allergies, Past Medical and  Surgical History, and Social History in the Epic system.    Review of Systems   Constitutional: Negative for fever.   Respiratory: Negative for shortness of breath.    Cardiovascular: Negative for chest pain.   Gastrointestinal: Negative for abdominal pain.   All other systems reviewed and are negative.      Physical Exam   BP: 118/70  Pulse: 78  Temp: 98.2  F (36.8  C)  Resp: 16  Weight: (!) 149.7 kg (330 lb)  SpO2: 98 %  Physical Exam   Constitutional: He is oriented to person, place, and time. He appears well-developed and well-nourished.   HENT:   Head: Normocephalic and atraumatic.   Mouth/Throat: Oropharynx is clear and moist.   Eyes: EOM are normal. Pupils are equal, round, and reactive to light.   Neck: Normal range of motion. Neck supple. No tracheal deviation present. No thyromegaly present.   Cardiovascular: Normal rate, regular rhythm, normal heart sounds and intact distal pulses.  Exam reveals no gallop and no friction rub.    No murmur heard.  Pulmonary/Chest: Effort normal and breath sounds normal. He exhibits no tenderness.   Abdominal: Soft. Bowel sounds are normal. He exhibits no distension and no mass. There is no tenderness.   Musculoskeletal: He exhibits no edema or tenderness.   Neurological: He is alert and oriented to person, place, and time. No cranial nerve deficit. Coordination normal.   Skin: Skin is warm and dry. No rash noted.   Psychiatric: His speech is normal and behavior is normal. Thought content normal. His mood appears anxious.   Nursing note and vitals reviewed.      ED Course     ED Course     Procedures             Critical Care time:  none             Labs Ordered and Resulted from Time of ED Arrival Up to the Time of Departure from the ED   ALCOHOL BREATH TEST POCT - Abnormal; Notable for the following:        Result Value    Alcohol Breath Test 0.041 (*)     All other components within normal limits   DRUG ABUSE SCREEN 6 CHEM DEP URINE ([facility])   MSSA SCORE AND VS             Assessments & Plan (with Medical Decision Making)   She presenting requesting detox from both alcohol and benzodiazepines.  He has a history of significant withdrawal symptoms, no definite history of DTs or seizures.  He also has depression but is not suicidal.  He has no acute medical issues.  Patient has been unable to stop using alcohol and drugs in the community due to both physical and psychological symptoms.  Continued use will put the patient at risk for medical and/or psychiatric complications.  Patient appears to be an appropriate candidate for voluntary admission to our detox unit.  The patient is otherwise medically and psychiatrically stable and voluntary.      I have reviewed the nursing notes.    I have reviewed the findings, diagnosis, plan and need for follow up with the patient.    New Prescriptions    No medications on file       Final diagnoses:   Alcohol dependence with withdrawal with complication (H)   Benzodiazepine dependence (H)   I, Juan Hernandez, am serving as a trained medical scribe to document services personally performed by Dylon Butts MD, based on the provider's statements to me.      IDylon MD, was physically present and have reviewed and verified the accuracy of this note documented by Juan Hernandez.       8/30/2017   [facility], [place], EMERGENCY DEPARTMENT     Jonny Butts MD  08/30/17 [id number]

## 2017-08-30 NOTE — PROGRESS NOTES
"Pt arrived from the [facility] Emergency Department for Alcohol and Benzodiazepine detox. Pt reports using 20 shots of vodka daily with some beer use along with the vodka for the past month with last use reported as this morning at 0700.  [facility] alcohol withdrawal score on admission is an 8. He also reports daily use of klonopin about 8 mg daily, although his prescribed amount is 2 mg daily \"in a bell curve\" type of use. States used a 90 day supply of klonopin in about 2 1/2 weeks. Last benzo use was in the ER in which he was administered ativan for alcohol withdrawal. Pt states his drug of choice is both alcohol and benzodiazepines.    Reports goal of admission as to do an outpatient treatment as long as it is near his home in [place].    Pt reports recent social stressors as feeling like he is being a financial burden on his family because of his inability to work because of his ongoing lyme's disease. States thinking Lyme's disease impacted his short term memory and gave him ADD. Pt denies any current suicidal ideation plans or intent but does report at age 18 he attempted suicide via hanging. States he ended up pulling himself up and over the device he was going to hang from and told his parents about this and that led to him being hospitalized. States that he is at times verbally abusive to his wife and to his parents \"when I'm using\" and that \"usually everything falls into place when I'm sober\". Does report a history of sobriety from 6/10/2005 until 1/4/2016 (10.5 years) and that he was active in AA.    Pt reports that medically he has lyme's disease for 24 years. Was on IV treatment for the lyme's disease that just ended 12 days and is now on oral antibiotics for the treatment of lyme's disease. He does report a weight loss of 15-20 pounds over the past couple of months due to a \"liquid diet\".  Will monitor patient through remainder of shift.               "

## 2017-08-30 NOTE — IP AVS SNAPSHOT
" [facility]: [phone]                                              INTERAGENCY TRANSFER FORM - LAB / IMAGING / EKG / EMG RESULTS   2017                    Hospital Admission Date: 2017  JONNY SUAREZ   : 1985  Sex: Male        Attending Provider: Eliud Vital MD     Allergies:  Abilify [Aripiprazole], Wellbutrin [Bupropion]    Infection:  None   Service:  CHEMICAL DEP    Ht:  1.981 m (6' 6\")   Wt:  144.2 kg (318 lb)   Admission Wt:  149.7 kg (330 lb)    BMI:  36.75 kg/m 2   BSA:  2.82 m 2            Patient PCP Information     Provider PCP Type    Kenneth G. Pallas, MD General         Lab Results - 3 Days      Hepatic panel [[id number]] (Abnormal)  Resulted: 17 0801, Result status: Final result    Ordering provider: Eliud Vital MD  17 003 Resulting lab: [facility]    Specimen Information    Type Source Collected On   Blood  17 0732          Components       Value Reference Range Flag Lab   Bilirubin Direct 0.1 0.0 - 0.2 mg/dL  13   Bilirubin Total 0.4 0.2 - 1.3 mg/dL  13   Albumin 3.5 3.4 - 5.0 g/dL  13   Protein Total 6.5 6.8 - 8.8 g/dL L 13   Alkaline Phosphatase 77 40 - 150 U/L  13    0 - 70 U/L H 13   AST 51 0 - 45 U/L H 13            GGT [[id number]] (Abnormal)  Resulted: 17 0757, Result status: Final result    Ordering provider: Eliud Vital MD  17 Resulting lab: [facility]    Specimen Information    Type Source Collected On   Blood  17 0732          Components       Value Reference Range Flag Lab    0 - 75 U/L H 13            Testing Performed By     Lab - Abbreviation Name Director Address Valid Date Range    13 - Unknown [facility] Unknown [facility] [address] 01/15/15 0916 - Present            Unresulted Labs     None      Encounter-Level Documents:     There are no " encounter-level documents.      Order-Level Documents:     There are no order-level documents.

## 2017-08-31 LAB
ALBUMIN SERPL-MCNC: 3.7 G/DL (ref 3.4–5)
ALBUMIN UR-MCNC: NEGATIVE MG/DL
ALP SERPL-CCNC: 105 U/L (ref 40–150)
ALT SERPL W P-5'-P-CCNC: 288 U/L (ref 0–70)
ANION GAP SERPL CALCULATED.3IONS-SCNC: 8 MMOL/L (ref 3–14)
APPEARANCE UR: CLEAR
AST SERPL W P-5'-P-CCNC: 111 U/L (ref 0–45)
BILIRUB SERPL-MCNC: 0.7 MG/DL (ref 0.2–1.3)
BILIRUB UR QL STRIP: NEGATIVE
BUN SERPL-MCNC: 13 MG/DL (ref 7–30)
CALCIUM SERPL-MCNC: 8.6 MG/DL (ref 8.5–10.1)
CHLORIDE SERPL-SCNC: 108 MMOL/L (ref 94–109)
CO2 SERPL-SCNC: 26 MMOL/L (ref 20–32)
COLOR UR AUTO: YELLOW
CREAT SERPL-MCNC: 0.73 MG/DL (ref 0.66–1.25)
ERYTHROCYTE [DISTWIDTH] IN BLOOD BY AUTOMATED COUNT: 13.3 % (ref 10–15)
GFR SERPL CREATININE-BSD FRML MDRD: >90 ML/MIN/1.7M2
GGT SERPL-CCNC: 213 U/L (ref 0–75)
GLUCOSE SERPL-MCNC: 123 MG/DL (ref 70–99)
GLUCOSE UR STRIP-MCNC: 30 MG/DL
HCT VFR BLD AUTO: 43.9 % (ref 40–53)
HGB BLD-MCNC: 16 G/DL (ref 13.3–17.7)
HGB UR QL STRIP: NEGATIVE
KETONES UR STRIP-MCNC: NEGATIVE MG/DL
LEUKOCYTE ESTERASE UR QL STRIP: NEGATIVE
MCH RBC QN AUTO: 34.3 PG (ref 26.5–33)
MCHC RBC AUTO-ENTMCNC: 36.4 G/DL (ref 31.5–36.5)
MCV RBC AUTO: 94 FL (ref 78–100)
NITRATE UR QL: NEGATIVE
PH UR STRIP: 6.5 PH (ref 5–7)
PLATELET # BLD AUTO: 152 10E9/L (ref 150–450)
POTASSIUM SERPL-SCNC: 4.4 MMOL/L (ref 3.4–5.3)
PROT SERPL-MCNC: 7 G/DL (ref 6.8–8.8)
RBC # BLD AUTO: 4.67 10E12/L (ref 4.4–5.9)
RBC #/AREA URNS AUTO: <1 /HPF (ref 0–2)
SODIUM SERPL-SCNC: 142 MMOL/L (ref 133–144)
SOURCE: ABNORMAL
SP GR UR STRIP: 1.01 (ref 1–1.03)
UROBILINOGEN UR STRIP-MCNC: NORMAL MG/DL (ref 0–2)
WBC # BLD AUTO: 6.3 10E9/L (ref 4–11)
WBC #/AREA URNS AUTO: 1 /HPF (ref 0–2)

## 2017-08-31 PROCEDURE — 82977 ASSAY OF GGT: CPT | Performed by: FAMILY MEDICINE

## 2017-08-31 PROCEDURE — 36415 COLL VENOUS BLD VENIPUNCTURE: CPT | Performed by: FAMILY MEDICINE

## 2017-08-31 PROCEDURE — 85027 COMPLETE CBC AUTOMATED: CPT | Performed by: FAMILY MEDICINE

## 2017-08-31 PROCEDURE — 12800008 ZZH R&B CD ADULT

## 2017-08-31 PROCEDURE — 25000132 ZZH RX MED GY IP 250 OP 250 PS 637: Performed by: PSYCHIATRY & NEUROLOGY

## 2017-08-31 PROCEDURE — H2035 A/D TX PROGRAM, PER HOUR: HCPCS | Mod: HQ

## 2017-08-31 PROCEDURE — 25000132 ZZH RX MED GY IP 250 OP 250 PS 637: Performed by: FAMILY MEDICINE

## 2017-08-31 PROCEDURE — 80053 COMPREHEN METABOLIC PANEL: CPT | Performed by: FAMILY MEDICINE

## 2017-08-31 PROCEDURE — 99222 1ST HOSP IP/OBS MODERATE 55: CPT | Mod: AI | Performed by: FAMILY MEDICINE

## 2017-08-31 PROCEDURE — 81001 URINALYSIS AUTO W/SCOPE: CPT | Performed by: FAMILY MEDICINE

## 2017-08-31 RX ORDER — PHENOBARBITAL 64.8 MG/1
64.8 TABLET ORAL 3 TIMES DAILY
Status: DISCONTINUED | OUTPATIENT
Start: 2017-08-31 | End: 2017-09-04

## 2017-08-31 RX ORDER — PHENOBARBITAL 32.4 MG/1
32.4 TABLET ORAL ONCE
Status: COMPLETED | OUTPATIENT
Start: 2017-08-31 | End: 2017-08-31

## 2017-08-31 RX ADMIN — DOXYCYCLINE HYCLATE 100 MG: 100 CAPSULE ORAL at 09:16

## 2017-08-31 RX ADMIN — NICOTINE POLACRILEX 4 MG: 2 GUM, CHEWING ORAL at 22:17

## 2017-08-31 RX ADMIN — VILAZODONE HYDROCHLORIDE 40 MG: 40 TABLET ORAL at 09:19

## 2017-08-31 RX ADMIN — DIAZEPAM 10 MG: 5 TABLET ORAL at 16:16

## 2017-08-31 RX ADMIN — CEFUROXIME AXETIL 500 MG: 500 TABLET, FILM COATED ORAL at 09:19

## 2017-08-31 RX ADMIN — TRAZODONE HYDROCHLORIDE 150 MG: 50 TABLET ORAL at 22:17

## 2017-08-31 RX ADMIN — SULFAMETHOXAZOLE AND TRIMETHOPRIM 1 TABLET: 800; 160 TABLET ORAL at 09:18

## 2017-08-31 RX ADMIN — PHENOBARBITAL 64.8 MG: 64.8 TABLET ORAL at 15:03

## 2017-08-31 RX ADMIN — NICOTINE POLACRILEX 4 MG: 2 GUM, CHEWING ORAL at 17:10

## 2017-08-31 RX ADMIN — NICOTINE 1 PATCH: 21 PATCH, EXTENDED RELEASE TRANSDERMAL at 09:20

## 2017-08-31 RX ADMIN — RANITIDINE HYDROCHLORIDE 300 MG: 150 TABLET, FILM COATED ORAL at 09:17

## 2017-08-31 RX ADMIN — DIAZEPAM 10 MG: 5 TABLET ORAL at 13:06

## 2017-08-31 RX ADMIN — NICOTINE POLACRILEX 4 MG: 2 GUM, CHEWING ORAL at 21:06

## 2017-08-31 RX ADMIN — PHENOBARBITAL 64.8 MG: 64.8 TABLET ORAL at 20:12

## 2017-08-31 RX ADMIN — DOXYCYCLINE HYCLATE 100 MG: 100 CAPSULE ORAL at 20:12

## 2017-08-31 RX ADMIN — PHENOBARBITAL 16.2 MG: 16.2 TABLET ORAL at 09:16

## 2017-08-31 RX ADMIN — PHENOBARBITAL 32.4 MG: 32.4 TABLET ORAL at 13:06

## 2017-08-31 RX ADMIN — CLARITHROMYCIN 500 MG: 500 TABLET ORAL at 09:19

## 2017-08-31 RX ADMIN — CEFUROXIME AXETIL 500 MG: 500 TABLET, FILM COATED ORAL at 20:12

## 2017-08-31 RX ADMIN — SULFAMETHOXAZOLE AND TRIMETHOPRIM 1 TABLET: 800; 160 TABLET ORAL at 20:12

## 2017-08-31 RX ADMIN — METRONIDAZOLE 250 MG: 250 TABLET ORAL at 20:12

## 2017-08-31 RX ADMIN — RANITIDINE HYDROCHLORIDE 300 MG: 150 TABLET, FILM COATED ORAL at 20:12

## 2017-08-31 RX ADMIN — METRONIDAZOLE 250 MG: 250 TABLET ORAL at 09:18

## 2017-08-31 RX ADMIN — VALACYCLOVIR HYDROCHLORIDE 500 MG: 500 TABLET, FILM COATED ORAL at 09:20

## 2017-08-31 RX ADMIN — VALACYCLOVIR HYDROCHLORIDE 500 MG: 500 TABLET, FILM COATED ORAL at 20:12

## 2017-08-31 RX ADMIN — CLARITHROMYCIN 500 MG: 500 TABLET ORAL at 20:12

## 2017-08-31 RX ADMIN — DIAZEPAM 10 MG: 5 TABLET ORAL at 09:16

## 2017-08-31 RX ADMIN — ATENOLOL 50 MG: 50 TABLET ORAL at 09:19

## 2017-08-31 NOTE — PROGRESS NOTES
"SPIRITUAL HEALTH SERVICES Progress Note  [facility] ([facility]) 3AW       DATA:    Initial visit -- per \"hospital \" consult/request entered by nurse on behalf of Jonny.  Twice I attempted to meet with Jonny; I knocked at his door and called his name; he was sleeping -- too deeply to respond.  Later in the evening -- during \"vitals\" and snack time -- I was able to make a brief connection with Jonny.  Jonny attested that he was still very tired; he suggested that we meet on Thursday.       INTERVENTION:    Introduction -- Spiritual Health Services (SHS).       OUTCOME:    Jonny is interested in a SHS meeting; however, he requested that we meet on Thursday afternoon/evening.       PLAN:    Visit -- Thursday, 31 August.                                                                                                                                             Fransisca Locke  [facility]    Pager [phone]    "

## 2017-08-31 NOTE — PROGRESS NOTES
Behavioral Health  Note    Behavioral Health  Spirituality Group Note    UNIT 3AW    Name: Jonny Gramajo YOB: 1985   MRN: [id number] Age: 32 year old      Patient attended -led group, which included discussion of spirituality, coping with illness and building resilience.    Patient attended group for 1.0 hrs.    The patient actively participated in group discussion and patient demonstrated an appreciation of topic's application for their personal circumstances.     Jonny was making specific connections with the spirituality group practices and his personal life; he addressed elements of fearfulness and stepping-out in faithfulness/courage.    Topic/Focus of today's spirituality group: Spirituality and Core Identity  IMR tie-in: Receving Feedback/Ways to [place]    Fransisca Locke  Volunteer   Pager [phone]

## 2017-08-31 NOTE — PLAN OF CARE
Problem: Substance Withdrawal  Goal: Substance Withdrawal  Signs and symptoms of listed problems will be absent or manageable.   Outcome: No Change  Patient here for alcohol and benzo detox, MSSA this shift 8 and 6.  Received 10 mg valium x1 per protocol.  Patient reported feeling withdraw from benzo.  MD notified, patient now on phenobarbital .  Urine specimen sent to lab for utox, awaiting result.  Received prn trazodone per request at bed time for sleep.  Patient sleeping comfortably in room, will continue to monitor closely.

## 2017-08-31 NOTE — PROGRESS NOTES
"CASE MANAGEMENT NOTE    Writer met with patient to initiate discharge planning. He reports a plan to work with [facility] ([facility]) in [place], his application paperwork is under review. Medical reports from current admission faxed to [facility], with patient agreement. Patient reports AA will once again be \"the core\" of his recovery, last regular attendance was 3 - 4 years ago. History of nearly 10 inpatient treatments and patient feels confident about his outpatient plan with AA, in addition to speaking monthly by phone with his psychiatrist.     Janet De Luna MA (Cindy), [facility]  3A Psychotherapist  [phone]   "

## 2017-08-31 NOTE — PLAN OF CARE
Problem: Substance Withdrawal  Goal: Substance Withdrawal  Signs and symptoms of listed problems will be absent or manageable. 1) Patient will achieve medical stabilization of acute withdrawal sx.  2) Patient will remain safe and free from injury  3) Patient will demonstrate improvement of ADLs (appetite, hygiene)  4) Verbalize reduction of fear or anxiety to a manageable level.  5) Verbalize knowledge of substance abuse as a disease  6) Verbalize risks and negative effects related to drug ingestion  7) Demonstrate participation in unit programming and attends specific substance use group therapy (i.e AA meetings)  8) Accept referral to substance abuse treatment  9) Express sense of regaining some control of situation/life (possible by verbalizing alternative coping mechanisms as alternatives to substance use in response to stress)  Outcome: No Change  Pt being monitored for alcohol and benzodiazepine withdrawal. Pt out on unit. Pt medicated x 2 this shift with valium 10 mg. +hand tremors. Pt reports feeling anxious. Pt will be on phenobarbital 64.2 TID for benzodiazepine withdrawal. Jonny states he gets his klonopin prescribed by his primary MD and that the Primary MD does not know he is here for detox. Pt dx with lymes disease  2005 and in on multiple antibiotics. Pt states he will be on this medication regime his whole life. Jonny states he had been trying to get admitted to a outpt CD program but that there was a 2-3 week waiting period.

## 2017-08-31 NOTE — PROGRESS NOTES
"CLINICAL NUTRITION SERVICES - ASSESSMENT NOTE     Nutrition Prescription    RECOMMENDATIONS FOR MDs/PROVIDERS TO ORDER:  None today    Malnutrition Status:    Does not meet criteria     Recommendations already ordered by Registered Dietitian (RD):  None today    Future/Additional Recommendations:  Encourage intakes at balanced, TID meals and prn snacks available on the unit to meet nutrition needs. If ongoing weight loss surrounding admission, could consider short term trial of oral nutrition supplements to help meet nutrition needs until intakes of solids at meals improve.      REASON FOR ASSESSMENT  Jonny Gramajo is a/an 32 year old male assessed by the dietitian for Admission Nutrition Risk Screen for unintentional loss of 10# or more in the past two months    NUTRITION HISTORY  Jonny reports being on a \"liquid diet\" of alcohol over the past 30 days leading to 15 lbs weight loss. No food allergies or intolerances noted.     CURRENT NUTRITION ORDERS  Diet: Regular  Intake/Tolerance: Received banana, yogurt, Y[place]ut toast, english muffin, bagel, cream cheese, coffee and milk for breakfast today. Tolerated meal well with no N/V/D noted.     LABS  Labs reviewed    MEDICATIONS  Medications reviewed    ANTHROPOMETRICS  Height: 198.1 cm (6' 6\")  Most Recent Weight: (!) 144.2 kg (318 lb)    IBW: 97.3 kg  BMI: Obesity Grade II BMI 35-39.9  Weight History: Jonny reports 15 lbs weight loss over the 30 days due to a \"liquid diet\", indicating ~4.5% weight loss which is not nutritionally significant. Per EPIC review, weight down 5.5 kg (3.6%) over past 26 days, however up 8 kg (5.8%) over past 3.6 months and up 6.8 kg (4.9%) over past 6 months.   Wt Readings from Last 10 Encounters:   08/30/17 (!) 144.2 kg (318 lb)   08/04/17 (!) 149.7 kg (330 lb)   07/15/17 (!) 145.2 kg (320 lb)   06/22/17 (!) 140.2 kg (309 lb)   06/21/17 (!) 140.2 kg (309 lb)   05/12/17 (!) 136.2 kg (300 lb 4.3 oz)   05/10/17 (!) 136.2 kg (300 lb 4.3 oz) "   03/03/17 (!) 137.4 kg (303 lb)   02/25/17 (!) 137.4 kg (303 lb)   10/21/16 (!) 142 kg (313 lb)     Dosing Weight: 109 kg (adjusted for >120% IBW)    ASSESSED NUTRITION NEEDS  Estimated Energy Needs: 4843-7895 kcals/day (20 - 25 kcals/kg)  Justification: Obese  Estimated Protein Needs:  grams protein/day (0.8 - 1 grams of pro/kg)  Justification: Maintenance  Estimated Fluid Needs: 1 mL/kcal  Justification: Maintenance    PHYSICAL FINDINGS  See malnutrition section below.  Appearance consistent with BMI     MALNUTRITION  % Intake: < 75% for >/= 1 month (non-severe)  % Weight Loss: Weight loss does not meet criteria  Subcutaneous Fat Loss: None observed  Muscle Loss: None observed  Fluid Accumulation/Edema: None noted  Malnutrition Diagnosis: Patient does not meet two of the above criteria necessary for diagnosing malnutrition    NUTRITION DIAGNOSIS  No nutrition diagnosis at this time    INTERVENTIONS  Implementation  Briefly met with Jonny melara admission risk screen and reported weight loss - Visit was short due to need for medication administration. Based on conversation, suspect reduced intakes of solids due to alcohol use and anticipate adequate intakes while in-patient, access to food, and not consuming alcohol. No questions noted regarding paper menu service.      Monitoring/Evaluation  No nutrition follow-up warranted at this time.  Please consult if further needs arise.    Susanne Gonsalez, BRAEDEN, LD

## 2017-09-01 PROCEDURE — 99231 SBSQ HOSP IP/OBS SF/LOW 25: CPT | Performed by: FAMILY MEDICINE

## 2017-09-01 PROCEDURE — 25000132 ZZH RX MED GY IP 250 OP 250 PS 637: Performed by: PSYCHIATRY & NEUROLOGY

## 2017-09-01 PROCEDURE — 25000132 ZZH RX MED GY IP 250 OP 250 PS 637: Performed by: FAMILY MEDICINE

## 2017-09-01 PROCEDURE — 12800012 ZZH R&B CD MH INTERMEDIATE ADULT

## 2017-09-01 RX ADMIN — CEFUROXIME AXETIL 500 MG: 500 TABLET, FILM COATED ORAL at 20:27

## 2017-09-01 RX ADMIN — METRONIDAZOLE 250 MG: 250 TABLET ORAL at 08:39

## 2017-09-01 RX ADMIN — DIAZEPAM 10 MG: 5 TABLET ORAL at 16:11

## 2017-09-01 RX ADMIN — NICOTINE POLACRILEX 4 MG: 2 GUM, CHEWING ORAL at 17:54

## 2017-09-01 RX ADMIN — HYDROXYZINE HYDROCHLORIDE 50 MG: 25 TABLET ORAL at 00:00

## 2017-09-01 RX ADMIN — NICOTINE POLACRILEX 4 MG: 2 GUM, CHEWING ORAL at 12:34

## 2017-09-01 RX ADMIN — PHENOBARBITAL 64.8 MG: 64.8 TABLET ORAL at 08:39

## 2017-09-01 RX ADMIN — DIAZEPAM 10 MG: 5 TABLET ORAL at 20:28

## 2017-09-01 RX ADMIN — METRONIDAZOLE 250 MG: 250 TABLET ORAL at 20:28

## 2017-09-01 RX ADMIN — CLARITHROMYCIN 500 MG: 500 TABLET ORAL at 20:28

## 2017-09-01 RX ADMIN — DOXYCYCLINE HYCLATE 100 MG: 100 CAPSULE ORAL at 20:28

## 2017-09-01 RX ADMIN — CEFUROXIME AXETIL 500 MG: 500 TABLET, FILM COATED ORAL at 08:46

## 2017-09-01 RX ADMIN — CLARITHROMYCIN 500 MG: 500 TABLET ORAL at 08:39

## 2017-09-01 RX ADMIN — NICOTINE POLACRILEX 4 MG: 2 GUM, CHEWING ORAL at 16:11

## 2017-09-01 RX ADMIN — TRAZODONE HYDROCHLORIDE 150 MG: 50 TABLET ORAL at 22:15

## 2017-09-01 RX ADMIN — DOXYCYCLINE HYCLATE 100 MG: 100 CAPSULE ORAL at 08:39

## 2017-09-01 RX ADMIN — SULFAMETHOXAZOLE AND TRIMETHOPRIM 1 TABLET: 800; 160 TABLET ORAL at 08:39

## 2017-09-01 RX ADMIN — RANITIDINE HYDROCHLORIDE 300 MG: 150 TABLET, FILM COATED ORAL at 20:28

## 2017-09-01 RX ADMIN — SULFAMETHOXAZOLE AND TRIMETHOPRIM 1 TABLET: 800; 160 TABLET ORAL at 20:28

## 2017-09-01 RX ADMIN — NICOTINE POLACRILEX 4 MG: 2 GUM, CHEWING ORAL at 22:15

## 2017-09-01 RX ADMIN — PHENOBARBITAL 64.8 MG: 64.8 TABLET ORAL at 14:03

## 2017-09-01 RX ADMIN — ATENOLOL 50 MG: 50 TABLET ORAL at 08:39

## 2017-09-01 RX ADMIN — VALACYCLOVIR HYDROCHLORIDE 500 MG: 500 TABLET, FILM COATED ORAL at 20:28

## 2017-09-01 RX ADMIN — VALACYCLOVIR HYDROCHLORIDE 500 MG: 500 TABLET, FILM COATED ORAL at 08:39

## 2017-09-01 RX ADMIN — PHENOBARBITAL 64.8 MG: 64.8 TABLET ORAL at 20:27

## 2017-09-01 RX ADMIN — VILAZODONE HYDROCHLORIDE 40 MG: 40 TABLET ORAL at 08:39

## 2017-09-01 RX ADMIN — NICOTINE 1 PATCH: 21 PATCH, EXTENDED RELEASE TRANSDERMAL at 08:39

## 2017-09-01 RX ADMIN — RANITIDINE HYDROCHLORIDE 300 MG: 150 TABLET, FILM COATED ORAL at 08:39

## 2017-09-01 ASSESSMENT — ENCOUNTER SYMPTOMS
CHEST PRESSURE: 0
DIETARY ISSUES: ADEQUATE INTAKE
WEAKNESS: 0
NO PATIENT REPORTED PAIN: 1
ACTIVITY IMPAIRMENT: NORMAL
DIARRHEA: 0
COUGH: 0
ANOREXIA: 0
SHORTNESS OF BREATH: 0

## 2017-09-01 ASSESSMENT — ACTIVITIES OF DAILY LIVING (ADL)
GROOMING: INDEPENDENT
ORAL_HYGIENE: INDEPENDENT
DRESS: INDEPENDENT
GROOMING: INDEPENDENT

## 2017-09-01 NOTE — PROGRESS NOTES
"Jonny Gramajo is a 32 year old male patient.  1. Alcohol dependence with withdrawal with complication (H)    2. Benzodiazepine dependence (H)      Past Medical History:   Diagnosis Date     Anxiety      Depressive disorder      Hypertension      Lyme disease      No current outpatient prescriptions on file.     Allergies   Allergen Reactions     Abilify [Aripiprazole]      Wellbutrin [Bupropion] Anxiety     Active Problems:    Chemical dependency (H)    Blood pressure 133/86, pulse 88, temperature 97.9  F (36.6  C), temperature source Oral, resp. rate 16, height 6' 6\" (1.981 m), weight (!) 318 lb (144.2 kg), SpO2 97 %.    Subjective:  Symptoms:  Stable.  No shortness of breath, malaise, cough, chest pain, weakness, headache, chest pressure, anorexia, diarrhea or anxiety.    Diet:  Adequate intake.    Activity level: Normal.    Pain:  He reports no pain.      Objective:  General Appearance:  Comfortable, well-appearing, in no acute distress and not in pain.    Vital signs: (most recent): Blood pressure 133/86, pulse 88, temperature 97.9  F (36.6  C), temperature source Oral, resp. rate 16, height 6' 6\" (1.981 m), weight (!) 318 lb (144.2 kg), SpO2 97 %.  Vital signs are normal.    Lungs:  Normal respiratory rate and normal effort.  Breath sounds clear to auscultation.    Heart: Normal rate.  Regular rhythm.    Neurological: Patient is alert and oriented to person, place and time.  Normal strength.    Skin:  Warm and dry.      Assessment:    Condition: In serious condition.  Improving.   (Alcohol Dependence and Withdrawal   Benzodiazepine dependence and withdrawal  Chronic Lyme disease  Alcoholic liver disease).     Plan:   (Stabilize on Gabapentin for next 3 days  Discharge on Phenobarb taper in 3-4 days  Out-patient treatment     20 minutes were spent with patient with more than 50% of time spent in counseling and coordination of care ; Discussed with patient many issues of addiction, relapse, and establishing a " [facility].).       Eliud Vital  9/1/2017

## 2017-09-01 NOTE — PROGRESS NOTES
CASE MANAGEMENT NOTE    With patient's approval and signed KATRINA, [facility] medical reports faxed to patient's benzodiazapine provider, Dr. Kenneth Pallas at [facility].    Janet De Luna MA (Cindy), [facility]  3A Psychotherapist  [phone]

## 2017-09-01 NOTE — PROGRESS NOTES
"SPIRITUAL HEALTH SERVICES Progress Note  [facility] ([facility]) 3AW       DATA:    Follow-up/check-in visit; Jonny was in the TV lounge area with some of the other 3AW clients.  Jonny was able to attend -- and intentionally participate in -- the Thursday afternoon spirituality group.   I offered a Spiritual Health Services (SHS) follow-up to our brief Wednesday encounter and the spirituality group practices earlier in the day.       INTERVENTION:    Brief follow-up/check-in visit.       OUTCOME:    Jonny was able to articulate that he benefited from the afternoon group-process and that, he wanted to relax/\"chill\" for the evening.       PLAN:    SHS remains available to Jonny.                                                                                                                                              Fransisca Locke  Volunteer    Pager [phone]    "

## 2017-09-02 PROCEDURE — 12800012 ZZH R&B CD MH INTERMEDIATE ADULT

## 2017-09-02 PROCEDURE — 25000132 ZZH RX MED GY IP 250 OP 250 PS 637: Performed by: FAMILY MEDICINE

## 2017-09-02 PROCEDURE — 25000132 ZZH RX MED GY IP 250 OP 250 PS 637: Performed by: PSYCHIATRY & NEUROLOGY

## 2017-09-02 RX ADMIN — RANITIDINE HYDROCHLORIDE 300 MG: 150 TABLET, FILM COATED ORAL at 20:11

## 2017-09-02 RX ADMIN — RANITIDINE HYDROCHLORIDE 300 MG: 150 TABLET, FILM COATED ORAL at 09:31

## 2017-09-02 RX ADMIN — METRONIDAZOLE 250 MG: 250 TABLET ORAL at 09:31

## 2017-09-02 RX ADMIN — NICOTINE POLACRILEX 4 MG: 2 GUM, CHEWING ORAL at 16:07

## 2017-09-02 RX ADMIN — METRONIDAZOLE 250 MG: 250 TABLET ORAL at 20:12

## 2017-09-02 RX ADMIN — CEFUROXIME AXETIL 500 MG: 500 TABLET, FILM COATED ORAL at 20:11

## 2017-09-02 RX ADMIN — DIAZEPAM 10 MG: 5 TABLET ORAL at 00:24

## 2017-09-02 RX ADMIN — NICOTINE POLACRILEX 4 MG: 2 GUM, CHEWING ORAL at 13:08

## 2017-09-02 RX ADMIN — CLARITHROMYCIN 500 MG: 500 TABLET ORAL at 20:11

## 2017-09-02 RX ADMIN — NICOTINE POLACRILEX 4 MG: 2 GUM, CHEWING ORAL at 21:47

## 2017-09-02 RX ADMIN — NICOTINE POLACRILEX 4 MG: 2 GUM, CHEWING ORAL at 20:12

## 2017-09-02 RX ADMIN — TRAZODONE HYDROCHLORIDE 150 MG: 50 TABLET ORAL at 21:47

## 2017-09-02 RX ADMIN — PHENOBARBITAL 64.8 MG: 64.8 TABLET ORAL at 09:32

## 2017-09-02 RX ADMIN — PHENOBARBITAL 64.8 MG: 64.8 TABLET ORAL at 20:14

## 2017-09-02 RX ADMIN — DIAZEPAM 10 MG: 5 TABLET ORAL at 12:44

## 2017-09-02 RX ADMIN — HYDROXYZINE HYDROCHLORIDE 50 MG: 25 TABLET ORAL at 21:47

## 2017-09-02 RX ADMIN — NICOTINE 1 PATCH: 21 PATCH, EXTENDED RELEASE TRANSDERMAL at 09:33

## 2017-09-02 RX ADMIN — DOXYCYCLINE HYCLATE 100 MG: 100 CAPSULE ORAL at 20:12

## 2017-09-02 RX ADMIN — DIAZEPAM 10 MG: 5 TABLET ORAL at 20:11

## 2017-09-02 RX ADMIN — PHENOBARBITAL 64.8 MG: 64.8 TABLET ORAL at 16:07

## 2017-09-02 RX ADMIN — CLARITHROMYCIN 500 MG: 500 TABLET ORAL at 09:31

## 2017-09-02 RX ADMIN — ATENOLOL 50 MG: 50 TABLET ORAL at 09:32

## 2017-09-02 RX ADMIN — DOXYCYCLINE HYCLATE 100 MG: 100 CAPSULE ORAL at 09:32

## 2017-09-02 RX ADMIN — SULFAMETHOXAZOLE AND TRIMETHOPRIM 1 TABLET: 800; 160 TABLET ORAL at 09:31

## 2017-09-02 RX ADMIN — NICOTINE POLACRILEX 4 MG: 2 GUM, CHEWING ORAL at 18:14

## 2017-09-02 RX ADMIN — DIAZEPAM 10 MG: 5 TABLET ORAL at 16:07

## 2017-09-02 RX ADMIN — CEFUROXIME AXETIL 500 MG: 500 TABLET, FILM COATED ORAL at 09:32

## 2017-09-02 RX ADMIN — VILAZODONE HYDROCHLORIDE 40 MG: 40 TABLET ORAL at 09:31

## 2017-09-02 RX ADMIN — VALACYCLOVIR HYDROCHLORIDE 500 MG: 500 TABLET, FILM COATED ORAL at 20:11

## 2017-09-02 RX ADMIN — VALACYCLOVIR HYDROCHLORIDE 500 MG: 500 TABLET, FILM COATED ORAL at 09:31

## 2017-09-02 RX ADMIN — SULFAMETHOXAZOLE AND TRIMETHOPRIM 1 TABLET: 800; 160 TABLET ORAL at 20:12

## 2017-09-02 RX ADMIN — HYDROXYZINE HYDROCHLORIDE 50 MG: 25 TABLET ORAL at 00:24

## 2017-09-02 ASSESSMENT — ACTIVITIES OF DAILY LIVING (ADL)
DRESS: INDEPENDENT
GROOMING: INDEPENDENT
GROOMING: INDEPENDENT
ORAL_HYGIENE: INDEPENDENT

## 2017-09-03 PROCEDURE — 12800012 ZZH R&B CD MH INTERMEDIATE ADULT

## 2017-09-03 PROCEDURE — 25000128 H RX IP 250 OP 636: Performed by: PSYCHIATRY & NEUROLOGY

## 2017-09-03 PROCEDURE — 25000132 ZZH RX MED GY IP 250 OP 250 PS 637: Performed by: PHYSICIAN ASSISTANT

## 2017-09-03 PROCEDURE — 25000132 ZZH RX MED GY IP 250 OP 250 PS 637: Performed by: PSYCHIATRY & NEUROLOGY

## 2017-09-03 PROCEDURE — 90732 PPSV23 VACC 2 YRS+ SUBQ/IM: CPT | Performed by: PSYCHIATRY & NEUROLOGY

## 2017-09-03 PROCEDURE — 25000132 ZZH RX MED GY IP 250 OP 250 PS 637: Performed by: FAMILY MEDICINE

## 2017-09-03 RX ORDER — POLYETHYLENE GLYCOL 3350 17 G/17G
17 POWDER, FOR SOLUTION ORAL DAILY
Status: DISCONTINUED | OUTPATIENT
Start: 2017-09-03 | End: 2017-09-05 | Stop reason: HOSPADM

## 2017-09-03 RX ADMIN — ATENOLOL 50 MG: 50 TABLET ORAL at 12:19

## 2017-09-03 RX ADMIN — DOXYCYCLINE HYCLATE 100 MG: 100 CAPSULE ORAL at 09:10

## 2017-09-03 RX ADMIN — VALACYCLOVIR HYDROCHLORIDE 500 MG: 500 TABLET, FILM COATED ORAL at 09:10

## 2017-09-03 RX ADMIN — TRAZODONE HYDROCHLORIDE 150 MG: 50 TABLET ORAL at 21:54

## 2017-09-03 RX ADMIN — NICOTINE POLACRILEX 4 MG: 2 GUM, CHEWING ORAL at 21:54

## 2017-09-03 RX ADMIN — PHENOBARBITAL 64.8 MG: 64.8 TABLET ORAL at 09:10

## 2017-09-03 RX ADMIN — VALACYCLOVIR HYDROCHLORIDE 500 MG: 500 TABLET, FILM COATED ORAL at 20:14

## 2017-09-03 RX ADMIN — CEFUROXIME AXETIL 500 MG: 500 TABLET, FILM COATED ORAL at 20:14

## 2017-09-03 RX ADMIN — NICOTINE POLACRILEX 4 MG: 2 GUM, CHEWING ORAL at 20:15

## 2017-09-03 RX ADMIN — POLYETHYLENE GLYCOL 3350 17 G: 17 POWDER, FOR SOLUTION ORAL at 12:16

## 2017-09-03 RX ADMIN — CLARITHROMYCIN 500 MG: 500 TABLET ORAL at 09:10

## 2017-09-03 RX ADMIN — CLARITHROMYCIN 500 MG: 500 TABLET ORAL at 20:15

## 2017-09-03 RX ADMIN — NICOTINE POLACRILEX 4 MG: 2 GUM, CHEWING ORAL at 16:10

## 2017-09-03 RX ADMIN — METRONIDAZOLE 250 MG: 250 TABLET ORAL at 20:15

## 2017-09-03 RX ADMIN — RANITIDINE HYDROCHLORIDE 300 MG: 150 TABLET, FILM COATED ORAL at 09:10

## 2017-09-03 RX ADMIN — NICOTINE 1 PATCH: 21 PATCH, EXTENDED RELEASE TRANSDERMAL at 09:08

## 2017-09-03 RX ADMIN — METRONIDAZOLE 250 MG: 250 TABLET ORAL at 09:10

## 2017-09-03 RX ADMIN — VILAZODONE HYDROCHLORIDE 40 MG: 40 TABLET ORAL at 09:10

## 2017-09-03 RX ADMIN — PHENOBARBITAL 64.8 MG: 64.8 TABLET ORAL at 21:11

## 2017-09-03 RX ADMIN — NICOTINE POLACRILEX 4 MG: 2 GUM, CHEWING ORAL at 18:23

## 2017-09-03 RX ADMIN — NICOTINE POLACRILEX 4 MG: 2 GUM, CHEWING ORAL at 12:17

## 2017-09-03 RX ADMIN — DOXYCYCLINE HYCLATE 100 MG: 100 CAPSULE ORAL at 20:15

## 2017-09-03 RX ADMIN — RANITIDINE HYDROCHLORIDE 300 MG: 150 TABLET, FILM COATED ORAL at 20:15

## 2017-09-03 RX ADMIN — SULFAMETHOXAZOLE AND TRIMETHOPRIM 1 TABLET: 800; 160 TABLET ORAL at 09:10

## 2017-09-03 RX ADMIN — SULFAMETHOXAZOLE AND TRIMETHOPRIM 1 TABLET: 800; 160 TABLET ORAL at 20:14

## 2017-09-03 RX ADMIN — HYDROXYZINE HYDROCHLORIDE 50 MG: 25 TABLET ORAL at 21:54

## 2017-09-03 RX ADMIN — NICOTINE POLACRILEX 4 MG: 2 GUM, CHEWING ORAL at 15:22

## 2017-09-03 RX ADMIN — PHENOBARBITAL 64.8 MG: 64.8 TABLET ORAL at 16:10

## 2017-09-03 RX ADMIN — PNEUMOCOCCAL VACCINE POLYVALENT 0.5 ML
25; 25; 25; 25; 25; 25; 25; 25; 25; 25; 25; 25; 25; 25; 25; 25; 25; 25; 25; 25; 25; 25; 25 INJECTION, SOLUTION INTRAMUSCULAR; SUBCUTANEOUS at 12:20

## 2017-09-03 RX ADMIN — CEFUROXIME AXETIL 500 MG: 500 TABLET, FILM COATED ORAL at 09:10

## 2017-09-03 ASSESSMENT — ACTIVITIES OF DAILY LIVING (ADL)
GROOMING: INDEPENDENT
DRESS: INDEPENDENT
ORAL_HYGIENE: INDEPENDENT

## 2017-09-04 PROCEDURE — 12800012 ZZH R&B CD MH INTERMEDIATE ADULT

## 2017-09-04 PROCEDURE — 99232 SBSQ HOSP IP/OBS MODERATE 35: CPT | Performed by: FAMILY MEDICINE

## 2017-09-04 PROCEDURE — 25000132 ZZH RX MED GY IP 250 OP 250 PS 637: Performed by: PSYCHIATRY & NEUROLOGY

## 2017-09-04 PROCEDURE — 25000132 ZZH RX MED GY IP 250 OP 250 PS 637: Performed by: FAMILY MEDICINE

## 2017-09-04 PROCEDURE — 25000132 ZZH RX MED GY IP 250 OP 250 PS 637: Performed by: PHYSICIAN ASSISTANT

## 2017-09-04 RX ORDER — PHENOBARBITAL 32.4 MG/1
TABLET ORAL
Qty: 50 TABLET | Refills: 0 | Status: ON HOLD | OUTPATIENT
Start: 2017-09-04 | End: 2017-09-25

## 2017-09-04 RX ORDER — TRAZODONE HYDROCHLORIDE 50 MG/1
50-150 TABLET, FILM COATED ORAL
Qty: 90 TABLET | Refills: 0 | Status: ON HOLD | OUTPATIENT
Start: 2017-09-04 | End: 2017-09-25

## 2017-09-04 RX ORDER — PHENOBARBITAL 32.4 MG/1
32.4 TABLET ORAL 4 TIMES DAILY
Status: DISCONTINUED | OUTPATIENT
Start: 2017-09-04 | End: 2017-09-05 | Stop reason: HOSPADM

## 2017-09-04 RX ADMIN — NICOTINE 1 PATCH: 21 PATCH, EXTENDED RELEASE TRANSDERMAL at 09:15

## 2017-09-04 RX ADMIN — CEFUROXIME AXETIL 500 MG: 500 TABLET, FILM COATED ORAL at 09:15

## 2017-09-04 RX ADMIN — METRONIDAZOLE 250 MG: 250 TABLET ORAL at 20:12

## 2017-09-04 RX ADMIN — RANITIDINE HYDROCHLORIDE 300 MG: 150 TABLET, FILM COATED ORAL at 20:12

## 2017-09-04 RX ADMIN — SULFAMETHOXAZOLE AND TRIMETHOPRIM 1 TABLET: 800; 160 TABLET ORAL at 20:12

## 2017-09-04 RX ADMIN — PHENOBARBITAL 32.4 MG: 32.4 TABLET ORAL at 20:12

## 2017-09-04 RX ADMIN — CEFUROXIME AXETIL 500 MG: 500 TABLET, FILM COATED ORAL at 20:12

## 2017-09-04 RX ADMIN — TRAZODONE HYDROCHLORIDE 150 MG: 50 TABLET ORAL at 22:37

## 2017-09-04 RX ADMIN — SULFAMETHOXAZOLE AND TRIMETHOPRIM 1 TABLET: 800; 160 TABLET ORAL at 09:15

## 2017-09-04 RX ADMIN — DOXYCYCLINE HYCLATE 100 MG: 100 CAPSULE ORAL at 09:15

## 2017-09-04 RX ADMIN — NICOTINE POLACRILEX 4 MG: 2 GUM, CHEWING ORAL at 14:52

## 2017-09-04 RX ADMIN — NICOTINE POLACRILEX 4 MG: 2 GUM, CHEWING ORAL at 20:15

## 2017-09-04 RX ADMIN — CLARITHROMYCIN 500 MG: 500 TABLET ORAL at 20:12

## 2017-09-04 RX ADMIN — DOXYCYCLINE HYCLATE 100 MG: 100 CAPSULE ORAL at 20:12

## 2017-09-04 RX ADMIN — VILAZODONE HYDROCHLORIDE 40 MG: 40 TABLET ORAL at 09:15

## 2017-09-04 RX ADMIN — PHENOBARBITAL 32.4 MG: 32.4 TABLET ORAL at 12:06

## 2017-09-04 RX ADMIN — RANITIDINE HYDROCHLORIDE 300 MG: 150 TABLET, FILM COATED ORAL at 09:15

## 2017-09-04 RX ADMIN — NICOTINE POLACRILEX 4 MG: 2 GUM, CHEWING ORAL at 16:06

## 2017-09-04 RX ADMIN — NICOTINE POLACRILEX 4 MG: 2 GUM, CHEWING ORAL at 12:06

## 2017-09-04 RX ADMIN — PHENOBARBITAL 64.8 MG: 64.8 TABLET ORAL at 09:15

## 2017-09-04 RX ADMIN — VALACYCLOVIR HYDROCHLORIDE 500 MG: 500 TABLET, FILM COATED ORAL at 09:15

## 2017-09-04 RX ADMIN — CLARITHROMYCIN 500 MG: 500 TABLET ORAL at 09:15

## 2017-09-04 RX ADMIN — POLYETHYLENE GLYCOL 3350 17 G: 17 POWDER, FOR SOLUTION ORAL at 09:15

## 2017-09-04 RX ADMIN — HYDROXYZINE HYDROCHLORIDE 50 MG: 25 TABLET ORAL at 22:37

## 2017-09-04 RX ADMIN — NICOTINE POLACRILEX 4 MG: 2 GUM, CHEWING ORAL at 22:37

## 2017-09-04 RX ADMIN — PHENOBARBITAL 32.4 MG: 32.4 TABLET ORAL at 16:06

## 2017-09-04 RX ADMIN — METRONIDAZOLE 250 MG: 250 TABLET ORAL at 09:15

## 2017-09-04 RX ADMIN — NICOTINE POLACRILEX 4 MG: 2 GUM, CHEWING ORAL at 19:01

## 2017-09-04 RX ADMIN — ATENOLOL 50 MG: 50 TABLET ORAL at 09:15

## 2017-09-04 RX ADMIN — VALACYCLOVIR HYDROCHLORIDE 500 MG: 500 TABLET, FILM COATED ORAL at 20:12

## 2017-09-04 ASSESSMENT — ENCOUNTER SYMPTOMS
NO PATIENT REPORTED PAIN: 1
CHEST PRESSURE: 0
ACTIVITY IMPAIRMENT: NORMAL
COUGH: 0
ANOREXIA: 0
WEAKNESS: 0
DIARRHEA: 0
DIETARY ISSUES: ADEQUATE INTAKE
SHORTNESS OF BREATH: 0

## 2017-09-04 ASSESSMENT — ACTIVITIES OF DAILY LIVING (ADL): GROOMING: INDEPENDENT

## 2017-09-04 NOTE — PROGRESS NOTES
"Jonny Gramajo is a 32 year old male patient.  1. Alcohol dependence with withdrawal with complication (H)    2. Benzodiazepine dependence (H)      Past Medical History:   Diagnosis Date     Anxiety      Depressive disorder      Hypertension      Lyme disease      No current outpatient prescriptions on file.     Allergies   Allergen Reactions     Abilify [Aripiprazole]      Wellbutrin [Bupropion] Anxiety     Active Problems:    Chemical dependency (H)    Blood pressure 141/82, pulse 72, temperature 97.8  F (36.6  C), temperature source Oral, resp. rate 16, height 6' 6\" (1.981 m), weight (!) 318 lb (144.2 kg), SpO2 97 %.    Subjective:  Symptoms:  Improved.  No shortness of breath, malaise, cough, chest pain, weakness, headache, chest pressure, anorexia, diarrhea or anxiety.    Diet:  Adequate intake.    Activity level: Normal.    Pain:  He reports no pain.      Objective:  General Appearance:  Comfortable, well-appearing, in no acute distress and not in pain.    Vital signs: (most recent): Blood pressure 141/82, pulse 72, temperature 97.8  F (36.6  C), temperature source Oral, resp. rate 16, height 6' 6\" (1.981 m), weight (!) 318 lb (144.2 kg), SpO2 97 %.  Vital signs are normal.    Lungs:  Normal respiratory rate and normal effort.  Breath sounds clear to auscultation.    Heart: Normal rate.  Regular rhythm.    Neurological: Patient is alert and oriented to person, place and time.  Normal strength.    Skin:  Warm and dry.      Assessment:    Condition: In serious condition.  Improving.   (Benzodiazepine dependence and withdrawal   Alcohol dependence  Alcoholic liver disease  Chronic Lyme's disease    ).     Plan:   (Reduce Phenobarb  Discharge tomorrow to out-patient treatment on Phenobarb taper  Re-check LFT's    20 minutes were spent with patient with more than 50% of time spent in counseling and coordination of care ).       Eliud Vital  9/4/2017    "

## 2017-09-04 NOTE — PLAN OF CARE
Problem: Substance Withdrawal  Goal: Substance Withdrawal  Signs and symptoms of listed problems will be absent or manageable. 1) Patient will achieve medical stabilization of acute withdrawal sx.  2) Patient will remain safe and free from injury  3) Patient will demonstrate improvement of ADLs (appetite, hygiene)  4) Verbalize reduction of fear or anxiety to a manageable level.  5) Verbalize knowledge of substance abuse as a disease  6) Verbalize risks and negative effects related to drug ingestion  7) Demonstrate participation in unit programming and attends specific substance use group therapy (i.e AA meetings)  8) Accept referral to substance abuse treatment  9) Express sense of regaining some control of situation/life (possible by verbalizing alternative coping mechanisms as alternatives to substance use in response to stress)   Outcome: Improving  Pt out in the milieu most of the shift.  Social with peers and staff.  MSSA score 7, 7.  Pt requested Valium x 1, but was denied since MSSA score below 8.  Pt accepting of this.  Phenobarbital administered x 2 per orders.  VSS.  Med compliant.  No c/o pain.

## 2017-09-05 VITALS
TEMPERATURE: 98.1 F | OXYGEN SATURATION: 97 % | RESPIRATION RATE: 16 BRPM | HEIGHT: 78 IN | BODY MASS INDEX: 36.45 KG/M2 | DIASTOLIC BLOOD PRESSURE: 82 MMHG | HEART RATE: 68 BPM | WEIGHT: 315 LBS | SYSTOLIC BLOOD PRESSURE: 138 MMHG

## 2017-09-05 LAB
ALBUMIN SERPL-MCNC: 3.5 G/DL (ref 3.4–5)
ALP SERPL-CCNC: 77 U/L (ref 40–150)
ALT SERPL W P-5'-P-CCNC: 202 U/L (ref 0–70)
AST SERPL W P-5'-P-CCNC: 51 U/L (ref 0–45)
BILIRUB DIRECT SERPL-MCNC: 0.1 MG/DL (ref 0–0.2)
BILIRUB SERPL-MCNC: 0.4 MG/DL (ref 0.2–1.3)
GGT SERPL-CCNC: 233 U/L (ref 0–75)
PROT SERPL-MCNC: 6.5 G/DL (ref 6.8–8.8)

## 2017-09-05 PROCEDURE — 80076 HEPATIC FUNCTION PANEL: CPT | Performed by: FAMILY MEDICINE

## 2017-09-05 PROCEDURE — 25000132 ZZH RX MED GY IP 250 OP 250 PS 637: Performed by: PSYCHIATRY & NEUROLOGY

## 2017-09-05 PROCEDURE — 99238 HOSP IP/OBS DSCHRG MGMT 30/<: CPT | Performed by: FAMILY MEDICINE

## 2017-09-05 PROCEDURE — 82977 ASSAY OF GGT: CPT | Performed by: FAMILY MEDICINE

## 2017-09-05 PROCEDURE — 25000132 ZZH RX MED GY IP 250 OP 250 PS 637: Performed by: PHYSICIAN ASSISTANT

## 2017-09-05 PROCEDURE — 36415 COLL VENOUS BLD VENIPUNCTURE: CPT | Performed by: FAMILY MEDICINE

## 2017-09-05 PROCEDURE — 25000132 ZZH RX MED GY IP 250 OP 250 PS 637: Performed by: FAMILY MEDICINE

## 2017-09-05 RX ADMIN — VALACYCLOVIR HYDROCHLORIDE 500 MG: 500 TABLET, FILM COATED ORAL at 08:58

## 2017-09-05 RX ADMIN — METRONIDAZOLE 250 MG: 250 TABLET ORAL at 08:58

## 2017-09-05 RX ADMIN — PSYLLIUM HUSK 1 PACKET: 3.4 POWDER ORAL at 09:00

## 2017-09-05 RX ADMIN — NICOTINE POLACRILEX 4 MG: 2 GUM, CHEWING ORAL at 08:58

## 2017-09-05 RX ADMIN — NICOTINE 1 PATCH: 21 PATCH, EXTENDED RELEASE TRANSDERMAL at 08:58

## 2017-09-05 RX ADMIN — RANITIDINE HYDROCHLORIDE 300 MG: 150 TABLET, FILM COATED ORAL at 08:58

## 2017-09-05 RX ADMIN — VILAZODONE HYDROCHLORIDE 40 MG: 40 TABLET ORAL at 08:58

## 2017-09-05 RX ADMIN — CEFUROXIME AXETIL 500 MG: 500 TABLET, FILM COATED ORAL at 08:58

## 2017-09-05 RX ADMIN — CLARITHROMYCIN 500 MG: 500 TABLET ORAL at 08:58

## 2017-09-05 RX ADMIN — POLYETHYLENE GLYCOL 3350 17 G: 17 POWDER, FOR SOLUTION ORAL at 08:59

## 2017-09-05 RX ADMIN — DOXYCYCLINE HYCLATE 100 MG: 100 CAPSULE ORAL at 08:58

## 2017-09-05 RX ADMIN — SULFAMETHOXAZOLE AND TRIMETHOPRIM 1 TABLET: 800; 160 TABLET ORAL at 08:58

## 2017-09-05 RX ADMIN — NICOTINE POLACRILEX 4 MG: 2 GUM, CHEWING ORAL at 12:23

## 2017-09-05 RX ADMIN — PHENOBARBITAL 32.4 MG: 32.4 TABLET ORAL at 12:23

## 2017-09-05 RX ADMIN — PHENOBARBITAL 32.4 MG: 32.4 TABLET ORAL at 08:58

## 2017-09-05 RX ADMIN — ATENOLOL 50 MG: 50 TABLET ORAL at 08:58

## 2017-09-05 NOTE — DISCHARGE INSTRUCTIONS
Behavioral Discharge Planning and Instructions  THANK YOU FOR CHOOSING THE [facility]  3A  [phone]    Summary: You were admitted to Station 3A on 8/30/2017 for detoxification from benzodiazepines and alcohol.  A medical exam was performed that included lab work. You have met with a  and opted to discharge home and follow up with [facility]. In addition, you will recommit to active participation and regular attendance in  and work with your sponsor.  Please take care and make your recovery a daily priority, Jonny!    Recommendation:  As stated above.    Main Diagnoses:  Per Dr. Vital;  1. Alcohol Dependence with withdrawal with complication  2. Benzodiazepine Dependence    Major Treatments, Procedures and Findings:  You were treated for Alcohol detoxification using Valium administered based on the [facility] protocol and for Benzodiazepine withdrawal using Phenobarbital. As an outpatient you will be prescribed Phenobarbital, please take this medication as prescribed until it is gone. You did not have a chemical dependency assessment completed during your hospitalization.  You have had labs drawn and those results have been reviewed with you. Please take a copy of your lab work with you to your next primary care physician appointment.    Symptoms to Report:  If you experience more anxiety, confusion, sleeplessness, deep sadness or thoughts of suicide, notify your treatment team or notify your primary care physician. IF ANY OF THE SYMPTOMS YOU ARE EXPERIENCING ARE A MEDICAL EMERGENCY CALL 911 IMMEDIATELY.     Lifestyle Adjustment: Adjust your lifestyle to get enough sleep, relaxation, exercise and good nutrition. Continue to develop healthy coping skills to decrease stress and promote a sober living environment. Do not use mood altering substances including alcohol, illegal drugs or addictive medications other than what is currently prescribed.     Treatment  Program Provider:  [facility]  [address]  [address]  Ph:  [phone]    Follow-up Appointment:   You report you will be appointed a Doctor at treatment at [facility] after discharge.    Resources:   SMART Recovery - self management for addiction recovery:  Www.smartrecRow44y.org  Pathways ~ A Health Crisis Resource & Support Center:  999.770.7456.   http://www.aastpaul.org/?topic=8  http://a[place].org/meetings/  http://www.nam[place].org/index.php/meeting-list-pdf  http://www.[employer].org  [facility] 137-505-5904  Support Group:  JENNIFER/[place] and Sponsor/support  Crisis Intervention: 951.174.8346 or 012-748-9522 (TTY: 498.467.7539).  Call anytime for help.  National [place] on Mental Illness (www.mn.[place].org): 130.145.4142 or 372-883-3149.  Alcoholics Anonymous (www.alcoholics-anonymous.org): Check your phone book for your local chapter.  Suicide Awareness Voices of Education (SAVE) (www.save.org): 664-244-RYLV (6661)  National Suicide Prevention Line (www.mentalhealthmn.org): 148-997-YNOF (1129)  Mental Health Consumer/Survivor Network of [place] (www.mhcsn.net): 810.253.3256 or 119-527-6269  Mental Health Association of [place] (www.mentalhealth.org): 755.927.6461 or 847-659-1304   Substance Abuse and Mental Health Services (www.samhsa.gov)    [facility])  [facility] connects people seeking recovery to resources that help foster and sustain long-term recovery.  Whether you are seeking resources for treatment, transportation, housing, job training, education, health care or other pathways to recovery, [facility] is a great place to start.  845.723.8174.  www.[facility].org    General Medication Instructions:   See your medication sheet(s) for instructions.   Take all medicines as directed.  Make no changes unless your doctor suggests them.   Go to all your doctor visits.  Be sure to have all your required lab tests. This  way, your medicines can be refilled on time.  AA/NA and Sponsors are excellent resources for support and you can find one at any support group meeting.  Do not use any forms of alcohol.    Please Note:  If you have any questions at anytime after you are discharged please call the [facility] [place] detox unit 3AW unit at [phone].  [facility], Behavioral Intake [phone]  Please take this discharge folder with you to all your follow up appointments, it contains your lab results, diagnosis, medication list and discharge recommendations.      THANK YOU FOR CHOOSING THE [facility]

## 2017-09-05 NOTE — PROGRESS NOTES
Pt given copy of his discharge instructions and medication administration instructions. All discharge plans and labs were discussed with patient. Pt reports no questions at this time regarding discharge plans, labs or medications. Pt denies any suicidal ideation, plans or intent at this time. Patient discharge assisted via YOUSIF foley the lobby. Patient plan is to pursue treatment as an outpatient. Patient is discharged at this time.

## 2017-09-05 NOTE — DISCHARGE SUMMARY
Jonny Gramajo is a 32-year-old male admitted to [facility] for detoxification.  He has alcohol dependence and benzodiazepine dependence.  He lives in [place] with his wife.  He is unemployed.  He has no children.  He entered with to detox as noted above.  He was hospitalized on a medical unit in June for similar issues.  He did not follow through with outpatient treatment.  He now plans to go to outpatient treatment.  He has been taking Klonopin and drinking alcohol.  He has been abusing his prescribed Klonopin, taking a 90-day supply in the 2 weeks prior to this admission.  He has been drinking alcohol heavily.  He has a history of multiple treatments as an adolescent.  He has been to the [facility] in [place].  He has had many years of recovery in the past but has been relapsing lately.      MEDICAL ILLNESSES:  Include chronic Lyme's disease for which he takes multiple antibiotics.      The patient was detoxed with phenobarbital, Valium.  He was medically stable and ready for discharge by 09/05/2017.  His plan is to go to treatment at [facility] in [place].      DISCHARGE MEDICATIONS:   He will be discharged on a Suboxone phenobarbital taper, taking 32 mg 4 times a day for 5 days, then 3 times a day for 5 days, then 2 times a day for 5 days, then 1 time a day for 5 days.   2.  He will take trazodone 50 mg at bedtime as needed for sleep.  His antibiotics are as follows, Cedax 400 mg daily, DEXA  100 mg b.i.d.   3.  Metronidazole 250 mg b.i.d.   4.  Bactrim-DS, 1 b.i.d.   5.  Biaxin 500 mg b.i.d.     6.  Vraylar 1.5 mg daily.   7.  Valtex 500 mg b.i.d.    8.  In addition, he takes atenolol 50 mg daily.   9.  Zantac 300 mg b.i.d.    10.  Vibyrd 40 mg daily.      LABORATORY DATA:  During the course of the hospital stay showed elevation of his liver functions with a GGT of 233, AST of 51 and ALT of 202.  These were coming down before discharge.   CBC was normal.      DISCHARGE DIAGNOSES:   1.  Benzodiazepine dependence.   2.  Alcohol dependence.   3.  Chronic Lyme disease.   4.  Hypertension.      Thirty minutes were spent with the patient and record in completion of this discharge summary with over 50% of time spent in counseling and coordination of care.         EVERETT HAUSER MD             D: 2017 08:43   T: 2017 09:11   MT: MD      Name:     JONNY SUAREZ   MRN:      [id number]        Account:        [id number]   :      1985           Admit Date:     [id number]                                  Discharge Date:       Document: [id number]

## 2017-09-19 ENCOUNTER — HOSPITAL ENCOUNTER (INPATIENT)
Facility: CLINIC | Age: 32
LOS: 6 days | Discharge: HOME | DRG: 897 | End: 2017-09-25
Attending: FAMILY MEDICINE | Admitting: FAMILY MEDICINE
Payer: COMMERCIAL

## 2017-09-19 ENCOUNTER — TELEPHONE (OUTPATIENT)
Dept: ADDICTION MEDICINE | Facility: CLINIC | Age: 32
End: 2017-09-19

## 2017-09-19 DIAGNOSIS — F43.23 ADJUSTMENT DISORDER WITH MIXED ANXIETY AND DEPRESSED MOOD: ICD-10-CM

## 2017-09-19 DIAGNOSIS — F13.930 BENZODIAZEPINE WITHDRAWAL, UNCOMPLICATED (H): ICD-10-CM

## 2017-09-19 DIAGNOSIS — A69.20 LYME DISEASE: ICD-10-CM

## 2017-09-19 DIAGNOSIS — F10.239 ALCOHOL DEPENDENCE WITH WITHDRAWAL WITH COMPLICATION (H): ICD-10-CM

## 2017-09-19 DIAGNOSIS — F33.3 SEVERE EPISODE OF RECURRENT MAJOR DEPRESSIVE DISORDER, WITH PSYCHOTIC FEATURES (H): Primary | ICD-10-CM

## 2017-09-19 DIAGNOSIS — M54.50 ACUTE BILATERAL LOW BACK PAIN WITHOUT SCIATICA: ICD-10-CM

## 2017-09-19 DIAGNOSIS — F13.20 BENZODIAZEPINE DEPENDENCE (H): ICD-10-CM

## 2017-09-19 DIAGNOSIS — F13.939 BENZODIAZEPINE WITHDRAWAL WITH COMPLICATION (H): ICD-10-CM

## 2017-09-19 LAB
ALBUMIN SERPL-MCNC: 4 G/DL (ref 3.4–5)
ALBUMIN UR-MCNC: NEGATIVE MG/DL
ALP SERPL-CCNC: 105 U/L (ref 40–150)
ALT SERPL W P-5'-P-CCNC: 316 U/L (ref 0–70)
AMPHETAMINES UR QL SCN: NEGATIVE
ANION GAP SERPL CALCULATED.3IONS-SCNC: 11 MMOL/L (ref 3–14)
APPEARANCE UR: CLEAR
APTT PPP: 30 SEC (ref 22–37)
AST SERPL W P-5'-P-CCNC: 202 U/L (ref 0–45)
BARBITURATES UR QL: POSITIVE
BASOPHILS # BLD AUTO: 0 10E9/L (ref 0–0.2)
BASOPHILS NFR BLD AUTO: 0.1 %
BENZODIAZ UR QL: NEGATIVE
BILIRUB SERPL-MCNC: 0.9 MG/DL (ref 0.2–1.3)
BILIRUB UR QL STRIP: NEGATIVE
BUN SERPL-MCNC: 8 MG/DL (ref 7–30)
CALCIUM SERPL-MCNC: 9.6 MG/DL (ref 8.5–10.1)
CANNABINOIDS UR QL SCN: NEGATIVE
CHLORIDE SERPL-SCNC: 97 MMOL/L (ref 94–109)
CO2 SERPL-SCNC: 26 MMOL/L (ref 20–32)
COCAINE UR QL: NEGATIVE
COLOR UR AUTO: YELLOW
CREAT SERPL-MCNC: 0.73 MG/DL (ref 0.66–1.25)
DIFFERENTIAL METHOD BLD: ABNORMAL
EOSINOPHIL # BLD AUTO: 0 10E9/L (ref 0–0.7)
EOSINOPHIL NFR BLD AUTO: 0.1 %
ERYTHROCYTE [DISTWIDTH] IN BLOOD BY AUTOMATED COUNT: 12.4 % (ref 10–15)
ETHANOL SERPL-MCNC: <0.01 G/DL
ETHANOL UR QL SCN: NEGATIVE
GFR SERPL CREATININE-BSD FRML MDRD: >90 ML/MIN/1.7M2
GGT SERPL-CCNC: 789 U/L (ref 0–75)
GLUCOSE SERPL-MCNC: 97 MG/DL (ref 70–99)
GLUCOSE UR STRIP-MCNC: NEGATIVE MG/DL
HCT VFR BLD AUTO: 45 % (ref 40–53)
HGB BLD-MCNC: 16.6 G/DL (ref 13.3–17.7)
HGB UR QL STRIP: NEGATIVE
IMM GRANULOCYTES # BLD: 0 10E9/L (ref 0–0.4)
IMM GRANULOCYTES NFR BLD: 0.4 %
INR PPP: 1.13 (ref 0.86–1.14)
KETONES UR STRIP-MCNC: NEGATIVE MG/DL
LEUKOCYTE ESTERASE UR QL STRIP: NEGATIVE
LYMPHOCYTES # BLD AUTO: 1.5 10E9/L (ref 0.8–5.3)
LYMPHOCYTES NFR BLD AUTO: 14.1 %
MAGNESIUM SERPL-MCNC: 1.6 MG/DL (ref 1.6–2.3)
MCH RBC QN AUTO: 34.1 PG (ref 26.5–33)
MCHC RBC AUTO-ENTMCNC: 36.9 G/DL (ref 31.5–36.5)
MCV RBC AUTO: 92 FL (ref 78–100)
MONOCYTES # BLD AUTO: 1 10E9/L (ref 0–1.3)
MONOCYTES NFR BLD AUTO: 9.8 %
NEUTROPHILS # BLD AUTO: 7.9 10E9/L (ref 1.6–8.3)
NEUTROPHILS NFR BLD AUTO: 75.5 %
NITRATE UR QL: NEGATIVE
NRBC # BLD AUTO: 0 10*3/UL
NRBC BLD AUTO-RTO: 0 /100
OPIATES UR QL SCN: NEGATIVE
PH UR STRIP: 6.5 PH (ref 5–7)
PLATELET # BLD AUTO: 239 10E9/L (ref 150–450)
POTASSIUM SERPL-SCNC: 3.4 MMOL/L (ref 3.4–5.3)
PROT SERPL-MCNC: 7.2 G/DL (ref 6.8–8.8)
RBC # BLD AUTO: 4.87 10E12/L (ref 4.4–5.9)
SODIUM SERPL-SCNC: 134 MMOL/L (ref 133–144)
SOURCE: NORMAL
SP GR UR STRIP: 1.01 (ref 1–1.03)
UROBILINOGEN UR STRIP-MCNC: NORMAL MG/DL (ref 0–2)
WBC # BLD AUTO: 10.5 10E9/L (ref 4–11)

## 2017-09-19 PROCEDURE — 96361 HYDRATE IV INFUSION ADD-ON: CPT | Performed by: FAMILY MEDICINE

## 2017-09-19 PROCEDURE — 96360 HYDRATION IV INFUSION INIT: CPT | Performed by: FAMILY MEDICINE

## 2017-09-19 PROCEDURE — 25000128 H RX IP 250 OP 636: Performed by: FAMILY MEDICINE

## 2017-09-19 PROCEDURE — 80053 COMPREHEN METABOLIC PANEL: CPT | Performed by: FAMILY MEDICINE

## 2017-09-19 PROCEDURE — 25000128 H RX IP 250 OP 636

## 2017-09-19 PROCEDURE — 80320 DRUG SCREEN QUANTALCOHOLS: CPT | Performed by: FAMILY MEDICINE

## 2017-09-19 PROCEDURE — 25000132 ZZH RX MED GY IP 250 OP 250 PS 637: Performed by: PSYCHIATRY & NEUROLOGY

## 2017-09-19 PROCEDURE — 85025 COMPLETE CBC W/AUTO DIFF WBC: CPT | Performed by: FAMILY MEDICINE

## 2017-09-19 PROCEDURE — 93010 ELECTROCARDIOGRAM REPORT: CPT | Mod: Z6 | Performed by: FAMILY MEDICINE

## 2017-09-19 PROCEDURE — 99285 EMERGENCY DEPT VISIT HI MDM: CPT | Mod: 25 | Performed by: FAMILY MEDICINE

## 2017-09-19 PROCEDURE — 93005 ELECTROCARDIOGRAM TRACING: CPT | Performed by: FAMILY MEDICINE

## 2017-09-19 PROCEDURE — 12800012 ZZH R&B CD MH INTERMEDIATE ADULT

## 2017-09-19 PROCEDURE — 81003 URINALYSIS AUTO W/O SCOPE: CPT | Performed by: FAMILY MEDICINE

## 2017-09-19 PROCEDURE — 80307 DRUG TEST PRSMV CHEM ANLYZR: CPT | Performed by: FAMILY MEDICINE

## 2017-09-19 PROCEDURE — 85610 PROTHROMBIN TIME: CPT | Performed by: FAMILY MEDICINE

## 2017-09-19 PROCEDURE — 82977 ASSAY OF GGT: CPT | Performed by: FAMILY MEDICINE

## 2017-09-19 PROCEDURE — HZ2ZZZZ DETOXIFICATION SERVICES FOR SUBSTANCE ABUSE TREATMENT: ICD-10-PCS | Performed by: FAMILY MEDICINE

## 2017-09-19 PROCEDURE — 25000132 ZZH RX MED GY IP 250 OP 250 PS 637: Performed by: FAMILY MEDICINE

## 2017-09-19 PROCEDURE — 85730 THROMBOPLASTIN TIME PARTIAL: CPT | Performed by: FAMILY MEDICINE

## 2017-09-19 PROCEDURE — 83735 ASSAY OF MAGNESIUM: CPT | Performed by: FAMILY MEDICINE

## 2017-09-19 RX ORDER — NYSTATIN 500000 [USP'U]/1
1 TABLET, COATED ORAL 4 TIMES DAILY
Status: ON HOLD | COMMUNITY
End: 2017-09-25

## 2017-09-19 RX ORDER — NYSTATIN 500000 [USP'U]/1
1 TABLET, COATED ORAL 4 TIMES DAILY
Status: DISCONTINUED | OUTPATIENT
Start: 2017-09-19 | End: 2017-09-25 | Stop reason: HOSPADM

## 2017-09-19 RX ORDER — DIAZEPAM 5 MG
5-20 TABLET ORAL EVERY 30 MIN PRN
Status: DISCONTINUED | OUTPATIENT
Start: 2017-09-19 | End: 2017-09-23

## 2017-09-19 RX ORDER — ALUMINA, MAGNESIA, AND SIMETHICONE 2400; 2400; 240 MG/30ML; MG/30ML; MG/30ML
30 SUSPENSION ORAL EVERY 4 HOURS PRN
Status: DISCONTINUED | OUTPATIENT
Start: 2017-09-19 | End: 2017-09-25 | Stop reason: HOSPADM

## 2017-09-19 RX ORDER — METRONIDAZOLE 250 MG/1
250 TABLET ORAL 2 TIMES DAILY
Status: DISCONTINUED | OUTPATIENT
Start: 2017-09-19 | End: 2017-09-25 | Stop reason: HOSPADM

## 2017-09-19 RX ORDER — MULTIPLE VITAMINS W/ MINERALS TAB 9MG-400MCG
1 TAB ORAL DAILY
Status: DISCONTINUED | OUTPATIENT
Start: 2017-09-20 | End: 2017-09-25 | Stop reason: HOSPADM

## 2017-09-19 RX ORDER — VILAZODONE HYDROCHLORIDE 40 MG/1
40 TABLET ORAL DAILY
Status: DISCONTINUED | OUTPATIENT
Start: 2017-09-20 | End: 2017-09-22

## 2017-09-19 RX ORDER — LIDOCAINE 40 MG/G
CREAM TOPICAL
Status: DISCONTINUED | OUTPATIENT
Start: 2017-09-19 | End: 2017-09-21

## 2017-09-19 RX ORDER — TRAZODONE HYDROCHLORIDE 50 MG/1
50-150 TABLET, FILM COATED ORAL
Status: DISCONTINUED | OUTPATIENT
Start: 2017-09-19 | End: 2017-09-25 | Stop reason: HOSPADM

## 2017-09-19 RX ORDER — SODIUM CHLORIDE 9 MG/ML
INJECTION, SOLUTION INTRAVENOUS
Status: COMPLETED
Start: 2017-09-19 | End: 2017-09-19

## 2017-09-19 RX ORDER — DOXYCYCLINE 100 MG/1
100 CAPSULE ORAL 2 TIMES DAILY
Status: DISCONTINUED | OUTPATIENT
Start: 2017-09-19 | End: 2017-09-25 | Stop reason: HOSPADM

## 2017-09-19 RX ORDER — VILAZODONE HYDROCHLORIDE 40 MG/1
40 TABLET ORAL DAILY
Status: ON HOLD | COMMUNITY
End: 2017-09-22

## 2017-09-19 RX ORDER — ATENOLOL 50 MG/1
50 TABLET ORAL ONCE
Status: COMPLETED | OUTPATIENT
Start: 2017-09-19 | End: 2017-09-19

## 2017-09-19 RX ORDER — BISACODYL 10 MG
10 SUPPOSITORY, RECTAL RECTAL DAILY PRN
Status: DISCONTINUED | OUTPATIENT
Start: 2017-09-19 | End: 2017-09-25 | Stop reason: HOSPADM

## 2017-09-19 RX ORDER — CEFUROXIME AXETIL 500 MG/1
400 TABLET ORAL DAILY
Status: DISCONTINUED | OUTPATIENT
Start: 2017-09-20 | End: 2017-09-22

## 2017-09-19 RX ORDER — PHENOBARBITAL 64.8 MG/1
64.8 TABLET ORAL ONCE
Status: COMPLETED | OUTPATIENT
Start: 2017-09-19 | End: 2017-09-19

## 2017-09-19 RX ORDER — SODIUM CHLORIDE 9 MG/ML
1000 INJECTION, SOLUTION INTRAVENOUS CONTINUOUS
Status: DISCONTINUED | OUTPATIENT
Start: 2017-09-19 | End: 2017-09-20 | Stop reason: CLARIF

## 2017-09-19 RX ORDER — SULFAMETHOXAZOLE/TRIMETHOPRIM 800-160 MG
1 TABLET ORAL 2 TIMES DAILY
Status: DISCONTINUED | OUTPATIENT
Start: 2017-09-19 | End: 2017-09-25 | Stop reason: HOSPADM

## 2017-09-19 RX ORDER — NICOTINE 21 MG/24HR
1 PATCH, TRANSDERMAL 24 HOURS TRANSDERMAL DAILY
Status: DISCONTINUED | OUTPATIENT
Start: 2017-09-20 | End: 2017-09-19

## 2017-09-19 RX ORDER — LANOLIN ALCOHOL/MO/W.PET/CERES
100 CREAM (GRAM) TOPICAL DAILY
Status: COMPLETED | OUTPATIENT
Start: 2017-09-20 | End: 2017-09-22

## 2017-09-19 RX ORDER — CLARITHROMYCIN 500 MG
500 TABLET ORAL 2 TIMES DAILY
Status: DISCONTINUED | OUTPATIENT
Start: 2017-09-19 | End: 2017-09-25 | Stop reason: HOSPADM

## 2017-09-19 RX ORDER — ACETAMINOPHEN 325 MG/1
650 TABLET ORAL EVERY 4 HOURS PRN
Status: DISCONTINUED | OUTPATIENT
Start: 2017-09-19 | End: 2017-09-25 | Stop reason: HOSPADM

## 2017-09-19 RX ORDER — FOLIC ACID 1 MG/1
1 TABLET ORAL DAILY
Status: DISCONTINUED | OUTPATIENT
Start: 2017-09-20 | End: 2017-09-25 | Stop reason: HOSPADM

## 2017-09-19 RX ORDER — HYDROXYZINE HYDROCHLORIDE 25 MG/1
25-50 TABLET, FILM COATED ORAL EVERY 4 HOURS PRN
Status: DISCONTINUED | OUTPATIENT
Start: 2017-09-19 | End: 2017-09-25 | Stop reason: HOSPADM

## 2017-09-19 RX ORDER — VALACYCLOVIR HYDROCHLORIDE 500 MG/1
500 TABLET, FILM COATED ORAL 2 TIMES DAILY
Status: DISCONTINUED | OUTPATIENT
Start: 2017-09-19 | End: 2017-09-25 | Stop reason: HOSPADM

## 2017-09-19 RX ORDER — CEFDINIR 300 MG/1
300 CAPSULE ORAL 2 TIMES DAILY
Status: DISCONTINUED | OUTPATIENT
Start: 2017-09-19 | End: 2017-09-25 | Stop reason: HOSPADM

## 2017-09-19 RX ORDER — NICOTINE 21 MG/24HR
1 PATCH, TRANSDERMAL 24 HOURS TRANSDERMAL DAILY
Status: DISCONTINUED | OUTPATIENT
Start: 2017-09-19 | End: 2017-09-25 | Stop reason: HOSPADM

## 2017-09-19 RX ADMIN — VALACYCLOVIR HYDROCHLORIDE 500 MG: 500 TABLET, FILM COATED ORAL at 22:55

## 2017-09-19 RX ADMIN — ATENOLOL 50 MG: 50 TABLET ORAL at 12:50

## 2017-09-19 RX ADMIN — DIAZEPAM 10 MG: 5 TABLET ORAL at 22:18

## 2017-09-19 RX ADMIN — SODIUM CHLORIDE 1000 ML: 9 INJECTION, SOLUTION INTRAVENOUS at 13:54

## 2017-09-19 RX ADMIN — SULFAMETHOXAZOLE AND TRIMETHOPRIM 1 TABLET: 800; 160 TABLET ORAL at 22:18

## 2017-09-19 RX ADMIN — METRONIDAZOLE 250 MG: 250 TABLET ORAL at 22:18

## 2017-09-19 RX ADMIN — NYSTATIN 500000 UNITS: 500000 TABLET, FILM COATED ORAL at 22:52

## 2017-09-19 RX ADMIN — PHENOBARBITAL 64.8 MG: 64.8 TABLET ORAL at 14:33

## 2017-09-19 RX ADMIN — NICOTINE 1 PATCH: 21 PATCH, EXTENDED RELEASE TRANSDERMAL at 22:24

## 2017-09-19 RX ADMIN — Medication 1000 ML: at 12:46

## 2017-09-19 RX ADMIN — RANITIDINE HYDROCHLORIDE 300 MG: 150 TABLET, FILM COATED ORAL at 22:18

## 2017-09-19 RX ADMIN — DOXYCYCLINE HYCLATE 100 MG: 100 CAPSULE ORAL at 22:18

## 2017-09-19 RX ADMIN — SODIUM CHLORIDE 1000 ML: 9 INJECTION, SOLUTION INTRAVENOUS at 12:46

## 2017-09-19 RX ADMIN — CEFDINIR 300 MG: 300 CAPSULE ORAL at 22:51

## 2017-09-19 RX ADMIN — CLARITHROMYCIN 500 MG: 500 TABLET ORAL at 22:17

## 2017-09-19 RX ADMIN — TRAZODONE HYDROCHLORIDE 150 MG: 50 TABLET ORAL at 22:18

## 2017-09-19 RX ADMIN — NICOTINE POLACRILEX 4 MG: 2 GUM, CHEWING ORAL at 22:55

## 2017-09-19 ASSESSMENT — ACTIVITIES OF DAILY LIVING (ADL)
ORAL_HYGIENE: INDEPENDENT
LAUNDRY: WITH SUPERVISION
GROOMING: INDEPENDENT
DRESS: STREET CLOTHES;INDEPENDENT

## 2017-09-19 ASSESSMENT — ENCOUNTER SYMPTOMS
CHILLS: 0
NECK STIFFNESS: 0
DYSPHORIC MOOD: 1
COLOR CHANGE: 0
DIFFICULTY URINATING: 0
FEVER: 0
PALPITATIONS: 1
SHORTNESS OF BREATH: 0
VOMITING: 0
SORE THROAT: 0
HALLUCINATIONS: 0
DECREASED CONCENTRATION: 1
APPETITE CHANGE: 0
ACTIVITY CHANGE: 1
VOICE CHANGE: 0
EYE REDNESS: 0
NAUSEA: 0
HEADACHES: 0
CHEST TIGHTNESS: 0
CONFUSION: 0
ABDOMINAL PAIN: 0
DIARRHEA: 0
LIGHT-HEADEDNESS: 1
FATIGUE: 1
ARTHRALGIAS: 0
NUMBNESS: 0
WEAKNESS: 1
NERVOUS/ANXIOUS: 1
TREMORS: 0
TROUBLE SWALLOWING: 0

## 2017-09-19 NOTE — ED PROVIDER NOTES
"  History     Chief Complaint   Patient presents with     Addiction Problem     he was taking phenobarb but has not had any for 72 hours. He states he had about 10 rum and cokes today.      HPI  Jonny Gramajo is a 32 year old male with a history of alcoholism who presents to the ED for evaluation after not taking any phenobarbital for 72 hours. He reports that he was recently discharged from the hospital here after going through detox, and he was given a 20 day course of phenobarbital so as to taper off Klonopin. He had tried to taper off Klonopin in the past without any medicinal assistance, and he suffered a seizure. He has been taking more phenobarb than he should, and so he ran out about 4 days ago. Since running out, he has been drinking about 10-15 drinks a days in order to avoid any seizures. He also was taking atenolol 50mg daily for high BP, but he ran out of that around the same time.     His last drink was 2 hours ago. He has been through multiple rounds of treatment/detox in the past, and when he withdraws, he denies any seizures, but he does become tremulous and anxious. Currently, he feels \"okay.\" He denies any SI, nausea, or abdominal pain. He denies any CP, SOB, fevers, or chills.     PAST MEDICAL HISTORY:   Past Medical History:   Diagnosis Date     Anxiety      Depressive disorder      Hypertension      Lyme disease        PAST SURGICAL HISTORY:   Past Surgical History:   Procedure Laterality Date     GENITOURINARY SURGERY      testical recessed       FAMILY HISTORY: No family history on file.    SOCIAL HISTORY:   Social History   Substance Use Topics     Smoking status: Current Every Day Smoker     Packs/day: 2.00     Smokeless tobacco: Former User     Alcohol use Yes      Comment: Drinking about 10 drinks a day for past 3 days       Patient's Medications   New Prescriptions    No medications on file   Previous Medications    CARIPRAZINE (VRAYLAR) 1.5 MG CAPS CAPSULE    Take 1.5 mg by mouth every " morning     CEFDINIR (OMNICEF) 300 MG CAPSULE    Take 300 mg by mouth 2 times daily    CEFTIBUTEN (CEDAX) 400 MG CAPSULE    Take 1 capsule (400 mg) by mouth daily    CLARITHROMYCIN (BIAXIN) 500 MG TABLET    Take 500 mg by mouth 2 times daily    DOXYCYCLINE MONOHYDRATE 100 MG CAPS    Take 100 mg by mouth 2 times daily With food. Avoid calcium, magnesium, dairy, and the sun in 24 hours.    METRONIDAZOLE (FLAGYL) 250 MG TABLET    Take 250 mg by mouth 2 times daily    MILK THISTLE PO    Take 4 capsules by mouth 2 times daily Strength unknown     NYSTATIN (MYCOSTATIN) 559261 UNITS TABS TABLET    Take 1 tablet by mouth 4 times daily    PHENOBARBITAL (LUMINAL) 32.4 MG TABS TABLET    Take 1 tab 4 times daily for 5 days, then  Take 1 tab 3 times daily for 5 days, then  Take 1 tab 2 times daily for 5 days, then  Take 1 tab 1 times daily for 5 days    PSYLLIUM (METAMUCIL) WAFR    Take 2 Wafers by mouth daily PRN    RANITIDINE (ZANTAC) 300 MG TABLET    Take 300 mg by mouth 2 times daily    SULFAMETHOXAZOLE-TRIMETHOPRIM (BACTRIM DS/SEPTRA DS) 800-160 MG PER TABLET    Take 1 tablet by mouth 2 times daily    TRAZODONE (DESYREL) 50 MG TABLET    Take 1-3 tablets ( mg) by mouth nightly as needed for sleep    VALACYCLOVIR HCL (VALTREX PO)    Take 500 mg by mouth 2 times daily     VILAZODONE (VIIBRYD) 40 MG TABS TABLET    Take 40 mg by mouth daily   Modified Medications    No medications on file   Discontinued Medications    ATENOLOL (TENORMIN) 50 MG TABLET    Take 50 mg by mouth daily    NYSTATIN (MYCOSTATIN) 429923 UNIT/ML SUSPENSION    Take 500,000 Units by mouth 4 times daily as needed (when patient feels candidia is flaring up)     RANITIDINE HCL (ZANTAC PO)    Take 300 mg by mouth 2 times daily    VILAZODONE HCL (VIIBRYD PO)    Take 40 mg by mouth daily          Allergies   Allergen Reactions     Abilify [Aripiprazole]      Patient reports tardive dyskinesia effect in 2004. It only occurred while on med and resolved with a  "few days after discontinuing med.      Wellbutrin [Bupropion] Anxiety     Patient reports felt \"reved\" up and anxious when taken in 2001.          I have reviewed the Medications, Allergies, Past Medical and Surgical History, and Social History in the Epic system.    Review of Systems   Constitutional: Positive for activity change and fatigue. Negative for appetite change, chills and fever.   HENT: Negative for congestion, sore throat, trouble swallowing and voice change.    Eyes: Negative for redness and visual disturbance.   Respiratory: Negative for chest tightness and shortness of breath.    Cardiovascular: Positive for palpitations. Negative for chest pain and leg swelling.   Gastrointestinal: Negative for abdominal pain, diarrhea, nausea and vomiting.   Genitourinary: Negative for difficulty urinating.   Musculoskeletal: Negative for arthralgias, gait problem and neck stiffness.   Skin: Negative for color change and rash.   Allergic/Immunologic: Negative for immunocompromised state.   Neurological: Positive for weakness and light-headedness. Negative for tremors, syncope, numbness and headaches.   Psychiatric/Behavioral: Positive for decreased concentration and dysphoric mood. Negative for confusion, hallucinations and suicidal ideas. The patient is nervous/anxious.    All other systems reviewed and are negative.      Physical Exam   BP: 155/88  Heart Rate: 120  Temp: 99.3  F (37.4  C)  Resp: 20  Height: 198.1 cm (6' 6\")  Weight: (!) 154.2 kg (340 lb)  SpO2: 97 %  Physical Exam   Constitutional: He is oriented to person, place, and time. He appears well-developed and well-nourished. He appears distressed.   Patient given 4 mg Ativan prior per 911 report.  Patient feeling better.   HENT:   Head: Normocephalic and atraumatic.   Eyes: Conjunctivae and EOM are normal. Pupils are equal, round, and reactive to light. No scleral icterus.   Neck: Normal range of motion. Neck supple. No JVD present. No tracheal " deviation present.   Cardiovascular:   Sinus tachycardia   Pulmonary/Chest: No stridor. No respiratory distress. He has no wheezes. He has no rales.   Abdominal: He exhibits no distension. There is no tenderness. There is no rebound and no guarding.   Musculoskeletal: He exhibits no edema, tenderness or deformity.   Neurological: He is alert and oriented to person, place, and time. He has normal reflexes. No cranial nerve deficit. Coordination normal.   Skin: Skin is warm and dry. No rash noted. He is not diaphoretic. No erythema. No pallor.   Psychiatric:   Patient feeling better mildly anxious this point is not delusional is not suicidal or homicidal.  Is cooperative.   Nursing note and vitals reviewed.      ED Course     ED Course     Procedures           In ER patient evaluated IV was established.  EKG showed sinus tachycardia.    Patient continually monitored in the ER.    Review records and Epic patient recent admission for detox from benzodiazepine.    Discussed case also with Dr. Vital    Recommendations are for gabapentin the patient feeling better otherwise readmission for benzodiazepine withdrawal which patient feels is appropriate this point    Patient had labs drawn IV fluids given in the ER.    Patient takes atenolol 50 mg orally has not taken it for 3 days did receive 1 dose here orally in the ER.    Discussed with  and with patient's increasing anxiety given 64.8 mg of phenobarbital orally ×1.    Laboratory testing alcohol was negative chemistries reveal   wbc10.5 hgb 16.6    Patient to be admitted for benzodiazepine withdrawal syndrome in the ER is voluntary will be admitted to .             EKG Interpretation:      Interpreted by Giancarlo Griffith  Time reviewed: 1135  Symptoms at time of EKG: Anxiety history of benzodiazepine withdrawal   Rhythm: Sinus tachycardia  Rate: 1:15  Axis: normal  Ectopy: none  Conduction: normal  ST Segments/ T Waves: No hyperacute ST-T wave  changes  Q Waves: none  Comparison to prior: No old EKG available    Clinical Impression: Sinus tachycardia            Critical Care time:  none           Labs Ordered and Resulted from Time of ED Arrival Up to the Time of Departure from the ED   CBC WITH PLATELETS DIFFERENTIAL - Abnormal; Notable for the following:        Result Value    MCH 34.1 (*)     MCHC 36.9 (*)     All other components within normal limits   COMPREHENSIVE METABOLIC PANEL - Abnormal; Notable for the following:      (*)      (*)     All other components within normal limits   DRUG ABUSE SCREEN 6 CHEM DEP URINE ([facility]) - Abnormal; Notable for the following:     Barbiturates Qual Urine Positive (*)     All other components within normal limits   INR   PARTIAL THROMBOPLASTIN TIME   MAGNESIUM   UA MACROSCOPIC WITH REFLEX TO MICRO AND CULTURE   ALCOHOL ETHYL   PULSE OXIMETRY NURSING   CARDIAC CONTINUOUS MONITORING   PERIPHERAL IV CATHETER     Results for orders placed or performed during the hospital encounter of 09/19/17   CBC with platelets differential   Result Value Ref Range    WBC 10.5 4.0 - 11.0 10e9/L    RBC Count 4.87 4.4 - 5.9 10e12/L    Hemoglobin 16.6 13.3 - 17.7 g/dL    Hematocrit 45.0 40.0 - 53.0 %    MCV 92 78 - 100 fl    MCH 34.1 (H) 26.5 - 33.0 pg    MCHC 36.9 (H) 31.5 - 36.5 g/dL    RDW 12.4 10.0 - 15.0 %    Platelet Count 239 150 - 450 10e9/L    Diff Method Automated Method     % Neutrophils 75.5 %    % Lymphocytes 14.1 %    % Monocytes 9.8 %    % Eosinophils 0.1 %    % Basophils 0.1 %    % Immature Granulocytes 0.4 %    Nucleated RBCs 0 0 /100    Absolute Neutrophil 7.9 1.6 - 8.3 10e9/L    Absolute Lymphocytes 1.5 0.8 - 5.3 10e9/L    Absolute Monocytes 1.0 0.0 - 1.3 10e9/L    Absolute Eosinophils 0.0 0.0 - 0.7 10e9/L    Absolute Basophils 0.0 0.0 - 0.2 10e9/L    Abs Immature Granulocytes 0.0 0 - 0.4 10e9/L    Absolute Nucleated RBC 0.0    INR   Result Value Ref Range    INR 1.13 0.86 - 1.14   Partial thromboplastin  time   Result Value Ref Range    PTT 30 22 - 37 sec   Comprehensive metabolic panel   Result Value Ref Range    Sodium 134 133 - 144 mmol/L    Potassium 3.4 3.4 - 5.3 mmol/L    Chloride 97 94 - 109 mmol/L    Carbon Dioxide 26 20 - 32 mmol/L    Anion Gap 11 3 - 14 mmol/L    Glucose 97 70 - 99 mg/dL    Urea Nitrogen 8 7 - 30 mg/dL    Creatinine 0.73 0.66 - 1.25 mg/dL    GFR Estimate >90 >60 mL/min/1.7m2    GFR Estimate If Black >90 >60 mL/min/1.7m2    Calcium 9.6 8.5 - 10.1 mg/dL    Bilirubin Total 0.9 0.2 - 1.3 mg/dL    Albumin 4.0 3.4 - 5.0 g/dL    Protein Total 7.2 6.8 - 8.8 g/dL    Alkaline Phosphatase 105 40 - 150 U/L     (H) 0 - 70 U/L     (H) 0 - 45 U/L   Magnesium   Result Value Ref Range    Magnesium 1.6 1.6 - 2.3 mg/dL   Drug abuse screen 6 urine (chem dep)   Result Value Ref Range    Amphetamine Qual Urine Negative NEG^Negative    Barbiturates Qual Urine Positive (A) NEG^Negative    Benzodiazepine Qual Urine Negative NEG^Negative    Cannabinoids Qual Urine Negative NEG^Negative    Cocaine Qual Urine Negative NEG^Negative    Ethanol Qual Urine Negative NEG^Negative    Opiates Qualitative Urine Negative NEG^Negative   UA reflex to Microscopic and Culture   Result Value Ref Range    Color Urine Yellow     Appearance Urine Clear     Glucose Urine Negative NEG^Negative mg/dL    Bilirubin Urine Negative NEG^Negative    Ketones Urine Negative NEG^Negative mg/dL    Specific Gravity Urine 1.008 1.003 - 1.035    Blood Urine Negative NEG^Negative    pH Urine 6.5 5.0 - 7.0 pH    Protein Albumin Urine Negative NEG^Negative mg/dL    Urobilinogen mg/dL Normal 0.0 - 2.0 mg/dL    Nitrite Urine Negative NEG^Negative    Leukocyte Esterase Urine Negative NEG^Negative    Source Unspecified Urine    Alcohol ethyl   Result Value Ref Range    Ethanol g/dL <0.01 <0.01 g/dL            Assessments & Plan (with Medical Decision Making)  32-year-old male history of recent detox for benzodiazepine withdrawal was discharged  on a 20 day taper of phenobarbital.  Taking more and ran out a few days ago now presents with increasing agitation and withdrawal symptoms.  Patient had been drinking rum and cokes last few days to help.  Patient brought in by ambulance report was patient given 4 mg of Ativan per paramedics.  Patient feeling more calm at this point evaluated in the ER tachycardia noted he's not taking his atenolol 50 mg for the last few days he did receive an oral dose here and I discussed case with Dr. Vital interviewed the patient one dose of phenobarbital here orally.  The patient is voluntary we'll plan to admit to detox for readmission for benzodiazepine withdrawal symptoms.           I have reviewed the nursing notes.    I have reviewed the findings, diagnosis, plan and need for follow up with the patient.    New Prescriptions    No medications on file       Final diagnoses:   Benzodiazepine withdrawal with complication (H)   I, Shree Phillips, am serving as a trained medical scribe to document services personally performed by Giancarlo Griffith MD, based on the provider's statements to me.      IGiancarlo MD, was physically present and have reviewed and verified the accuracy of this note documented by Shree Phillips.       9/19/2017   [facility] EMERGENCY DEPARTMENT    This note was created at least in part by the use of dragon voice dictation system. Inadvertent typographical errors may still exist.  Giancarlo Griffith MD.         Giancarlo Griffith MD  09/19/17 1934

## 2017-09-19 NOTE — IP AVS SNAPSHOT
[facility]    [address] [place] [address]    Phone:  [phone]                                       After Visit Summary   9/19/2017    Jonny Gramajo    MRN: [id number]           After Visit Summary Signature Page     I have received my discharge instructions, and my questions have been answered. I have discussed any challenges I see with this plan with the nurse or doctor.    ..........................................................................................................................................  Patient/Patient Representative Signature      ..........................................................................................................................................  Patient Representative Print Name and Relationship to Patient    ..................................................               ................................................  Date                                            Time    ..........................................................................................................................................  Reviewed by Signature/Title    ...................................................              ..............................................  Date                                                            Time

## 2017-09-19 NOTE — IP AVS SNAPSHOT
MRN:[id number]                      After Visit Summary   9/19/2017    Jonny Gramajo    MRN: [id number]           Thank you!     Thank you for choosing [facility] for your care. Our goal is always to provide you with excellent care.        Patient Information     Date Of Birth          1985        Designated Caregiver       Most Recent Value    Caregiver    Will someone help with your care after discharge? no      About your hospital stay     You were admitted on:  September 19, 2017 You last received care in the:  [facility]    You were discharged on:  September 25, 2017       Who to Call     For medical emergencies, please call 911.  For non-urgent questions about your medical care, please call your primary care provider or clinic, [phone]          Attending Provider     Provider Specialty    Giancarlo Griffith MD Emergency Medicine    Amer, Eliud Marin MD Family Practice       Primary Care Provider Office Phone # Fax #    Marinoh G Pallas, [phone]      Further instructions from your care team       Behavioral Discharge Planning and Instructions  THANK YOU FOR CHOOSING THE [facility]  [facility] [place]                          [phone]    Summary:   You were admitted to [facility] on 9/19/2017 for detoxification from alcohol. You are being discharged on 9/25/17.  A medical examination was performed that included lab work. You met with a  and opted to transfer directly to [facility].  Please take care and make your recovery a daily priority, Jonny.     Recommendation: As stated above. Consider the following:  outpatient treatment following completion of [facility], psychotherapy weekly following completion of treatment.    Main Diagnosis:  Per Dr. Eliud Vital MD  1.  Alcohol dependence and withdrawal.   2.  Benzodiazepine dependence.     Major Treatments, Procedures and Findings:  You have withdrawn from ***  using the appropriate protocols.  You have had blood drawn, and the results have been reviewed with you.  Please take a copy of your lab work with you to your next primary care provider appointment.    Symptoms to Report:  If you experience more anxiety, confusion, sleeplessness, deep sadness or thoughts of suicide, notify your treatment team or notify your primary care physician. IF ANY OF THE SYMPTOMS YOU ARE EXPERIENCING ARE A MEDICAL EMERGENCY CALL 911 IMMEDIATELY.     Primary Provider:    Dr. Pallas   [facility]  [phone]  October 25, 2017 @3:00PM    Dr. Marquise Clark   [facility]   [phone]  October 25, 2017 @9:00am    Mental Health Therapist:  Mario Shook MA, LP, LMFT  [address], [address]  [place] [address] [phone]    Lifestyle Adjustment & Health Action Plan:  1.   Create a daily schedule  2.   Eat Healthy  3.   Plan Enjoyable Sober Activities  4.   Use Problem Solving Skills and Deal with Issues as they Arise.   5.   Be Physically Active  6.   Take your medications as prescribed  7.   Get enough restful sleep  8.   Practice Relaxation  9.   Spend time with Supportive People  10. No use of alcohol, illegal drugs or addictive medications other than what is currently prescribed.   11. AA, [place] Sponsor are excellent resources for support    Resources:   [facility] Crisis Resource & Support Center:  132.164.6661.   Support Group:  JENNIFER/[place] and Sponsor/support.  SMART Recovery - self management for addiction recovery:  www.smartrecovery.org  National [place] on Mental Illness (www.[place].rizwan.org): 627.924.5172 or 888-397-5157.  Alcoholics Anonymous (www.alcoholics-anonymous.org): Check your phone book for your local chapter.  Crisis Connection:  630.443.4234 or 1-838.574.3232. Call anytime for help.  [facility]:  119.487.9528 or 1-673.478.6421.  Suicide Awareness Voices of Education (SAVE) (www.save.org): 990-538-SAVE  (0453).  National Suicide Prevention Line (www.mentalhealthmn.org): 820-851-OZOY (1964).  Mental Health Consumer/Survivor Network of [place] (www.mhcsn.net): 589.403.4977 or 426-956-0326.  Mental Health Association of [place] (www.mentalhealth.org): 441.667.6512 or 594-416-0690.     Substance Abuse and Mental Health Services (www.samhsa.gov).    [place] Recovery Connection ([facility])  [facility] connects people seeking recovery to resources that help foster and sustain long-term recovery.  Whether you are seeking resources for treatment, transportation, housing, job training, education, health care or other pathways to recovery, [facility] is a great place to start.  582.565.6525. www.[facility].org    General Medication Instructions:   See your medication papers for instructions. Take all medicines as directed.  Make no changes unless your doctor suggests them. Go to all your doctor visits.  Be sure to have all your required lab tests. This way, your medicines can be refilled on time. Do not use any drugs not prescribed by your provider.  AA/NA and Sponsors are excellent resources for support. Avoid alcohol at all costs!    THANK YOU FOR CHOOSING THE [facility]    The treatment team has appreciated the opportunity to work with you.  We wish you the best in the future. Please bring this discharge folder with you to all follow  up appointments - it contains your lab results, diagnosis, medication list and discharge recommendations.    For follow up questions:  Detox Nursing [phone]  Past medical records [phone]  Readmission [phone]         Pending Results     No orders found from 9/17/2017 to 9/20/2017.            Statement of Approval     Ordered          09/25/17 0840  I have reviewed and agree with all the recommendations and orders detailed in this document.  EFFECTIVE NOW     Approved and electronically signed by:  Eliud Vital MD             Admission Information     Date & Time Provider  "Department Dept. Phone    2017 Eliud Vital MD [facility] [phone]      Your Vitals Were     Blood Pressure Pulse Temperature Respirations Height Weight    140/89 94 97.3  F (36.3  C) 16 1.981 m (6' 6\") 149.7 kg (330 lb)    Pulse Oximetry BMI (Body Mass Index)                98% 38.14 kg/m2          [employer] Information     [employer] lets you send messages to your doctor, view your test results, renew your prescriptions, schedule appointments and more. To sign up, go to [email or web address] . Click on \"Log in\" on the left side of the screen, which will take you to the Welcome page. Then click on \"Sign up Now\" on the right side of the page.     You will be asked to enter the access code listed below, as well as some personal information. Please follow the directions to create your username and password.     Your access code is: [email or web address]  Expires: 2017  1:46 PM     Your access code will  in 90 days. If you need help or a new code, please call your [facility] clinic or [phone].        Care EveryWhere ID     This is your Care EveryWhere ID. This could be used by other organizations to access your [facility] medical records  [id number]        Equal Access to Services     MIRLANDE BURROUGHS : Hadii kolby de leóno Sowoody, waaxda luqadaha, qaybta kaalmada teodora, batsheva luu. So [facility] [phone].    ATENCIÓN: Si habla español, tiene a castro disposición servicios gratuitos de asistencia lingüística. Norbert nolasco [phone].    We comply with applicable federal civil rights laws and [place] laws. We do not discriminate on the basis of race, color, national origin, age, disability sex, sexual orientation or gender identity.               Review of your medicines      START taking        Dose / Directions    gabapentin 300 MG capsule   Commonly known as:  NEURONTIN   Used for:  Adjustment disorder with mixed anxiety and depressed mood        " Dose:  300 mg   Take 1 capsule (300 mg) by mouth 3 times daily   Quantity:  90 capsule   Refills:  1       hydrOXYzine 25 MG tablet   Commonly known as:  ATARAX   Used for:  Benzodiazepine withdrawal with complication (H)        Dose:  25-50 mg   Take 1-2 tablets (25-50 mg) by mouth every 4 hours as needed for anxiety   Quantity:  120 tablet   Refills:  1       ibuprofen 600 MG tablet   Commonly known as:  ADVIL/MOTRIN   Used for:  Acute bilateral low back pain without sciatica        Dose:  600 mg   Take 1 tablet (600 mg) by mouth every 6 hours as needed for moderate pain   Quantity:  120 tablet   Refills:  1       naltrexone 50 MG tablet   Commonly known as:  DEPADE;REVIA   Used for:  Alcohol dependence with withdrawal with complication (H)        Dose:  50 mg   Take 1 tablet (50 mg) by mouth daily   Quantity:  30 tablet   Refills:  1       propranolol 20 MG tablet   Commonly known as:  INDERAL   Used for:  Benzodiazepine withdrawal with complication (H)        Dose:  20 mg   Take 1 tablet (20 mg) by mouth 3 times daily   Quantity:  90 tablet   Refills:  0       vortioxetine 5 MG tablet   Commonly known as:  TRINTELLIX/BRINTELLIX   Used for:  Severe episode of recurrent major depressive disorder, with psychotic features (H)        Dose:  10 mg   Take 2 tablets (10 mg) by mouth daily   Quantity:  30 tablet   Refills:  3         CONTINUE these medicines which may have CHANGED, or have new prescriptions. If we are uncertain of the size of tablets/capsules you have at home, strength may be listed as something that might have changed.        Dose / Directions    cariprazine 3 MG Caps capsule   Commonly known as:  VRAYLAR   This may have changed:    - medication strength  - how much to take   Used for:  Severe episode of recurrent major depressive disorder, with psychotic features (H)        Dose:  3 mg   Take 1 capsule (3 mg) by mouth every morning   Quantity:  30 capsule   Refills:  1       valACYclovir 500 MG tablet    Commonly known as:  VALTREX   This may have changed:  medication strength   Used for:  Lyme disease        Dose:  500 mg   Take 1 tablet (500 mg) by mouth 2 times daily   Quantity:  60 tablet   Refills:  0         CONTINUE these medicines which have NOT CHANGED        Dose / Directions    cefdinir 300 MG capsule   Commonly known as:  OMNICEF   Used for:  Lyme disease        Dose:  300 mg   Take 1 capsule (300 mg) by mouth 2 times daily   Quantity:  30 capsule   Refills:  0       ceftibuten 400 MG capsule   Commonly known as:  CEDAX   Used for:  Lyme disease        Dose:  400 mg   Take 1 capsule (400 mg) by mouth daily   Quantity:  30 capsule   Refills:  0       clarithromycin 500 MG tablet   Commonly known as:  BIAXIN   Used for:  Lyme disease        Dose:  500 mg   Take 1 tablet (500 mg) by mouth 2 times daily with food   Quantity:  60 tablet   Refills:  0       doxycycline Monohydrate 100 MG Caps   Used for:  Lyme disease        Dose:  100 mg   Take 1 capsule (100 mg) by mouth 2 times daily With food. Avoid calcium, magnesium, dairy, and the sun in 24 hours.   Quantity:  60 capsule   Refills:  0       metroNIDAZOLE 250 MG tablet   Commonly known as:  FLAGYL   Used for:  Lyme disease        Dose:  250 mg   Take 1 tablet (250 mg) by mouth 2 times daily with food   Quantity:  60 tablet   Refills:  0       nystatin 855426 UNITS Tabs tablet   Commonly known as:  MYCOSTATIN   Used for:  Lyme disease        Dose:  1 tablet   Take 1 tablet (500,000 Units) by mouth 4 times daily   Quantity:  120 tablet   Refills:  0       PHENobarbital 32.4 MG Tabs tablet   Commonly known as:  LUMINAL   Used for:  Benzodiazepine dependence (H), Benzodiazepine withdrawal, uncomplicated (H)        Take 1 tab 4 times daily for 5 days, then Take 1 tab 3 times daily for 5 days, then Take 1 tab 2 times daily for 5 days, then Take 1 tab 1 times daily for 5 days   Quantity:  50 tablet   Refills:  0       psyllium Wafr        Dose:  2 Wafer    Take 2 Wafers by mouth daily PRN   Refills:  0       ranitidine 300 MG tablet   Commonly known as:  ZANTAC   Used for:  Lyme disease        Dose:  300 mg   Take 1 tablet (300 mg) by mouth 2 times daily   Quantity:  60 tablet   Refills:  0       sulfamethoxazole-trimethoprim 800-160 MG per tablet   Commonly known as:  BACTRIM DS/SEPTRA DS   Used for:  Lyme disease        Dose:  1 tablet   Take 1 tablet by mouth 2 times daily   Quantity:  60 tablet   Refills:  0       traZODone 50 MG tablet   Commonly known as:  DESYREL   Used for:  Alcohol dependence with withdrawal with complication (H)        Dose:   mg   Take 1-3 tablets ( mg) by mouth nightly as needed for sleep   Quantity:  30 tablet   Refills:  1         STOP taking     MILK THISTLE PO           VIIBRYD 40 MG Tabs tablet   Generic drug:  vilazodone                Where to get your medicines      [facility] [address] [address]  [facility]  [address] [facility] [address] [facility] [address]     Phone:  [phone]     vortioxetine 5 MG tablet         These medications were sent to [place], [place] - [address]  [address] [facility] [address] [address]     Phone:  [phone]     cariprazine 3 MG Caps capsule    cefdinir 300 MG capsule    ceftibuten 400 MG capsule    clarithromycin 500 MG tablet    doxycycline Monohydrate 100 MG Caps    gabapentin 300 MG capsule    hydrOXYzine 25 MG tablet    ibuprofen 600 MG tablet    metroNIDAZOLE 250 MG tablet    naltrexone 50 MG tablet    nystatin 852286 UNITS Tabs tablet    propranolol 20 MG tablet    ranitidine 300 MG tablet    sulfamethoxazole-trimethoprim 800-160 MG per tablet    traZODone 50 MG tablet    valACYclovir 500 MG tablet         Some of these will need a paper prescription and others can be bought over the counter. Ask your nurse if you have questions.     Bring a paper prescription for each of these medications      PHENobarbital 32.4 MG Tabs tablet               ANTIBIOTIC INSTRUCTION     You've Been Prescribed an Antibiotic - Now What?  Your healthcare team thinks that you or your loved one might have an infection. Some infections can be treated with antibiotics, which are powerful, life-saving drugs. Like all medications, antibiotics have side effects and should only be used when necessary. There are some important things you should know about your antibiotic treatment.      Your healthcare team may run tests before you start taking an antibiotic.    Your team may take samples (e.g., from your blood, urine or other areas) to run tests to look for bacteria. These test can be important to determine if you need an antibiotic at all and, if you do, which antibiotic will work best.      Within a few days, your healthcare team might change or even stop your antibiotic.    Your team may start you on an antibiotic while they are working to find out what is making you sick.    Your team might change your antibiotic because test results show that a different antibiotic would be better to treat your infection.    In some cases, once your team has more information, they learn that you do not need an antibiotic at all. They may find out that you don't have an infection, or that the antibiotic you're taking won't work against your infection. For example, an infection caused by a virus can't be treated with antibiotics. Staying on an antibiotic when you don't need it is more likely to be harmful than helpful.      You may experience side effects from your antibiotic.    Like all medications, antibiotics have side effects. Some of these can be serious.    Let you healthcare team know if you have any known allergies when you are admitted to the hospital.    One significant side effect of nearly all antibiotics is the risk of severe and sometimes deadly diarrhea caused by Clostridium difficile (C. Difficile). This occurs when a person takes  antibiotics because some good germs are destroyed. Antibiotic use allows C. diificile to take over, putting patients at high risk for this serious infection.    As a patient or caregiver, it is important to understand your or your loved one's antibiotic treatment. It is especially important for caregivers to speak up when patients can't speak for themselves. Here are some important questions to ask your healthcare team.    What infection is this antibiotic treating and how do you know I have that infection?    What side effects might occur from this antibiotic?    How long will I need to take this antibiotic?    Is it safe to take this antibiotic with other medications or supplements (e.g., vitamins) that I am taking?     Are there any special directions I need to know about taking this antibiotic? For example, should I take it with food?    How will I be monitored to know whether my infection is responding to the antibiotic?    What tests may help to make sure the right antibiotic is prescribed for me?      Information provided by:  www.cdc.gov/getsmart  U.S. Department of Health and Human Services  Centers for disease Control and Prevention  National Center for Emerging and Zoonotic Infectious Diseases  Division of Healthcare Quality Promotion         Protect others around you: Learn how to safely use, store and throw away your medicines at www.disposemymeds.org.             Medication List: This is a list of all your medications and when to take them. Check marks below indicate your daily home schedule. Keep this list as a reference.      Medications           Morning Afternoon Evening Bedtime As Needed    cariprazine 3 MG Caps capsule   Commonly known as:  VRAYLAR   Take 1 capsule (3 mg) by mouth every morning   Last time this was given:  3 mg on 9/25/2017  8:36 AM                                cefdinir 300 MG capsule   Commonly known as:  OMNICEF   Take 1 capsule (300 mg) by mouth 2 times daily   Last time  this was given:  300 mg on 9/25/2017  1:54 PM                                ceftibuten 400 MG capsule   Commonly known as:  CEDAX   Take 1 capsule (400 mg) by mouth daily                                clarithromycin 500 MG tablet   Commonly known as:  BIAXIN   Take 1 tablet (500 mg) by mouth 2 times daily with food   Last time this was given:  500 mg on 9/25/2017  8:36 AM                                doxycycline Monohydrate 100 MG Caps   Take 1 capsule (100 mg) by mouth 2 times daily With food. Avoid calcium, magnesium, dairy, and the sun in 24 hours.                                gabapentin 300 MG capsule   Commonly known as:  NEURONTIN   Take 1 capsule (300 mg) by mouth 3 times daily   Last time this was given:  300 mg on 9/25/2017  1:13 PM                                hydrOXYzine 25 MG tablet   Commonly known as:  ATARAX   Take 1-2 tablets (25-50 mg) by mouth every 4 hours as needed for anxiety   Last time this was given:  50 mg on 9/24/2017  9:52 PM                                ibuprofen 600 MG tablet   Commonly known as:  ADVIL/MOTRIN   Take 1 tablet (600 mg) by mouth every 6 hours as needed for moderate pain   Last time this was given:  600 mg on 9/25/2017 10:23 AM                                metroNIDAZOLE 250 MG tablet   Commonly known as:  FLAGYL   Take 1 tablet (250 mg) by mouth 2 times daily with food   Last time this was given:  250 mg on 9/25/2017  8:37 AM                                naltrexone 50 MG tablet   Commonly known as:  DEPADE;REVIA   Take 1 tablet (50 mg) by mouth daily   Last time this was given:  50 mg on 9/25/2017  8:37 AM                                nystatin 059862 UNITS Tabs tablet   Commonly known as:  MYCOSTATIN   Take 1 tablet (500,000 Units) by mouth 4 times daily   Last time this was given:  500,000 Units on 9/25/2017 11:46 AM                                PHENobarbital 32.4 MG Tabs tablet   Commonly known as:  LUMINAL   Take 1 tab 4 times daily for 5 days, then  Take 1 tab 3 times daily for 5 days, then Take 1 tab 2 times daily for 5 days, then Take 1 tab 1 times daily for 5 days   Last time this was given:  32.4 mg on 9/25/2017 11:46 AM                                propranolol 20 MG tablet   Commonly known as:  INDERAL   Take 1 tablet (20 mg) by mouth 3 times daily   Last time this was given:  20 mg on 9/25/2017  1:14 PM                                psyllium Wafr   Take 2 Wafers by mouth daily PRN                                ranitidine 300 MG tablet   Commonly known as:  ZANTAC   Take 1 tablet (300 mg) by mouth 2 times daily   Last time this was given:  300 mg on 9/25/2017  8:36 AM                                sulfamethoxazole-trimethoprim 800-160 MG per tablet   Commonly known as:  BACTRIM DS/SEPTRA DS   Take 1 tablet by mouth 2 times daily   Last time this was given:  1 tablet on 9/25/2017  8:37 AM                                traZODone 50 MG tablet   Commonly known as:  DESYREL   Take 1-3 tablets ( mg) by mouth nightly as needed for sleep   Last time this was given:  150 mg on 9/24/2017  9:52 PM                                valACYclovir 500 MG tablet   Commonly known as:  VALTREX   Take 1 tablet (500 mg) by mouth 2 times daily   Last time this was given:  500 mg on 9/25/2017  8:37 AM                                vortioxetine 5 MG tablet   Commonly known as:  TRINTELLIX/BRINTELLIX   Take 2 tablets (10 mg) by mouth daily   Last time this was given:  5 mg on 9/25/2017  8:36 AM

## 2017-09-19 NOTE — TELEPHONE ENCOUNTER
Called received from the [facility] ER, Dr. Griffith would like to speak with Dr. Vital about pt.  Contact info:  [phone]        Dieudonne Ramirez

## 2017-09-20 PROCEDURE — 25000132 ZZH RX MED GY IP 250 OP 250 PS 637: Performed by: FAMILY MEDICINE

## 2017-09-20 PROCEDURE — 25000132 ZZH RX MED GY IP 250 OP 250 PS 637: Performed by: PSYCHIATRY & NEUROLOGY

## 2017-09-20 PROCEDURE — 12800012 ZZH R&B CD MH INTERMEDIATE ADULT

## 2017-09-20 PROCEDURE — 99222 1ST HOSP IP/OBS MODERATE 55: CPT | Mod: AI | Performed by: FAMILY MEDICINE

## 2017-09-20 RX ORDER — PHENOBARBITAL 32.4 MG/1
32.4 TABLET ORAL 4 TIMES DAILY
Status: DISCONTINUED | OUTPATIENT
Start: 2017-09-20 | End: 2017-09-25 | Stop reason: HOSPADM

## 2017-09-20 RX ADMIN — NICOTINE POLACRILEX 4 MG: 2 GUM, CHEWING ORAL at 08:02

## 2017-09-20 RX ADMIN — TRAZODONE HYDROCHLORIDE 150 MG: 50 TABLET ORAL at 21:48

## 2017-09-20 RX ADMIN — CEFDINIR 300 MG: 300 CAPSULE ORAL at 08:02

## 2017-09-20 RX ADMIN — CLARITHROMYCIN 500 MG: 500 TABLET ORAL at 20:27

## 2017-09-20 RX ADMIN — NICOTINE 1 PATCH: 21 PATCH, EXTENDED RELEASE TRANSDERMAL at 08:01

## 2017-09-20 RX ADMIN — Medication 100 MG: at 08:03

## 2017-09-20 RX ADMIN — DIAZEPAM 10 MG: 5 TABLET ORAL at 16:22

## 2017-09-20 RX ADMIN — NYSTATIN 500000 UNITS: 500000 TABLET, FILM COATED ORAL at 16:22

## 2017-09-20 RX ADMIN — METRONIDAZOLE 250 MG: 250 TABLET ORAL at 08:03

## 2017-09-20 RX ADMIN — FOLIC ACID 1 MG: 1 TABLET ORAL at 08:02

## 2017-09-20 RX ADMIN — RANITIDINE HYDROCHLORIDE 300 MG: 150 TABLET, FILM COATED ORAL at 08:02

## 2017-09-20 RX ADMIN — METRONIDAZOLE 250 MG: 250 TABLET ORAL at 20:27

## 2017-09-20 RX ADMIN — CLARITHROMYCIN 500 MG: 500 TABLET ORAL at 08:03

## 2017-09-20 RX ADMIN — MULTIPLE VITAMINS W/ MINERALS TAB 1 TABLET: TAB at 08:03

## 2017-09-20 RX ADMIN — DOXYCYCLINE HYCLATE 100 MG: 100 CAPSULE ORAL at 08:02

## 2017-09-20 RX ADMIN — PHENOBARBITAL 32.4 MG: 32.4 TABLET ORAL at 20:27

## 2017-09-20 RX ADMIN — NYSTATIN 500000 UNITS: 500000 TABLET, FILM COATED ORAL at 20:27

## 2017-09-20 RX ADMIN — DOXYCYCLINE HYCLATE 100 MG: 100 CAPSULE ORAL at 20:27

## 2017-09-20 RX ADMIN — SULFAMETHOXAZOLE AND TRIMETHOPRIM 1 TABLET: 800; 160 TABLET ORAL at 08:03

## 2017-09-20 RX ADMIN — DIAZEPAM 10 MG: 5 TABLET ORAL at 11:19

## 2017-09-20 RX ADMIN — CARIPRAZINE 1.5 MG: 1.5 CAPSULE, GELATIN COATED ORAL at 08:02

## 2017-09-20 RX ADMIN — VALACYCLOVIR HYDROCHLORIDE 500 MG: 500 TABLET, FILM COATED ORAL at 20:26

## 2017-09-20 RX ADMIN — NICOTINE POLACRILEX 4 MG: 2 GUM, CHEWING ORAL at 13:27

## 2017-09-20 RX ADMIN — RANITIDINE HYDROCHLORIDE 300 MG: 150 TABLET, FILM COATED ORAL at 20:27

## 2017-09-20 RX ADMIN — NYSTATIN 500000 UNITS: 500000 TABLET, FILM COATED ORAL at 08:02

## 2017-09-20 RX ADMIN — NICOTINE POLACRILEX 4 MG: 2 GUM, CHEWING ORAL at 11:19

## 2017-09-20 RX ADMIN — NYSTATIN 500000 UNITS: 500000 TABLET, FILM COATED ORAL at 11:19

## 2017-09-20 RX ADMIN — NICOTINE POLACRILEX 4 MG: 2 GUM, CHEWING ORAL at 16:22

## 2017-09-20 RX ADMIN — VALACYCLOVIR HYDROCHLORIDE 500 MG: 500 TABLET, FILM COATED ORAL at 08:02

## 2017-09-20 RX ADMIN — SULFAMETHOXAZOLE AND TRIMETHOPRIM 1 TABLET: 800; 160 TABLET ORAL at 20:27

## 2017-09-20 RX ADMIN — PHENOBARBITAL 32.4 MG: 32.4 TABLET ORAL at 16:22

## 2017-09-20 RX ADMIN — VILAZODONE HYDROCHLORIDE 40 MG: 40 TABLET ORAL at 08:03

## 2017-09-20 RX ADMIN — NICOTINE POLACRILEX 4 MG: 2 GUM, CHEWING ORAL at 20:31

## 2017-09-20 RX ADMIN — CEFDINIR 300 MG: 300 CAPSULE ORAL at 20:26

## 2017-09-20 RX ADMIN — DIAZEPAM 10 MG: 5 TABLET ORAL at 20:26

## 2017-09-20 ASSESSMENT — ACTIVITIES OF DAILY LIVING (ADL)
DRESS: STREET CLOTHES
GROOMING: INDEPENDENT
LAUNDRY: WITH SUPERVISION
ORAL_HYGIENE: INDEPENDENT

## 2017-09-20 NOTE — H&P
DATE OF ADMISSION:  09/19/2017      CHIEF COMPLAINT:  Alcohol dependence, benzodiazepine dependence.      HISTORY OF PRESENT ILLNESS:  Jonny Gramajo is a 32-year-old male from [place].  He was recently here for detox 3 weeks ago.  He lives with his wife in [place].  He is unemployed at the present time.  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  He has no children.  The patient has a history of addiction dating back to his teenage years.  He has been through multiple treatment programs as an adolescent.  He went to a program in [place] and a program in [place] and has been to [facility].  He has had some years of sobriety and was active in a recovery program in the past.      Lately he has been struggling with alcohol.  Prior to his last admission, he was abusing benzodiazepines, specifically Klonopin.  He was detoxed with Valium and phenobarbital and was discharged on a 20 day phenobarbital taper.  He took the phenobarbital more quickly than prescribed and then started to drink alcohol.  He was waiting to get into an outpatient treatment program.  He was worried about having a withdrawal seizure.  He now enters for further detox and then to go to residential treatment because outpatient plan failed.  He is not having a lot of withdrawal but again is very worried about having a seizure.      PAST MEDICAL HISTORY:  Chronic Lyme disease.  He attributes depression and symptoms of schizoaffective disorder to the chronic Lyme disease.  Borderline diabetes, hypertension, depression.      PAST SURGICAL HISTORY:  Orchiopexy.      MEDICATIONS PRIOR TO ADMISSION:  Doxycycline, metronidazole, Bactrim, Biaxin, atenolol, Viibryd, nystatin and Valtrex.      REVIEW OF SYSTEMS:     SKIN:  Several excoriations on the lower extremities from insect bites, nodule below the lower lip since May.   HEAD:  No headaches.   EYES:  No visual disturbance.   EARS:  No hearing loss.   MOUTH AND THROAT:  No difficulty  swallowing.   PULMONARY:  No cough or shortness of breath.   CARDIOVASCULAR:  No chest pain.   GASTROINTESTINAL:  No nausea, vomiting, diarrhea or constipation.   GENITOURINARY:  Negative.   MUSCULOSKELETAL:  Negative.   NEUROLOGIC:  Negative.      PHYSICAL EXAMINATION:   VITAL SIGNS:  Temperature 98.4, pulse 78, respirations 16, blood pressure is 140/89.   GENERAL:  This is a well-developed, well-nourished, tall 32-year-old male in no apparent distress, alert and cooperative and oriented.   SKIN:  Excoriations on the lower extremities not infected, pea-sized likely sebaceous cyst below the left lip lower.     HEAD:  Normal.   EYES:  Pupils equal, round, regular and reactive to light and accommodation.  EOMs intact.  Conjunctivae are normal.  Sclerae normal.   EARS:  Normal.   NOSE:  Normal.   MOUTH AND THROAT:  Lips normal.  Tongue normal.  Pharynx normal.   NECK:  Supple.  No masses, no thyroid enlargement.  Lymph nodes normal anterior and posterior cervical region.   PULMONARY:  Lungs clear to auscultation, good inspiration and expiration, no wheezes, no rhonchi, no rales.   CARDIOVASCULAR:  Heart regular rate and rhythm, no murmurs.  Good peripheral pulses, no edema.   GASTROINTESTINAL:  Abdomen soft, no distention, tenderness or rigidity.  Bowel sounds normal.  No masses, no organomegaly.  Abdomen is protuberant.  The patient complains of some tenderness in the right upper quadrant.   EXTREMITIES:  Full range of motion of all extremities, no inflammation of the ankles, knees, hips, hands, shoulders or elbows bilaterally.   NEUROLOGIC:  Motor normal.  Sensory normal.  Coordination normal.   MENTAL STATUS:  Oriented to time, place, person and situation.  Attitude is cooperative.  Insight fair, judgment fair.      ASSESSMENT:   1.  Alcohol dependence and withdrawal.   2.  Benzodiazepine dependence.   3.  Chronic Lyme disease.      PLAN:  Inpatient detox, evaluate for treatment, likely looking towards going to  residential treatment at this time.         EVERETT HAUSER MD             D: 2017 14:40   T: 2017 14:58   MT: PARISA      Name:     JONNY SUAREZ   MRN:      [id number]        Account:      [id number]   :      1985           Admitted:     [id number]      Document: [id number]

## 2017-09-20 NOTE — PROGRESS NOTES
SPIRITUAL HEALTH SERVICES Progress Note  [facility] ([facility]) 3AW       DATA:    Jonny was still in his admission interview with Reggie; this Spiritual Health Services (SHS) consult/request was made by Reggie on behalf of Jonny.        INTERVENTION:    I waited for a brief time to make an initial SHS introduction; Jonny was still in his admission process/interview when I took leave of 3AW.        OUTCOME:    NA       PLAN:    Visit -- Wednesday afternoon/evening, 20 September                                                                                                                                             Fransisca Locke  Volunteer    Pager [phone]

## 2017-09-20 NOTE — PLAN OF CARE
Problem: Substance Withdrawal  Goal: Substance Withdrawal  Problem: General Plan of Care (Inpatient Behavioral)  Goal: Individualization/ Patient Specific Goal (IP Behavioral)  The patient and/or their representative their patient-specific goals related to the plan of care.  Patient specific goals include:  1. Patient will be evaluated by a physician and labs will be evaluated.  2. Patient will be seen by a  for evaluation and discharge planning.  3. Patient will complete assessment paperwork  4. Patient will consume 75 % of meal to meet estimated energy needs.  5. Detoxification from alcohol using valium.  6. Patient will be provided with alcohol relapse prevention information.Signs and symptoms of listed problems will be absent or manageable.  Outcome: Paula Fuller is a 31 yo  male who comes to  seeking detox from alcohol.  He states that he has been drinking twenty drinks a day, a combination of Rum & Cokes and beer. He has been doing this for the last four days.  He was recently discharged from [facility] having been admitted for benzodiazepine and alcohol withdrawal.  When he was discharged on 9/5/17 he had been prescribed a phenobarbital taper.  He completed the taper early, explaining that he felt he needed to take more than he had been prescribed. He stated that his last drink was today (9/19/17) at 11:00 am. He is a two ppd cigarette smoker.   He states that he is depressed but he has no thoughts of self harm, harm to others, suicide or homicidal ideation.  Stressors include problems with his marriage and his relationship with his parents.  B:  History of anxiety, Lyme disease, HTN, and Depressive disorder.  A:  Patient int moderate alcohol withdrawal AEB MSSA score of 11.  R:  Admission orders obtained.  Provided with nutritious snacks and encouraged increased fluid intake.  Re-oriented to unit routines and schedule.  Counseled on smoking cessation.  Re-introduced to IM&R Treatment  program.  Administered metronidazole 250 mg, doxycyline 100 mg, clarithromycin 500 mg, valcyclovir 500 mg, ranitidine 300 mg, Bactrim-Trimethoprim 800-160 mg, nystatin 500,000 units, cefdinir 300 mg, trazodone 150 mg, diazepam 10 mg and nicotine gum 4 mg and one 21 mg nicotine patch placed.  Continue to monitor and medicate as indicated and ordered.

## 2017-09-20 NOTE — PROGRESS NOTES
09/19/17 2011   Patient Belongings   Did you bring any home meds/supplements to the hospital?  No   Patient Belongings other (see comments)   Disposition of Belongings storage   Belongings Search Yes   Clothing Search Yes   Second Staff Uriel HOFFMANNER 324:   cell phone, shoe laces    Security - $60.00 cash,    A             Admission:  I am responsible for any personal items that are not sent to the safe or pharmacy.  [facility] is not responsible for loss, theft or damage of any property in my possession.    Signature:  _________________________________ Date: _______  Time: _____                                              Staff Signature:  ____________________________ Date: ________  Time: _____      2nd Staff person, if patient is unable/unwilling to sign:    Signature: ________________________________ Date: ________  Time: _____   Discharge:  [facility] has returned all of my personal belongings:    Signature: _________________________________ Date: ________  Time: _____                                          Staff Signature:  ____________________________ Date: ________  Time: _____

## 2017-09-20 NOTE — PHARMACY-ADMISSION MEDICATION HISTORY
Admission Medication History status for the 9/19/2017 admission is complete.  See EPIC admission navigator for Prior to Admission medications.    Medication history sources:  Patient, [facility] in [facility] ([phone]), [facility]    Medication history source reliability: Good. Patient recognized all medication names. He was uncertain about some strengths which were verified with [facility], [facility], and [facility]. Fill history also reviewed with pharmacy.     Medication adherence:  Poor. Patient reports he discontinues medications when he drinks because he is afraid of them interacting with alcohol. Patient reports in the last month, only the past few days have been without meds. However, pharmacy fill records shows some medications (Bactrim, clarithromycin, metronidazole, valacyclovir) have not been taken in over a month.     Changes made to PTA medication list (reason)  Added: None  Deleted:   -atenolol 50mg tab - 1 tab by mouth daily - per patient, general practitioner discontinued 2 weeks ago.  Changed:  -psyllium wafer: 2 wafers QOD --> 2 wafers QD PRN per patient  -milk thistle: tabs --> caps per patient.  -nystatin 100,000 unit/mL suspension - per [facility] pharmacy not filled. [facility] suspension was likely switched to nystatin 500,000 unit tablet.     Additional medication history information (including reliability of information, actions taken by pharmacist):   -Patient reports general practitioner was worried patient's blood pressure was going two low and discontinued atenolol. Patient confirmed he did stop taking the medication and it was not replaced with any other blood pressure medication.  -Per [facility], patient picked up phenobarbital 32.4mg quantity of 50 tabs on 9/5/17. Patient reports he was taking 1 tablet 4 times a day and ran out (did not titrate down as prescribed).   -Patient taking valacyclovir for activated Joel-Barr virus since 2016. Last  picked up 1 month supply on 8/14/17 per [facility].  -Patient reports general practitioner wanted to switch patient from cefdinir to ceftibuten but pharmacy reports prescription is on shortage and is not able to stock it for weeks. Pharmacy confirmed patient has not yet received.   -Patient prescribed chronic antibiotics for lyme disease since 2015. Patient reports taking all antibiotics but refill history is inconsistent.    *cefdinir (picked up 9/9/17 for 1 month supply [facility])  *doxycycline  picked up 9/9/17 for 1 month supply per [facility])  *clarithromycin (picked up 6/17/17 for 1 month supply [facility], no fill history at [facility])  *metronidazole (picked up 6/19/17 for 1 month supply per [facility], no fill history at [facility])  *Bactrim (picked up 7/13/17 for 1 month supply per [facility], no fill history at [facility])  -Patient reports he takes milk thistle to help with his liver. Per patient, Zymogen brand but unknown strength.  -Patient requested nicotine replacement therapy when he goes on floor. Encouraged him to remind care team when he arrives on floor as well.  -Clarified reactions with patients to meds listed as allergies (abilify - patient reported tardive dyskinesia but likely akathisia & wellbutrin - anxiety).     Time spent in this activity: 50 minutes    Medication history completed by: Stacy Jefferson, [facility] Pharmacy Intern    Prior to Admission medications    Medication Sig Last Dose Taking? Auth Provider   nystatin (MYCOSTATIN) 350841 UNITS TABS tablet Take 1 tablet by mouth 4 times daily Past Week at Unknown time Yes Unknown, Entered By History   ranitidine (ZANTAC) 300 MG tablet Take 300 mg by mouth 2 times daily Past Week at Unknown time Yes Unknown, Entered By History   vilazodone (VIIBRYD) 40 MG TABS tablet Take 40 mg by mouth daily 9/19/2017 at AM Yes Unknown, Entered By History   traZODone (DESYREL) 50 MG tablet Take 1-3 tablets ( mg) by mouth nightly as needed for sleep 9/16/2017 at Unknown time Yes  Eliud Vital MD   cefdinir (OMNICEF) 300 MG capsule Take 300 mg by mouth 2 times daily 9/16/2017 at Unknown time Yes Unknown, Entered By History   doxycycline Monohydrate 100 MG CAPS Take 100 mg by mouth 2 times daily With food. Avoid calcium, magnesium, dairy, and the sun in 24 hours. 9/16/2017 at Unknown time Yes Unknown, Entered By History   MILK THISTLE PO Take 4 capsules by mouth 2 times daily Strength unknown  Past Week at Unknown time Yes Unknown, Entered By History   psyllium (METAMUCIL) WAFR Take 2 Wafers by mouth daily PRN 9/16/2017 at Unknown time Yes Unknown, Entered By History   clarithromycin (BIAXIN) 500 MG tablet Take 500 mg by mouth 2 times daily with food Past Week at Unknown time Yes Unknown, Entered By History   cariprazine (VRAYLAR) 1.5 MG CAPS capsule Take 1.5 mg by mouth every morning  Past Week at Unknown time Yes Reported, Patient   ValACYclovir HCl (VALTREX PO) Take 500 mg by mouth 2 times daily  Past Week at Unknown time Yes Reported, Patient   ceftibuten (CEDAX) 400 MG capsule Take 1 capsule (400 mg) by mouth daily  at Not yet taken  Eliud Vital MD   PHENobarbital (LUMINAL) 32.4 MG TABS tablet Take 1 tab 4 times daily for 5 days, then  Take 1 tab 3 times daily for 5 days, then  Take 1 tab 2 times daily for 5 days, then  Take 1 tab 1 times daily for 5 days 9/16/2017 at Unknown time  Eliud Vital MD   metroNIDAZOLE (FLAGYL) 250 MG tablet Take 250 mg by mouth 2 times daily with food Unknown at Unknown time  Unknown, Entered By History   sulfamethoxazole-trimethoprim (BACTRIM DS/SEPTRA DS) 800-160 MG per tablet Take 1 tablet by mouth 2 times daily Unknown at Unknown time  Unknown, Entered By History

## 2017-09-20 NOTE — PROGRESS NOTES
CASE MANAGEMENT NOTE    Writer met with patient to initiate discharge planning. Patient is asking for placement in [facility] and was directed to contact the business office regarding possible out of pocket costs, in addition to completing his paperwork. Patient cooperative, asked and answered questions appropriately.    Janet Griggs) SPENCER De Luna, [facility]  3A Psychotherapist  [phone]

## 2017-09-20 NOTE — PROGRESS NOTES
"CLINICAL NUTRITION SERVICES - ASSESSMENT NOTE     Nutrition Prescription    RECOMMENDATIONS FOR MDs/PROVIDERS TO ORDER:  None at this time.    Malnutrition Status:    Patient does not meet two of the above criteria necessary for diagnosing malnutrition    Recommendations already ordered by Registered Dietitian (RD):  None at this time.    Future/Additional Recommendations:  -Encourage continued good intakes of tid meals plus snacks between from unit as desired.     REASON FOR ASSESSMENT  Jonny Gramajo is a/an 32 year old male assessed by the dietitian for Admission Nutrition Risk Screen for reduced oral intake over the last month    NUTRITION HISTORY  Per chart review, pt states that he has been drinking twenty drinks a day, a combination of Rum & Cokes and beer. He has been doing this for the last four days.  He was recently discharged from [facility] having been admitted for benzodiazepine and alcohol withdrawal.  During previous admission, pt was assessed by RD on 8/31 due to (+) nutrition admission risk screen for unintended weight loss.      Today pt stated he was eating well until 3-4 days PTA.  He reports eating maybe 1/2 sandwich per day and drinking heavily.    CURRENT NUTRITION ORDERS  Diet: Regular  Intake/Tolerance: Per pt he has been eating well since here.  He anticipates no problems continuing to eat well.    LABS  Labs reviewed  (9/19) ALT, AST  elevated    MEDICATIONS  Medications reviewed  NS bolus  Thiamine   Folic Acid  MVI with minerals    ANTHROPOMETRICS  Height: 198.1 cm (6' 6\")  Most Recent Weight: (!) 149.7 kg (330 lb)    IBW: 214 lbs  BMI: Obesity Grade II BMI 35-39.9  Weight History:   Pe chart review, weights fluctuating, but currently increased from 318 lbs on (8/30).    Wt Readings from Last 10 Encounters:   09/19/17 (!) 149.7 kg (330 lb)   08/30/17 (!) 144.2 kg (318 lb)   08/04/17 (!) 149.7 kg (330 lb)   07/15/17 (!) 145.2 kg (320 lb)   06/22/17 (!) 140.2 kg (309 lb)   06/21/17 (!) 140.2 kg " (309 lb)   05/12/17 (!) 136.2 kg (300 lb 4.3 oz)   05/10/17 (!) 136.2 kg (300 lb 4.3 oz)   03/03/17 (!) 137.4 kg (303 lb)   02/25/17 (!) 137.4 kg (303 lb)       Dosing Weight:  110 kg    ASSESSED NUTRITION NEEDS  Estimated Energy Needs: 8477-4403 kcals/day (20 - 25 kcals/kg)  Justification: Obese  Estimated Protein Needs:  grams protein/day (0.8 - 1 grams of pro/kg)  Justification: Maintenance  Estimated Fluid Needs:  (1 mL/kcal)   Justification: Per provider pending fluid status    PHYSICAL FINDINGS  See malnutrition section below.  No edema    MALNUTRITION  % Intake: Decreased intake does not meet criteria  % Weight Loss: None noted  Subcutaneous Fat Loss: None observed  Muscle Loss: None observed  Fluid Accumulation/Edema: None noted  Malnutrition Diagnosis: Patient does not meet two of the above criteria necessary for diagnosing malnutrition    NUTRITION DIAGNOSIS  No nutrition diagnosis at this time.      INTERVENTIONS  Implementation  Nutrition Education: Met with pt to discuss admission risk screen and reduced oral intakes.  Per pt currently eating well and should be able to continue.    Monitoring/Evaluation  No further nutrition follow up planned at this time.  Please consult if further needs arise.    Ce Barron RD, LD

## 2017-09-20 NOTE — PLAN OF CARE
Problem: Substance Withdrawal  Goal: Substance Withdrawal  Problem: General Plan of Care (Inpatient Behavioral)  Goal: Individualization/ Patient Specific Goal (IP Behavioral)  The patient and/or their representative their patient-specific goals related to the plan of care.  Patient specific goals include:  1. Patient will be evaluated by a physician and labs will be evaluated.  2. Patient will be seen by a  for evaluation and discharge planning.  3. Patient will complete assessment paperwork  4. Patient will consume 75 % of meal to meet estimated energy needs.  5. Detoxification from alcohol using valium.  6. Patient will be provided with alcohol relapse prevention information.Signs and symptoms of listed problems will be absent or manageable.   Outcome: No Change  Pt being monitored for alcohol and benzodiazepine withdrawal. Pt medicated x 1 with valium 10 mg. See orders per Dr. Vital for phenobarbital to start. PT out on unit. Reports he has auditory and visual hallucinations for several years. Hears his voice being called and will see things that are not there or something like a cup melting on the table. Pt reports feeling depressed rating his depression level a 8 on a 0-10 severe scale. Denies SI.  Pt states he interested in the LP program.

## 2017-09-21 ENCOUNTER — TELEPHONE (OUTPATIENT)
Dept: BEHAVIORAL HEALTH | Facility: CLINIC | Age: 32
End: 2017-09-21

## 2017-09-21 PROCEDURE — H2035 A/D TX PROGRAM, PER HOUR: HCPCS | Mod: HQ

## 2017-09-21 PROCEDURE — 25000132 ZZH RX MED GY IP 250 OP 250 PS 637: Performed by: PSYCHIATRY & NEUROLOGY

## 2017-09-21 PROCEDURE — 12800012 ZZH R&B CD MH INTERMEDIATE ADULT

## 2017-09-21 PROCEDURE — 99231 SBSQ HOSP IP/OBS SF/LOW 25: CPT | Performed by: FAMILY MEDICINE

## 2017-09-21 PROCEDURE — 25000132 ZZH RX MED GY IP 250 OP 250 PS 637: Performed by: FAMILY MEDICINE

## 2017-09-21 RX ORDER — PROPRANOLOL HYDROCHLORIDE 20 MG/1
20 TABLET ORAL 3 TIMES DAILY
Status: DISCONTINUED | OUTPATIENT
Start: 2017-09-21 | End: 2017-09-25 | Stop reason: HOSPADM

## 2017-09-21 RX ORDER — GABAPENTIN 300 MG/1
300 CAPSULE ORAL 3 TIMES DAILY
Status: DISCONTINUED | OUTPATIENT
Start: 2017-09-21 | End: 2017-09-25 | Stop reason: HOSPADM

## 2017-09-21 RX ORDER — IBUPROFEN 600 MG/1
600 TABLET, FILM COATED ORAL EVERY 6 HOURS PRN
Status: DISCONTINUED | OUTPATIENT
Start: 2017-09-21 | End: 2017-09-25 | Stop reason: HOSPADM

## 2017-09-21 RX ADMIN — METRONIDAZOLE 250 MG: 250 TABLET ORAL at 20:48

## 2017-09-21 RX ADMIN — NICOTINE POLACRILEX 4 MG: 2 GUM, CHEWING ORAL at 20:49

## 2017-09-21 RX ADMIN — PROPRANOLOL HYDROCHLORIDE 20 MG: 20 TABLET ORAL at 20:48

## 2017-09-21 RX ADMIN — GABAPENTIN 300 MG: 300 CAPSULE ORAL at 20:48

## 2017-09-21 RX ADMIN — SULFAMETHOXAZOLE AND TRIMETHOPRIM 1 TABLET: 800; 160 TABLET ORAL at 20:48

## 2017-09-21 RX ADMIN — CARIPRAZINE 1.5 MG: 1.5 CAPSULE, GELATIN COATED ORAL at 08:41

## 2017-09-21 RX ADMIN — PSYLLIUM HUSK 1 PACKET: 3.4 POWDER ORAL at 09:41

## 2017-09-21 RX ADMIN — NICOTINE POLACRILEX 4 MG: 2 GUM, CHEWING ORAL at 10:03

## 2017-09-21 RX ADMIN — PHENOBARBITAL 32.4 MG: 32.4 TABLET ORAL at 16:12

## 2017-09-21 RX ADMIN — NICOTINE POLACRILEX 2 MG: 2 GUM, CHEWING ORAL at 14:43

## 2017-09-21 RX ADMIN — METRONIDAZOLE 250 MG: 250 TABLET ORAL at 08:40

## 2017-09-21 RX ADMIN — DIAZEPAM 10 MG: 5 TABLET ORAL at 12:23

## 2017-09-21 RX ADMIN — VILAZODONE HYDROCHLORIDE 40 MG: 40 TABLET ORAL at 08:41

## 2017-09-21 RX ADMIN — SULFAMETHOXAZOLE AND TRIMETHOPRIM 1 TABLET: 800; 160 TABLET ORAL at 08:41

## 2017-09-21 RX ADMIN — PHENOBARBITAL 32.4 MG: 32.4 TABLET ORAL at 20:55

## 2017-09-21 RX ADMIN — MULTIPLE VITAMINS W/ MINERALS TAB 1 TABLET: TAB at 08:41

## 2017-09-21 RX ADMIN — NICOTINE POLACRILEX 4 MG: 2 GUM, CHEWING ORAL at 16:13

## 2017-09-21 RX ADMIN — NICOTINE POLACRILEX 4 MG: 2 GUM, CHEWING ORAL at 18:11

## 2017-09-21 RX ADMIN — PHENOBARBITAL 32.4 MG: 32.4 TABLET ORAL at 08:41

## 2017-09-21 RX ADMIN — NYSTATIN 500000 UNITS: 500000 TABLET, FILM COATED ORAL at 20:48

## 2017-09-21 RX ADMIN — CEFDINIR 300 MG: 300 CAPSULE ORAL at 08:41

## 2017-09-21 RX ADMIN — PHENOBARBITAL 32.4 MG: 32.4 TABLET ORAL at 12:22

## 2017-09-21 RX ADMIN — NYSTATIN 500000 UNITS: 500000 TABLET, FILM COATED ORAL at 16:12

## 2017-09-21 RX ADMIN — CLARITHROMYCIN 500 MG: 500 TABLET ORAL at 08:41

## 2017-09-21 RX ADMIN — IBUPROFEN 600 MG: 600 TABLET ORAL at 12:22

## 2017-09-21 RX ADMIN — DIAZEPAM 10 MG: 5 TABLET ORAL at 16:12

## 2017-09-21 RX ADMIN — DIAZEPAM 10 MG: 5 TABLET ORAL at 20:48

## 2017-09-21 RX ADMIN — NICOTINE POLACRILEX 4 MG: 2 GUM, CHEWING ORAL at 12:21

## 2017-09-21 RX ADMIN — IBUPROFEN 600 MG: 600 TABLET ORAL at 18:11

## 2017-09-21 RX ADMIN — NYSTATIN 500000 UNITS: 500000 TABLET, FILM COATED ORAL at 08:41

## 2017-09-21 RX ADMIN — CEFDINIR 300 MG: 300 CAPSULE ORAL at 20:47

## 2017-09-21 RX ADMIN — GABAPENTIN 300 MG: 300 CAPSULE ORAL at 14:44

## 2017-09-21 RX ADMIN — NICOTINE 1 PATCH: 21 PATCH, EXTENDED RELEASE TRANSDERMAL at 08:39

## 2017-09-21 RX ADMIN — PROPRANOLOL HYDROCHLORIDE 20 MG: 20 TABLET ORAL at 14:43

## 2017-09-21 RX ADMIN — VALACYCLOVIR HYDROCHLORIDE 500 MG: 500 TABLET, FILM COATED ORAL at 08:40

## 2017-09-21 RX ADMIN — FOLIC ACID 1 MG: 1 TABLET ORAL at 08:41

## 2017-09-21 RX ADMIN — NYSTATIN 500000 UNITS: 500000 TABLET, FILM COATED ORAL at 12:22

## 2017-09-21 RX ADMIN — Medication 100 MG: at 08:41

## 2017-09-21 RX ADMIN — RANITIDINE HYDROCHLORIDE 300 MG: 150 TABLET, FILM COATED ORAL at 08:40

## 2017-09-21 RX ADMIN — DOXYCYCLINE HYCLATE 100 MG: 100 CAPSULE ORAL at 20:48

## 2017-09-21 RX ADMIN — CEFUROXIME AXETIL 500 MG: 500 TABLET, FILM COATED ORAL at 08:41

## 2017-09-21 RX ADMIN — RANITIDINE HYDROCHLORIDE 300 MG: 150 TABLET, FILM COATED ORAL at 20:48

## 2017-09-21 RX ADMIN — DOXYCYCLINE HYCLATE 100 MG: 100 CAPSULE ORAL at 08:40

## 2017-09-21 RX ADMIN — ACETAMINOPHEN 650 MG: 325 TABLET, FILM COATED ORAL at 10:03

## 2017-09-21 RX ADMIN — CLARITHROMYCIN 500 MG: 500 TABLET ORAL at 20:48

## 2017-09-21 RX ADMIN — TRAZODONE HYDROCHLORIDE 150 MG: 50 TABLET ORAL at 21:55

## 2017-09-21 RX ADMIN — VALACYCLOVIR HYDROCHLORIDE 500 MG: 500 TABLET, FILM COATED ORAL at 20:47

## 2017-09-21 ASSESSMENT — ACTIVITIES OF DAILY LIVING (ADL)
GROOMING: INDEPENDENT
ORAL_HYGIENE: INDEPENDENT
GROOMING: INDEPENDENT
ORAL_HYGIENE: INDEPENDENT
DRESS: INDEPENDENT

## 2017-09-21 ASSESSMENT — ENCOUNTER SYMPTOMS
ACTIVITY IMPAIRMENT: NORMAL
NO PATIENT REPORTED PAIN: 1
DIETARY ISSUES: ADEQUATE INTAKE
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
## 2017-09-21 NOTE — PROGRESS NOTES
SPIRITUAL HEALTH SERVICES Progress Note  [facility] ([facility]) 3AW       DATA:    Initial Spiritual Health Services ([facility]) attempted visit; though, Jonny and I have met during an earlier admission.  Jonny was on the telephone after dinner/before the evening Twelve Step meeting; we were not able to meet.  I wrote a brief note for Jonny; Psych Associate-Amelia delivered my note to Jonny during the Twelve Step meeting-time.       INTERVENTION:    Attempted a [facility] visit.       OUTCOME:    NA       PLAN:    Visit -- Thursday, 21 September.                                                                                                                                             Fransisca Locek  Volunteer    Pager [phone]

## 2017-09-21 NOTE — PROGRESS NOTES
Rule 25 Assessment  Background Information   1. Date of Assessment Request  2. Date of Assessment  9/21/2017  3. Date Service Authorized     4.   Janet De Luna MA (Cindy), [facility]    5.  Phone Number   [phone]  6. Referent  Self 7. Assessment Site  [facility]     8. Client Name   Jonny Gramajo 9. Date of Birth  1985 Age  32 year old 10. Gender  male  11. PMI/ Insurance No.  Payor: [employer] / Plan:  OPEN ACCESS FULLY INSURED / Product Type: HMO /   [id number]   12. Client's Primary Language:  English 13. Do you require special accommodations, such as an  or assistance with written material? No   14. Current Address: [address] [place] [address] [address] [place] [address]   15. Client Phone Numbers: [phone] (home) none (work)     16. Tell me what has happened to bring you here today.    Relapse.  I ran out of phenobarbital and felt my heart pounding out of my chest and I always get so scared from tapering off benzos that I'll have a heart attack or seizure which I've had before. Initially I was trying to calm myself down and limp my way to outpatient treatment but I got so sick on day I was supposed to be admitted - dry heaving and not retaining water that I got to get help and called an ambulance and knew I'd be turned away at outpatient.    17. Have you had other rule 25 assessments?     End of August 2017 at [facility].    DIMENSION I - Acute Intoxication /Withdrawal Potential   1. Chemical use most recent 12 months outside a facility and other significant use history (client self-report)              X = Primary Drug Used   Age of First Use Most Recent Pattern of Use and Duration   Need enough information to show pattern (both frequency and amounts) and to show tolerance for each chemical that has a diagnosis   Date of last use and time, if needed   Withdrawal Potential? Requiring special care Method of use  (oral, smoked, snort, IV, etc)      x Alcohol     [age] to 25 hard drinks daily (rum & coke, beers) for the last 4 days.  Prior:  For 10 days I was completely sober. Prior to that:  Usually 2 - 3 days of drinking and then I'd pause for a bit and then resume, usually split the week in half between sobering up and drinking for about 2 years.   9/19/2017  11AM Yes Oral      Marijuana/  Hashish   2001  Heavy marijuana use for about 2 years after being arrested for possession and living at home. 2005        Cocaine/Crack     N/A           Meth/  Amphetamines   N/A           Heroin     N/A           Other Opiates/  Synthetics   N/A           Inhalants     N/A           Benzodiazepines     14 I took it for 8 years responsibility but then for the last 2 years, irresponsibly.   8/30/2017   Yes Oral      Hallucinogens     N/A           Barbiturates/  Sedatives/  Hypnotics N/A           Over-the-Counter Drugs   N/A           Other     N/A           Nicotine     12 2 PPD since age 20. 9/19/2017   Yes Smoke     2. Do you use greater amounts of alcohol/other drugs to feel intoxicated or achieve the desired effect?  Yes.  Or use the same amount and get less of an effect?  No.  Example: I didn't have to drink that much before, I was like a cat that climbed a tree too high, I didn't know how to get down. The benzos helped me drink less because of the interaction with alcohol. It took care of my agoraphobia because I had profound mental health issues too, complication of lyme disease which is what basically kicked off everything. Agoraphobia is now less of an issue because I've been treated with antibiotics. I still deal with hallucinations, zone out at the TV. I get really paranoid too, thinking that people are talking about me, there's not really a logical reason to think that after I analyze it.    3A. Have you ever been to detox?     Yes    3B. When was the first time?     6/1/2017 through 6/5/2017 here at [facility] but was in a medical unit because of my PIC  line, they wanted me on detox unit, I didn't want to do that and compromise my lyme treatment. It was like a detox.    3C. How many times since then?     2017     3D. Date of most recent detox:     This is 2nd detox plus detox protocol in medical unit 2017 - 2017.    4.  Withdrawal symptoms: Have you had any of the following withdrawal symptoms?  Past 12 months Recent (past 30 days)   Sweating (Rapid Pulse)  Shaky / Jittery / Tremors  Unable to Sleep  Agitation  Fatigue / Extremely Tired  Sad / Depressed Feeling  Muscle Aches  Vivid / Unpleasant Dreams  Dizziness  Seizures  Diarrhea  Hallucinations  Fever  Psychosis  Confused / Disrupted Speech  Anxiety / Worried Sweating (Rapid Pulse)  Shaky / Jittery / Tremors  Unable to Sleep  Agitation  Headache  Fatigue / Extremely Tired  Sad / Depressed Feeling  Muscle Aches  Vivid / Unpleasant Dreams  Nausea / Vomiting  Diarrhea  Hallucinations really kick up when in withdrawal.   Unable to Eat  Psychosis - it's more of a lyme disease thing - I've had it for 24 years, and it becomes very neurological. I'm really concerned that no one is qualified to address neuropsychological lyme disease which is different than something you're born with. I have resources outside of here but not really here in this hospital.  Confused / Disrupted Speech  Anxiety / Worried     's Visual Observations and Symptoms: No visible withdrawal symptoms at this time    Based on the above information, is withdrawal likely to require attention as part of treatment participation?  Yes    Dimension I Ratings   Acute intoxication/Withdrawal potential - The placing authority must use the criteria in Dimension I to determine a client s acute intoxication and withdrawal potential.    RISK DESCRIPTIONS - Severity ratin Client can tolerate and cope with withdrawal discomfort. The client displays mild to moderate intoxication or signs and symptoms interfering with daily functioning but  does not immediately endanger self or others. Client poses minimal risk of severe withdrawal.    REASONS SEVERITY WAS ASSIGNED (What about the amount of the person s use and date of most recent use and history of withdrawal problems suggests the potential of withdrawal symptoms requiring professional assistance? )     Patient reports withdrawals continue, feels really shaky, feels like drinking.         DIMENSION II - Biomedical Complications and Conditions   1. Do you have any current health/medical conditions?(Include any infectious diseases, allergies, or chronic or acute pain, history of chronic conditions)       Chronic lyme disease.    2. Do you have a health care provider? When was your most recent appointment? What concerns were identified?     Yes -  who prescribes my anti-biotics.  Last appointment July 2017.       3. If indicated by answers to items 1 or 2: How do you deal with these concerns? Is that working for you? If you are not receiving care for this problem, why not?      Yes, seems to be going well. I feel like I'm in a remission of sorts on the lyme front. I feel about 70% better overall better.    4A. List current medication(s) including over-the-counter or herbal supplements--including pain management:     Prior to Admission medications    Medication Sig Start Date End Date Taking? Authorizing Provider   nystatin (MYCOSTATIN) 274916 UNITS TABS tablet Take 1 tablet by mouth 4 times daily   Yes Unknown, Entered By History   ranitidine (ZANTAC) 300 MG tablet Take 300 mg by mouth 2 times daily   Yes Unknown, Entered By History   vilazodone (VIIBRYD) 40 MG TABS tablet Take 40 mg by mouth daily   Yes Unknown, Entered By History   traZODone (DESYREL) 50 MG tablet Take 1-3 tablets ( mg) by mouth nightly as needed for sleep 9/4/17  Yes Eliud Vital MD   cefdinir (OMNICEF) 300 MG capsule Take 300 mg by mouth 2 times daily   Yes Unknown, Entered By History   doxycycline Monohydrate  100 MG CAPS Take 100 mg by mouth 2 times daily With food. Avoid calcium, magnesium, dairy, and the sun in 24 hours.   Yes Unknown, Entered By History   MILK THISTLE PO Take 4 capsules by mouth 2 times daily Strength unknown    Yes Unknown, Entered By History   psyllium (METAMUCIL) WAFR Take 2 Wafers by mouth daily PRN   Yes Unknown, Entered By History   clarithromycin (BIAXIN) 500 MG tablet Take 500 mg by mouth 2 times daily with food   Yes Unknown, Entered By History   cariprazine (VRAYLAR) 1.5 MG CAPS capsule Take 1.5 mg by mouth every morning    Yes Reported, Patient   ValACYclovir HCl (VALTREX PO) Take 500 mg by mouth 2 times daily    Yes Reported, Patient   ceftibuten (CEDAX) 400 MG capsule Take 1 capsule (400 mg) by mouth daily 9/4/17   Eliud Vital MD   PHENobarbital (LUMINAL) 32.4 MG TABS tablet Take 1 tab 4 times daily for 5 days, then  Take 1 tab 3 times daily for 5 days, then  Take 1 tab 2 times daily for 5 days, then  Take 1 tab 1 times daily for 5 days 9/4/17   Eliud Vital MD   metroNIDAZOLE (FLAGYL) 250 MG tablet Take 250 mg by mouth 2 times daily with food    Unknown, Entered By History   sulfamethoxazole-trimethoprim (BACTRIM DS/SEPTRA DS) 800-160 MG per tablet Take 1 tablet by mouth 2 times daily    Unknown, Entered By History        4B. Do you follow current medical recommendations/take medications as prescribed?     Yes - pretty closely. I have to be really careful with metroNIDAZOLE, it interacts like Antabuse.    4C. When did you last take your medication?     Day of admission.    5. Has a health care provider/healer ever recommended that you reduce or quit alcohol/drug use?     Yes    6. Are you pregnant?     N/A     7. Have you had any injuries, assaults/violence towards you, accidents, health related issues, overdose(s) or hospitalizations related to your use of alcohol or other drugs:     Yes - overdoses of klonopin and alcohol in early June 2017. I was using recreationally  and it got out of hand. I wasn't trying to hurt myself.    8. Do you have any specific physical needs/accommodations? Yes, Lyme disease fatigue - rest. Abilify briefly.    Dimension II Ratings   Biomedical Conditions and Complications - The placing authority must use the criteria in Dimension II to determine a client s biomedical conditions and complications.   RISK DESCRIPTIONS - Severity ratin Client has difficulty tolerating and coping with physical problems or has other biomedical problems that interfere with recovery and treatment. Client neglects or does not seek care for serious biomedical problems.    REASONS SEVERITY WAS ASSIGNED (What physical/medical problems does this person have that would inhibit his or her ability to participate in treatment? What issues does he or she have that require assistance to address?)    Chronic lyme disease causing fatigue.         DIMENSION III - Emotional, Behavioral, Cognitive Conditions and Complications   1. (Optional) Tell me what it was like growing up in your family. (substance use, mental health, discipline, abuse, support)      Forms of punishment growing up:  Grounding, loss of privileges.     2. When was the last time that you had significant problems...  A. with feeling very trapped, lonely, sad, blue, depressed or hopeless  about the future? Past Month - In the past, I'm very preoccupied with my inventions and intellectual pursuits and having an impaired cognition has been really frustrating to me. I can actually think when having the first few drinks, helps me focus, get much more done. I'm basically laying in bed all day, want to do things but with no mental capacity to do anything. My house is a mess and we live like horders, it's absolutely gross and I just want to block it out.     B. with sleep trouble, such as bad dreams, sleeping restlessly, or falling  asleep during the day? Past Month - I've always had interrupted or complete insommnia. I'm almost  always tossing and turning.    C. with feeling very anxious, nervous, tense, scared, panicked, or like  something bad was going to happen? Past Month - Every single one of those yes. I feel like this impending doom. I had a really hard time yesterday, felt intimidated by some of the people here. I didn't want to say anything, you just can't show weakness out there. I just went to my room, breathe, take mental breaks, exercise walking up and down the halls.    D. with becoming very distressed and upset when something reminded  you of the past? Past Month - I'm sure it has but I just can't remember. I revisit a lot of memories and have a lot of regrets. It really preoccupies and stresses me.     E. with thinking about ending your life or committing suicide? Past Month - I've definitely had thoughts. I had an attempt when I was 18. I'm just so frustrated with not seeing any progress in my life. I don't think people get the magnitude of what I'm dealing with because I'm very stoic too. I think I'm very frustrated that people don't understand what I'm going through. I can't overemphasize the mental illness part of this. I think this is probably the core of my drinking. There's an area of the house that's high enough that I could hang myself, I even kind of measured it out, get a running start, drop and crack my neck, at least strangle myself to death. I did start gathering materials, looking to see if there was enough clearance or height. I'm glad I'm not a gun owner and don't plan to be, that would be too simple. If there was just a quick way to kill myself, I'd probably do it. I can't think of a way that isn't that gorey or agonizing.     Current suicidal ideation, plan or intention?:  I never wish harm on other people. My issues are really internal, self-based. I really really do not have any desire to make anyone's life miserable, I don't really think of it as an option.     For me, if there were an easy way, I'd  probably be tempted to do it. Right now, I'm feeling kind of emotional talking about all this stuff. Hope for the future (10 = most hopeful):  6.5. Because I've always been able to get through what has been affecting me. I'm very resilient but I'm realistic too.     I feel like basically I'm bed-sick with lyme disease and worried I'm going to be overworked and harassed by aggressive treatment counselors (in [facility]) but I need to know I can lay down when I need to. You can't not lay down with 24 years of lyme disease. Maybe if there was some understanding that I could rest if I absolutely had to.    's note regarding current suicidal ideation/plan/intention:  Patient stated he feels safe, agrees to alert staff if he is feeling like harming himself here.      3. When was the last time that you did the following things two or more times?  A. Lied or conned to get things you wanted or to avoid having to do  something? Past Month    B. Had a hard time paying attention at school, work, or home? Past Month    C. Had a hard time listening to instructions at school, work, or home? Past Month    D. Were a bully or threatened other people? Never    E. Started physical fights with other people? Never    Note: These questions are from the Global Appraisal of Individual Needs--Short Screener. Any item marked  past month  or  2 to 12 months ago  will be scored with a severity rating of at least 2.     For each item that has occurred in the past month or past year ask follow up questions to determine how often the person has felt this way or has the behavior occurred? How recently? How has it affected their daily living? And, whether they were using or in withdrawal at the time?    4A. If the person has answered item 2E with  in the past year  or  the past month , ask about frequency and history of suicide in the family or someone close and whether they were under the influence.     Any history of suicide in your  family? Or someone close to you?     No    4B. If the person answered item 2E  in the past month  ask about  intent, plan, means and access and any other follow-up information  to determine imminent risk. Document any actions taken to intervene  on any identified imminent risk.      Current suicidal ideation, plan or intention?:  I never wish harm on other people. My issues are really internal, self-based. I really really do not have any desire to make anyone's life miserable, I don't really think of it as an option.     For me, if there were an easy way, I'd probably be tempted to do it. Right now, I'm feeling kind of emotional talking about all this stuff. Hope for the future (10 = most hopeful):  6.5. Because I've always been able to get through what has been affecting me. I'm very resilient but I'm realistic too. I feel like basically I'm bed-sick with lyme disease and worried I'm going to be overworked and harassed by aggressive treatment counselors (in [facility]) but I need to know I can lay down when I need to. You can't not lay down with 24 years of lyme disease. Maybe if there was some understanding that I could rest if I absolutely had to.    's note regarding current suicidal ideation/plan/intention:  Patient stated he feels safe, agrees to alert staff if he is feeling like harming himself here.    5A. Have you ever been diagnosed with a mental health problem?     Yes - major depression and Schizo Affective Disorder.    5B. Are you receiving care for any mental health issues? If yes, what is the focus of that care or treatment?  Are you satisfied with the service? Most recent appointment?  How has it been helpful?     Therapist:  Yes - Mario at [facility] in [place]. Last visit:  May 2017. Attending weekly, saw him about 4 times. It was very helpful.   Psychiatrist:  I had been working with the same one at least over the phone for 22 years and just love the continuity - Dr. [place]  in [place].     Recently I reached out to a MH practitioner here, didn't seem to be that helpful and went back to Dr. Clark by phone. Last phone session was 3 weeks ago.  I'm trying to find a new psychiatrist that I can meet with face-to face.     6. Have you been prescribed medications for emotional/psychological problems?     Yes.  6B. Current mental health medication(s) If these medications are listed for Dimension II, reference item II-5. See med list in DIMENSION II, #4A.  .   6C. Are you taking your medications as instructed?  yes.    7. Does your MH provider know about your use?     Yes. He doesn't like prescribing a controlled substance to anyone with less than a year's sobriety.    8A. Have you ever been verbally, emotionally, physically or sexually abused?      No. I had a wonderful childhood and have a wonderful wife.     Follow up questions to learn current risk, continuing emotional impact.      NA    8B. Have you received counseling for abuse?      N/A    9. Have you ever experienced or been part of a group that experienced community violence, historical trauma, rape or assault?     No    10A. [place]:    No    11. Do you have problems with any of the following things in your daily life?    Headaches, Dizziness, Problem Solving, Concentration, Performing your job/school work, Remembering, In relationships with others and Reading, writing, calculating and learning new information.    Note: If the person has any of the above problems, follow up with items 12, 13, and 14. If none of the issues in item 11 are a problem for the person, skip to item 15.    Entire life have been in special ed type classes. I'll take notes, write down questions, use highlighter and index cards.    12. Have you been diagnosed with traumatic brain injury or Alzheimer s?  No.    13. If the answer to #12 is no, ask the following questions:    Have you ever hit your head or been hit on the head? No blunt force  trauma.     Were you ever seen in the Emergency Room, hospital or by a doctor because of an injury to your head? No    Have you had any significant illness that affected your brain (brain tumor, meningitis, West Nile Virus, stroke or seizure, heart attack, near drowning or near suffocation)? Yes - brain Spect scan 10/2006 showed severe abnormalities as a result of lyme disease. Spect looks at blood flow.  Cognitive distortion - zoning out and getting lost in my thoughts really needs to be addressed and treated. I really feel like there is something there, it's aggravating, looking for objects over and over again, preoccupied with memory loss even when I was sober 10.5 years and still had memory issues. I was sober through my entire 20's with exception of first 2 weeks.    I have a phenomenal long term memory, it's more problems with short term memory.     14. If the answer to #12 is yes, ask if any of the problems identified in #11 occurred since the head injury or loss of oxygen.     15A. Highest grade of school completed:     High school graduate    15B. Do you have a learning disability? Yes - stemming back to 2nd grade - difficulty with short term memory, attention span, reading comprehension - written expression primarily. In special education classes from 4th grade to end of high school.    15C. Did you ever have tutoring in Math or English? Yes    15D. Have you ever been diagnosed with Fetal Alcohol Effects or Fetal Alcohol Syndrome? No    16. If yes to item 15 B, C, or D: How has this affected your use or been affected by your use?     Dimension III Ratings   Emotional/Behavioral/Cognitive - The placing authority must use the criteria in Dimension III to determine a client s emotional, behavioral, and cognitive conditions and complications.   RISK DESCRIPTIONS - Severity ratin Client has difficulty with impulse control and lacks coping skills. Client has thoughts of suicide or harm to others without  means; however, the thoughts may interfere with participation in some treatment activities. Client has difficulty functioning in significant life areas. Client has moderate symptoms of emotional, behavioral, or cognitive problems. Client is able to participate in most treatment activities.    REASONS SEVERITY WAS ASSIGNED - What current issues might with thinking, feelings or behavior pose barriers to participation in a treatment program? What coping skills or other assets does the person have to offset those issues? Are these problems that can be initially accommodated by a treatment provider? If not, what specialized skills or attributes must a provider have?    Mental health symptoms and diagnosis secondary to lyme disease per patient.  Suicidal ideation with a plan prior to admission.  On the morning of this assessment, patient acknowledged suicidal ideation but in the afternoon, denied suicidal ideation, reporting the morning assessment questions caused him to be emotional and stated he's not a morning person.  Guilt regarding inability to provide for wife.  Denies history of childhood abuse.  Inability to clean up his living environment.     Per Dr. Morgan Stephen's 9/22/2017 Psychiatry Consult:    Major depression, recurrent, severe with psychotic features F33.3.              DIMENSION IV - Readiness for Change   1. You ve told me what brought you here today. (first section) What do you think the problem really is?     Both alcohol and mental illness and lyme disease.    2. Tell me how things are going. Ask enough questions to determine whether the person has use related problems or assets that can be built upon in the following areas: Family/friends/relationships; Legal; Financial; Emotional; Educational; Recreational/ leisure; Vocational/employment; Living arrangements (DSM)      Relationships:  My wife is also an alcoholic, she is going to outpatient soon. Guilt about not being able to provide for my  wife.  Legal:  None.  Financial:  Doing okay.  Emotional:  Feeling numb, melancholy almost.  Leisure:  Laying on the couch unable to get up at times.   Employment:  Not been able to work.  Living Arrangements:  Housing environment is a bio-hazard and causes great stress.     3. What activities have you engaged in when using alcohol/other drugs that could be hazardous to you or others (i.e. driving a car/motorcycle/boat, operating machinery, unsafe sex, sharing needles for drugs or tattoos, etc     None that I can think of.    4. How much time do you spend getting, using or getting over using alcohol or drugs? (DSM)     About half the time.    5. Reasons for drinking/drug use (Use the space below to record answers. It may not be necessary to ask each item.)  Like the feeling Yes    Trying to forget problems Yes   To cope with stress Yes   To relieve physical pain Yes   To cope with anxiety Yes   To cope with depression Yes   To relax or unwind Yes   Makes it easier to talk with people Yes   Partner encourages use Yes   Most friends drink or use No   To cope with family problems Yes   Afraid of withdrawal symptoms/to feel better Yes   Other (specify)  Yes for feelings of hopelessness  Yes afraid of seizures and panic     A. What concerns other people about your alcohol or drug use/Has anyone told you that you use too much? What did they say? (DSM)     They are afraid I will drink myself to death.    B. What did you think about that/ do you think you have a problem with alcohol or drug use?     Absolutely.    6. What changes are you willing to make? What substance are you willing to stop using? How are you going to do that? Have you tried that before? What interfered with your success with that goal?      I think 28 days in conjunction with outpatient aftercare in addition to meetings.    7. What would be helpful to you in making this change?     I think 28 day program at [facility] would be ideal. Upon finishing,  synchronizing my discharge upon finishing with outpatient will be ideal.    Dimension IV Ratings   Readiness for Change - The placing authority must use the criteria in Dimension IV to determine a client s readiness for change.   RISK DESCRIPTIONS - Severity ratin Client is motivated with active reinforcement, to explore treatment and strategies for change, but ambivalent about illness or need for change.    REASONS SEVERITY WAS ASSIGNED - (What information did the person provide that supports your assessment of his or her readiness to change? How aware is the person of problems caused by continued use? How willing is she or he to make changes? What does the person feel would be helpful? What has the person been able to do without help?)      Patient admits he has a problem with alcohol, mental health, living environment. Asking for help.          DIMENSION V - Relapse, Continued Use, and Continued Problem Potential   1. In what ways have you tried to control, cut-down or quit your use? If you have had periods of sobriety, how did you accomplish that? What was helpful? What happened to prevent you from continuing your sobriety? (DSM)     Trying to drink beer and wine only. Only drinking at night.   10.5 years sober 6/10/2005 to 2016 .   Not believing I had a problem with alcohol when I turned 30.    2. Have you experienced cravings? If yes, ask follow up questions to determine if the person recognizes triggers and if the person has had any success in dealing with them.     Yes - cravings.  Triggers - loneliness, co-dependency, not wanting to leave my wife's side for meetings. Co-dependency is probably a huge barrier to recovery.    3. Have you been treated for alcohol/other drug abuse/dependence?     Tried outpatient treatment twice, didn't work. Last outpatient treatment arranged prior to last [facility] detox admission 2017, unable to begin due to drinking.    Yes. Completed 7 of 7 treatments.   Last  treatment:  2003 - 2003 [facility].  10/2003 - 2003 [facility] in [place].  2003 - 2004 [facility].  2004 - 2004 [facility]  10/2004 - 2005 [facility] followed by [facility] house.     Helpful:  The initial basic education, getting a head start on sobriety.    4. Support group participation: Have you/do you attend support group meetings to reduce/stop your alcohol/drug use? How recently? What was your experience? Are you willing to restart? If the person has not participated, is he or she willing?     I had a great support community in AA Meetings.   Last meetin2017.  I intend to resume when I get out.    5. What would assist you in staying sober/straight?     My wife getting sober.    Dimension V Ratings   Relapse/Continued Use/Continued problem potential - The placing authority must use the criteria in Dimension V to determine a client s relapse, continued use, and continued problem potential.   RISK DESCRIPTIONS - Severity ratin No awareness of the negative impact of mental health problems or substance abuse. No coping skills to arrest mental health or addiction illnesses, or prevent relapse.    REASONS SEVERITY WAS ASSIGNED - (What information did the person provide that indicates his or her understanding of relapse issues? What about the person s experience indicates how prone he or she is to relapse? What coping skills does the person have that decrease relapse potential?)      Lacks coping skills.  Lacks insight, it seems.  Per report:  Chronic lyme disease impacting mental health.         DIMENSION VI - Recovery Environment   1. Are you employed/attending school? Tell me about that.     No.  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     2A. Describe a typical day; evening for you. Work, school, social, leisure, volunteer, spiritual practices. Include time spent obtaining, using,  recovering from drugs or alcohol. (DSM)     Unstructured resting from crippling fatigue from lyme disease. Alcohol use starts typically around early lunch time. I wake up, get really lonely, wrestle with the idea of whether or not I'll drink today. Drinking starts by 11AM.    2B. How often do you spend more time than you planned using or use more than you planned? (DSM)     Activities surrounding my use consume the vast majority of my time.    3. How important is using to your social connections? Do many of your family or friends use?     Just with my wife.    4A. Are you currently in a significant relationship?     Yes.  4B. How long? 7 years.    4C. Sexual Orientation:     Heterosexual    5A. Who do you live with?      My wife. I have a very good marriage, great family, great wife.    5B. Tell me about their alcohol/drug use and mental health issues.     Wife uses alcohol.  Mental health:  Depression and anxiety but nothing beyond that.    5C. Are you concerned for your safety there? Yes    5D. Are you concerned about the safety of anyone else who lives with you? Yes - biological stevan.    6A. Do you have children who live with you?     No    6B. Do you have children who do not live with you?     No    7A. Who supports you in making changes in your alcohol or drug use? What are they willing to do to support you? Who is upset or angry about you making changes in your alcohol or drug use? How big a problem is this for you?      My wife has applied to outpatient and approved to begin once an opening is available.    7B. This table is provided to record information about the person s relationships and available support It is not necessary to ask each item; only to get a comprehensive picture of their support system.  How often can you count on the following people when you need someone?   Partner / Spouse Usually supportive   Parent(s)/Aunt(s)/Uncle(s)/Grandparents Always supportive   Sibling(s)/Cousin(s) Always  supportive   Child(sathish) N/A   Other relative(s) Always supportive   Friend(s)/neighbor(s) Usually supportive   Child(sathish) s father(s)/mother(s) N/A   Support group member(s) N/A   Community of [place] members Usually supportive   /counselor/therapist/healer Always supportive   Other (specify) N/A     8A. What is your current living situation?     I live with my wife.  I have tons of hobbies and things that interest me but my house is so - I have 4 feet of raw garbage in my house, can't find my tools, some areas of the home that are relatively clean, there's flies in my house, we have black mold in my house - I used a swab kit and it tested positive. I really feel like my home is killing me.    8B. What is your long term plan for where you will be living?     Not sure.    8C. Tell me about your living environment/neighborhood? Ask enough follow up questions to determine safety, criminal activity, availability of alcohol and drugs, supportive or antagonistic to the person making changes.      No concerns.    9. Criminal justice history: Gather current/recent history and any significant history related to substance use--Arrests? Convictions? Circumstances? Alcohol or drug involvement? Sentences? Still on probation or parole? Expectations of the court? Current court order? Any sex offenses - lifetime? What level? (DSM)    Possession 2005 individual quantity of marijuana.    10. What obstacles exist to participating in treatment? (Time off work, childcare, funding, transportation, pending [facility] time, living situation)     Wife is also an alcoholic.    Dimension VI Ratings   Recovery environment - The placing authority must use the criteria in Dimension VI to determine a client s recovery environment.   RISK DESCRIPTIONS - Severity ratin Client has (A) Chronically antagonistic significant other, living environment, family, peer group or long-term criminal justice involvement that is harmful to recovery or  treatment progress, or (B) Client has an actively antagonistic significant other, family, work, or living environment with immediate threat to the client s safety and well-being.    REASONS SEVERITY WAS ASSIGNED - (What support does the person have for making changes? What structure/stability does the person have in his or her daily life that will increase the likelihood that changes can be sustained? What problems exist in the person s environment that will jeopardize getting/staying clean and sober?)     Wife with whom patient lives is also an alcoholic.  Lacks meaningful activity and ability to pursue meaningful activity.  Unsafe living environment - bio hazard.  Lacks sober support system involvement.  Inadequate social support/.               Client Choice/Exceptions   Would you like services specific to language, age, gender, culture, Gnosticist preference, race, ethnicity, sexual orientation or disability?  No    What particular treatment choices and options would you like to have? Residential or outpatient?     Patient's mother is recommending 28 days of residential treatment.    Do you have a preference for a particular treatment program? [facility] or [facility].      Criteria for Diagnosis     Criteria for Diagnosis  DSM-5 Criteria for Substance Use Disorder  Instructions: Determine whether the client currently meets the criteria for Substance Use Disorder using the diagnostic criteria in the DSM-V pp.481-589. 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    If court related records were reviewed, summarize here:       Rule 25 Assessment Summary and Plan   's Recommendation    High intensity residential treatment - [facility] or similar.  Follow the recommendations of your treatment counselors.    Resume psychotherapy weekly following completion of treatment.         Collateral Contacts     Name:       Relationship:       Phone Number:     Releases:         Giancarlo Griffith MD Physician Signed Emergency Medicine ED Provider Notes   Date of Service: 9/19/2017 11:37 AM Creation Time: 9/19/2017 12:21 PM             History           Chief Complaint   Patient presents with     Addiction Problem       he was taking phenobarb but has not had any for 72 hours. He states he had about 10 rum and cokes today.       HPI  Jonny Gramajo is a 32 year old male with a history of alcoholism who presents to the ED for evaluation after not taking any phenobarbital for 72 hours. He reports that he was recently discharged from the hospital here after going through detox, and he was given a 20 day course of phenobarbital so as to taper off Klonopin. He had tried to taper off Klonopin in the past without any medicinal assistance, and he suffered a seizure. He has been taking more phenobarb than he should, and so he ran out about 4 days ago. Since running out, he has been drinking about 10-15 drinks a days in order to avoid any seizures. He also was taking atenolol 50mg daily for high BP, but he ran out of that around the same time.      His last drink was 2 hours ago. He has been through multiple rounds of treatment/detox in the past, and when he withdraws, he denies any seizures, but he does become tremulous and anxious. Currently, he feels \"okay.\" He denies any SI, nausea, or abdominal pain. He denies any CP, " SOB, fevers, or chills.      PAST MEDICAL HISTORY:    Past Medical History         Past Medical History:   Diagnosis Date     Anxiety       Depressive disorder       Hypertension       Lyme disease              PAST SURGICAL HISTORY:    Past Surgical History          Past Surgical History:   Procedure Laterality Date     GENITOURINARY SURGERY         testical recessed            FAMILY HISTORY:    Family History    No family history on file.        SOCIAL HISTORY:           Social History   Substance Use Topics     Smoking status: Current Every Day Smoker       Packs/day: 2.00     Smokeless tobacco: Former User     Alcohol use Yes          Comment: Drinking about 10 drinks a day for past 3 days              Patient's Medications   New Prescriptions     No medications on file   Previous Medications     CARIPRAZINE (VRAYLAR) 1.5 MG CAPS CAPSULE    Take 1.5 mg by mouth every morning      CEFDINIR (OMNICEF) 300 MG CAPSULE    Take 300 mg by mouth 2 times daily     CEFTIBUTEN (CEDAX) 400 MG CAPSULE    Take 1 capsule (400 mg) by mouth daily     CLARITHROMYCIN (BIAXIN) 500 MG TABLET    Take 500 mg by mouth 2 times daily     DOXYCYCLINE MONOHYDRATE 100 MG CAPS    Take 100 mg by mouth 2 times daily With food. Avoid calcium, magnesium, dairy, and the sun in 24 hours.     METRONIDAZOLE (FLAGYL) 250 MG TABLET    Take 250 mg by mouth 2 times daily     MILK THISTLE PO    Take 4 capsules by mouth 2 times daily Strength unknown      NYSTATIN (MYCOSTATIN) 223365 UNITS TABS TABLET    Take 1 tablet by mouth 4 times daily     PHENOBARBITAL (LUMINAL) 32.4 MG TABS TABLET    Take 1 tab 4 times daily for 5 days, then  Take 1 tab 3 times daily for 5 days, then  Take 1 tab 2 times daily for 5 days, then  Take 1 tab 1 times daily for 5 days     PSYLLIUM (METAMUCIL) WAFR    Take 2 Wafers by mouth daily PRN     RANITIDINE (ZANTAC) 300 MG TABLET    Take 300 mg by mouth 2 times daily     SULFAMETHOXAZOLE-TRIMETHOPRIM (BACTRIM DS/SEPTRA DS) 800-160  "MG PER TABLET    Take 1 tablet by mouth 2 times daily     TRAZODONE (DESYREL) 50 MG TABLET    Take 1-3 tablets ( mg) by mouth nightly as needed for sleep     VALACYCLOVIR HCL (VALTREX PO)    Take 500 mg by mouth 2 times daily      VILAZODONE (VIIBRYD) 40 MG TABS TABLET    Take 40 mg by mouth daily   Modified Medications     No medications on file   Discontinued Medications     ATENOLOL (TENORMIN) 50 MG TABLET    Take 50 mg by mouth daily     NYSTATIN (MYCOSTATIN) 281361 UNIT/ML SUSPENSION    Take 500,000 Units by mouth 4 times daily as needed (when patient feels candidia is flaring up)      RANITIDINE HCL (ZANTAC PO)    Take 300 mg by mouth 2 times daily     VILAZODONE HCL (VIIBRYD PO)    Take 40 mg by mouth daily                   Allergies   Allergen Reactions     Abilify [Aripiprazole]         Patient reports tardive dyskinesia effect in 2004. It only occurred while on med and resolved with a few days after discontinuing med.      Wellbutrin [Bupropion] Anxiety       Patient reports felt \"reved\" up and anxious when taken in 2001.             I have reviewed the Medications, Allergies, Past Medical and Surgical History, and Social History in the Epic system.     Review of Systems   Constitutional: Positive for activity change and fatigue. Negative for appetite change, chills and fever.   HENT: Negative for congestion, sore throat, trouble swallowing and voice change.    Eyes: Negative for redness and visual disturbance.   Respiratory: Negative for chest tightness and shortness of breath.    Cardiovascular: Positive for palpitations. Negative for chest pain and leg swelling.   Gastrointestinal: Negative for abdominal pain, diarrhea, nausea and vomiting.   Genitourinary: Negative for difficulty urinating.   Musculoskeletal: Negative for arthralgias, gait problem and neck stiffness.   Skin: Negative for color change and rash.   Allergic/Immunologic: Negative for immunocompromised state.   Neurological: Positive " "for weakness and light-headedness. Negative for tremors, syncope, numbness and headaches.   Psychiatric/Behavioral: Positive for decreased concentration and dysphoric mood. Negative for confusion, hallucinations and suicidal ideas. The patient is nervous/anxious.    All other systems reviewed and are negative.        Physical Exam   BP: 155/88  Heart Rate: 120  Temp: 99.3  F (37.4  C)  Resp: 20  Height: 198.1 cm (6' 6\")  Weight: (!) 154.2 kg (340 lb)  SpO2: 97 %  Physical Exam   Constitutional: He is oriented to person, place, and time. He appears well-developed and well-nourished. He appears distressed.   Patient given 4 mg Ativan prior per 911 report.  Patient feeling better.   HENT:   Head: Normocephalic and atraumatic.   Eyes: Conjunctivae and EOM are normal. Pupils are equal, round, and reactive to light. No scleral icterus.   Neck: Normal range of motion. Neck supple. No JVD present. No tracheal deviation present.   Cardiovascular:   Sinus tachycardia   Pulmonary/Chest: No stridor. No respiratory distress. He has no wheezes. He has no rales.   Abdominal: He exhibits no distension. There is no tenderness. There is no rebound and no guarding.   Musculoskeletal: He exhibits no edema, tenderness or deformity.   Neurological: He is alert and oriented to person, place, and time. He has normal reflexes. No cranial nerve deficit. Coordination normal.   Skin: Skin is warm and dry. No rash noted. He is not diaphoretic. No erythema. No pallor.   Psychiatric:   Patient feeling better mildly anxious this point is not delusional is not suicidal or homicidal.  Is cooperative.   Nursing note and vitals reviewed.        ED Course      ED Course      Procedures            In ER patient evaluated IV was established.  EKG showed sinus tachycardia.     Patient continually monitored in the ER.     Review records and Epic patient recent admission for detox from benzodiazepine.     Discussed case also with  " Zahraa     Recommendations are for gabapentin the patient feeling better otherwise readmission for benzodiazepine withdrawal which patient feels is appropriate this point     Patient had labs drawn IV fluids given in the ER.     Patient takes atenolol 50 mg orally has not taken it for 3 days did receive 1 dose here orally in the ER.     Discussed with  and with patient's increasing anxiety given 64.8 mg of phenobarbital orally ×1.     Laboratory testing alcohol was negative chemistries reveal   wbc10.5 hgb 16.6     Patient to be admitted for benzodiazepine withdrawal syndrome in the ER is voluntary will be admitted to 3A.                EKG Interpretation:       Interpreted by Giancarlo Griffith  Time reviewed: 1135  Symptoms at time of EKG: Anxiety history of benzodiazepine withdrawal   Rhythm: Sinus tachycardia  Rate: 1:15  Axis: normal  Ectopy: none  Conduction: normal  ST Segments/ T Waves: No hyperacute ST-T wave changes  Q Waves: none  Comparison to prior: No old EKG available     Clinical Impression: Sinus tachycardia                 Critical Care time:  none                  Labs Ordered and Resulted from Time of ED Arrival Up to the Time of Departure from the ED   CBC WITH PLATELETS DIFFERENTIAL - Abnormal; Notable for the following:        Result Value      MCH 34.1 (*)       MCHC 36.9 (*)       All other components within normal limits   COMPREHENSIVE METABOLIC PANEL - Abnormal; Notable for the following:       (*)        (*)       All other components within normal limits   DRUG ABUSE SCREEN 6 CHEM DEP URINE ([facility]) - Abnormal; Notable for the following:      Barbiturates Qual Urine Positive (*)       All other components within normal limits   INR   PARTIAL THROMBOPLASTIN TIME   MAGNESIUM   UA MACROSCOPIC WITH REFLEX TO MICRO AND CULTURE   ALCOHOL ETHYL   PULSE OXIMETRY NURSING   CARDIAC CONTINUOUS MONITORING   PERIPHERAL IV CATHETER            Results for orders placed or  performed during the hospital encounter of 09/19/17   CBC with platelets differential   Result Value Ref Range     WBC 10.5 4.0 - 11.0 10e9/L     RBC Count 4.87 4.4 - 5.9 10e12/L     Hemoglobin 16.6 13.3 - 17.7 g/dL     Hematocrit 45.0 40.0 - 53.0 %     MCV 92 78 - 100 fl     MCH 34.1 (H) 26.5 - 33.0 pg     MCHC 36.9 (H) 31.5 - 36.5 g/dL     RDW 12.4 10.0 - 15.0 %     Platelet Count 239 150 - 450 10e9/L     Diff Method Automated Method       % Neutrophils 75.5 %     % Lymphocytes 14.1 %     % Monocytes 9.8 %     % Eosinophils 0.1 %     % Basophils 0.1 %     % Immature Granulocytes 0.4 %     Nucleated RBCs 0 0 /100     Absolute Neutrophil 7.9 1.6 - 8.3 10e9/L     Absolute Lymphocytes 1.5 0.8 - 5.3 10e9/L     Absolute Monocytes 1.0 0.0 - 1.3 10e9/L     Absolute Eosinophils 0.0 0.0 - 0.7 10e9/L     Absolute Basophils 0.0 0.0 - 0.2 10e9/L     Abs Immature Granulocytes 0.0 0 - 0.4 10e9/L     Absolute Nucleated RBC 0.0     INR   Result Value Ref Range     INR 1.13 0.86 - 1.14   Partial thromboplastin time   Result Value Ref Range     PTT 30 22 - 37 sec   Comprehensive metabolic panel   Result Value Ref Range     Sodium 134 133 - 144 mmol/L     Potassium 3.4 3.4 - 5.3 mmol/L     Chloride 97 94 - 109 mmol/L     Carbon Dioxide 26 20 - 32 mmol/L     Anion Gap 11 3 - 14 mmol/L     Glucose 97 70 - 99 mg/dL     Urea Nitrogen 8 7 - 30 mg/dL     Creatinine 0.73 0.66 - 1.25 mg/dL     GFR Estimate >90 >60 mL/min/1.7m2     GFR Estimate If Black >90 >60 mL/min/1.7m2     Calcium 9.6 8.5 - 10.1 mg/dL     Bilirubin Total 0.9 0.2 - 1.3 mg/dL     Albumin 4.0 3.4 - 5.0 g/dL     Protein Total 7.2 6.8 - 8.8 g/dL     Alkaline Phosphatase 105 40 - 150 U/L      (H) 0 - 70 U/L      (H) 0 - 45 U/L   Magnesium   Result Value Ref Range     Magnesium 1.6 1.6 - 2.3 mg/dL   Drug abuse screen 6 urine (chem dep)   Result Value Ref Range     Amphetamine Qual Urine Negative NEG^Negative     Barbiturates Qual Urine Positive (A) NEG^Negative      Benzodiazepine Qual Urine Negative NEG^Negative     Cannabinoids Qual Urine Negative NEG^Negative     Cocaine Qual Urine Negative NEG^Negative     Ethanol Qual Urine Negative NEG^Negative     Opiates Qualitative Urine Negative NEG^Negative   UA reflex to Microscopic and Culture   Result Value Ref Range     Color Urine Yellow       Appearance Urine Clear       Glucose Urine Negative NEG^Negative mg/dL     Bilirubin Urine Negative NEG^Negative     Ketones Urine Negative NEG^Negative mg/dL     Specific Gravity Urine 1.008 1.003 - 1.035     Blood Urine Negative NEG^Negative     pH Urine 6.5 5.0 - 7.0 pH     Protein Albumin Urine Negative NEG^Negative mg/dL     Urobilinogen mg/dL Normal 0.0 - 2.0 mg/dL     Nitrite Urine Negative NEG^Negative     Leukocyte Esterase Urine Negative NEG^Negative     Source Unspecified Urine     Alcohol ethyl   Result Value Ref Range     Ethanol g/dL <0.01 <0.01 g/dL               Assessments & Plan (with Medical Decision Making)  32-year-old male history of recent detox for benzodiazepine withdrawal was discharged on a 20 day taper of phenobarbital.  Taking more and ran out a few days ago now presents with increasing agitation and withdrawal symptoms.  Patient had been drinking rum and cokes last few days to help.  Patient brought in by ambulance report was patient given 4 mg of Ativan per paramedics.  Patient feeling more calm at this point evaluated in the ER tachycardia noted he's not taking his atenolol 50 mg for the last few days he did receive an oral dose here and I discussed case with Dr. Vital interviewed the patient one dose of phenobarbital here orally.  The patient is voluntary we'll plan to admit to detox for readmission for benzodiazepine withdrawal symptoms.              I have reviewed the nursing notes.     I have reviewed the findings, diagnosis, plan and need for follow up with the patient.         New Prescriptions     No medications on file         Final diagnoses:    Benzodiazepine withdrawal with complication (H)   I, Shree Phillips, am serving as a trained medical scribe to document services personally performed by Giancarlo Griffith MD, based on the provider's statements to me.       Giancarlo DAVIS MD, was physically present and have reviewed and verified the accuracy of this note documented by Shree Phillips.         9/19/2017   [facility], EMERGENCY DEPARTMENT     This note was created at least in part by the use of dragon voice dictation system. Inadvertent typographical errors may still exist.  Giancarlo Griffith MD.           Giancarlo Griffith MD  09/19/17 1934              Collateral Contacts     Name:       Relationship:       Phone Number:       Releases:         Eliud Vital MD Physician Unsigned Transcription Family Medicine H&P   Date of Service: 9/20/2017  2:40 PM Creation Time: 9/20/2017  2:58 PM           DATE OF ADMISSION:  09/19/2017       CHIEF COMPLAINT:  Alcohol dependence, benzodiazepine dependence.       HISTORY OF PRESENT ILLNESS:  Jonny Gramajo is a 32-year-old male from [place].  He was recently here for detox 3 weeks ago.  He lives with his wife in [place].  He is unemployed at the present time.  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  He has no children.  The patient has a history of addiction dating back to his teenage years.  He has been through multiple treatment programs as an adolescent.  He went to a program in [place] and a program in [place] and has been to [facility].  He has had some years of sobriety and was active in a recovery program in the past.       Lately he has been struggling with alcohol.  Prior to his last admission, he was abusing benzodiazepines, specifically Klonopin.  He was detoxed with Valium and phenobarbital and was discharged on a 20 day phenobarbital taper.  He took the phenobarbital more quickly than prescribed and then started to drink alcohol.  He was waiting to get into an  outpatient treatment program.  He was worried about having a withdrawal seizure.  He now enters for further detox and then to go to residential treatment because outpatient plan failed.  He is not having a lot of withdrawal but again is very worried about having a seizure.       PAST MEDICAL HISTORY:  Chronic Lyme disease.  He attributes depression and symptoms of schizoaffective disorder to the chronic Lyme disease.  Borderline diabetes, hypertension, depression.       PAST SURGICAL HISTORY:  Orchiopexy.       MEDICATIONS PRIOR TO ADMISSION:  Doxycycline, metronidazole, Bactrim, Biaxin, atenolol, Viibryd, nystatin and Valtrex.       REVIEW OF SYSTEMS:     SKIN:  Several excoriations on the lower extremities from insect bites, nodule below the lower lip since May.   HEAD:  No headaches.   EYES:  No visual disturbance.   EARS:  No hearing loss.   MOUTH AND THROAT:  No difficulty swallowing.   PULMONARY:  No cough or shortness of breath.   CARDIOVASCULAR:  No chest pain.   GASTROINTESTINAL:  No nausea, vomiting, diarrhea or constipation.   GENITOURINARY:  Negative.   MUSCULOSKELETAL:  Negative.   NEUROLOGIC:  Negative.       PHYSICAL EXAMINATION:   VITAL SIGNS:  Temperature 98.4, pulse 78, respirations 16, blood pressure is 140/89.   GENERAL:  This is a well-developed, well-nourished, tall 32-year-old male in no apparent distress, alert and cooperative and oriented.   SKIN:  Excoriations on the lower extremities not infected, pea-sized likely sebaceous cyst below the left lip lower.     HEAD:  Normal.   EYES:  Pupils equal, round, regular and reactive to light and accommodation.  EOMs intact.  Conjunctivae are normal.  Sclerae normal.   EARS:  Normal.   NOSE:  Normal.   MOUTH AND THROAT:  Lips normal.  Tongue normal.  Pharynx normal.   NECK:  Supple.  No masses, no thyroid enlargement.  Lymph nodes normal anterior and posterior cervical region.   PULMONARY:  Lungs clear to auscultation, good inspiration and  "expiration, no wheezes, no rhonchi, no rales.   CARDIOVASCULAR:  Heart regular rate and rhythm, no murmurs.  Good peripheral pulses, no edema.   GASTROINTESTINAL:  Abdomen soft, no distention, tenderness or rigidity.  Bowel sounds normal.  No masses, no organomegaly.  Abdomen is protuberant.  The patient complains of some tenderness in the right upper quadrant.   EXTREMITIES:  Full range of motion of all extremities, no inflammation of the ankles, knees, hips, hands, shoulders or elbows bilaterally.   NEUROLOGIC:  Motor normal.  Sensory normal.  Coordination normal.   MENTAL STATUS:  Oriented to time, place, person and situation.  Attitude is cooperative.  Insight fair, judgment fair.       ASSESSMENT:   1.  Alcohol dependence and withdrawal.   2.  Benzodiazepine dependence.   3.  Chronic Lyme disease.       PLAN:  Inpatient detox, evaluate for treatment, likely looking towards going to residential treatment at this time.           EVERETT HAUSER MD                D: 2017 14:40   T: 2017 14:58   MT: CG       Name:     JONNY GRAMAJO   MRN:      [id number]        Account:      [id number]   :      1985           Admitted:     [id number]       Document: [id number]                Collateral Contacts     Name:       Relationship:       Phone Number:       Releases:           Morgan Stephen MD Physician Unsigned Transcription Psychiatry Consults   Date of Service: 2017 11:28 AM Creation Time: 2017 12:31 PM         []  PSYCHIATRIC CONSULTATION        Mr. Jonny Gramajo, date of birth , is a 32-year-old man admitted to [facility] on 2017.  He is seeing me in psychiatric consultation.  He has had previous diagnoses of anxiety and depression as a child, after uriah Lyme's disease but before the diagnosis of chronic Lyme's was made he was diagnosed as having \"schizoaffective illness at [facility],\" more recently his psychiatrist has " "diagnosed him as having major depressive disorder with psychotic features (which I feel is accurate).  He has been currently seeing Dr. Clark for depression and has seen him for about 22 years.       Anxiety symptoms consist of a sense of \"flight or fight,\" palpitations, a sense of having to leave.  Depression symptoms consist of sadness, intermittent suicidal ideation (he does not feel at risk currently) and as part of this symptomatology he has ongoing concerns about surveillance.       CURRENT MEDICATIONS:     1.  Psychiatric medications of a Vraylar 1.5 mg per day.   2.  Viibryd 20 mg per day.   3.  He also takes gabapentin 300 mg 3 times a day.     4.  He has occasionally taken his wife's naltrexone which he states did help him with cravings.       Previous antidepressants include Elavil, imipramine, Wellbutrin, Prozac, Paxil, Zoloft, Lexapro, Celexa, Effexor, Cymbalta, Pristiq, and he notes he still is feeling depressed with the Viibryd, although there was some initial response.  He has taken neuroleptics of Risperdal, Seroquel, Abilify (allergy) and currently Vraylar.  He has taken Depakote.  He has not tried augmentation with Cytomel, Lamictal or lithium.  He has not taken Tegretol or carbamazepine.  He has not used MAO inhibitors, Fetzima, or Trintellix.  He has not used Clozaril, Saphris or Rexulti.       MENTAL STATUS EXAMINATION:  Shows an alert, cooperative 32-year-old man.  He states he is \"wiped out\" and \"anxious. \"  He has a sensation of hearing mumbled voices.  Affect is good and varies appropriately with content.  Speech production is good.  There is no tremor or cogwheel rigidity.  There are no abnormal mouth or tongue movements and no abnormal other movements.  He does not appear to have akathisia.  No delusional concerns are noted, although he did mention concerns about surveillance that have been fairly lifelong.  He is not suicidal.       DIAGNOSTIC IMPRESSION:   1.  Major depression, " recurrent, severe with psychotic features F33.3.   2.  Alcohol and benzodiazepine dependence.       PLAN:  Plan at this time is to increase the Vraylar to 3 mg per day and change to Viibryd to Trintellix.  I would next look at augmentation with lithium or Lamictal.  Side effects were discussed for the Lamictal if he were to use this.           TANNER AARON MD                D: 2017 11:28   T: 2017 12:31   MT:        Name:     JONNY SUAREZ   MRN:      [id number]        Account:       [id number]   :      1985           Consult Date:  2017       Document: [id number]          Tanner Aaron MD Physician Signed Psychiatry Consults   Date of Service: 2017 11:32 AM Creation Time: 2017 11:31 AM     Consult Orders:                    Consult  Completed.  Dx= MDD, F33.3.  I do feel he would do ok in [facility].          ollateral Contacts      A problematic pattern of alcohol/drug use leading to clinically significant impairment or distress, as manifested by at least two of the following, occurring within a 12-month period:    Alcohol/drug is often taken in larger amounts or over a longer period than was intended.  There is a persistent desire or unsuccessful efforts to cut down or control alcohol/drug use  A great deal of time is spent in activities necessary to obtain alcohol, use alcohol, or recover from its effects.  Craving, or a strong desire or urge to use alcohol/drug  Recurrent alcohol/drug use resulting in a failure to fulfill major role obligations at work, school or home.  Continued alcohol use despite having persistent or recurrent social or interpersonal problems caused or exacerbated by the effects of alcohol/drug.  Important social, occupational, or recreational activities are given up or reduced because of alcohol/drug use.  Recurrent alcohol/drug use in situations in which it is physically hazardous.  Alcohol/drug use is continued despite knowledge of having a  persistent or recurrent physical or psychological problem that is likely to have been caused or exacerbated by alcohol.  Tolerance, as defined by either of the following: A need for markedly increased amounts of alcohol/drug to achieve intoxication or desired effect.  Withdrawal, as manifested by either of the following: The characteristic withdrawal syndrome for alcohol/drug (refer to Criteria A and B of the criteria set for alcohol/drug withdrawal). and Alcohol/drug (or a closely related substance, such as a benzodiazepine) is taken to relieve or avoid withdrawal symptoms.      Specify if: In early remission:  After full criteria for alcohol/drug use disorder were previously met, none of the criteria for alcohol/drug use disorder have been met for at least 3 months but for less than 12 months (with the exception that Criterion A4,  Craving or a strong desire or urge to use alcohol/drug  may be met).     In sustained remission:   After full criteria for alcohol use disorder were previously met, non of the criteria for alcohol/drug use disorder have been met at any time during a period of 12 months or longer (with the exception that Criterion A4,  Craving or strong desire or urge to use alcohol/drug  may be met).   Specify if:   This additional specifier is used if the individual is in an environment where access to alcohol is restricted.    Mild: Presence of 2-3 symptoms    Moderate: Presence of 4-5 symptoms    Severe: Presence of 6 or more symptoms

## 2017-09-21 NOTE — PROGRESS NOTES
"Jonny Gramajo is a 32 year old male patient.  1. Benzodiazepine withdrawal with complication (H)      Past Medical History:   Diagnosis Date     Anxiety      Depressive disorder      Hypertension      Lyme disease      No current outpatient prescriptions on file.     Allergies   Allergen Reactions     Abilify [Aripiprazole]      Patient reports tardive dyskinesia effect in 2004 but likely akathisia since patient reports the effects only occurred while on med and resolved with a few days after discontinuing med.      Wellbutrin [Bupropion] Anxiety     Patient reports felt \"reved\" up and anxious when taken in 2001.      Active Problems:    Chemical dependency (H)    Blood pressure (!) 158/100, pulse 79, temperature 98.4  F (36.9  C), temperature source Tympanic, resp. rate 18, height 6' 6\" (1.981 m), weight (!) 330 lb (149.7 kg), SpO2 98 %.    Subjective:  Symptoms:  Improved.  He reports anxiety.    Diet:  Adequate intake.    Activity level: Normal.    Pain:  He reports no pain.      Objective:  General Appearance:  Comfortable, well-appearing and in no acute distress.    Vital signs: (most recent): Blood pressure (!) 158/100, pulse 79, temperature 98.4  F (36.9  C), temperature source Tympanic, resp. rate 18, height 6' 6\" (1.981 m), weight (!) 330 lb (149.7 kg), SpO2 98 %.  Vital signs are normal.    Lungs:  Normal respiratory rate and normal effort.  Breath sounds clear to auscultation.    Heart: Normal rate.  Regular rhythm.    Neurological: Patient is alert and oriented to person, place and time.  Normal strength.    Skin:  Warm and dry.      Assessment:    Condition: In serious condition.  Improving.   (Alcohol Dependence and Withdrawal   Benzodiazepine dependence and withdrawal   Anxiety).     Plan:   (Add Propranolol and Gabapentin for anxiety  Assess for treatment     20 minutes were spent with patient with more than 50% of time spent in counseling and coordination of care ).       Eliud Marin " Zahraa  9/21/2017

## 2017-09-21 NOTE — PROGRESS NOTES
"Behavioral Health  Note    Behavioral Health  Spirituality Group Note    UNIT 3[facility]    Name: Jonny Gramajo YOB: 1985   MRN: [id number] Age: 32 year old      Patient attended -led group, which included discussion of spirituality, coping with illness and building resilience.    Patient attended group for 1.0 hrs.    The patient actively participated in group discussion and patient demonstrated an appreciation of topic's application for their personal circumstances.     Jonny was a thoughtful participant -- offering gentle, inspirational perspectives -- e.g.,  Framing a personal spirituality anecdote with wisdom that spoke to him: \"Coincidence is God's way of staying anonymous.\"    Topic/Focus of today's spirituality group: Spirituality and Core Identity  IMR tie-in: Receving Feedback/Ways to [place]      Fransisca Locke  Volunteer   Pager [phone]    "

## 2017-09-22 PROCEDURE — H0001 ALCOHOL AND/OR DRUG ASSESS: HCPCS

## 2017-09-22 PROCEDURE — 25000132 ZZH RX MED GY IP 250 OP 250 PS 637: Performed by: FAMILY MEDICINE

## 2017-09-22 PROCEDURE — 99207 ZZC DOWN CODE DUE TO SUBSEQUENT EXAM: CPT | Performed by: PSYCHIATRY & NEUROLOGY

## 2017-09-22 PROCEDURE — 99232 SBSQ HOSP IP/OBS MODERATE 35: CPT | Performed by: FAMILY MEDICINE

## 2017-09-22 PROCEDURE — 99232 SBSQ HOSP IP/OBS MODERATE 35: CPT | Performed by: PSYCHIATRY & NEUROLOGY

## 2017-09-22 PROCEDURE — 25000132 ZZH RX MED GY IP 250 OP 250 PS 637: Performed by: PSYCHIATRY & NEUROLOGY

## 2017-09-22 PROCEDURE — 99207 ZZC CDG-HISTORY COMP: MEETS EXP. PROBLEM FOCUSED-DOWN CODED LACK OF ROS: CPT | Performed by: PSYCHIATRY & NEUROLOGY

## 2017-09-22 PROCEDURE — 12800012 ZZH R&B CD MH INTERMEDIATE ADULT

## 2017-09-22 RX ORDER — NALTREXONE HYDROCHLORIDE 50 MG/1
50 TABLET, FILM COATED ORAL DAILY
Status: DISCONTINUED | OUTPATIENT
Start: 2017-09-22 | End: 2017-09-25 | Stop reason: HOSPADM

## 2017-09-22 RX ADMIN — IBUPROFEN 600 MG: 600 TABLET ORAL at 16:11

## 2017-09-22 RX ADMIN — NICOTINE POLACRILEX 4 MG: 2 GUM, CHEWING ORAL at 12:02

## 2017-09-22 RX ADMIN — RANITIDINE HYDROCHLORIDE 300 MG: 150 TABLET, FILM COATED ORAL at 20:08

## 2017-09-22 RX ADMIN — SULFAMETHOXAZOLE AND TRIMETHOPRIM 1 TABLET: 800; 160 TABLET ORAL at 09:16

## 2017-09-22 RX ADMIN — TRAZODONE HYDROCHLORIDE 150 MG: 50 TABLET ORAL at 22:09

## 2017-09-22 RX ADMIN — METRONIDAZOLE 250 MG: 250 TABLET ORAL at 20:07

## 2017-09-22 RX ADMIN — GABAPENTIN 300 MG: 300 CAPSULE ORAL at 14:16

## 2017-09-22 RX ADMIN — GABAPENTIN 300 MG: 300 CAPSULE ORAL at 09:16

## 2017-09-22 RX ADMIN — PHENOBARBITAL 32.4 MG: 32.4 TABLET ORAL at 11:36

## 2017-09-22 RX ADMIN — CLARITHROMYCIN 500 MG: 500 TABLET ORAL at 09:17

## 2017-09-22 RX ADMIN — CLARITHROMYCIN 500 MG: 500 TABLET ORAL at 20:08

## 2017-09-22 RX ADMIN — Medication 100 MG: at 09:16

## 2017-09-22 RX ADMIN — PHENOBARBITAL 32.4 MG: 32.4 TABLET ORAL at 20:08

## 2017-09-22 RX ADMIN — PSYLLIUM HUSK 1 PACKET: 3.4 POWDER ORAL at 18:04

## 2017-09-22 RX ADMIN — METRONIDAZOLE 250 MG: 250 TABLET ORAL at 09:16

## 2017-09-22 RX ADMIN — PROPRANOLOL HYDROCHLORIDE 20 MG: 20 TABLET ORAL at 09:17

## 2017-09-22 RX ADMIN — NICOTINE POLACRILEX 4 MG: 2 GUM, CHEWING ORAL at 14:18

## 2017-09-22 RX ADMIN — NALTREXONE HYDROCHLORIDE 50 MG: 50 TABLET, FILM COATED ORAL at 11:36

## 2017-09-22 RX ADMIN — PROPRANOLOL HYDROCHLORIDE 20 MG: 20 TABLET ORAL at 20:07

## 2017-09-22 RX ADMIN — RANITIDINE HYDROCHLORIDE 300 MG: 150 TABLET, FILM COATED ORAL at 09:15

## 2017-09-22 RX ADMIN — MULTIPLE VITAMINS W/ MINERALS TAB 1 TABLET: TAB at 09:16

## 2017-09-22 RX ADMIN — HYDROXYZINE HYDROCHLORIDE 50 MG: 25 TABLET ORAL at 22:09

## 2017-09-22 RX ADMIN — PHENOBARBITAL 32.4 MG: 32.4 TABLET ORAL at 16:11

## 2017-09-22 RX ADMIN — NICOTINE POLACRILEX 4 MG: 2 GUM, CHEWING ORAL at 18:03

## 2017-09-22 RX ADMIN — ACETAMINOPHEN 650 MG: 325 TABLET, FILM COATED ORAL at 20:31

## 2017-09-22 RX ADMIN — NYSTATIN 500000 UNITS: 500000 TABLET, FILM COATED ORAL at 20:07

## 2017-09-22 RX ADMIN — CEFDINIR 300 MG: 300 CAPSULE ORAL at 09:16

## 2017-09-22 RX ADMIN — NICOTINE POLACRILEX 4 MG: 2 GUM, CHEWING ORAL at 20:31

## 2017-09-22 RX ADMIN — NICOTINE POLACRILEX 4 MG: 2 GUM, CHEWING ORAL at 09:54

## 2017-09-22 RX ADMIN — NICOTINE 1 PATCH: 21 PATCH, EXTENDED RELEASE TRANSDERMAL at 09:15

## 2017-09-22 RX ADMIN — DOXYCYCLINE HYCLATE 100 MG: 100 CAPSULE ORAL at 09:15

## 2017-09-22 RX ADMIN — NICOTINE POLACRILEX 4 MG: 2 GUM, CHEWING ORAL at 16:11

## 2017-09-22 RX ADMIN — VALACYCLOVIR HYDROCHLORIDE 500 MG: 500 TABLET, FILM COATED ORAL at 09:17

## 2017-09-22 RX ADMIN — CEFDINIR 300 MG: 300 CAPSULE ORAL at 20:07

## 2017-09-22 RX ADMIN — IBUPROFEN 600 MG: 600 TABLET ORAL at 22:09

## 2017-09-22 RX ADMIN — PHENOBARBITAL 32.4 MG: 32.4 TABLET ORAL at 09:16

## 2017-09-22 RX ADMIN — FOLIC ACID 1 MG: 1 TABLET ORAL at 09:16

## 2017-09-22 RX ADMIN — GABAPENTIN 300 MG: 300 CAPSULE ORAL at 20:08

## 2017-09-22 RX ADMIN — NYSTATIN 500000 UNITS: 500000 TABLET, FILM COATED ORAL at 16:11

## 2017-09-22 RX ADMIN — VILAZODONE HYDROCHLORIDE 40 MG: 40 TABLET ORAL at 09:17

## 2017-09-22 RX ADMIN — NYSTATIN 500000 UNITS: 500000 TABLET, FILM COATED ORAL at 09:16

## 2017-09-22 RX ADMIN — VALACYCLOVIR HYDROCHLORIDE 500 MG: 500 TABLET, FILM COATED ORAL at 20:07

## 2017-09-22 RX ADMIN — PROPRANOLOL HYDROCHLORIDE 20 MG: 20 TABLET ORAL at 14:16

## 2017-09-22 RX ADMIN — NICOTINE POLACRILEX 4 MG: 2 GUM, CHEWING ORAL at 22:08

## 2017-09-22 RX ADMIN — CARIPRAZINE 1.5 MG: 1.5 CAPSULE, GELATIN COATED ORAL at 09:16

## 2017-09-22 RX ADMIN — SULFAMETHOXAZOLE AND TRIMETHOPRIM 1 TABLET: 800; 160 TABLET ORAL at 20:07

## 2017-09-22 RX ADMIN — NYSTATIN 500000 UNITS: 500000 TABLET, FILM COATED ORAL at 11:36

## 2017-09-22 RX ADMIN — IBUPROFEN 600 MG: 600 TABLET ORAL at 09:54

## 2017-09-22 RX ADMIN — DOXYCYCLINE HYCLATE 100 MG: 100 CAPSULE ORAL at 20:07

## 2017-09-22 ASSESSMENT — ACTIVITIES OF DAILY LIVING (ADL)
GROOMING: INDEPENDENT
ORAL_HYGIENE: INDEPENDENT
DRESS: INDEPENDENT
GROOMING: INDEPENDENT
LAUNDRY: WITH SUPERVISION

## 2017-09-22 NOTE — TELEPHONE ENCOUNTER
"SB[facility]    Name:   Jonny Gramajo YOB: 1985 Age:  32 year old Gender:  male   Insurance:   [employer] - writer faxed auth request to [employer].   Precipitating Event:   Treatment due to own awareness of need for help and Treatment due to pressure from family members to get help   DOC:   Alcohol  Additional abused substances:   Benzodiazepines  and Nicotine   Medical:   Chronic lyme disease   Mental Health:   Per Dr. Morgan Stephen 9/22/2017:  Major depression, recurrent, severe with psychotic features F33.3.     Per patient:  major depression and Schizo Affective Disorder. Guilt over inability to provide for wife. History of 7 mental health admissions due to suicidal ideation prior to 2004. Experiences auditory hallucinations, visual hallucinations in periphery line of site. Short term memory impairment.     Previous Treatments:  2-3 prior IP detoxification admission(s).  7 prior CD treatment(s). 2 outpatient treatments did not follow through   Psychosocial:    No children  Unhealthy living environment   Good family support network, Minimal sober support network, Tendency to isolate from others, not working   Suicide Risk Status:    Emergent? No  Urgent / Non-Emergent?  No  Present / Non- Urgent? No  No Current Risk? Yes, Evaluation Counselors - Document in Epic / SBAR to counselor \"No identified risk\" and Treatment Counselors - Assess weekly in progress notes under [facility] and summarize in Discharge / Treatment summary under [facility].     Additional Info as needed: Outpatient treatment and individual therapy following completion of [facility]. Would benefit from assistance with cleaning up living environment.         "

## 2017-09-22 NOTE — PROGRESS NOTES
Mssa's 8 and 9 and given valium x 2 this pm. Is eating well and resting in bed much of pm. VSS. Motrin given for aches and pain after supper with relief.Patient states he already had his flu shot.

## 2017-09-22 NOTE — PLAN OF CARE
Problem: Substance Withdrawal  Goal: Substance Withdrawal  Problem: General Plan of Care (Inpatient Behavioral)  Goal: Individualization/ Patient Specific Goal (IP Behavioral)  The patient and/or their representative their patient-specific goals related to the plan of care.  Patient specific goals include:  1. Patient will be evaluated by a physician and labs will be evaluated.  2. Patient will be seen by a  for evaluation and discharge planning.  3. Patient will complete assessment paperwork  4. Patient will consume 75 % of meal to meet estimated energy needs.  5. Detoxification from alcohol using valium.  6. Patient will be provided with alcohol relapse prevention information.Signs and symptoms of listed problems will be absent or manageable.   Outcome: Improving  Pt being monitored for alcohol and benzodiazepine withdrawal. No medications for alcohol withdrawal this shift. Pt continues on phenobarbital 32.4 QID. Pt reporting lower back pain. Requested motrin x 1. Soft care mattress placed on bed. Dr. Stephen met with patient for psychiatric consult. See new medications per Dr. Stephen. Pt states he does not take ceftin. See order to discontinue ceftin per Dr. Vital. Affect bright. Jonny continues to endorse auditory hallucinations of hearing his name being called. 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 Pt states he was involved in an association for  lyme's disease.

## 2017-09-22 NOTE — TELEPHONE ENCOUNTER
Spoke with patient about chronic Lyme's disease and concern for inability to attend programming in [facility]. Patient is also taking Phenobarbital and withdrawing from ETOH which certainly could be exacerbating feeling fatigued. Patient states he is confident that he will be able to attend programming and verbalized an understanding of the requirement to attend programming. He also stated that worse case scenario he would need 5 minutes of rest to recoup. Once we discussed the [facility] schedule he was more confident that he could rest between programming during breaks.     Patient is medically approved for [facility] at this time pending no issues with medications getting filled and sent to [facility].

## 2017-09-22 NOTE — PROGRESS NOTES
"SPIRITUAL HEALTH SERVICES Progress Note  [facility] ([facility]) 3AW       DATA:    Follow-up spiritual Health Services (SHS) visit; I encountered Jonny toward the end of the evening. He was awake, out of his room, and enjoying a snack in the dining area, where we had a brief SHS visit.   Jonny and I reviewed some of the spirituality practices/discussion from the group time earlier today. Jonny offered that one contributing factor to his relapse [from three (3) weeks ago] was that he ignored his spiritual-self/practices.   Jonny holds the assurance of a loving, forgiving God as his spiritual foundation.   Jonny also shared that his wife is seeking treatment; he expressed that BOTH of them working simultaneously on their health/sobriety will serve him well in recovery.  We talked about the importance of connecting with inspiration -- things that are life-giving -- as a part of recovery.   Jonny plans to discuss options for continued self-care -- particularly, returning to a counselor/therapist -- in addition to Twelve Step meetings and/or support groups, on-going treatment programs, etc. \"I need to stay positive ... I need to stay focused ....\"       INTERVENTION:    Brief reflective conversation; I reminded Jonny that, should he wish to see another , all he needs to do is let any of the 3AW staff know of his interest in a SHS visit.        OUTCOME:    Jonny was able to consider how spirituality and sobriety work together to contribute toward good self-care and health.        PLAN:    SHS remains available to Jonyn.                                                                                                                                              Fransisca Locke  Volunteer    Pager [phone]    "

## 2017-09-22 NOTE — TELEPHONE ENCOUNTER
----- Message from Katherine De Luna sent at 9/22/2017  9:44 AM CDT -----  Regarding: [facility]CEL 3A to [facility] Referral  Treatment plan changed.

## 2017-09-22 NOTE — PROGRESS NOTES
"CASE MANAGEMENT NOTE    [facility] screen requested submitted to Mickie Reyes. Mickie to do face-to-face screen with patient on Sunday, 9/24.    Per [facility] of [facility] wait list, patient scheduled for \"Mixed Group A\" bed on 9/25.    Preparation complete for [facility], pending [facility] screening approval.    Janet Griggs) SPENCER De Luna, [facility]  3A Psychotherapist  [phone]   "

## 2017-09-22 NOTE — PROGRESS NOTES
SPIRITUAL HEALTH SERVICES Progress Note  [facility] ([facility]) 3AW       DATA:    Follow-up Spiritual Health Services (SHS) visit attempted; however, Jonny was able to attend the Thursday afternoon spirituality group.  I was unable to meet with Jonny after spirituality group and/or before the evening Twelve Step meeting time.  After the evening meeting time, when I sought out Jonny for a SHS visit, he was sleeping soundly in his room; he did not respond to my knocking and/or calling his name at the swing of the door.       INTERVENTION:    Attempted SHS visit.       OUTCOME:    NA       PLAN:    SHS remains available to Jonny.                                                                                                                                              Fransisca Locke  Volunteer    Pager [phone]

## 2017-09-22 NOTE — PROGRESS NOTES
"Addiction medicine note:    Patient seen for rounding  Remains in withdrawal from alcohol and benzodiazepines  Depressed  Suicidal ideation but says he would never act on it  He's concerned about cognitive impairment from chronic Lyme disease  Would like psychiatry, mental health input    Still tremulous, anxious, sweaty    OBJECTIVE:     ROS:  Constitutional, HEENT, cardiovascular, pulmonary, gi and gu systems are negative, except as otherwise noted.  CONSTITUTIONAL:NEGATIVE for fever, chills, change in weight and fatigue  INTEGUMENTARY/SKIN: POSITIVE for diaphoresis  RESP:NEGATIVE for significant cough or SOB  CV: NEGATIVE for chest pain, palpitations or peripheral edema  NEURO: NEGATIVE for weakness, dizziness or paresthesias and POSITIVE for tremor   PSYCHIATRIC: POSITIVE foragitation, anhedonia, anxiety, concentration difficulty, depressed mood, fatigue, feelings of worthlessness/guilt, hopelessness, impaired memory and suicidal thoughts with out a plan    /90  Pulse 81  Temp 98.1  F (36.7  C) (Tympanic)  Resp 16  Ht 6' 6\" (1.981 m)  Wt (!) 330 lb (149.7 kg)  SpO2 98%  BMI 38.14 kg/m2  EXAM:  GENERAL APPEARANCE: alert and mild distress  EYES: Eyes grossly normal to inspection, PERRL and conjunctivae and sclerae normal  RESP: lungs clear to auscultation - no rales, rhonchi or wheezes  CV: regular rates and rhythm, normal S1 S2, no S3 or S4 and no murmur, click or rub  ABDOMEN: soft, nontender, without hepatosplenomegaly or masses and bowel sounds normal  SKIN: no suspicious lesions or rashes  NEURO: Normal strength and tone, mentation intact and speech normal  PSYCH: mentation appears normal, anxious and concentration poor  MENTAL STATUS EXAM:  Appearance/Behavior: Distressed and Casually groomed  Speech: Normal  Mood/Affect: depressed affect  Insight: Fair    ASSESSMENT: Alcohol Dependence and Withdrawal    Benzodiazepine dependence and withdrawal   Chronic Lyme disease    PLAN: Continue " detox  Psychiatry consult  Treatment plan    40 minutes were spent with patient with more than 50% of time spent in counseling and coordination of care

## 2017-09-22 NOTE — PROGRESS NOTES
"CASE MANAGEMENT NOTE      Patient reports the state of his living environment: \"I have 4 feet of raw garbage in my house...flies in my house...black mold. I really feel like my home is killing me...I just want to block it out.\"    Per patient's morning Rule 25 assessment report:  He endorses suicidal ideation and planning by hanging in the past month. Regarding current morning suicidal ideation:  \"For me, if there were an easy way, I'd probably be tempted to do it. Right now, I'm feeling kind of emotional talking about all this stuff. Hope for the future (10 = most hopeful):  6.5. Because I've always been able to get through what has been affecting me. I'm very resilient but I'm realistic too.\" Patient stated he feels safe from self-harm here, agrees to alert staff if he has thoughts of self-harm.     This afternoon patient denies suicidal ideation stating, \"I have thoughts of discouragement, but I'm optimistic overall.\"     Patient expresses concern about the pace of [facility] programming - this was relayed to Mickie Reyes who will meet with patient face-to-face on 3A.    Wife with whom patient lives is also an alcoholic, she is planning to go to outpatient treatment.    See writer's 9/22/2017 Rule 25 assessment for details.    Janet Griggs) SPENCER De Luna, [facility]  3A Psychotherapist  [phone]   "

## 2017-09-22 NOTE — CONSULTS
"PSYCHIATRIC CONSULTATION       Mr. Jonny Gramajo, date of birth 1985, is a 32-year-old man admitted to [facility] on 09/19/2017.  He is seeing me in psychiatric consultation.  He has had previous diagnoses of anxiety and depression as a child, after uriah Lyme's disease but before the diagnosis of chronic Lyme's was made he was diagnosed as having \"schizoaffective illness at [facility],\" more recently his psychiatrist has diagnosed him as having major depressive disorder with psychotic features (which I feel is accurate).  He has been currently seeing Dr. Clark for depression and has seen him for about 22 years.      Anxiety symptoms consist of a sense of \"flight or fight,\" palpitations, a sense of having to leave.  Depression symptoms consist of sadness, intermittent suicidal ideation (he does not feel at risk currently) and as part of this symptomatology he has ongoing concerns about surveillance.      CURRENT MEDICATIONS:     1.  Psychiatric medications of a Vraylar 1.5 mg per day.   2.  Viibryd 20 mg per day.   3.  He also takes gabapentin 300 mg 3 times a day.     4.  He has occasionally taken his wife's naltrexone which he states did help him with cravings.      Previous antidepressants include Elavil, imipramine, Wellbutrin, Prozac, Paxil, Zoloft, Lexapro, Celexa, Effexor, Cymbalta, Pristiq, and he notes he still is feeling depressed with the Viibryd, although there was some initial response.  He has taken neuroleptics of Risperdal, Seroquel, Abilify (allergy) and currently Vraylar.  He has taken Depakote.  He has not tried augmentation with Cytomel, Lamictal or lithium.  He has not taken Tegretol or carbamazepine.  He has not used MAO inhibitors, Fetzima, or Trintellix.  He has not used Clozaril, Saphris or Rexulti.      MENTAL STATUS EXAMINATION:  Shows an alert, cooperative 32-year-old man.  He states he is \"wiped out\" and \"anxious. \"  He has a sensation of " hearing mumbled voices.  Affect is good and varies appropriately with content.  Speech production is good.  There is no tremor or cogwheel rigidity.  There are no abnormal mouth or tongue movements and no abnormal other movements.  He does not appear to have akathisia.  No delusional concerns are noted, although he did mention concerns about surveillance that have been fairly lifelong.  He is not suicidal.      DIAGNOSTIC IMPRESSION:   1.  Major depression, recurrent, severe with psychotic features F33.3.   2.  Alcohol and benzodiazepine dependence.      PLAN:  Plan at this time is to increase the Vraylar to 3 mg per day and change to Viibryd to Trintellix.  I would next look at augmentation with lithium or Lamictal.  Side effects were discussed for the Lamictal if he were to use this.         TANNER AARON MD             D: 2017 11:28   T: 2017 12:31   MT: HOANG      Name:     JONNY SUAREZ   MRN:      [id number]        Account:       [id number]   :      1985           Consult Date:  2017      Document: [id number]

## 2017-09-23 PROCEDURE — 25000132 ZZH RX MED GY IP 250 OP 250 PS 637: Performed by: PSYCHIATRY & NEUROLOGY

## 2017-09-23 PROCEDURE — 12800012 ZZH R&B CD MH INTERMEDIATE ADULT

## 2017-09-23 PROCEDURE — 25000132 ZZH RX MED GY IP 250 OP 250 PS 637: Performed by: FAMILY MEDICINE

## 2017-09-23 RX ADMIN — VALACYCLOVIR HYDROCHLORIDE 500 MG: 500 TABLET, FILM COATED ORAL at 08:52

## 2017-09-23 RX ADMIN — NICOTINE POLACRILEX 4 MG: 2 GUM, CHEWING ORAL at 16:08

## 2017-09-23 RX ADMIN — PHENOBARBITAL 32.4 MG: 32.4 TABLET ORAL at 20:18

## 2017-09-23 RX ADMIN — NICOTINE POLACRILEX 4 MG: 2 GUM, CHEWING ORAL at 09:01

## 2017-09-23 RX ADMIN — FOLIC ACID 1 MG: 1 TABLET ORAL at 08:52

## 2017-09-23 RX ADMIN — PROPRANOLOL HYDROCHLORIDE 20 MG: 20 TABLET ORAL at 14:41

## 2017-09-23 RX ADMIN — DOXYCYCLINE HYCLATE 100 MG: 100 CAPSULE ORAL at 20:17

## 2017-09-23 RX ADMIN — ACETAMINOPHEN 650 MG: 325 TABLET, FILM COATED ORAL at 10:44

## 2017-09-23 RX ADMIN — CARIPRAZINE 3 MG: 1.5 CAPSULE, GELATIN COATED ORAL at 08:50

## 2017-09-23 RX ADMIN — PHENOBARBITAL 32.4 MG: 32.4 TABLET ORAL at 12:06

## 2017-09-23 RX ADMIN — GABAPENTIN 300 MG: 300 CAPSULE ORAL at 20:18

## 2017-09-23 RX ADMIN — CEFDINIR 300 MG: 300 CAPSULE ORAL at 20:18

## 2017-09-23 RX ADMIN — RANITIDINE HYDROCHLORIDE 300 MG: 150 TABLET, FILM COATED ORAL at 08:51

## 2017-09-23 RX ADMIN — METRONIDAZOLE 250 MG: 250 TABLET ORAL at 08:52

## 2017-09-23 RX ADMIN — DOXYCYCLINE HYCLATE 100 MG: 100 CAPSULE ORAL at 08:52

## 2017-09-23 RX ADMIN — NICOTINE POLACRILEX 4 MG: 2 GUM, CHEWING ORAL at 20:22

## 2017-09-23 RX ADMIN — IBUPROFEN 600 MG: 600 TABLET ORAL at 09:23

## 2017-09-23 RX ADMIN — IBUPROFEN 600 MG: 600 TABLET ORAL at 16:08

## 2017-09-23 RX ADMIN — VORTIOXETINE 5 MG: 5 TABLET, FILM COATED ORAL at 08:52

## 2017-09-23 RX ADMIN — VALACYCLOVIR HYDROCHLORIDE 500 MG: 500 TABLET, FILM COATED ORAL at 20:18

## 2017-09-23 RX ADMIN — IBUPROFEN 600 MG: 600 TABLET ORAL at 22:28

## 2017-09-23 RX ADMIN — PROPRANOLOL HYDROCHLORIDE 20 MG: 20 TABLET ORAL at 08:50

## 2017-09-23 RX ADMIN — GABAPENTIN 300 MG: 300 CAPSULE ORAL at 08:52

## 2017-09-23 RX ADMIN — TRAZODONE HYDROCHLORIDE 150 MG: 50 TABLET ORAL at 22:28

## 2017-09-23 RX ADMIN — NICOTINE POLACRILEX 4 MG: 2 GUM, CHEWING ORAL at 18:16

## 2017-09-23 RX ADMIN — MULTIPLE VITAMINS W/ MINERALS TAB 1 TABLET: TAB at 08:51

## 2017-09-23 RX ADMIN — NYSTATIN 500000 UNITS: 500000 TABLET, FILM COATED ORAL at 20:18

## 2017-09-23 RX ADMIN — RANITIDINE HYDROCHLORIDE 300 MG: 150 TABLET, FILM COATED ORAL at 20:17

## 2017-09-23 RX ADMIN — ACETAMINOPHEN 650 MG: 325 TABLET, FILM COATED ORAL at 18:16

## 2017-09-23 RX ADMIN — CEFDINIR 300 MG: 300 CAPSULE ORAL at 08:53

## 2017-09-23 RX ADMIN — PHENOBARBITAL 32.4 MG: 32.4 TABLET ORAL at 08:52

## 2017-09-23 RX ADMIN — HYDROXYZINE HYDROCHLORIDE 50 MG: 25 TABLET ORAL at 22:28

## 2017-09-23 RX ADMIN — GABAPENTIN 300 MG: 300 CAPSULE ORAL at 14:41

## 2017-09-23 RX ADMIN — NYSTATIN 500000 UNITS: 500000 TABLET, FILM COATED ORAL at 12:06

## 2017-09-23 RX ADMIN — PROPRANOLOL HYDROCHLORIDE 20 MG: 20 TABLET ORAL at 20:18

## 2017-09-23 RX ADMIN — METRONIDAZOLE 250 MG: 250 TABLET ORAL at 20:18

## 2017-09-23 RX ADMIN — CLARITHROMYCIN 500 MG: 500 TABLET ORAL at 20:17

## 2017-09-23 RX ADMIN — NICOTINE POLACRILEX 4 MG: 2 GUM, CHEWING ORAL at 22:28

## 2017-09-23 RX ADMIN — NYSTATIN 500000 UNITS: 500000 TABLET, FILM COATED ORAL at 08:51

## 2017-09-23 RX ADMIN — CLARITHROMYCIN 500 MG: 500 TABLET ORAL at 08:52

## 2017-09-23 RX ADMIN — NICOTINE POLACRILEX 4 MG: 2 GUM, CHEWING ORAL at 12:06

## 2017-09-23 RX ADMIN — SULFAMETHOXAZOLE AND TRIMETHOPRIM 1 TABLET: 800; 160 TABLET ORAL at 20:17

## 2017-09-23 RX ADMIN — PHENOBARBITAL 32.4 MG: 32.4 TABLET ORAL at 16:08

## 2017-09-23 RX ADMIN — PSYLLIUM HUSK 1 PACKET: 3.4 POWDER ORAL at 16:46

## 2017-09-23 RX ADMIN — SULFAMETHOXAZOLE AND TRIMETHOPRIM 1 TABLET: 800; 160 TABLET ORAL at 08:52

## 2017-09-23 RX ADMIN — NALTREXONE HYDROCHLORIDE 50 MG: 50 TABLET, FILM COATED ORAL at 08:52

## 2017-09-23 RX ADMIN — NYSTATIN 500000 UNITS: 500000 TABLET, FILM COATED ORAL at 16:08

## 2017-09-23 RX ADMIN — NICOTINE 1 PATCH: 21 PATCH, EXTENDED RELEASE TRANSDERMAL at 08:53

## 2017-09-23 ASSESSMENT — ACTIVITIES OF DAILY LIVING (ADL)
GROOMING: INDEPENDENT
ORAL_HYGIENE: INDEPENDENT
LAUNDRY: WITH SUPERVISION
GROOMING: INDEPENDENT
DRESS: INDEPENDENT

## 2017-09-23 NOTE — PLAN OF CARE
Problem: Substance Withdrawal  Goal: Substance Withdrawal  Problem: General Plan of Care (Inpatient Behavioral)  Goal: Individualization/ Patient Specific Goal (IP Behavioral)  The patient and/or their representative their patient-specific goals related to the plan of care.  Patient specific goals include:  1. Patient will be evaluated by a physician and labs will be evaluated.  2. Patient will be seen by a  for evaluation and discharge planning.  3. Patient will complete assessment paperwork  4. Patient will consume 75 % of meal to meet estimated energy needs.  5. Detoxification from alcohol using valium.  6. Patient will be provided with alcohol relapse prevention information.Signs and symptoms of listed problems will be absent or manageable.   Outcome: Improving  Pt continues to report lower back pain. Requested motrin and tylenol his am. Pt no tremors. Pt alcohol withdrawal complete. Valium discontinued. Pt reporting anxiety. Jonny states he has been  x 5 years. No children. States he has a dog and a cat. States it has been a years since he has been to CD treatment. Reports when he was 17 yo he was sent to treatment multiple times by his family. Pt still hoping to attend LP program. Out on unit attending unit programming.

## 2017-09-24 PROCEDURE — 25000132 ZZH RX MED GY IP 250 OP 250 PS 637: Performed by: PSYCHIATRY & NEUROLOGY

## 2017-09-24 PROCEDURE — 25000132 ZZH RX MED GY IP 250 OP 250 PS 637: Performed by: FAMILY MEDICINE

## 2017-09-24 PROCEDURE — 12800012 ZZH R&B CD MH INTERMEDIATE ADULT

## 2017-09-24 RX ADMIN — GABAPENTIN 300 MG: 300 CAPSULE ORAL at 20:17

## 2017-09-24 RX ADMIN — SULFAMETHOXAZOLE AND TRIMETHOPRIM 1 TABLET: 800; 160 TABLET ORAL at 20:17

## 2017-09-24 RX ADMIN — GABAPENTIN 300 MG: 300 CAPSULE ORAL at 14:33

## 2017-09-24 RX ADMIN — NICOTINE POLACRILEX 4 MG: 2 GUM, CHEWING ORAL at 16:19

## 2017-09-24 RX ADMIN — PSYLLIUM HUSK 1 PACKET: 3.4 POWDER ORAL at 12:30

## 2017-09-24 RX ADMIN — VALACYCLOVIR HYDROCHLORIDE 500 MG: 500 TABLET, FILM COATED ORAL at 20:16

## 2017-09-24 RX ADMIN — NYSTATIN 500000 UNITS: 500000 TABLET, FILM COATED ORAL at 08:47

## 2017-09-24 RX ADMIN — PHENOBARBITAL 32.4 MG: 32.4 TABLET ORAL at 08:47

## 2017-09-24 RX ADMIN — METRONIDAZOLE 250 MG: 250 TABLET ORAL at 08:47

## 2017-09-24 RX ADMIN — GABAPENTIN 300 MG: 300 CAPSULE ORAL at 08:47

## 2017-09-24 RX ADMIN — RANITIDINE HYDROCHLORIDE 300 MG: 150 TABLET, FILM COATED ORAL at 08:47

## 2017-09-24 RX ADMIN — PHENOBARBITAL 32.4 MG: 32.4 TABLET ORAL at 20:17

## 2017-09-24 RX ADMIN — SULFAMETHOXAZOLE AND TRIMETHOPRIM 1 TABLET: 800; 160 TABLET ORAL at 08:47

## 2017-09-24 RX ADMIN — FOLIC ACID 1 MG: 1 TABLET ORAL at 08:47

## 2017-09-24 RX ADMIN — PROPRANOLOL HYDROCHLORIDE 20 MG: 20 TABLET ORAL at 20:17

## 2017-09-24 RX ADMIN — PROPRANOLOL HYDROCHLORIDE 20 MG: 20 TABLET ORAL at 14:33

## 2017-09-24 RX ADMIN — NICOTINE POLACRILEX 4 MG: 2 GUM, CHEWING ORAL at 20:17

## 2017-09-24 RX ADMIN — NICOTINE POLACRILEX 4 MG: 2 GUM, CHEWING ORAL at 12:29

## 2017-09-24 RX ADMIN — NICOTINE POLACRILEX 4 MG: 2 GUM, CHEWING ORAL at 14:35

## 2017-09-24 RX ADMIN — CEFDINIR 300 MG: 300 CAPSULE ORAL at 20:16

## 2017-09-24 RX ADMIN — VORTIOXETINE 5 MG: 5 TABLET, FILM COATED ORAL at 08:47

## 2017-09-24 RX ADMIN — DOXYCYCLINE HYCLATE 100 MG: 100 CAPSULE ORAL at 20:17

## 2017-09-24 RX ADMIN — NICOTINE POLACRILEX 4 MG: 2 GUM, CHEWING ORAL at 21:52

## 2017-09-24 RX ADMIN — RANITIDINE HYDROCHLORIDE 300 MG: 150 TABLET, FILM COATED ORAL at 20:17

## 2017-09-24 RX ADMIN — NICOTINE POLACRILEX 4 MG: 2 GUM, CHEWING ORAL at 08:46

## 2017-09-24 RX ADMIN — NYSTATIN 500000 UNITS: 500000 TABLET, FILM COATED ORAL at 20:16

## 2017-09-24 RX ADMIN — IBUPROFEN 600 MG: 600 TABLET ORAL at 14:35

## 2017-09-24 RX ADMIN — MULTIPLE VITAMINS W/ MINERALS TAB 1 TABLET: TAB at 08:47

## 2017-09-24 RX ADMIN — IBUPROFEN 600 MG: 600 TABLET ORAL at 20:17

## 2017-09-24 RX ADMIN — VALACYCLOVIR HYDROCHLORIDE 500 MG: 500 TABLET, FILM COATED ORAL at 08:47

## 2017-09-24 RX ADMIN — NYSTATIN 500000 UNITS: 500000 TABLET, FILM COATED ORAL at 16:18

## 2017-09-24 RX ADMIN — CLARITHROMYCIN 500 MG: 500 TABLET ORAL at 08:46

## 2017-09-24 RX ADMIN — NALTREXONE HYDROCHLORIDE 50 MG: 50 TABLET, FILM COATED ORAL at 08:47

## 2017-09-24 RX ADMIN — NICOTINE 1 PATCH: 21 PATCH, EXTENDED RELEASE TRANSDERMAL at 08:48

## 2017-09-24 RX ADMIN — ACETAMINOPHEN 650 MG: 325 TABLET, FILM COATED ORAL at 16:18

## 2017-09-24 RX ADMIN — NYSTATIN 500000 UNITS: 500000 TABLET, FILM COATED ORAL at 12:29

## 2017-09-24 RX ADMIN — PHENOBARBITAL 32.4 MG: 32.4 TABLET ORAL at 16:19

## 2017-09-24 RX ADMIN — CEFDINIR 300 MG: 300 CAPSULE ORAL at 08:46

## 2017-09-24 RX ADMIN — ACETAMINOPHEN 650 MG: 325 TABLET, FILM COATED ORAL at 21:52

## 2017-09-24 RX ADMIN — ACETAMINOPHEN 650 MG: 325 TABLET, FILM COATED ORAL at 12:29

## 2017-09-24 RX ADMIN — DOXYCYCLINE HYCLATE 100 MG: 100 CAPSULE ORAL at 08:47

## 2017-09-24 RX ADMIN — CARIPRAZINE 3 MG: 1.5 CAPSULE, GELATIN COATED ORAL at 08:46

## 2017-09-24 RX ADMIN — HYDROXYZINE HYDROCHLORIDE 50 MG: 25 TABLET ORAL at 21:52

## 2017-09-24 RX ADMIN — CLARITHROMYCIN 500 MG: 500 TABLET ORAL at 20:17

## 2017-09-24 RX ADMIN — PHENOBARBITAL 32.4 MG: 32.4 TABLET ORAL at 12:29

## 2017-09-24 RX ADMIN — IBUPROFEN 600 MG: 600 TABLET ORAL at 08:47

## 2017-09-24 RX ADMIN — METRONIDAZOLE 250 MG: 250 TABLET ORAL at 20:17

## 2017-09-24 RX ADMIN — TRAZODONE HYDROCHLORIDE 150 MG: 50 TABLET ORAL at 21:52

## 2017-09-24 RX ADMIN — PROPRANOLOL HYDROCHLORIDE 20 MG: 20 TABLET ORAL at 08:47

## 2017-09-24 ASSESSMENT — ACTIVITIES OF DAILY LIVING (ADL)
LAUNDRY: WITH SUPERVISION
GROOMING: INDEPENDENT
DRESS: INDEPENDENT
ORAL_HYGIENE: INDEPENDENT
GROOMING: INDEPENDENT

## 2017-09-24 NOTE — PROGRESS NOTES
Pt main issue has been back pain. Requesting prn medications for this. Pt states he has two herniated desks. Pt met with LP manager. Pt denies auditory hallucinations today. States he feels the new medications have been helping improve his mood. Continues on scheduled phenobarbital 32.4 QID. No over sedation noted. Pt vague on if the doctor providing his benzodiazepines is aware he is here. Possible discharge to LP tomorrow. Pt wife and parents here visiting.

## 2017-09-25 ENCOUNTER — HOSPITAL ENCOUNTER (OUTPATIENT)
Dept: BEHAVIORAL HEALTH | Facility: CLINIC | Age: 32
End: 2017-09-25
Attending: PSYCHIATRY & NEUROLOGY
Payer: COMMERCIAL

## 2017-09-25 VITALS
DIASTOLIC BLOOD PRESSURE: 89 MMHG | SYSTOLIC BLOOD PRESSURE: 140 MMHG | HEART RATE: 94 BPM | WEIGHT: 315 LBS | TEMPERATURE: 97.3 F | OXYGEN SATURATION: 98 % | BODY MASS INDEX: 36.45 KG/M2 | RESPIRATION RATE: 16 BRPM | HEIGHT: 78 IN

## 2017-09-25 PROCEDURE — 25000132 ZZH RX MED GY IP 250 OP 250 PS 637: Performed by: FAMILY MEDICINE

## 2017-09-25 PROCEDURE — 25000132 ZZH RX MED GY IP 250 OP 250 PS 637: Performed by: PSYCHIATRY & NEUROLOGY

## 2017-09-25 PROCEDURE — 99239 HOSP IP/OBS DSCHRG MGMT >30: CPT | Performed by: FAMILY MEDICINE

## 2017-09-25 PROCEDURE — 10020000 ZZH LODGING PLUS FACILITY CHARGE ADULT

## 2017-09-25 RX ORDER — METRONIDAZOLE 250 MG/1
250 TABLET ORAL 2 TIMES DAILY
Qty: 60 TABLET | Refills: 0 | Status: SHIPPED | OUTPATIENT
Start: 2017-09-25 | End: 2021-03-20

## 2017-09-25 RX ORDER — NYSTATIN 500000 [USP'U]/1
1 TABLET, COATED ORAL 4 TIMES DAILY
Qty: 120 TABLET | Refills: 0 | Status: SHIPPED | OUTPATIENT
Start: 2017-09-25 | End: 2021-03-20

## 2017-09-25 RX ORDER — AMOXICILLIN 250 MG
2 CAPSULE ORAL DAILY PRN
COMMUNITY
End: 2017-10-10

## 2017-09-25 RX ORDER — VALACYCLOVIR HYDROCHLORIDE 500 MG/1
500 TABLET, FILM COATED ORAL 2 TIMES DAILY
Qty: 60 TABLET | Refills: 0 | Status: SHIPPED | OUTPATIENT
Start: 2017-09-25 | End: 2021-03-20

## 2017-09-25 RX ORDER — TRAZODONE HYDROCHLORIDE 50 MG/1
50-150 TABLET, FILM COATED ORAL
Qty: 30 TABLET | Refills: 1 | Status: SHIPPED | OUTPATIENT
Start: 2017-09-25 | End: 2021-03-20

## 2017-09-25 RX ORDER — CEFDINIR 300 MG/1
300 CAPSULE ORAL 2 TIMES DAILY
Qty: 30 CAPSULE | Refills: 0 | Status: SHIPPED | OUTPATIENT
Start: 2017-09-25 | End: 2021-03-20

## 2017-09-25 RX ORDER — PHENOBARBITAL 32.4 MG/1
TABLET ORAL
Qty: 50 TABLET | Refills: 0 | Status: ON HOLD | OUTPATIENT
Start: 2017-09-25 | End: 2017-10-13

## 2017-09-25 RX ORDER — GABAPENTIN 300 MG/1
300 CAPSULE ORAL 3 TIMES DAILY
Qty: 90 CAPSULE | Refills: 1 | Status: SHIPPED | OUTPATIENT
Start: 2017-09-25

## 2017-09-25 RX ORDER — PROPRANOLOL HYDROCHLORIDE 20 MG/1
20 TABLET ORAL 3 TIMES DAILY
Qty: 90 TABLET | Refills: 0 | Status: SHIPPED | OUTPATIENT
Start: 2017-09-25 | End: 2017-10-10 | Stop reason: SINTOL

## 2017-09-25 RX ORDER — HYDROXYZINE HYDROCHLORIDE 25 MG/1
25-50 TABLET, FILM COATED ORAL EVERY 4 HOURS PRN
Qty: 120 TABLET | Refills: 1 | Status: SHIPPED | OUTPATIENT
Start: 2017-09-25 | End: 2021-03-20

## 2017-09-25 RX ORDER — NALTREXONE HYDROCHLORIDE 50 MG/1
50 TABLET, FILM COATED ORAL DAILY
Qty: 30 TABLET | Refills: 1 | Status: SHIPPED | OUTPATIENT
Start: 2017-09-25 | End: 2017-10-10 | Stop reason: SINTOL

## 2017-09-25 RX ORDER — CLARITHROMYCIN 500 MG
500 TABLET ORAL 2 TIMES DAILY
Qty: 60 TABLET | Refills: 0 | Status: SHIPPED | OUTPATIENT
Start: 2017-09-25 | End: 2021-03-20

## 2017-09-25 RX ORDER — DOXYCYCLINE 100 MG/1
100 CAPSULE ORAL 2 TIMES DAILY
Qty: 60 CAPSULE | Refills: 0 | Status: SHIPPED | OUTPATIENT
Start: 2017-09-25 | End: 2021-03-20

## 2017-09-25 RX ORDER — MAGNESIUM HYDROXIDE/ALUMINUM HYDROXICE/SIMETHICONE 120; 1200; 1200 MG/30ML; MG/30ML; MG/30ML
30 SUSPENSION ORAL EVERY 6 HOURS PRN
COMMUNITY
End: 2017-10-10

## 2017-09-25 RX ORDER — IBUPROFEN 600 MG/1
600 TABLET, FILM COATED ORAL EVERY 6 HOURS PRN
Qty: 120 TABLET | Refills: 1 | Status: SHIPPED | OUTPATIENT
Start: 2017-09-25 | End: 2017-10-04

## 2017-09-25 RX ORDER — SULFAMETHOXAZOLE/TRIMETHOPRIM 800-160 MG
1 TABLET ORAL 2 TIMES DAILY
Qty: 60 TABLET | Refills: 0 | Status: SHIPPED | OUTPATIENT
Start: 2017-09-25 | End: 2021-03-20

## 2017-09-25 RX ORDER — LORATADINE 10 MG/1
10 TABLET ORAL DAILY PRN
COMMUNITY
End: 2017-10-10

## 2017-09-25 RX ADMIN — PROPRANOLOL HYDROCHLORIDE 20 MG: 20 TABLET ORAL at 08:36

## 2017-09-25 RX ADMIN — NALTREXONE HYDROCHLORIDE 50 MG: 50 TABLET, FILM COATED ORAL at 08:37

## 2017-09-25 RX ADMIN — SULFAMETHOXAZOLE AND TRIMETHOPRIM 1 TABLET: 800; 160 TABLET ORAL at 08:37

## 2017-09-25 RX ADMIN — VALACYCLOVIR HYDROCHLORIDE 500 MG: 500 TABLET, FILM COATED ORAL at 08:37

## 2017-09-25 RX ADMIN — GABAPENTIN 300 MG: 300 CAPSULE ORAL at 13:13

## 2017-09-25 RX ADMIN — ACETAMINOPHEN 650 MG: 325 TABLET, FILM COATED ORAL at 08:37

## 2017-09-25 RX ADMIN — CEFDINIR 300 MG: 300 CAPSULE ORAL at 13:54

## 2017-09-25 RX ADMIN — NICOTINE POLACRILEX 4 MG: 2 GUM, CHEWING ORAL at 13:13

## 2017-09-25 RX ADMIN — RANITIDINE HYDROCHLORIDE 300 MG: 150 TABLET, FILM COATED ORAL at 08:36

## 2017-09-25 RX ADMIN — NICOTINE POLACRILEX 2 MG: 2 GUM, CHEWING ORAL at 08:36

## 2017-09-25 RX ADMIN — DOXYCYCLINE HYCLATE 100 MG: 100 CAPSULE ORAL at 13:54

## 2017-09-25 RX ADMIN — NICOTINE POLACRILEX 4 MG: 2 GUM, CHEWING ORAL at 10:23

## 2017-09-25 RX ADMIN — NYSTATIN 500000 UNITS: 500000 TABLET, FILM COATED ORAL at 08:37

## 2017-09-25 RX ADMIN — ACETAMINOPHEN 650 MG: 325 TABLET, FILM COATED ORAL at 13:13

## 2017-09-25 RX ADMIN — CLARITHROMYCIN 500 MG: 500 TABLET ORAL at 08:36

## 2017-09-25 RX ADMIN — DOXYCYCLINE HYCLATE 100 MG: 100 CAPSULE ORAL at 08:37

## 2017-09-25 RX ADMIN — FOLIC ACID 1 MG: 1 TABLET ORAL at 08:37

## 2017-09-25 RX ADMIN — METRONIDAZOLE 250 MG: 250 TABLET ORAL at 08:37

## 2017-09-25 RX ADMIN — PHENOBARBITAL 32.4 MG: 32.4 TABLET ORAL at 11:46

## 2017-09-25 RX ADMIN — CARIPRAZINE 3 MG: 1.5 CAPSULE, GELATIN COATED ORAL at 08:36

## 2017-09-25 RX ADMIN — NICOTINE 1 PATCH: 21 PATCH, EXTENDED RELEASE TRANSDERMAL at 08:37

## 2017-09-25 RX ADMIN — IBUPROFEN 600 MG: 600 TABLET ORAL at 10:23

## 2017-09-25 RX ADMIN — GABAPENTIN 300 MG: 300 CAPSULE ORAL at 08:36

## 2017-09-25 RX ADMIN — VORTIOXETINE 5 MG: 5 TABLET, FILM COATED ORAL at 08:36

## 2017-09-25 RX ADMIN — CEFDINIR 300 MG: 300 CAPSULE ORAL at 08:36

## 2017-09-25 RX ADMIN — PHENOBARBITAL 32.4 MG: 32.4 TABLET ORAL at 08:36

## 2017-09-25 RX ADMIN — NYSTATIN 500000 UNITS: 500000 TABLET, FILM COATED ORAL at 11:46

## 2017-09-25 RX ADMIN — PSYLLIUM HUSK 1 PACKET: 3.4 POWDER ORAL at 08:37

## 2017-09-25 RX ADMIN — MULTIPLE VITAMINS W/ MINERALS TAB 1 TABLET: TAB at 08:37

## 2017-09-25 RX ADMIN — PROPRANOLOL HYDROCHLORIDE 20 MG: 20 TABLET ORAL at 13:14

## 2017-09-25 ASSESSMENT — ACTIVITIES OF DAILY LIVING (ADL)
ORAL_HYGIENE: INDEPENDENT
DRESS: INDEPENDENT
GROOMING: INDEPENDENT
LAUNDRY: WITH SUPERVISION

## 2017-09-25 NOTE — DISCHARGE INSTRUCTIONS
Behavioral Discharge Planning and Instructions  THANK YOU FOR CHOOSING THE [facility]  Cui [facility] [place]                          [phone]    Summary:   You were admitted to Cui 3A on 9/19/2017 for detoxification from alcohol. You are being discharged on 9/25/17.  A medical examination was performed that included lab work. You met with a  and opted to transfer directly to [facility].  Please take care and make your recovery a daily priority, Jonny.     Recommendation: As stated above. Consider the following:  outpatient treatment following completion of [facility], psychotherapy weekly following completion of treatment.    Main Diagnosis:  Per Dr. Eliud Vital MD  1.  Alcohol dependence and withdrawal.   2.  Benzodiazepine dependence.     Major Treatments, Procedures and Findings:  You have withdrawn from *** using the appropriate protocols.  You have had blood drawn, and the results have been reviewed with you.  Please take a copy of your lab work with you to your next primary care provider appointment.    Symptoms to Report:  If you experience more anxiety, confusion, sleeplessness, deep sadness or thoughts of suicide, notify your treatment team or notify your primary care physician. IF ANY OF THE SYMPTOMS YOU ARE EXPERIENCING ARE A MEDICAL EMERGENCY CALL 911 IMMEDIATELY.     Primary Provider:    Dr. Pallas   [facility]  [phone]  October 25, 2017 @3:00PM    Dr. Marquise Clark   [facility]   [phone]  October 25, 2017 @9:00am    Mental Health Therapist:  Mario Shook MA, LP, LMFT  [address], [address]  [place] [address] [phone]    Lifestyle Adjustment & Health Action Plan:  1.   Create a daily schedule  2.   Eat Healthy  3.   Plan Enjoyable Sober Activities  4.   Use Problem Solving Skills and Deal with Issues as they Arise.   5.   Be Physically Active  6.   Take your medications as prescribed  7.   Get enough restful  sleep  8.   Practice Relaxation  9.   Spend time with Supportive People  10. No use of alcohol, illegal drugs or addictive medications other than what is currently prescribed.   11. AA, NA Sponsor are excellent resources for support    Resources:   [facility] Crisis Resource & Support Center:  389.862.3271.   Support Group:  AA/NA and Sponsor/support.  SMART Recovery - self management for addiction recovery:  www.smartrecovery.org  [place] on [place] Illness (www.mn.rizwan.org): 665.854.9440 or 507-561-1172.  Alcoholics Anonymous (www.alcoholics-anonymous.org): Check your phone book for your local chapter.  Crisis Connection:  811.743.2301 or 1-285.778.4689. Call anytime for help.  [facility] Emergency Services:  356.391.2157 or 1-260.586.3860.  Suicide Awareness Voices of Education (SAVE) (www.save.org): 036-791-SAVE (6283).  National Suicide Prevention Line (www.mentalhealthmn.org): 991-211-RHIK (6113).  Mental Health Consumer/Survivor Network of [place] (www.mhcsn.net): 120.114.1244 or 516-357-5052.  Mental Health Association of [place] (www.mentalhealth.org): 509.344.3380 or 382-161-2331.     Substance Abuse and Mental Health Services (www.samhsa.gov).    [facility] ([facility])  [facility] connects people seeking recovery to resources that help foster and sustain long-term recovery.  Whether you are seeking resources for treatment, transportation, housing, job training, education, health care or other pathways to recovery, [facility] is a great place to start.  102.363.8760. www.[facility].org    General Medication Instructions:   See your medication papers for instructions. Take all medicines as directed.  Make no changes unless your doctor suggests them. Go to all your doctor visits.  Be sure to have all your required lab tests. This way, your medicines can be refilled on time. Do not use any drugs not prescribed by your provider.  AA/NA and Sponsors are excellent resources for support. Avoid  alcohol at all costs!    THANK YOU FOR CHOOSING THE [facility]    The treatment team has appreciated the opportunity to work with you.  We wish you the best in the future. Please bring this discharge folder with you to all follow  up appointments - it contains your lab results, diagnosis, medication list and discharge recommendations.    For follow up questions:  Detox Nursing [phone]  Past medical records [phone]  Readmission [phone]

## 2017-09-25 NOTE — PROGRESS NOTES
This [facility] patient, or other Residential/Lodging CD Treatment patient is a categorical Vulnerable Adult according to [place] Statute 626.5572 subdivision 21.    Susceptibility to abuse by others     1.  Have you ever been emotionally abused by anyone?          No    2.  Have you ever been bullied, or physically assaulted by anyone?        No    3.  Have you ever been sexually taken advantage of or sexually assaulted?        No    4.  Have you ever been financially taken advantage of?        No    5.  Have you ever hurt yourself intentionally such as burns or cuts?       No    Risk of abusing other vulnerable adults     1.  Have you ever bullied, berated or emotionally degraded someone else?       No    2.  Have you ever financially taken advantage of someone else?       No    3.  Have you ever sexually exploited or assaulted another person?       No    4.  Have you ever gotten into fights, verbal arguments or physically assaulted someone?          No    Based on the above information:    This [facility] patient, or other Residential/Lodging CD Treatment patient is a categorical Vulnerable Adult according to [facility] [place] Statue 626.5572 subdivision 21.

## 2017-09-25 NOTE — PROGRESS NOTES
Name: Jonny Gramajo  Date: 9/25/2017  Medical Record: [id number]    Envelope Number: [id number]    List of Contents (List each item separately in new row):     1 cell phone     Admission:  I am responsible for any personal items that are not sent to the safe or pharmacy.  [facility] is not responsible for loss, theft or damage of any property in my possession.      Patient Signature:  ___________________________________________       Date/Time:__________________________    Staff Signature: __________________________________       Date/Time:__________________________    2nd Staff person, if patient is unable/unwilling to sign:      __________________________________________________________       Date/Time: __________________________      Discharge:  [facility] has returned all of my personal belongings:    Patient Signature: ________________________________________     Date/Time: ____________________________________    Staff Signature: ______________________________________     Date/Time:_____________________________________

## 2017-09-25 NOTE — PROGRESS NOTES
Per Dr. Zahraa purcell for patient to stop taking Vraylar as pt reports that it makes him restless. Patient notified and note made in patients MAR.

## 2017-09-25 NOTE — DISCHARGE SUMMARY
HISTORY OF PRESENT ILLNESS:  Jonny Gramajo is a 32-year-old male who was readmitted to [facility] after a relapse.  He has alcohol dependence and benzodiazepine dependence.  He lives with his wife in [place].  He is unemployed.  He has no children.  He has a history of addiction dating back to his teenage years.  He has been through multiple treatment programs as an adolescent.  He has been to [facility] and to a program in [place].  He has had some years of sobriety and was active in recovery in the past.  Lately, he has been struggling with alcohol and benzodiazepines.  He was here a month ago, but relapsed.  He now enters for detox and treatment.      He has a history of chronic Lyme disease and is on multiple medications.  He has a history of depression and schizoaffective disorder.  He was seen in consultation by Dr. Stephen who assessed him as having major depression with psychotic features.  He was started on Trintellix and Vraylar.      HOSPITAL COURSE:  The patient was stable medically and will be discharged to [facility].      DISCHARGE MEDICATIONS:   1.  Ibuprofen 600 mg q.6 h. p.r.n. back pain.   2.  Hydroxyzine 25-50 mg q.i.d. p.r.n.   3.  Gabapentin 300 mg t.i.d.   4.  Trazodone 50 mg at bedtime.   5.  Naltrexone 50 mg daily.     6.  Nystatin 500,000 units q.i.d.    7.  Vraylar 3 mg daily.    8.  Valtrex 500 mg b.i.d.   9.  Propranolol 20 mg t.i.d.    10.  Omnicef 300 mg b.i.d.    11.  Cedax 400 mg daily.   12.  Phenobarbital 32.4 mg q.i.d. for 5 days, then t.i.d. for 5 days, then b.i.d. for 5 days, then daily for 5 days.     13.  Biaxin 500 mg b.i.d.   14.  Flagyl 250 mg b.i.d.   15.  Sulfamethoxazole/trimethoprim 1 b.i.d.   16.  Doxycycline 100 mg b.i.d.   17.  Zantac 300 mg b.i.d.   18.  Trintellix to 5 mg tablets 2 tablets daily.   19.  Metamucil 2 tablets daily p.r.n.      LABORATORY DATA:  During the course of the hospital stay showed an ALT of 316 and AST of 202 and a GGT of 789.   CBC was normal.      DISCHARGE DIAGNOSES:   1.  Alcohol dependence.   2.  Benzodiazepine dependence.     3.  Major depression.     4.  Chronic Lyme disease.      Greater than 30 minutes were spent with the patient and record in completion of this discharge summary with over 50% of time spent in counseling and coordination of care.         EVERETT HAUSER MD             D: 2017 08:48   T: 2017 09:27   MT: SHAYNE      Name:     JONNY SUAREZ   MRN:      [id number]        Account:        [id number]   :      1985           Admit Date:                                       Discharge Date:       Document: [id number]

## 2017-09-25 NOTE — PLAN OF CARE
Problem: Substance Withdrawal  Goal: Substance Withdrawal  Problem: General Plan of Care (Inpatient Behavioral)  Goal: Individualization/ Patient Specific Goal (IP Behavioral)  The patient and/or their representative their patient-specific goals related to the plan of care.  Patient specific goals include:  1. Patient will be evaluated by a physician and labs will be evaluated.  2. Patient will be seen by a  for evaluation and discharge planning.  3. Patient will complete assessment paperwork  4. Patient will consume 75 % of meal to meet estimated energy needs.  5. Detoxification from alcohol using valium.  6. Patient will be provided with alcohol relapse prevention information.Signs and symptoms of listed problems will be absent or manageable.   Outcome: Adequate for Discharge Date Met:  09/25/17  Pt denies suicidal ideation or homicidal ideation. Has received all belongings. Discharge medications and follow up instructions reviewed with patient. Patient verbalizes understanding of discharge instructions. Pt was discharged to [facility].

## 2017-09-25 NOTE — PROGRESS NOTES
Initial Services Plan        Before your first treatment group, please do the following    Immediate health & safety concerns: Obtain personal items (glasses, hearing aides, medicines, diabetes supplies, clothing, etc.).  Look for a support network (such as AA, NA, DBT group, a [employer] group, etc.)    Suggestions for client during the time between intake & completion of treatment plan:  Tour your treatment center (unit or outpatient clinic).  Introduce yourself to the treatment group.  Spend time getting to know your peers.  Complete your psycho-social paperwork.  Complete the problem list for your treatment plan.  Start drug and alcohol use history.  Review your patient or client handbook.  Identify concerns about whom to ask for family week    Client issues to be addressed in the first treatment sessions:  Identify concerns about coming into treatment, i.e. fear of failing again, sharing a room in treatment  Identify concerns about going to group, i.e. fear of talking in group      Princess Lorenzo RN  9/25/2017  1:58 PM

## 2017-09-25 NOTE — PROGRESS NOTES
"[facility] Nursing Health Assessment      Vital signs:   Per 3AW    Transfer from 3AW    Counselor: Li  Drug of Choice: Alcohol and benzodiazepines   Last use: ETOH on 9/19 and benzodiazepines on 8/30 (other than those given in detox)  Home clinic/MD: [facility]  Psychiatrist/therapist: Dr. Marquise Clark - out [facility] (telephone encounters)    Medical history/current conditions: chronic lyme disease (in remission), hypertension     H&P Screen:  H&P within the last 90 days: Yes, while in detox. No H & P needed at this time.       Mental Health diagnosis: Anxiety and MDD with psychotic features per patient  Medication compliant?: yes   Recent sucidal thoughts? none    When? n/a  Current thought of self-harm? none    Plan? n/a    Pain assessment:   Pt. Experiencing pain at this time? Yes (Please explain): low back, acute pain \"from the beds in detox\" rated 4/10 in severity      Nursing Assessment Summary:  Risk for fatigue and brain fog with Lyme disease per patient. Symptoms improve with rest - currently in remission; knows that he is to attend all programming.     On-going nursing intervention required?   No    Acute care visit recommended: No      "

## 2017-09-25 NOTE — PROGRESS NOTES
Diet & BMI Assessment     Are you on a special diet?    No   Do you have any concerns regarding your nutritional status?   No   Have you had any appetite changes in the last 3 months?       No   Have you had any weight loss or weight gain in the last 3 months?    If yes, how much weight gain or loss:    If weight patient gains or loses is more than 10 pounds, refer to primary care provider for assessment.        No   Was the patient informed of BMI?    Above,  General nutrition education   Yes

## 2017-09-26 ENCOUNTER — HOSPITAL ENCOUNTER (OUTPATIENT)
Dept: BEHAVIORAL HEALTH | Facility: CLINIC | Age: 32
End: 2017-09-26
Attending: FAMILY MEDICINE
Payer: COMMERCIAL

## 2017-09-26 PROCEDURE — H2035 A/D TX PROGRAM, PER HOUR: HCPCS | Mod: HQ

## 2017-09-26 PROCEDURE — 10020000 ZZH LODGING PLUS FACILITY CHARGE ADULT

## 2017-09-26 NOTE — PROGRESS NOTES
Name: Jonny Gramajo  Date: 9/26/2017  Medical Record: [id number]    Envelope Number: [id number]    List of Contents (List each item separately in new row):     Vraylar 3mg caps     Admission:  I am responsible for any personal items that are not sent to the safe or pharmacy.  [facility] is not responsible for loss, theft or damage of any property in my possession.      Patient Signature:  ___________________________________________       Date/Time:__________________________    Staff Signature: __________________________________       Date/Time:__________________________    2nd Staff person, if patient is unable/unwilling to sign:      __________________________________________________________       Date/Time: __________________________      Discharge:  [facility] has returned all of my personal belongings:    Patient Signature: ________________________________________     Date/Time: ____________________________________    Staff Signature: ______________________________________     Date/Time:_____________________________________

## 2017-09-27 ENCOUNTER — HOSPITAL ENCOUNTER (OUTPATIENT)
Dept: BEHAVIORAL HEALTH | Facility: CLINIC | Age: 32
End: 2017-09-27
Attending: FAMILY MEDICINE
Payer: COMMERCIAL

## 2017-09-27 PROCEDURE — 10020000 ZZH LODGING PLUS FACILITY CHARGE ADULT

## 2017-09-27 PROCEDURE — H2035 A/D TX PROGRAM, PER HOUR: HCPCS | Mod: HQ

## 2017-09-27 NOTE — PROGRESS NOTES
Name: Jonny Gramajo  Date: 9/27/2017  Medical Record: [id number]    Envelope Number: [id number]    List of Contents (List each item separately in new row):   Nicotine Patch    Admission:  I am responsible for any personal items that are not sent to the safe or pharmacy.  [facility] is not responsible for loss, theft or damage of any property in my possession.      Patient Signature:  ___________________________________________       Date/Time:__________________________    Staff Signature: __________________________________       Date/Time:__________________________    2nd Staff person, if patient is unable/unwilling to sign:      __________________________________________________________       Date/Time: __________________________      Discharge:  [facility] has returned all of my personal belongings:    Patient Signature: ________________________________________     Date/Time: ____________________________________    Staff Signature: ______________________________________     Date/Time:_____________________________________

## 2017-09-28 ENCOUNTER — HOSPITAL ENCOUNTER (OUTPATIENT)
Dept: BEHAVIORAL HEALTH | Facility: CLINIC | Age: 32
End: 2017-09-28
Attending: FAMILY MEDICINE
Payer: COMMERCIAL

## 2017-09-28 PROCEDURE — 10020000 ZZH LODGING PLUS FACILITY CHARGE ADULT

## 2017-09-28 PROCEDURE — H2035 A/D TX PROGRAM, PER HOUR: HCPCS | Mod: HQ

## 2017-09-28 NOTE — PROGRESS NOTES
Treatment Plan Preparation  D)  Reviewed admission paperwork, pt's own targeted goals and Rule 25 assessment to coordinate the development of his treatment plan. Pt states that family discord related to use by he and his wife is significant. He indicates that be believes his use is closely connected to his mental health issues. Patient reports prior diagnosis for major depressive disorder with psychotic features and anxiety. Patient has a hx of  seizures related to alcohol/benzo withdrawal. Pt reports a re-occurring history of relapses.  I) Counselor facilitated 1:1 session with pt.  A) Pt appears motivated and engaged  P) Develop and implement tx plan.
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
## 2017-09-28 NOTE — PROGRESS NOTES
First Group   D. Patient attended his first group session on 9/26/2017. Pt shared an overview of circumstances leading to his admission. Pt provided some personal information relative to to his current living, social and employment situations. Pt was able to identify some ways peers could assist him while in tx.   I. Counselor facilitated group. Group rules and expectations were reviewed. Counselors and peers were introduced.   A. Patient appears to have adequate motivation for sobriety and seems appropriate for this level of care.   P. Participate in all programming,be available for further assessment and  contribute to treatment planning.

## 2017-09-29 ENCOUNTER — HOSPITAL ENCOUNTER (OUTPATIENT)
Dept: BEHAVIORAL HEALTH | Facility: CLINIC | Age: 32
End: 2017-09-29
Attending: FAMILY MEDICINE
Payer: COMMERCIAL

## 2017-09-29 PROCEDURE — 10020000 ZZH LODGING PLUS FACILITY CHARGE ADULT

## 2017-09-29 PROCEDURE — H2035 A/D TX PROGRAM, PER HOUR: HCPCS | Mod: HQ

## 2017-09-29 NOTE — PROGRESS NOTES
Acknowledgement of Current Treatment Plan       I have reviewed my treatment plan with my therapist / counselor on 10/2/2017. I agree with the plan as it is written in the electronic health record.    Name Signature   Jonny Gramajo    Name of Therapist / Counselor    [facility]

## 2017-09-30 ENCOUNTER — HOSPITAL ENCOUNTER (OUTPATIENT)
Dept: BEHAVIORAL HEALTH | Facility: CLINIC | Age: 32
End: 2017-09-30
Attending: FAMILY MEDICINE
Payer: COMMERCIAL

## 2017-09-30 PROCEDURE — 10020000 ZZH LODGING PLUS FACILITY CHARGE ADULT

## 2017-09-30 PROCEDURE — H2035 A/D TX PROGRAM, PER HOUR: HCPCS | Mod: HQ

## 2017-10-01 ENCOUNTER — HOSPITAL ENCOUNTER (OUTPATIENT)
Dept: BEHAVIORAL HEALTH | Facility: CLINIC | Age: 32
End: 2017-10-01
Attending: FAMILY MEDICINE
Payer: COMMERCIAL

## 2017-10-01 PROCEDURE — H2035 A/D TX PROGRAM, PER HOUR: HCPCS | Mod: HQ

## 2017-10-01 PROCEDURE — 10020000 ZZH LODGING PLUS FACILITY CHARGE ADULT

## 2017-10-02 ENCOUNTER — HOSPITAL ENCOUNTER (OUTPATIENT)
Dept: BEHAVIORAL HEALTH | Facility: CLINIC | Age: 32
End: 2017-10-02
Attending: FAMILY MEDICINE
Payer: COMMERCIAL

## 2017-10-02 PROCEDURE — 10020000 ZZH LODGING PLUS FACILITY CHARGE ADULT

## 2017-10-02 PROCEDURE — H2035 A/D TX PROGRAM, PER HOUR: HCPCS | Mod: HQ

## 2017-10-02 NOTE — PROGRESS NOTES
Patient Safety Plan Template    Name:   Jonny Gramajo YOB: 1985 Age:  32 year old MR Number:  [id number]   Step 1: Warning signs (Thoughts, images, mood, situation, behavior) that a crisis may be developin. paranoia     2. Isolating, sleeping     3. Lack of interest     Step 2: Internal coping strategies - Things I can do to take my mind off of my problems without contacting another person (relaxation technique, physical activity):     1. Deep breathing     2. exercise     3. Talking with others (wife and dad)     Step 3: People and social settings that provide distraction:     1. Name: Marce (wife)   Phone: [phone]   2. Name: Lacey (mother)   Phone: [phone]   3. Place: neighborhood walks   4. Place: family gatherings     The one thing that is most important to me and worth living for is: my marriage     Step 4: People whom I can ask for help:     1. Name: Marce   Phone: [phone]     2. Name: mom   Phone: [phone]     3. Name: dad   Phone: [phone]     Step 5: Professionals or agencies I can contact during a crisis:     1. Clinician Name: Dr. Pruett   Phone: [phone]   Clinician Pager or Emergency Contact #: NA     2. Clinician Name: [place]   Phone: [place]     Clinician Pager or Emergency Contact #: NA     3. Local Urgent Care Services:     Urgent Care Services Address:     Urgent Care Services Phone: NA     4. Suicide Prevention Lifeline Phone: 9-439-126-MMBI ([phone])     Step 6: Making the environment safe:     1. Ensure all alcohol is removed from home     2. Remove all controlled substances and sources     Safety Plan Template 2008 Alyssa Bishop and Eliud Olvera is reprinted with the express permission of the authors.  No portion of the Safety Plan Template may be reproduced without the express, written permission.  You can contact the authors at bhs@[email or web address] or [email or web address].

## 2017-10-03 ENCOUNTER — HOSPITAL ENCOUNTER (OUTPATIENT)
Dept: BEHAVIORAL HEALTH | Facility: CLINIC | Age: 32
End: 2017-10-03
Attending: FAMILY MEDICINE
Payer: COMMERCIAL

## 2017-10-03 ENCOUNTER — TELEPHONE (OUTPATIENT)
Dept: BEHAVIORAL HEALTH | Facility: CLINIC | Age: 32
End: 2017-10-03

## 2017-10-03 PROCEDURE — H2035 A/D TX PROGRAM, PER HOUR: HCPCS | Mod: HQ

## 2017-10-03 PROCEDURE — 10020000 ZZH LODGING PLUS FACILITY CHARGE ADULT

## 2017-10-03 PROCEDURE — H2035 A/D TX PROGRAM, PER HOUR: HCPCS

## 2017-10-03 PROCEDURE — 90792 PSYCH DIAG EVAL W/MED SRVCS: CPT | Performed by: PSYCHIATRY & NEUROLOGY

## 2017-10-03 NOTE — TELEPHONE ENCOUNTER
S: Dr. Arora called to place a patient in the [facility] program into inpatient mental health for treatment.  B: Pt has a hx of schizoaffective d/o and Lyme's disease. Pt has been sober for 15 days. Pt is currently unable to care for himself. Pt reports constant auditory and visual hallucinations of demonic shapes and delusions of persecution. Pt exhibiting poor sleep and appetite.   A: Contact #[phone] or Nurse #[id number] for patient location when placement available.  R: Placed on the wait list.

## 2017-10-03 NOTE — H&P
IDENTIFYING INFORMATION:  Jonny Gramajo is a 32-year-old  male who is .  He has 3 kids.  His psyhciatrist in [facility]        CHIEF COMPLAINT:  Hallucinations.      HISTORY OF PRESENT ILLNESS:  Mr. Jonny Gramajo has a history of schizoaffective disorder.  He was discharged from Dr. Vital's service on Vraylar 3 mg for his psychosis.  He is sober now for 15 days and stopped his meds. Now He reduced his Vraylar to 1.5 mg because of feeling tired and restless and he began to have more hallucinations.  He is having presently auditory hallucinations, visual hallucinations.  He hears demonic noises and  black shapes.  He feels it is a constant laughing in his background.  He has delusions of persecutions, delusions of reference, delusions of control.  He feels like people are talking rumors about him.  He feels like he has declined in the last couple of days.  He is feeling exhausted.  Overnight, he feels like he is going to go to a downward spiral.  He is feeling very depressed.  He says that he is barely hanging by a thread.  He is not suicidal or homicidal.  His sleep is down, energy is down, motivation is up and interest is up.  He is not able to focus.   His appetite is down.  He feels hopeless.      The patient denies any tu, denies any PTSD.  The patient does experience some psychosis in the absence of mood symptoms.  He was given a diagnosis of schizoaffective disorder at [facility].  He was also described as major depressive disorder with psychotic features as well.  He was seen by Dr. Stephen for a consult while on 3 A -As per the recommendation he was to increase it to 3 mg of Vraylar . it was increased but he stopped taking it.       The patient began to drink alcohol at age of 14.  By 30, it was a problem.  He has tolerance, withdrawal, progressive use with loss of control, tried to quit unsuccessfully.  He has been abusing benzos for 8 years.  He takes more than the prescribed  dose.        PAST PSYCHIATRIC HISTORY:  Psychiatrically hospitalized over 7 times.  He was hospitalized in various areas.  He was hospitalized in [place], [facility], [place], and [place].  He used to use marijuana and benzos in his 20s.  He was in one treatment in [facility].  The patient tried several antidepressants including Elavil, imipramine, Wellbutrin, Prozac, Paxil, Zoloft, Celexa, Lexapro, Effexor, Cymbalta and Pristiq.  He is on Viibryd.  He has taken Risperdal, Seroquel, Abilify.  He is presently on Vraylar.  He has not used Clozaril, Saphris or olanzapine.      PAST MEDICAL HISTORY:   He has history of Lyme's disease.  Apparently, he had encephalopathy from it.  He believes his psychiatric issues are from it.  He has borderline diabetes, hypertension, depression, orchiopexy.      MEDICATIONS:   1.  Gabapentin 300 mg 3 times a day.   2.  Trazodone  mg.   3.  Naltrexone 50 mg.   4.  Propranolol 20 mg 3 times a day.     5.  Omnicef.   6.  Cedax.   7.  He is on a phenobarbital taper as well.   8.  Metronidazole.   9.  Doxycycline for chronic Lyme's disease.      FAMILY HISTORY:  Mom's side, there is depression and anxiety.      SOCIAL HISTORY:  Born in [place].  Comes from an intact family, high school plus some college.      MENTAL STATUS EXAMINATION:  The patient is a 32-year-old obese  male who appears his stated age with adequate grooming, adequate hygiene, maintains good eye contact.  Mood is anxious.  Affect is congruent and paranoid, has delusions of persecutions, reference and control and has auditory and visual hallucinations.  Insight and judgment are partial.  Alert and oriented x3.  Recent and remote memory, language, fund of knowledge are all adequate.      DIAGNOSES:   Axis I:  Schizoaffective disorder, rule out major depressive disorder with psychotic features.      PLAN:  The patient at this time is willing to go to inpatient psychiatry.  We will look at transferring  him.  After a few minutes the patient is not willing to do so.  I have staffed this case with his counselor.  Ideally, I would like for this patient to go to inpatient psychiatry but he is not willing to do.  He is willing to take Zyprexa and I put him on 5 mg b.i.d.  The patient will return to me on Thursday if he is still here.  If he is willing to go to inpatient psychiatry he can be followed up there.         TERRIE COSTA MD             D: 10/03/2017 12:28   T: 10/03/2017 12:52   MT:       Name:     JONNY SUAREZ   MRN:      [id number]        Account:      [id number]   :      1985           Admitted:     [id number]      Document: [id number]

## 2017-10-03 NOTE — PROGRESS NOTES
Session with Psychotherapist    MAHI Met with freida on 10/3 from 3:30 - 4:15. Client reported that he had considered leaving today but gave it some time and as able to stay calm. Client reported that he suffers from social anxiety and that coupled with his cognitive issues from lymes disease has made dealing with social issues very difficult for him. Client stated that he is feeling very fatigued and has been feeling that way since he got here about 8 days ago. Client reported that he has not seen a therapist on a regular basis for his mental health issues. Client said that he is here so that he can  some additional tools to help him stay sober and also help him with his mental health issues. Client stated that playing guitar and getting physical activity really helps him with his ADD. Client also stated that going to AA meetings he finds very helpful. Client said that he was feeling some physical discomfort around his liver and I suggested that he may want to get that checked out while he is here.  I. Used validation and reframing techniques and also suggested a physical exam. I also suggest to client that the anxiety study would be good to consider.   A. Client was engaged in our conversation, demonstrated a sense of humor. Client was also in some physical discomfort due to his fatigue. Client would benefit from ongoing 1 on 1 therapy due to his many many mental health issues for which he has little to none coping skills for them.   P. Client did not schedule another appointment but was invited to stop by and make an appointment. It would benefit that client to continue.    Cruz Bingham, [facility] HospitalANTOINE, LMFT

## 2017-10-04 ENCOUNTER — HOSPITAL ENCOUNTER (EMERGENCY)
Facility: CLINIC | Age: 32
Discharge: HOME | End: 2017-10-04
Attending: EMERGENCY MEDICINE | Admitting: EMERGENCY MEDICINE
Payer: COMMERCIAL

## 2017-10-04 ENCOUNTER — HOSPITAL ENCOUNTER (OUTPATIENT)
Dept: BEHAVIORAL HEALTH | Facility: CLINIC | Age: 32
End: 2017-10-04
Attending: FAMILY MEDICINE
Payer: COMMERCIAL

## 2017-10-04 VITALS
SYSTOLIC BLOOD PRESSURE: 130 MMHG | TEMPERATURE: 98.3 F | RESPIRATION RATE: 16 BRPM | BODY MASS INDEX: 38.48 KG/M2 | WEIGHT: 315 LBS | OXYGEN SATURATION: 98 % | DIASTOLIC BLOOD PRESSURE: 77 MMHG | HEART RATE: 81 BPM

## 2017-10-04 DIAGNOSIS — M54.50 ACUTE BILATERAL LOW BACK PAIN WITHOUT SCIATICA: ICD-10-CM

## 2017-10-04 DIAGNOSIS — M77.41 METATARSALGIA OF BOTH FEET: ICD-10-CM

## 2017-10-04 DIAGNOSIS — M77.42 METATARSALGIA OF BOTH FEET: ICD-10-CM

## 2017-10-04 LAB — GLUCOSE BLDC GLUCOMTR-MCNC: 87 MG/DL (ref 70–99)

## 2017-10-04 PROCEDURE — H2035 A/D TX PROGRAM, PER HOUR: HCPCS | Mod: HQ

## 2017-10-04 PROCEDURE — 00000146 ZZHCL STATISTIC GLUCOSE BY METER IP

## 2017-10-04 PROCEDURE — 99282 EMERGENCY DEPT VISIT SF MDM: CPT | Performed by: EMERGENCY MEDICINE

## 2017-10-04 PROCEDURE — 10020000 ZZH LODGING PLUS FACILITY CHARGE ADULT

## 2017-10-04 PROCEDURE — 99283 EMERGENCY DEPT VISIT LOW MDM: CPT | Mod: Z6 | Performed by: EMERGENCY MEDICINE

## 2017-10-04 RX ORDER — IBUPROFEN 600 MG/1
600 TABLET, FILM COATED ORAL 3 TIMES DAILY
Qty: 21 TABLET | Refills: 0 | Status: ON HOLD | OUTPATIENT
Start: 2017-10-04 | End: 2017-10-13

## 2017-10-04 NOTE — ED AVS SNAPSHOT
[facility], [place], Emergency Department    [address]    [place] [address]    Phone:  [phone]    Fax:  [phone]                                       Jonny Gramajo   MRN: [id number]    Department:  [facility], Emergency Department   Date of Visit:  10/4/2017           After Visit Summary Signature Page     I have received my discharge instructions, and my questions have been answered. I have discussed any challenges I see with this plan with the nurse or doctor.    ..........................................................................................................................................  Patient/Patient Representative Signature      ..........................................................................................................................................  Patient Representative Print Name and Relationship to Patient    ..................................................               ................................................  Date                                            Time    ..........................................................................................................................................  Reviewed by Signature/Title    ...................................................              ..............................................  Date                                                            Time

## 2017-10-04 NOTE — ED PROVIDER NOTES
"  History     Chief Complaint   Patient presents with     Foot Pain     believes may have diabetes, has had \"neuropathy\" pain in bilat feet, extreme exhaustion     HPI  Jonny Gramajo is a 32-year-old male [facility] patient who presents to the ER with complaints of pain in his feet bilaterally. The patient states he believes the pain in his feet comes on when he has sugar to eat.  Patient is concerned that he might be developing diabetes and is concerned that he might be developing a thyroid disease.  Patient states that his feet hurt in the plantar surface over the metatarsal heads bilaterally. Patient denies any heel pain and denies any known trauma.  Patient also denies any fevers. Patient s past history is one of anxiety, hypertension, and depression.  Patient states he is a family history of diabetes and thyroid disease, which is why he is concerned about it and would like blood drawn for this.    Past Medical History:   Diagnosis Date     Anxiety      Depressive disorder      Hypertension      Lyme disease        Past Surgical History:   Procedure Laterality Date     GENITOURINARY SURGERY      testical recessed       No family history on file.    Social History   Substance Use Topics     Smoking status: Current Every Day Smoker     Packs/day: 2.00     Smokeless tobacco: Former User     Alcohol use Yes      Comment: Drinking about 10 drinks a day for past 3 days       Previous Medications    ACETAMINOPHEN (TYLENOL PO)    Take 650 mg by mouth every 4 hours as needed for mild pain or fever    ALUM & MAG HYDROXIDE-SIMETHICONE (MYLANTA/MAALOX) 200-200-20 MG/5ML SUSP SUSPENSION    Take 30 mLs by mouth every 6 hours as needed for indigestion    CARIPRAZINE (VRAYLAR) 1.5 MG CAPS CAPSULE    Take 1.5 mg by mouth daily    CARIPRAZINE (VRAYLAR) 3 MG CAPS CAPSULE    Take 1 capsule (3 mg) by mouth every morning    CEFDINIR (OMNICEF) 300 MG CAPSULE    Take 1 capsule (300 mg) by mouth 2 times daily    CEFTIBUTEN (CEDAX) " 400 MG CAPSULE    Take 1 capsule (400 mg) by mouth daily    CLARITHROMYCIN (BIAXIN) 500 MG TABLET    Take 1 tablet (500 mg) by mouth 2 times daily with food    DOXYCYCLINE MONOHYDRATE 100 MG CAPS    Take 1 capsule (100 mg) by mouth 2 times daily With food. Avoid calcium, magnesium, dairy, and the sun in 24 hours.    GABAPENTIN (NEURONTIN) 300 MG CAPSULE    Take 1 capsule (300 mg) by mouth 3 times daily    GUAIFENESIN (ROBITUSSIN) 20 MG/ML SOLN SOLUTION    Take 10 mLs by mouth every 4 hours as needed for cough    HYDROXYZINE (ATARAX) 25 MG TABLET    Take 1-2 tablets (25-50 mg) by mouth every 4 hours as needed for anxiety    LORATADINE (CLARITIN) 10 MG TABLET    Take 10 mg by mouth daily as needed for allergies    MELATONIN PO    Take 3 mg by mouth nightly as needed    METRONIDAZOLE (FLAGYL) 250 MG TABLET    Take 1 tablet (250 mg) by mouth 2 times daily with food    NALTREXONE (DEPADE;REVIA) 50 MG TABLET    Take 1 tablet (50 mg) by mouth daily    NYSTATIN (MYCOSTATIN) 424700 UNITS TABS TABLET    Take 1 tablet (500,000 Units) by mouth 4 times daily    OLANZAPINE (ZYPREXA) 5 MG TABLET    bid    PHENOBARBITAL (LUMINAL) 32.4 MG TABS TABLET    Take 1 tab 4 times daily for 5 days, then  Take 1 tab 3 times daily for 5 days, then  Take 1 tab 2 times daily for 5 days, then  Take 1 tab 1 times daily for 5 days    PHENOL-MENTHOL (CEPASTAT) 14.5 MG LOZENGE    Place 1 lozenge inside cheek every 2 hours as needed for moderate pain    PROPRANOLOL (INDERAL) 20 MG TABLET    Take 1 tablet (20 mg) by mouth 3 times daily    PSYLLIUM (METAMUCIL) WAFR    Take 2 Wafers by mouth daily PRN    RANITIDINE (ZANTAC) 300 MG TABLET    Take 1 tablet (300 mg) by mouth 2 times daily    SENNA-DOCUSATE (SENOKOT-S;PERICOLACE) 8.6-50 MG PER TABLET    Take 2 tablets by mouth daily as needed for constipation    SULFAMETHOXAZOLE-TRIMETHOPRIM (BACTRIM DS/SEPTRA DS) 800-160 MG PER TABLET    Take 1 tablet by mouth 2 times daily    TRAZODONE (DESYREL) 50 MG  "TABLET    Take 1-3 tablets ( mg) by mouth nightly as needed for sleep    VALACYCLOVIR (VALTREX) 500 MG TABLET    Take 1 tablet (500 mg) by mouth 2 times daily    VORTIOXETINE (TRINTELLIX/BRINTELLIX) 5 MG TABLET    Take 2 tablets (10 mg) by mouth daily        Allergies   Allergen Reactions     Abilify [Aripiprazole]      Patient reports tardive dyskinesia effect in 2004 but likely akathisia since patient reports the effects only occurred while on med and resolved with a few days after discontinuing med.      Wellbutrin [Bupropion] Anxiety     Patient reports felt \"reved\" up and anxious when taken in 2001.        I have reviewed the Medications, Allergies, Past Medical and Surgical History, and Social History in the Epic system.    Review of Systems   All other systems reviewed and are negative.         Physical Exam   BP: 130/77  Pulse: 81  Temp: 98.3  F (36.8  C)  Resp: 16  Weight: (!) 151 kg (333 lb)  SpO2: 98 %  Physical Exam   Constitutional: He is oriented to person, place, and time. No distress.   HENT:   Head: Atraumatic.   Eyes: EOM are normal. Pupils are equal, round, and reactive to light.   Neck: Neck supple.   Pulmonary/Chest: No respiratory distress.   Musculoskeletal: He exhibits tenderness (over the metatarsal area on both feet bilaterally). He exhibits no edema or deformity.   There is no plantar fasciitis present   Neurological: He is alert and oriented to person, place, and time.   Grossly intact and symmetric   Skin: No rash noted. He is not diaphoretic. No erythema.   Psychiatric: He has a normal mood and affect.       ED Course     ED Course     Procedures        Results for orders placed or performed during the hospital encounter of 10/04/17   Glucose by meter   Result Value Ref Range    Glucose 87 70 - 99 mg/dL       Assessments & Plan (with Medical Decision Making)     I have reviewed the nursing notes.    Patient will be set up with metatarsal pads in his shoes as well as instructions " on how to treat his condition.    Patient was informed that thyroid testing can be done on an outpatient basis and that he has no current indications for testing at this time.    I have reviewed the findings, diagnosis, plan and need for follow up with the patient.    Modified Medications    Modified Medication Previous Medication    IBUPROFEN (ADVIL/MOTRIN) 600 MG TABLET ibuprofen (ADVIL/MOTRIN) 600 MG tablet       Take 1 tablet (600 mg) by mouth 3 times daily for 7 days    Take 1 tablet (600 mg) by mouth every 6 hours as needed for moderate pain         Final diagnoses:   Metatarsalgia of both feet     Please make an appointment to follow up with Your Primary Care Provider or our Primary Care Center (phone: [phone]) in one week for recheck.    Routine instructions were given for this diagnosis.    Gabriel Sierra MD      10/4/2017   [facility], EMERGENCY DEPARTMENT     Gabriel Sierra MD  10/04/17 5812

## 2017-10-04 NOTE — PROGRESS NOTES
Pt returned from ED and reported to LP RN at 1250 (please read ED note in EPIC).  Pt brought script for Ibuprofen.  Already has prescription.  Script sent down to Security and pt can have at D/C.  Pt directed to return to group.  Counselor notiifed

## 2017-10-04 NOTE — PROGRESS NOTES
"This pt is supposed to be in [facility] programming and found sleeping in bed by LP Counselor.  Pt asked to come and speak with LP RN.  Pt reported to this writer that he \"feels so exhausted like I'm going to die\"  Pt verbalizes \"prickly feet\" for the last 6 months.  Pt concerned that he is diabetic.  Pt has no diabetes diagnosis.  This writer checked with [facility] for appt availablilty this AM.  None.  Pt sent to ED for assessment.  Counselor updated.  This writer will follow pt on EPIC  "

## 2017-10-04 NOTE — DISCHARGE INSTRUCTIONS
Please make an appointment to follow up with Your Primary Care Provider or our Primary Care Center (phone: [phone]) in one week for recheck.

## 2017-10-04 NOTE — PROGRESS NOTES
D:Patient has not been able to attend programming on a consistent basis. He continues to site his inability to participate as a result of his general physical discomfort, fatigue, anxiety, paranoia and auditory hallucinations. Patient has visited with staff psychiatrist and nursing staff numerous times regarding these issues. He has been encouraged to consider intake to a psychiatric to stabilize as well as going to the ER for further evaluation. Patient has declined stating that feels the issues do not warrant that level of care at this time. Patient reports that he is not experiencing any suicidal or homicidal ideation. Patient has agreed to seek out staff members should this condition change.  I:Patient has had numerous meetings with primary counselor, staff psychiatrist and nursing staff.  A: Patient presents as inappropriate for this level of care at this time. Patient appears motivated and determined to leave program and return home. He has been open, communicative and receptive to direction.  P: Consider alternative options to address patients emotional, mental and physical needs. Staff with nurse, , staff psychiatrist and patient.

## 2017-10-04 NOTE — PROGRESS NOTES
Patient attended a spirituality workshop which was conducted by Fransisca Bishop and observed by writer, Ingris Sims.

## 2017-10-04 NOTE — ED AVS SNAPSHOT
[facility], Emergency Department    [address]    [facility] [place] [place] [address]    Phone:  [phone]    Fax:  [phone]                                       Jonny Gramajo   MRN: [id number]    Department:  [facility], Emergency Department   Date of Visit:  10/4/2017           Patient Information     Date Of Birth          1985        Your diagnoses for this visit were:     Metatarsalgia of both feet     Acute bilateral low back pain without sciatica        You were seen by Gabriel Sierra MD.        Discharge Instructions       Please make an appointment to follow up with Your Primary Care Provider or our Primary Care Center (phone: [phone]) in one week for recheck.    Discharge References/Attachments     METATARSALGIA, WHAT IS (ENGLISH)    METATARSALGIA, TREATING (ENGLISH)      Future Appointments        Provider Department Dept Phone Center    10/5/2017 7:15 AM [facility] [phone] [place]    10/6/2017 7:15 AM [facility] [phone] [place]    10/7/2017 7:15 AM [facility] [phone] [place]    10/8/2017 7:15 AM [facility] [phone] [place]    10/9/2017 7:15 AM [facility] [phone] [place]    10/10/2017 7:15 AM [facility] [phone] [place]    10/11/2017 7:15 AM [facility] [phone] [place]    10/12/2017 7:15 AM [facility] [phone] [place]    10/13/2017 7:15 AM [facility] [phone] [place]    10/14/2017 7:15 AM [facility] [phone] [place]    10/15/2017 7:15 AM [facility]  [facility] [phone] [place]    10/16/2017 7:15 AM [facility] [phone] [place]    10/17/2017 7:15 AM [facility] [phone] [place]    10/18/2017 7:15 AM [facility] [phone] [place]    10/19/2017 7:15 AM [facility] [phone] [place]    10/20/2017 7:15 AM [facility] [phone] [place]    10/21/2017 7:15 AM [facility] [phone] [place]    10/22/2017 7:15 AM [facility] [phone] [place]      24 Hour Appointment Hotline       To make an appointment at any [facility] clinic, call [phone] ([phone]). If you don't have a family doctor or clinic, we will help you find one. [facility] clinics are conveniently located to serve the needs of you and your family.          ED Discharge Orders     METATARSAL SUPPORT PADS                    Review of your medicines      CONTINUE these medicines which may have CHANGED, or have new prescriptions. If we are uncertain of the size of tablets/capsules you have at home, strength may be listed as something that might have changed.        Dose / Directions Last dose taken    ibuprofen 600 MG tablet   Commonly known as:  ADVIL/MOTRIN   Dose:  600 mg   What changed:    - when to take this  - reasons to take this   Quantity:  21 tablet        Take 1 tablet (600 mg) by mouth 3 times daily for 7 days   Refills:  0          Our records show that you are taking the medicines listed below. If these are incorrect, please call your family doctor or clinic.        Dose / Directions Last dose taken    alum & mag hydroxide-simethicone 200-200-20 MG/5ML Susp suspension   Commonly known as:  MYLANTA/MAALOX   Dose:  30  mL        Take 30 mLs by mouth every 6 hours as needed for indigestion   Refills:  0        * VRAYLAR 1.5 MG Caps capsule   Dose:  1.5 mg   Generic drug:  cariprazine        Take 1.5 mg by mouth daily   Refills:  0        * cariprazine 3 MG Caps capsule   Commonly known as:  VRAYLAR   Dose:  3 mg   Quantity:  30 capsule        Take 1 capsule (3 mg) by mouth every morning   Refills:  1        cefdinir 300 MG capsule   Commonly known as:  OMNICEF   Dose:  300 mg   Quantity:  30 capsule        Take 1 capsule (300 mg) by mouth 2 times daily   Refills:  0        ceftibuten 400 MG capsule   Commonly known as:  CEDAX   Dose:  400 mg   Quantity:  30 capsule        Take 1 capsule (400 mg) by mouth daily   Refills:  0        clarithromycin 500 MG tablet   Commonly known as:  BIAXIN   Dose:  500 mg   Quantity:  60 tablet        Take 1 tablet (500 mg) by mouth 2 times daily with food   Refills:  0        doxycycline Monohydrate 100 MG Caps   Dose:  100 mg   Quantity:  60 capsule        Take 1 capsule (100 mg) by mouth 2 times daily With food. Avoid calcium, magnesium, dairy, and the sun in 24 hours.   Refills:  0        gabapentin 300 MG capsule   Commonly known as:  NEURONTIN   Dose:  300 mg   Quantity:  90 capsule        Take 1 capsule (300 mg) by mouth 3 times daily   Refills:  1        guaiFENesin 20 mg/mL Soln solution   Commonly known as:  ROBITUSSIN   Dose:  10 mL        Take 10 mLs by mouth every 4 hours as needed for cough   Refills:  0        hydrOXYzine 25 MG tablet   Commonly known as:  ATARAX   Dose:  25-50 mg   Quantity:  120 tablet        Take 1-2 tablets (25-50 mg) by mouth every 4 hours as needed for anxiety   Refills:  1        loratadine 10 MG tablet   Commonly known as:  CLARITIN   Dose:  10 mg        Take 10 mg by mouth daily as needed for allergies   Refills:  0        MELATONIN PO   Dose:  3 mg        Take 3 mg by mouth nightly as needed   Refills:  0        metroNIDAZOLE 250 MG tablet   Commonly known  as:  FLAGYL   Dose:  250 mg   Quantity:  60 tablet        Take 1 tablet (250 mg) by mouth 2 times daily with food   Refills:  0        naltrexone 50 MG tablet   Commonly known as:  DEPADE;REVIA   Dose:  50 mg   Quantity:  30 tablet        Take 1 tablet (50 mg) by mouth daily   Refills:  1        nystatin 635365 UNITS Tabs tablet   Commonly known as:  MYCOSTATIN   Dose:  1 tablet   Quantity:  120 tablet        Take 1 tablet (500,000 Units) by mouth 4 times daily   Refills:  0        OLANZapine 5 MG tablet   Commonly known as:  zyPREXA   Quantity:  60 tablet        bid   Refills:  0        PHENobarbital 32.4 MG Tabs tablet   Commonly known as:  LUMINAL   Quantity:  50 tablet        Take 1 tab 4 times daily for 5 days, then Take 1 tab 3 times daily for 5 days, then Take 1 tab 2 times daily for 5 days, then Take 1 tab 1 times daily for 5 days   Refills:  0        phenol-menthol 14.5 MG lozenge   Dose:  1 lozenge        Place 1 lozenge inside cheek every 2 hours as needed for moderate pain   Refills:  0        propranolol 20 MG tablet   Commonly known as:  INDERAL   Dose:  20 mg   Quantity:  90 tablet        Take 1 tablet (20 mg) by mouth 3 times daily   Refills:  0        psyllium Wafr   Dose:  2 Wafer        Take 2 Wafers by mouth daily PRN   Refills:  0        ranitidine 300 MG tablet   Commonly known as:  ZANTAC   Dose:  300 mg   Quantity:  60 tablet        Take 1 tablet (300 mg) by mouth 2 times daily   Refills:  0        senna-docusate 8.6-50 MG per tablet   Commonly known as:  SENOKOT-S;PERICOLACE   Dose:  2 tablet        Take 2 tablets by mouth daily as needed for constipation   Refills:  0        sulfamethoxazole-trimethoprim 800-160 MG per tablet   Commonly known as:  BACTRIM DS/SEPTRA DS   Dose:  1 tablet   Quantity:  60 tablet        Take 1 tablet by mouth 2 times daily   Refills:  0        traZODone 50 MG tablet   Commonly known as:  DESYREL   Dose:   mg   Quantity:  30 tablet        Take 1-3 tablets  ( mg) by mouth nightly as needed for sleep   Refills:  1        TYLENOL PO   Dose:  650 mg        Take 650 mg by mouth every 4 hours as needed for mild pain or fever   Refills:  0        valACYclovir 500 MG tablet   Commonly known as:  VALTREX   Dose:  500 mg   Quantity:  60 tablet        Take 1 tablet (500 mg) by mouth 2 times daily   Refills:  0        vortioxetine 5 MG tablet   Commonly known as:  TRINTELLIX/BRINTELLIX   Dose:  10 mg   Quantity:  30 tablet        Take 2 tablets (10 mg) by mouth daily   Refills:  3        * Notice:  This list has 2 medication(s) that are the same as other medications prescribed for you. Read the directions carefully, and ask your doctor or other care provider to review them with you.            Prescriptions were sent or printed at these locations (1 Prescription)                   Other Prescriptions                Printed at Department/Unit printer (1 of 1)         ibuprofen (ADVIL/MOTRIN) 600 MG tablet                Procedures and tests performed during your visit     Glucose by meter    Glucose monitor nursing POCT      Orders Needing Specimen Collection     None      Pending Results     No orders found from 10/2/2017 to 10/5/2017.            Pending Culture Results     No orders found from 10/2/2017 to 10/5/2017.            Pending Results Instructions     If you had any lab results that were not finalized at the time of your Discharge, you can call the ED Lab Result RN at [phone]. You will be contacted by this team for any positive Lab results or changes in treatment. The nurses are available 7 days a week from 10A to 6:30P.  You can leave a message 24 hours per day and they will return your call.        Thank you for choosing [facility]       Thank you for choosing [facility] for your care. Our goal is always to provide you with excellent care. Hearing back from our patients is one way we can continue to improve our services. Please take a few minutes to complete  "the written survey that you may receive in the mail after you visit with us. Thank you!        [facility] Information     [facility] lets you send messages to your doctor, view your test results, renew your prescriptions, schedule appointments and more. To sign up, go to [email or web address] . Click on \"Log in\" on the left side of the screen, which will take you to the Welcome page. Then click on \"Sign up Now\" on the right side of the page.     You will be asked to enter the access code listed below, as well as some personal information. Please follow the directions to create your username and password.     Your access code is: [email or web address]  Expires: 2017  1:46 PM     Your access code will  in 90 days. If you need help or a new code, please call your [facility] clinic or [phone].        Care EveryWhere ID     This is your Care EveryWhere ID. This could be used by other organizations to access your [facility] medical records  [id number]        Equal Access to Services     MIRLANDE BURROUGHS : Hadii kolby de leóno Sowoody, waaxda luqadaha, qaybta kaalmachrista araiza, batsheva amador . So [facility] [phone].    ATENCIÓN: Si habla español, tiene a castro disposición servicios gratuitos de asistencia lingüística. Llame al [phone].    We comply with applicable federal civil rights laws and [place] laws. We do not discriminate on the basis of race, color, national origin, age, disability, sex, sexual orientation, or gender identity.            After Visit Summary       This is your record. Keep this with you and show to your community pharmacist(s) and doctor(s) at your next visit.                  "

## 2017-10-04 NOTE — TELEPHONE ENCOUNTER
R: Per chart review pt not willing to come to inpt psych so no longer needing to wait for  bed, removed from wait list (Dr Arora's note 10/3 in the afternoon in EPIC reflects initial plan for pt to come to inpt and then pt not being willing to come inpt any longer and [facility]:  The patient at this time is willing to go to inpatient psychiatry.  We will look at transferring him.  After a few minutes the patient is not willing to do so.  I have staffed this case with his counselor.  Ideally, I would like for this patient to go to inpatient psychiatry but he is not willing to do.  He is willing to take Zyprexa and I put him on 5 mg b.i.d.  The patient will return to me on Thursday if he is still here.  If he is willing to go to inpatient psychiatry he can be followed up there.)    Previous intake note:    S: Dr. Arora called to place a patient in the [facility] into inpatient mental health for treatment.  B: Pt has a hx of schizoaffective d/o and Lyme's disease. Pt has been sober for 15 days. Pt is currently unable to care for himself. Pt reports constant auditory and visual hallucinations of demonic shapes and delusions of persecution. Pt exhibiting poor sleep and appetite.   A: Contact #[phone] or Nurse #[id number] for patient location when placement available.  R: Placed on the wait list.      Electronically signed by Tomas Strickland at 10/3/2017 10:40 AM

## 2017-10-04 NOTE — ED NOTES
Unable to find metatarsal pads in SPD or pharmacy. Dr. Sierra notified and wrote RX for pt. Pt going back to [facility].

## 2017-10-04 NOTE — PROGRESS NOTES
Name: Jonny Gramajo  Date: 10/4/2017  Medical Record: [id number]    Envelope Number: [id number]    List of Contents (List each item separately in new row):   Prescription for Ibuprofen 600 mg tablet    Admission:  I am responsible for any personal items that are not sent to the safe or pharmacy.  [facility] is not responsible for loss, theft or damage of any property in my possession.      Patient Signature:  ___________________________________________       Date/Time:__________________________    Staff Signature: __________________________________       Date/Time:__________________________    2nd Staff person, if patient is unable/unwilling to sign:      __________________________________________________________       Date/Time: __________________________      Discharge:  [facility] has returned all of my personal belongings:    Patient Signature: ________________________________________     Date/Time: ____________________________________    Staff Signature: ______________________________________     Date/Time:_____________________________________

## 2017-10-05 ENCOUNTER — HOSPITAL ENCOUNTER (OUTPATIENT)
Dept: BEHAVIORAL HEALTH | Facility: CLINIC | Age: 32
End: 2017-10-05
Attending: FAMILY MEDICINE
Payer: COMMERCIAL

## 2017-10-05 PROCEDURE — 99212 OFFICE O/P EST SF 10 MIN: CPT | Performed by: PSYCHIATRY & NEUROLOGY

## 2017-10-05 PROCEDURE — H2035 A/D TX PROGRAM, PER HOUR: HCPCS | Mod: HQ

## 2017-10-05 NOTE — PROGRESS NOTES
Interim history   This is a 32 year old male with Axis I:  Schizoaffective disorder, rule out major depressive disorder with psychotic features. .Pt seen . Patient's mood is better,   deneid any auditoy visual hallucinations, has less delusions of persecution/reference/control  Energy Level:MODERATE  Sleep:No concerns, sleeps well through night  Appetite:fair motivation interest are good   Denied any Suicidal/homicidal ideation/plan intent. Denied psychosis    Tolerating meds and has no side effects.         No prior suicide attempts      Past Medical History:   Diagnosis Date     Anxiety      Depressive disorder      Hypertension      Lyme disease        10 point ROS is negative   constitutional    HEENT - no symptoms   RESPIRATORY-no symptoms   CVS-no symptoms   GI-no symptoms  MUSCKULOSKELETAL -no symptoms  NEUROLOGICAL-no symptoms  ENDOCRINE-no symptoms  HEMATAOLOGY-no symptoms  SKIN-no symptoms  No family history on file.  Social History     Social History     Marital status:      Spouse name: N/A     Number of children: N/A     Years of education: N/A     Occupational History     Not on file.     Social History Main Topics     Smoking status: Current Every Day Smoker     Packs/day: 2.00     Smokeless tobacco: Former User     Alcohol use Yes      Comment: Drinking about 10 drinks a day for past 3 days     Drug use: No     Sexual activity: Not on file     Other Topics Concern     Not on file     Social History Narrative   FAMILY HISTORY:  Mom's side, there is depression and anxiety.       SOCIAL HISTORY:  Born in [place].  Comes from an intact family, high school plus some college.          Medications:     Current Outpatient Prescriptions   Medication Sig     ibuprofen (ADVIL/MOTRIN) 600 MG tablet Take 1 tablet (600 mg) by mouth 3 times daily for 7 days     OLANZapine (ZYPREXA) 5 MG tablet bid     cariprazine (VRAYLAR) 1.5 MG CAPS capsule Take 1.5 mg by mouth daily     hydrOXYzine  (ATARAX) 25 MG tablet Take 1-2 tablets (25-50 mg) by mouth every 4 hours as needed for anxiety     gabapentin (NEURONTIN) 300 MG capsule Take 1 capsule (300 mg) by mouth 3 times daily     traZODone (DESYREL) 50 MG tablet Take 1-3 tablets ( mg) by mouth nightly as needed for sleep     naltrexone (DEPADE;REVIA) 50 MG tablet Take 1 tablet (50 mg) by mouth daily     nystatin (MYCOSTATIN) 299055 UNITS TABS tablet Take 1 tablet (500,000 Units) by mouth 4 times daily     valACYclovir (VALTREX) 500 MG tablet Take 1 tablet (500 mg) by mouth 2 times daily     propranolol (INDERAL) 20 MG tablet Take 1 tablet (20 mg) by mouth 3 times daily     cefdinir (OMNICEF) 300 MG capsule Take 1 capsule (300 mg) by mouth 2 times daily     ceftibuten (CEDAX) 400 MG capsule Take 1 capsule (400 mg) by mouth daily (Patient not taking: Reported on 9/25/2017)     PHENobarbital (LUMINAL) 32.4 MG TABS tablet Take 1 tab 4 times daily for 5 days, then  Take 1 tab 3 times daily for 5 days, then  Take 1 tab 2 times daily for 5 days, then  Take 1 tab 1 times daily for 5 days     clarithromycin (BIAXIN) 500 MG tablet Take 1 tablet (500 mg) by mouth 2 times daily with food     metroNIDAZOLE (FLAGYL) 250 MG tablet Take 1 tablet (250 mg) by mouth 2 times daily with food     sulfamethoxazole-trimethoprim (BACTRIM DS/SEPTRA DS) 800-160 MG per tablet Take 1 tablet by mouth 2 times daily     doxycycline Monohydrate 100 MG CAPS Take 1 capsule (100 mg) by mouth 2 times daily With food. Avoid calcium, magnesium, dairy, and the sun in 24 hours.     ranitidine (ZANTAC) 300 MG tablet Take 1 tablet (300 mg) by mouth 2 times daily     cariprazine (VRAYLAR) 3 MG CAPS capsule Take 1 capsule (3 mg) by mouth every morning     Acetaminophen (TYLENOL PO) Take 650 mg by mouth every 4 hours as needed for mild pain or fever     alum & mag hydroxide-simethicone (MYLANTA/MAALOX) 200-200-20 MG/5ML SUSP suspension Take 30 mLs by mouth every 6 hours as needed for indigestion      guaiFENesin (ROBITUSSIN) 20 mg/mL SOLN solution Take 10 mLs by mouth every 4 hours as needed for cough     phenol-menthol (CEPASTAT) 14.5 MG lozenge Place 1 lozenge inside cheek every 2 hours as needed for moderate pain     loratadine (CLARITIN) 10 MG tablet Take 10 mg by mouth daily as needed for allergies     senna-docusate (SENOKOT-S;PERICOLACE) 8.6-50 MG per tablet Take 2 tablets by mouth daily as needed for constipation     MELATONIN PO Take 3 mg by mouth nightly as needed     vortioxetine (TRINTELLIX/BRINTELLIX) 5 MG tablet Take 2 tablets (10 mg) by mouth daily     psyllium (METAMUCIL) WAFR Take 2 Wafers by mouth daily PRN     No current facility-administered medications for this encounter.      Facility-Administered Medications Ordered in Other Encounters   Medication     Self Administer Medications: Behavioral Services        Current Outpatient Prescriptions on File Prior to Encounter:  ibuprofen (ADVIL/MOTRIN) 600 MG tablet Take 1 tablet (600 mg) by mouth 3 times daily for 7 days   OLANZapine (ZYPREXA) 5 MG tablet bid   cariprazine (VRAYLAR) 1.5 MG CAPS capsule Take 1.5 mg by mouth daily   hydrOXYzine (ATARAX) 25 MG tablet Take 1-2 tablets (25-50 mg) by mouth every 4 hours as needed for anxiety   gabapentin (NEURONTIN) 300 MG capsule Take 1 capsule (300 mg) by mouth 3 times daily   traZODone (DESYREL) 50 MG tablet Take 1-3 tablets ( mg) by mouth nightly as needed for sleep   naltrexone (DEPADE;REVIA) 50 MG tablet Take 1 tablet (50 mg) by mouth daily   nystatin (MYCOSTATIN) 579491 UNITS TABS tablet Take 1 tablet (500,000 Units) by mouth 4 times daily   valACYclovir (VALTREX) 500 MG tablet Take 1 tablet (500 mg) by mouth 2 times daily   propranolol (INDERAL) 20 MG tablet Take 1 tablet (20 mg) by mouth 3 times daily   cefdinir (OMNICEF) 300 MG capsule Take 1 capsule (300 mg) by mouth 2 times daily   ceftibuten (CEDAX) 400 MG capsule Take 1 capsule (400 mg) by mouth daily (Patient not taking: Reported  on 9/25/2017)   PHENobarbital (LUMINAL) 32.4 MG TABS tablet Take 1 tab 4 times daily for 5 days, thenTake 1 tab 3 times daily for 5 days, thenTake 1 tab 2 times daily for 5 days, thenTake 1 tab 1 times daily for 5 days   clarithromycin (BIAXIN) 500 MG tablet Take 1 tablet (500 mg) by mouth 2 times daily with food   metroNIDAZOLE (FLAGYL) 250 MG tablet Take 1 tablet (250 mg) by mouth 2 times daily with food   sulfamethoxazole-trimethoprim (BACTRIM DS/SEPTRA DS) 800-160 MG per tablet Take 1 tablet by mouth 2 times daily   doxycycline Monohydrate 100 MG CAPS Take 1 capsule (100 mg) by mouth 2 times daily With food. Avoid calcium, magnesium, dairy, and the sun in 24 hours.   ranitidine (ZANTAC) 300 MG tablet Take 1 tablet (300 mg) by mouth 2 times daily   cariprazine (VRAYLAR) 3 MG CAPS capsule Take 1 capsule (3 mg) by mouth every morning   Acetaminophen (TYLENOL PO) Take 650 mg by mouth every 4 hours as needed for mild pain or fever   alum & mag hydroxide-simethicone (MYLANTA/MAALOX) 200-200-20 MG/5ML SUSP suspension Take 30 mLs by mouth every 6 hours as needed for indigestion   guaiFENesin (ROBITUSSIN) 20 mg/mL SOLN solution Take 10 mLs by mouth every 4 hours as needed for cough   phenol-menthol (CEPASTAT) 14.5 MG lozenge Place 1 lozenge inside cheek every 2 hours as needed for moderate pain   loratadine (CLARITIN) 10 MG tablet Take 10 mg by mouth daily as needed for allergies   senna-docusate (SENOKOT-S;PERICOLACE) 8.6-50 MG per tablet Take 2 tablets by mouth daily as needed for constipation   MELATONIN PO Take 3 mg by mouth nightly as needed   vortioxetine (TRINTELLIX/BRINTELLIX) 5 MG tablet Take 2 tablets (10 mg) by mouth daily   psyllium (METAMUCIL) WAFR Take 2 Wafers by mouth daily PRN     Current Facility-Administered Medications on File Prior to Encounter:  Self Administer Medications: Behavioral Services          Allergies:     Allergies   Allergen Reactions     Abilify [Aripiprazole]      Patient reports  "tardive dyskinesia effect in 2004 but likely akathisia since patient reports the effects only occurred while on med and resolved with a few days after discontinuing med.      Wellbutrin [Bupropion] Anxiety     Patient reports felt \"reved\" up and anxious when taken in 2001.             Psychiatric Examination:     Weight is 0 lbs 0 oz  There is no height or weight on file to calculate BMI.    Appearance:  awake, alert and adequately groomed  Attitude:  cooperative  Eye Contact:  good  Mood:  better  Affect:  appropriate and in normal range and mood congruent  Speech:  clear, coherent rate /rhythm are good  Psychomotor Behavior:  no evidence of tardive dyskinesia, dystonia, or tics and intact station, gait and muscle tone  Throught Process:  logical  Associations:  no loose associations  Thought Content:  no evidence of suicidal ideation or homicidal ideation, no evidence of psychotic thought, no auditory hallucinations present and no visual hallucinations present  Insight:  fair  Judgement:  fair  Oriented to:  time, person, and place  Attention Span and Concentration:  fair  Recent and Remote Memory:  fair  Language fund of knowledge are adequate               Labs:     No results found for: NTBNPI, NTBNP  Lab Results   Component Value Date    WBC 10.5 09/19/2017    HGB 16.6 09/19/2017    HCT 45.0 09/19/2017    MCV 92 09/19/2017     09/19/2017     Lab Results   Component Value Date    TSH 0.87 04/05/2017           DIagnoses:       Schizoaffective disorder, rule out major depressive disorder with psychotic features.        Plan:   Continue present meds   pt is feeling much better, worried about weight gain on zyprexa, is open to both haldol and clozaril , does not want to increase his vylar  HE WILL BE SEEN BY PHARMACIST AND WILL DECIDE ABOUT THE MEDICATION    Has appointment on 10/9 at 10.30 with pharmacist.  F/u as per counselours    Able to give informed consent:  YES       Discussed Risks/Benefits/Side " Effects/Alternatives: YES      Discussed with patient many issues of addiction,triggers/ relapse, and establishing a solid recovery program.

## 2017-10-06 NOTE — TELEPHONE ENCOUNTER
Roly Willett, lodging plus counselor regarding client.  Client left lodging AMA evening of 10/5.  Per counselor, client is wanting to come back in.  Counselor not sure if this would be best program for him and is recommending that client schedule cd evaluation to assess.  Counselor will send inbasket to intake detailing this.  Cleopatra Nielsen

## 2017-10-07 NOTE — PROGRESS NOTES
Patient has not picked his Phenobarbital as of 10/6/17 1259. Sent down to [facility] for storage.  Envelope #[phone]

## 2017-10-07 NOTE — PROGRESS NOTES
"CHEMICAL DEPENDENCY DISCHARGE SUMMARY      DATE OF ADMISSION:  09/25/2017   DATE OF LAST SESSION:  10/05/2017   DISCHARGE DATE:  10/05/2017    EVALUATION COUNSELOR:  Janet De Luna (Cindy) [facility].   TREATMENT COUNSELORS:  Andriy Willett [facility] and Love Manjarrez [facility].   REFERRAL SOURCE:  Jonny Gramajo.   PROGRAM:  [facility].   ADMISSION DIAGNOSES:   1.  Alcohol use disorder, 303.90/F10.20.   2.  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   DISCHARGE DIAGNOSES:   1.  Alcohol use disorder, 303.90/F10.20.   2.  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   LAST USE DATE:  As reported by patient was 09/19/2017.   HOURS OF TREATMENT COMPLETED:  Patient completed 11 days of residential chemical dependency treatment.      PRESENTING PROBLEMS:  Jonny Gramajo admitted to the [facility] emergency room seeking medical attention to detoxify from alcohol and benzodiazepines.  Patient reported, at the time of admission, that \"I am afraid that I will have a heart attack or seizure which I have had before\" if he were to go through withdrawal without medical attention.  The patient stated that he had experienced seizures in the past.  The patient was admitted to the detox unit where he completed his Rule 25 assessment and preparations were made to admit patient into the [facility] residential treatment program.  The patient indicated through his assessment that he had been admitted to 7 prior CD treatment programs in addition to several hospitalizations as a mental health intervention.  Patient endorses prior diagnosis for major depressive disorder with psychotic features, anxiety and ADD.  In addition, patient acknowledged auditory and visual hallucinations at times but nothing current.  He did note a \"few occurrences in detox.\"  Patient also identifies with some elements of cognitive distortion.  He notes problems with short-term memory, limited attention " span and concentration. These cognitive challenges he attributes to Lyme's disease which he contracted at the age of 7 and was diagnosed when he was 24 years old.  Patient left the program (AMA) against medical advice after 11 days of programming.  Patient's pronounced mental health issues particularly paranoia and anxiety along with general disinterest in program participation were considerable obstacles.  Patient called back the following day requesting readmission into the program.  It was recommended to him that he contact the Behavioral Health intake department with the intent of obtaining a more complete MICD assessment.  Patient was provided information about the MICD Day Outpatient Program offered through [facility] along with the advisory that if he were to feel that he was in personal danger to himself that he immediately admit to the emergency room. Patient stated that he felt safe and was not at risk to himself or anyone else. Patient denied any suicidal or homicidal ideation.     Initial problem list identified at the Rule 25 assessment on 09/21/2017 include:  Patient is currently living in an unhealthy and/or using environment.  Patient lacks relapse prevention skills.  Patient has limited sober living/coping skills.  Patient lacks a sober support network.  Patient lacks insight into managing co-occurring disorders.  The patient has significant history of guilt and shame issues.      SERVICES PROVIDED:  Our services included assessment, treatment planning and education regarding chemical dependency, mental health and relapse prevention.  Patient also participated in individual, group therapy, recovery oriented workshops, spiritual care counseling, recovery skills training and aftercare planning.      ISSUES ADDRESSED IN TREATMENT:      DIMENSION 1:  Acute Intoxication/Withdrawal Potential:  Risk factor at time of admission 0, risk factor at time of discharge 0.  No indication of intoxication or  "withdrawal throughout programming.  Patient reported his last use was 09/19/2017.      DIMENSION 2:  Biomedical Conditions and Complaints:  Risk factor at the time of admission 0, risk factor at time of discharge 0.  No physical complaints or medical issues were presented.  Patient was able to participate in most schedule programming.      DIMENSION 3:  Emotional/Behavioral/Cognitive Conditions and Complications:  Risk factor at the time of admission 2.  Risk factor at time of discharge 2.     Treatment plan goal:  Ensure patient's safety and emotional well-being.   Intervention:  Patient and counselor met to complete his \"patient safety plan\" in his first week of treatment.  Counselor and patient met on a regular and systematic basis to assess his emotional/mental health and risk rating.   Goal:  Begin to understand which attitudes and perceptions support addiction and which support your recovery.   Intervention:  Complete \"Self Sabotage\" assignment and present in group.  This was not completed.     Goal:  Develop insight into the relationship between mental/emotional health and chemical use.  Identify effective sober coping skills.   Intervention:  Patient met with staff psychiatrist on multiple occasions.  Patient followed scheduled appointments with staff psychotherapist.  Patient was directed to read the \"Anxiety and Worry\" handout, write 2-3 pages of what he found personally relevant, share in group, this was not completed.      DIMENSION 4:  Readiness to Change:  Risk factor at time of admission 1.  Risk factor at time of discharge 2.   Goal:  Develop an awareness of drug use patterns and identify the consequences of addiction, strength and commitment through a recovery lifestyle.   Intervention:  Patient completed and presented in group his \"Drug Use History/Progression\" assignment.  Complete \"patient journal\" each day and present in morning group.  This was not completed.      DIMENSION 5:  " "Relapse/Continued Use/Continued Problem Potential:  Risk factor at time of admission:  4.  Risk factor at time of discharge:  4.     Goal:  Structure and aftercare program which will provide for continued care and skills development.   Intervention:  Consider outpatient MICD treatment options as an aftercare program, schedule orientation date/interview for outpatient treatment program, research support group times and locations in your area include in your recovery plan; this was not completed.     Goal:  Gain insight into the emotional and physical cues which proceed relapse; develop a personal relapse prevention plan.   Intervention:  Patient attended the weekend workshop on relapse prevention 09/30/2017.  Read and complete the \"Overcoming Negative Thinking\" assignment, hand into counselor, this was not completed.   Goal:  Identify lifestyle changes and activities that will support your recovery efforts.   Intervention:  Complete the \"Setting and Pursuing Goals\" worksheet present in group.  List 25 behaviors/activities which will support your recovery efforts, share in group.  Complete the \"Meeting My Needs\" worksheet and into counselor.  The assignments were not completed.      DIMENSION 6:  Recovery Environment:  Risk factor at time of admission:  4.  Risk factor at time of discharge:  4.   Goal:  Ensure the living situation is supportive of your recovery efforts.   Intervention:  Research and evaluate sober living options, include information in your recovery plan, consider alternative living arrangements to backup original plan.     Goal:  Begin to develop skills necessary to building a sober support network, identify helpful resources.  Patient attended a minimum of 3 support group meetings each week while he was in treatment.  Patient was encouraged to request information about getting a temporary sponsor through the [employer] and to attend the [place] [facility] orientation " meeting/lecture, this was not completed.     Goal:  Begin healing relationships damaged by use and related behaviors.   Intervention:  Attend a weekend workshop on personal relationships.  This was not completed, invite family/concerned persons to participate in the family week program and participate with those able to attend, this was not completed.      PATIENT'S STRENGTHS:  The patient began to develop a better awareness of behaviors/activities which would support his recovery efforts.  Patient offered insightful feedback within the group process.  The patient was beginning to express genuine concern for other group members and became more comfortable offering support as well as encouragement.  Patient put forth effort, though inconsistent, in attempting to address treatment plan goals and following staff recommendations.  The patient seemed to gain additional insight into relapse prevention strategies.      LIVING ARRANGEMENTS:  Patient discharged AMA, stating that he was moving back into the home he shares with his wife.      PROGNOSIS:  The prognosis for this patient is unfavorable as he left the program AMA after 11 days.  Patient's pronounced mental health issues, specifically his paranoia and high level of anxiety along with general disinterest in programming were considerable obstacles.      RECOMMENDATIONS AND REFERRALS (see presenting problems):   1.  Abstain from all mood-altering chemicals.   2.  Attend a minimum of 2 support group meetings each week.   3.  Obtain a male sponsor as soon as possible.   4.  Seek admission into an MICD treatment program as soon as possible.   5.  Follow recommendations to contact behavioral intake services for a more comprehensive MICD assessment.   6.  Maintain personal safety.  Immediately seek medical attention if personal safety feels threatened.   7.  Arrange regular ongoing appointments with the therapist.  Continue to focus on developing and strengthening a sober  support network.   8.  Remain medication compliant.         This information has been disclosed to you from records protected by Federal confidentiality rules (42 CFR part 2). The Federal rules prohibit you from making any further disclosure of this information unless further disclosure is expressly permitted by the written consent of the person to whom it pertains or as otherwise permitted by 42 CFR part 2. A general authorization for the release of medical or other information is NOT sufficient for this purpose. The Federal rules restrict any use of the information to criminally investigate or prosecute any alcohol or drug abuse patient.      IMANI WALLIS [facility]             D: 10/06/2017 14:41   T: 10/07/2017 05:33   MT: TD      Name:     JONNY SUAREZ   MRN:      [id number]        Account:      [id number]   :      1985           Visit Date:   10/05/2017      Document: [id number]

## 2017-10-07 NOTE — PROGRESS NOTES
Name: Jonny Gramajo  Date: 10/7/2017  Medical Record: [id number]    Envelope Number: [id number]    List of Contents (List each item separately in new row):   vraylar    Admission:  I am responsible for any personal items that are not sent to the safe or pharmacy.  [facility] is not responsible for loss, theft or damage of any property in my possession.      Patient Signature:  ___________________________________________       Date/Time:__________________________    Staff Signature: __________________________________       Date/Time:__________________________    2nd Staff person, if patient is unable/unwilling to sign:      __________________________________________________________       Date/Time: __________________________      Discharge:  [facility] has returned all of my personal belongings:    Patient Signature: ________________________________________     Date/Time: ____________________________________    Staff Signature: ______________________________________     Date/Time:_____________________________________

## 2017-10-07 NOTE — PROGRESS NOTES
Name: Jonny Gramajo  Date: 10/6/2017  Medical Record: [id number]    Envelope Number: [id number]    List of Contents (List each item separately in new row):   Phenobarbital 15 tabs     Admission:  I am responsible for any personal items that are not sent to the safe or pharmacy.  [facility] is not responsible for loss, theft or damage of any property in my possession.      Patient Signature:  ___________________________________________       Date/Time:__________________________    Staff Signature: __________________________________       Date/Time:__________________________    2nd Staff person, if patient is unable/unwilling to sign:      __________________________________________________________       Date/Time: __________________________      Discharge:  [facility] has returned all of my personal belongings:    Patient Signature: ________________________________________     Date/Time: ____________________________________    Staff Signature: ______________________________________     Date/Time:_____________________________________

## 2017-10-10 ENCOUNTER — APPOINTMENT (OUTPATIENT)
Dept: GENERAL RADIOLOGY | Facility: CLINIC | Age: 32
DRG: 897 | End: 2017-10-10
Attending: FAMILY MEDICINE
Payer: COMMERCIAL

## 2017-10-10 ENCOUNTER — HOSPITAL ENCOUNTER (INPATIENT)
Facility: CLINIC | Age: 32
LOS: 3 days | Discharge: HOME | DRG: 897 | End: 2017-10-13
Attending: FAMILY MEDICINE | Admitting: PSYCHIATRY & NEUROLOGY
Payer: COMMERCIAL
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
## 2017-10-10 DIAGNOSIS — F33.3 SEVERE EPISODE OF RECURRENT MAJOR DEPRESSIVE DISORDER, WITH PSYCHOTIC FEATURES (H): Primary | ICD-10-CM

## 2017-10-10 DIAGNOSIS — F10.120 ALCOHOL ABUSE WITH UNCOMPLICATED INTOXICATION (H): ICD-10-CM

## 2017-10-10 DIAGNOSIS — F34.1 DYSTHYMIA: ICD-10-CM

## 2017-10-10 DIAGNOSIS — S29.9XXA INJURY OF CHEST WALL, INITIAL ENCOUNTER: ICD-10-CM

## 2017-10-10 DIAGNOSIS — F19.20 CHEMICAL DEPENDENCY (H): ICD-10-CM

## 2017-10-10 DIAGNOSIS — F10.920 ALCOHOL INTOXICATION, UNCOMPLICATED (H): ICD-10-CM

## 2017-10-10 LAB
ALBUMIN UR-MCNC: NEGATIVE MG/DL
ALCOHOL BREATH TEST: 0.1 (ref 0–0.01)
ALCOHOL BREATH TEST: 0.1 (ref 0–0.01)
AMPHETAMINES UR QL SCN: NEGATIVE
APPEARANCE UR: CLEAR
BARBITURATES UR QL: POSITIVE
BENZODIAZ UR QL: NEGATIVE
BILIRUB UR QL STRIP: NEGATIVE
CANNABINOIDS UR QL SCN: NEGATIVE
COCAINE UR QL: NEGATIVE
COLOR UR AUTO: ABNORMAL
ETHANOL UR QL SCN: POSITIVE
GLUCOSE BLDC GLUCOMTR-MCNC: 128 MG/DL (ref 70–99)
GLUCOSE UR STRIP-MCNC: NEGATIVE MG/DL
HGB UR QL STRIP: NEGATIVE
KETONES UR STRIP-MCNC: NEGATIVE MG/DL
LEUKOCYTE ESTERASE UR QL STRIP: NEGATIVE
NITRATE UR QL: NEGATIVE
OPIATES UR QL SCN: NEGATIVE
PH UR STRIP: 6 PH (ref 5–7)
SOURCE: ABNORMAL
SP GR UR STRIP: 1 (ref 1–1.03)
UROBILINOGEN UR STRIP-MCNC: NORMAL MG/DL (ref 0–2)

## 2017-10-10 PROCEDURE — 25000132 ZZH RX MED GY IP 250 OP 250 PS 637: Performed by: FAMILY MEDICINE

## 2017-10-10 PROCEDURE — 80320 DRUG SCREEN QUANTALCOHOLS: CPT | Performed by: FAMILY MEDICINE

## 2017-10-10 PROCEDURE — 82075 ASSAY OF BREATH ETHANOL: CPT | Performed by: FAMILY MEDICINE

## 2017-10-10 PROCEDURE — 82075 ASSAY OF BREATH ETHANOL: CPT | Mod: 91 | Performed by: FAMILY MEDICINE

## 2017-10-10 PROCEDURE — HZ2ZZZZ DETOXIFICATION SERVICES FOR SUBSTANCE ABUSE TREATMENT: ICD-10-PCS | Performed by: PSYCHIATRY & NEUROLOGY

## 2017-10-10 PROCEDURE — 00000146 ZZHCL STATISTIC GLUCOSE BY METER IP

## 2017-10-10 PROCEDURE — 25000132 ZZH RX MED GY IP 250 OP 250 PS 637: Performed by: PSYCHIATRY & NEUROLOGY

## 2017-10-10 PROCEDURE — 71020 XR CHEST 2 VW: CPT

## 2017-10-10 PROCEDURE — 99285 EMERGENCY DEPT VISIT HI MDM: CPT | Mod: Z6 | Performed by: FAMILY MEDICINE

## 2017-10-10 PROCEDURE — 80307 DRUG TEST PRSMV CHEM ANLYZR: CPT | Performed by: FAMILY MEDICINE

## 2017-10-10 PROCEDURE — 81003 URINALYSIS AUTO W/O SCOPE: CPT | Performed by: FAMILY MEDICINE

## 2017-10-10 PROCEDURE — 99285 EMERGENCY DEPT VISIT HI MDM: CPT | Mod: 25 | Performed by: FAMILY MEDICINE

## 2017-10-10 PROCEDURE — 12800012 ZZH R&B CD MH INTERMEDIATE ADULT

## 2017-10-10 RX ORDER — GABAPENTIN 300 MG/1
300 CAPSULE ORAL 3 TIMES DAILY
Status: DISCONTINUED | OUTPATIENT
Start: 2017-10-10 | End: 2017-10-13 | Stop reason: HOSPADM

## 2017-10-10 RX ORDER — HYDROXYZINE HYDROCHLORIDE 25 MG/1
25-50 TABLET, FILM COATED ORAL EVERY 4 HOURS PRN
Status: DISCONTINUED | OUTPATIENT
Start: 2017-10-10 | End: 2017-10-13 | Stop reason: HOSPADM

## 2017-10-10 RX ORDER — ATENOLOL 50 MG/1
50 TABLET ORAL DAILY PRN
Status: DISCONTINUED | OUTPATIENT
Start: 2017-10-10 | End: 2017-10-13 | Stop reason: HOSPADM

## 2017-10-10 RX ORDER — IBUPROFEN 600 MG/1
600 TABLET, FILM COATED ORAL 3 TIMES DAILY
Status: DISCONTINUED | OUTPATIENT
Start: 2017-10-10 | End: 2017-10-13 | Stop reason: HOSPADM

## 2017-10-10 RX ORDER — MULTIPLE VITAMINS W/ MINERALS TAB 9MG-400MCG
1 TAB ORAL DAILY
Status: DISCONTINUED | OUTPATIENT
Start: 2017-10-11 | End: 2017-10-13 | Stop reason: HOSPADM

## 2017-10-10 RX ORDER — LORAZEPAM 1 MG/1
1 TABLET ORAL ONCE
Status: COMPLETED | OUTPATIENT
Start: 2017-10-10 | End: 2017-10-10

## 2017-10-10 RX ORDER — LANOLIN ALCOHOL/MO/W.PET/CERES
3 CREAM (GRAM) TOPICAL
COMMUNITY

## 2017-10-10 RX ORDER — FOLIC ACID 1 MG/1
1 TABLET ORAL DAILY
Status: DISCONTINUED | OUTPATIENT
Start: 2017-10-11 | End: 2017-10-13 | Stop reason: HOSPADM

## 2017-10-10 RX ORDER — IBUPROFEN 800 MG/1
800 TABLET, FILM COATED ORAL ONCE
Status: COMPLETED | OUTPATIENT
Start: 2017-10-10 | End: 2017-10-10

## 2017-10-10 RX ORDER — CEFDINIR 300 MG/1
300 CAPSULE ORAL 2 TIMES DAILY
Status: DISCONTINUED | OUTPATIENT
Start: 2017-10-10 | End: 2017-10-13 | Stop reason: HOSPADM

## 2017-10-10 RX ORDER — LANOLIN ALCOHOL/MO/W.PET/CERES
3 CREAM (GRAM) TOPICAL
Status: DISCONTINUED | OUTPATIENT
Start: 2017-10-10 | End: 2017-10-13 | Stop reason: HOSPADM

## 2017-10-10 RX ORDER — LANOLIN ALCOHOL/MO/W.PET/CERES
100 CREAM (GRAM) TOPICAL DAILY
Status: COMPLETED | OUTPATIENT
Start: 2017-10-11 | End: 2017-10-13

## 2017-10-10 RX ORDER — METRONIDAZOLE 250 MG/1
250 TABLET ORAL 2 TIMES DAILY
Status: DISCONTINUED | OUTPATIENT
Start: 2017-10-10 | End: 2017-10-13 | Stop reason: HOSPADM

## 2017-10-10 RX ORDER — SULFAMETHOXAZOLE/TRIMETHOPRIM 800-160 MG
1 TABLET ORAL 2 TIMES DAILY
Status: DISCONTINUED | OUTPATIENT
Start: 2017-10-10 | End: 2017-10-13 | Stop reason: HOSPADM

## 2017-10-10 RX ORDER — VALACYCLOVIR HYDROCHLORIDE 500 MG/1
500 TABLET, FILM COATED ORAL 2 TIMES DAILY
Status: DISCONTINUED | OUTPATIENT
Start: 2017-10-10 | End: 2017-10-13 | Stop reason: HOSPADM

## 2017-10-10 RX ORDER — DOXYCYCLINE 100 MG/1
100 CAPSULE ORAL 2 TIMES DAILY
Status: DISCONTINUED | OUTPATIENT
Start: 2017-10-10 | End: 2017-10-13 | Stop reason: HOSPADM

## 2017-10-10 RX ORDER — DIAZEPAM 5 MG
5-20 TABLET ORAL EVERY 30 MIN PRN
Status: DISCONTINUED | OUTPATIENT
Start: 2017-10-10 | End: 2017-10-13 | Stop reason: HOSPADM

## 2017-10-10 RX ORDER — CLARITHROMYCIN 500 MG
500 TABLET ORAL 2 TIMES DAILY
Status: DISCONTINUED | OUTPATIENT
Start: 2017-10-10 | End: 2017-10-13 | Stop reason: HOSPADM

## 2017-10-10 RX ORDER — ACETAMINOPHEN 325 MG/1
650 TABLET ORAL EVERY 4 HOURS PRN
Status: ON HOLD | COMMUNITY
End: 2017-10-13

## 2017-10-10 RX ORDER — TRAZODONE HYDROCHLORIDE 50 MG/1
50-150 TABLET, FILM COATED ORAL
Status: DISCONTINUED | OUTPATIENT
Start: 2017-10-10 | End: 2017-10-13 | Stop reason: HOSPADM

## 2017-10-10 RX ORDER — NYSTATIN 500000 [USP'U]/1
1 TABLET, COATED ORAL 4 TIMES DAILY
Status: DISCONTINUED | OUTPATIENT
Start: 2017-10-10 | End: 2017-10-13 | Stop reason: HOSPADM

## 2017-10-10 RX ADMIN — SULFAMETHOXAZOLE AND TRIMETHOPRIM 1 TABLET: 800; 160 TABLET ORAL at 22:00

## 2017-10-10 RX ADMIN — IBUPROFEN 800 MG: 800 TABLET ORAL at 20:11

## 2017-10-10 RX ADMIN — RANITIDINE 300 MG: 150 TABLET ORAL at 22:00

## 2017-10-10 RX ADMIN — CLARITHROMYCIN 500 MG: 500 TABLET ORAL at 22:00

## 2017-10-10 RX ADMIN — NYSTATIN 500000 UNITS: 500000 TABLET, FILM COATED ORAL at 22:00

## 2017-10-10 RX ADMIN — LORAZEPAM 1 MG: 1 TABLET ORAL at 17:18

## 2017-10-10 RX ADMIN — DOXYCYCLINE HYCLATE 100 MG: 100 CAPSULE ORAL at 21:59

## 2017-10-10 RX ADMIN — HYDROXYZINE HYDROCHLORIDE 50 MG: 25 TABLET ORAL at 21:54

## 2017-10-10 RX ADMIN — METRONIDAZOLE 250 MG: 250 TABLET ORAL at 22:00

## 2017-10-10 RX ADMIN — LORAZEPAM 1 MG: 1 TABLET ORAL at 20:24

## 2017-10-10 RX ADMIN — TRAZODONE HYDROCHLORIDE 150 MG: 50 TABLET ORAL at 21:54

## 2017-10-10 RX ADMIN — GABAPENTIN 300 MG: 300 CAPSULE ORAL at 22:00

## 2017-10-10 RX ADMIN — VALACYCLOVIR HYDROCHLORIDE 500 MG: 500 TABLET, FILM COATED ORAL at 22:00

## 2017-10-10 RX ADMIN — CEFDINIR 300 MG: 300 CAPSULE ORAL at 21:52

## 2017-10-10 RX ADMIN — NICOTINE POLACRILEX 8 MG: 4 GUM, CHEWING ORAL at 21:54

## 2017-10-10 RX ADMIN — IBUPROFEN 600 MG: 600 TABLET ORAL at 22:00

## 2017-10-10 ASSESSMENT — ENCOUNTER SYMPTOMS
NECK PAIN: 0
ARTHRALGIAS: 0
NECK STIFFNESS: 0
BACK PAIN: 0
ACTIVITY CHANGE: 1
FEVER: 0
NAUSEA: 0
ABDOMINAL PAIN: 1
HALLUCINATIONS: 0
BLOOD IN STOOL: 0
SHORTNESS OF BREATH: 1
DIFFICULTY URINATING: 0
APPETITE CHANGE: 1
EYE REDNESS: 0
WOUND: 0
VOMITING: 0
HEADACHES: 0
WHEEZING: 0
WEAKNESS: 0
DECREASED CONCENTRATION: 0
COUGH: 0
COLOR CHANGE: 0
CONFUSION: 0
NERVOUS/ANXIOUS: 0
DYSPHORIC MOOD: 0

## 2017-10-10 ASSESSMENT — ACTIVITIES OF DAILY LIVING (ADL): GROOMING: INDEPENDENT

## 2017-10-10 NOTE — IP AVS SNAPSHOT
MRN:[id number]                      After Visit Summary   10/10/2017    Jonny Gramajo    MRN: [id number]           Thank you!     Thank you for choosing [facility] for your care. Our goal is always to provide you with excellent care.        Patient Information     Date Of Birth          1985        Designated Caregiver       Most Recent Value    Caregiver    Will someone help with your care after discharge? no      About your hospital stay     You were admitted on:  October 10, 2017 You last received care in the:  [facility]    You were discharged on:  October 13, 2017       Who to Call     For medical emergencies, please call 911.  For non-urgent questions about your medical care, please call your primary care provider or clinic, [phone]          Attending Provider     Provider Specialty    Giancarlo Griffith MD Emergency Medicine    Morgan Stephen MD Psychiatry       Primary Care Provider Office Phone # Fax #    Kenneth G Pallas, [phone]      Further instructions from your care team       Behavioral Discharge Planning and Instructions  THANK YOU FOR CHOOSING THE [address]  [phone]    Summary: You were admitted to [facility] on 10- for treatment and evaluation of alcohol use. You are being discharged today 10-.    A medical exam was performed that included lab work. You have met with a  and opted to enter treatment at [facility].  You have an appointment with them on 10/25/17 @ 10:30 am.   Please take care and make your recovery a priorityJonny.      [address] [address]  [phone]      Main Diagnosis:   Per Dr. Stephen psychiatrist.   DIAGNOSTIC IMPRESSION:   1.  Alcohol use disorder, severe with withdrawal.   2.  Major depression, recurrent, severe with psychotic features, F33.3. .    Major Treatments, Procedures and  Findings:  You have withdrawn from alcohol using valium and the Modified Selective Severity Assessment (MSSA) protocol    You have had a chemical dependency assessment.    You have had labs drawn and those results have been reviewed with you.  Please take a copy of your lab work with you to your next primary care physician appointment.    Continue to work with your primary MD for treatment of your lyme's disease.     Symptoms to Report:  If you experience more anxiety, confusion, sleeplessness, deep sadness or thoughts of suicide, notify your treatment team or notify your primary care physician. IF ANY OF THE SYMPTOMS YOU ARE EXPERIENCING ARE A MEDICAL EMERGENCY CALL 911 IMMEDIATELY.     Lifestyle Adjustment: Health Action Plan:  1.Create a daily schedule  2. Eat Healthy  3. Plan Enjoyable Sober Activities  4. Use Problem Solving Skills and Deal with Issues as they Arise.   5. Be Physically Active  6. Take your medications as prescribed  7. Get enough restful sleep  8. Practice Relaxation  9. Spend time with Supportive People  10. No use of alcohol, illegal drugs or addictive medications other than what is currently prescribed.   11.[place], [place] Sponsor are excellent resources for support    Keeping hands clean is one of the most important steps we can take to avoid getting sick and spreading germs to others.  Please wash your hands frequently.         Primary Provider:      Psychiatry Follow-up:   Dr. Talley  [facility]  [phone]  Wednesday, October 25, 2017 10:30 am       Resources:     [facility] [phone]    Support Group:  [place] and Sponsor/support    Crisis Intervention: 529.710.3075 or 343-795-0331 (TTY: 375.985.7186).  Call anytime for help.  National [place] on Mental Illness (www.[place].org): 519.683.3331 or 770-313-8892.  Alcoholics Anonymous (www.alcoholics-anonymous.org): Check your phone book for your local chapter.  Suicide Awareness Voices of Education (SAVE)  (www.save.org): 888-511-SAVE (7283)  National Suicide Prevention Line (www.mentalhealthmn.org): 827-551-OFGN (7824)  Mental Health Consumer/Survivor Network of [place] (www.mhcsn.net): 223.225.6112 or 929-530-5879  Mental Health Association of [place] (www.mentalhealth.org): 673.638.2966 or 116-617-6366   Substance Abuse and Mental Health Services (www.sama.gov)    [place] Recovery Connection ([facility])  [facility] connects people seeking recovery to resources that help foster and sustain long-term recovery.  Whether you are seeking resources for treatment, transportation, housing, job training, education, health care or other pathways to recovery, [facility] is a great place to start.  [phone].  Www.[facility].org    General Medication Instructions:   See your medication sheet(s) for instructions.   Take all medicines as directed.  Make no changes unless your doctor suggests them.   Go to all your doctor visits.  Be sure to have all your required lab tests. This way, your medicines can be refilled on time.  Do not use any drugs not prescribed by your provider.  AA/NA and Sponsors are excellent resources for support  Avoid alcohol.    Please Note:  If you have any questions at anytime after you are discharged please call the [facility] detox unit 3AW unit at [phone].    [facility], Behavioral Intake [phone]    Medical Records [phone]      Please take this discharge folder with you to all your follow up appointments, it contains your lab results, diagnosis, medication list and discharge recommendations.      THANK YOU FOR CHOOSING THE [facility]     Pending Results     No orders found from 10/8/2017 to 10/11/2017.            Statement of Approval     Ordered          10/13/17 0948  I have reviewed and agree with all the recommendations and orders detailed in this document.  EFFECTIVE NOW     Comments:  Please send labs and dc med list  "to his mds   Approved and electronically signed by:  Morgan Stephen MD             Admission Information     Date & Time Provider Department Dept. Phone    10/10/2017 Morgan Stephen MD [facility] [phone]      Your Vitals Were     Blood Pressure Pulse Temperature Respirations Height Weight    143/101 94 97  F (36.1  C) (Oral) 16 1.981 m (6' 6\") 150.6 kg (332 lb)    Pulse Oximetry BMI (Body Mass Index)                98% 38.37 kg/m2          [employer] Information     [employer] lets you send messages to your doctor, view your test results, renew your prescriptions, schedule appointments and more. To sign up, go to [email or web address] . Click on \"Log in\" on the left side of the screen, which will take you to the Welcome page. Then click on \"Sign up Now\" on the right side of the page.     You will be asked to enter the access code listed below, as well as some personal information. Please follow the directions to create your username and password.     Your access code is: [email or web address]  Expires: 2017  1:46 PM     Your access code will  in 90 days. If you need help or a new code, please call your [facility] clinic or [phone].        Care EveryWhere ID     This is your Care EveryWhere ID. This could be used by other organizations to access your [facility] medical records  [id number]        Equal Access to Services     [facility]: Hadii kolby martinez hadasho Socourtneyali, waaxda luqadaha, qaybta kaalmada adeegyada, batsheva amador . So [facility] [phone].    ATENCIÓN: Si habla español, tiene a castro disposición servicios gratuitos de asistencia lingüística. Llame al [phone].    We comply with applicable federal civil rights laws and [place] laws. We do not discriminate on the basis of race, color, national origin, age, disability, sex, sexual orientation, or gender identity.               Review of your medicines      START taking        Dose / Directions    " acamprosate 333 MG EC tablet   Commonly known as:  CAMPRAL   Used for:  Alcohol intoxication, uncomplicated (H)        Dose:  666 mg   Take 2 tablets (666 mg) by mouth 3 times daily   Quantity:  180 tablet   Refills:  3       lactobacillus rhamnosus (GG) capsule   Used for:  Alcohol abuse with uncomplicated intoxication (H)        Dose:  1 capsule   Take 1 capsule by mouth 3 times daily (before meals)   Quantity:  90 capsule   Refills:  3       risperiDONE 0.5 MG tablet   Commonly known as:  risperDAL   Used for:  Severe episode of recurrent major depressive disorder, with psychotic features (H)        Dose:  0.5 mg   Take 1 tablet (0.5 mg) by mouth daily   Quantity:  30 tablet   Refills:  3         CONTINUE these medicines which may have CHANGED, or have new prescriptions. If we are uncertain of the size of tablets/capsules you have at home, strength may be listed as something that might have changed.        Dose / Directions    ranitidine 300 MG tablet   Commonly known as:  ZANTAC   This may have changed:  when to take this   Used for:  Lyme disease        Dose:  300 mg   Take 1 tablet (300 mg) by mouth 2 times daily   Quantity:  60 tablet   Refills:  0       vortioxetine 20 MG tablet   Commonly known as:  TRINTELLIX/BRINTELLIX   This may have changed:    - medication strength  - how much to take   Used for:  Severe episode of recurrent major depressive disorder, with psychotic features (H)        Dose:  20 mg   Take 1 tablet (20 mg) by mouth daily   Quantity:  30 tablet   Refills:  3         CONTINUE these medicines which have NOT CHANGED        Dose / Directions    cefdinir 300 MG capsule   Commonly known as:  OMNICEF   Used for:  Lyme disease        Dose:  300 mg   Take 1 capsule (300 mg) by mouth 2 times daily   Quantity:  30 capsule   Refills:  0       clarithromycin 500 MG tablet   Commonly known as:  BIAXIN   Used for:  Lyme disease        Dose:  500 mg   Take 1 tablet (500 mg) by mouth 2 times daily with  food   Quantity:  60 tablet   Refills:  0       doxycycline monohydrate 100 MG capsule   Used for:  Lyme disease        Dose:  100 mg   Take 1 capsule (100 mg) by mouth 2 times daily With food. Avoid calcium, magnesium, dairy, and the sun in 24 hours.   Quantity:  60 capsule   Refills:  0       gabapentin 300 MG capsule   Commonly known as:  NEURONTIN   Used for:  Adjustment disorder with mixed anxiety and depressed mood        Dose:  300 mg   Take 1 capsule (300 mg) by mouth 3 times daily   Quantity:  90 capsule   Refills:  1       hydrOXYzine 25 MG tablet   Commonly known as:  ATARAX   Used for:  Benzodiazepine withdrawal with complication (H)        Dose:  25-50 mg   Take 1-2 tablets (25-50 mg) by mouth every 4 hours as needed for anxiety   Quantity:  120 tablet   Refills:  1       melatonin 3 MG tablet        Dose:  3 mg   Take 3 mg by mouth nightly as needed for sleep   Refills:  0       metroNIDAZOLE 250 MG tablet   Commonly known as:  FLAGYL   Used for:  Lyme disease        Dose:  250 mg   Take 1 tablet (250 mg) by mouth 2 times daily with food   Quantity:  60 tablet   Refills:  0       nystatin 714089 UNITS Tabs tablet   Commonly known as:  MYCOSTATIN   Used for:  Lyme disease        Dose:  1 tablet   Take 1 tablet (500,000 Units) by mouth 4 times daily   Quantity:  120 tablet   Refills:  0       psyllium Packet   Commonly known as:  METAMUCIL/KONSYL        Dose:  1 packet   Take 1 packet by mouth 3 times daily as needed for constipation   Refills:  0       sulfamethoxazole-trimethoprim 800-160 MG per tablet   Commonly known as:  BACTRIM DS/SEPTRA DS   Used for:  Lyme disease        Dose:  1 tablet   Take 1 tablet by mouth 2 times daily   Quantity:  60 tablet   Refills:  0       traZODone 50 MG tablet   Commonly known as:  DESYREL   Used for:  Alcohol dependence with withdrawal with complication (H)        Dose:   mg   Take 1-3 tablets ( mg) by mouth nightly as needed for sleep   Quantity:  30  tablet   Refills:  1       valACYclovir 500 MG tablet   Commonly known as:  VALTREX   Used for:  Lyme disease        Dose:  500 mg   Take 1 tablet (500 mg) by mouth 2 times daily   Quantity:  60 tablet   Refills:  0         STOP taking     acetaminophen 325 MG tablet   Commonly known as:  TYLENOL           ibuprofen 600 MG tablet   Commonly known as:  ADVIL/MOTRIN           PHENobarbital 32.4 MG Tabs tablet   Commonly known as:  LUMINAL                Where to get your medicines      These medications were sent to [facility] [place], [place] - [address]  [address] [facility], [facility] [address]     Phone:  [phone]     acamprosate 333 MG EC tablet    risperiDONE 0.5 MG tablet    vortioxetine 20 MG tablet         Some of these will need a paper prescription and others can be bought over the counter. Ask your nurse if you have questions.     Bring a paper prescription for each of these medications     lactobacillus rhamnosus (GG) capsule                Protect others around you: Learn how to safely use, store and throw away your medicines at www.disposemymeds.org.             Medication List: This is a list of all your medications and when to take them. Check marks below indicate your daily home schedule. Keep this list as a reference.      Medications           Morning Afternoon Evening Bedtime As Needed    acamprosate 333 MG EC tablet   Commonly known as:  CAMPRAL   Take 2 tablets (666 mg) by mouth 3 times daily   Last time this was given:  666 mg on 10/13/2017  8:28 AM                                         cefdinir 300 MG capsule   Commonly known as:  OMNICEF   Take 1 capsule (300 mg) by mouth 2 times daily   Last time this was given:  300 mg on 10/13/2017  8:27 AM                                      clarithromycin 500 MG tablet   Commonly known as:  BIAXIN   Take 1 tablet (500 mg) by mouth 2 times daily with food   Last time this was given:  500 mg on 10/13/2017  8:27 AM                                       doxycycline monohydrate 100 MG capsule   Take 1 capsule (100 mg) by mouth 2 times daily With food. Avoid calcium, magnesium, dairy, and the sun in 24 hours.                                      gabapentin 300 MG capsule   Commonly known as:  NEURONTIN   Take 1 capsule (300 mg) by mouth 3 times daily   Last time this was given:  300 mg on 10/13/2017  8:27 AM                                      hydrOXYzine 25 MG tablet   Commonly known as:  ATARAX   Take 1-2 tablets (25-50 mg) by mouth every 4 hours as needed for anxiety   Last time this was given:  50 mg on 10/13/2017 10:32 AM                                   lactobacillus rhamnosus (GG) capsule   Take 1 capsule by mouth 3 times daily (before meals)   Last time this was given:  1 capsule on 10/13/2017  8:27 AM                                         melatonin 3 MG tablet   Take 3 mg by mouth nightly as needed for sleep   Last time this was given:  3 mg on 10/12/2017 10:07 PM                                   metroNIDAZOLE 250 MG tablet   Commonly known as:  FLAGYL   Take 1 tablet (250 mg) by mouth 2 times daily with food   Last time this was given:  250 mg on 10/13/2017  8:27 AM                                      nystatin 376100 UNITS Tabs tablet   Commonly known as:  MYCOSTATIN   Take 1 tablet (500,000 Units) by mouth 4 times daily   Last time this was given:  500,000 Units on 10/13/2017  8:27 AM                                            psyllium Packet   Commonly known as:  METAMUCIL/KONSYL   Take 1 packet by mouth 3 times daily as needed for constipation   Last time this was given:  1 packet on 10/12/2017  6:14 PM                                   ranitidine 300 MG tablet   Commonly known as:  ZANTAC   Take 1 tablet (300 mg) by mouth 2 times daily   Last time this was given:  300 mg on 10/13/2017  8:27 AM                                      risperiDONE 0.5 MG tablet   Commonly known as:  risperDAL   Take 1 tablet (0.5 mg) by mouth  daily   Last time this was given:  0.5 mg on 10/13/2017 10:32 AM                                   sulfamethoxazole-trimethoprim 800-160 MG per tablet   Commonly known as:  BACTRIM DS/SEPTRA DS   Take 1 tablet by mouth 2 times daily   Last time this was given:  1 tablet on 10/13/2017  8:27 AM                                      traZODone 50 MG tablet   Commonly known as:  DESYREL   Take 1-3 tablets ( mg) by mouth nightly as needed for sleep   Last time this was given:  150 mg on 10/12/2017  8:49 PM                                   valACYclovir 500 MG tablet   Commonly known as:  VALTREX   Take 1 tablet (500 mg) by mouth 2 times daily   Last time this was given:  500 mg on 10/13/2017  8:27 AM                                      vortioxetine 20 MG tablet   Commonly known as:  TRINTELLIX/BRINTELLIX   Take 1 tablet (20 mg) by mouth daily   Last time this was given:  20 mg on 10/13/2017  8:27 AM

## 2017-10-10 NOTE — ED PROVIDER NOTES
"    [facility] [place] ([facility])    10/10/17       History     Chief Complaint   Patient presents with     Alcohol Intoxication     pt called EMS; drinking 1.5L vodka/day for past 3 days since [facility]     Abdominal Pain     RUQ abd pain, n/v/d for past 2 days     Suicidal     reported SI to EMS     HPI  Jonny Gramajo is a 32 year old male with a medical history of alcohol intoxication, chemical dependency and hypertension for evaluation of alcohol intoxication, right lower rib pain and previous injury, suicidal ideation and a lesion on his inner, lower lip. Patient reports [facility] for alcohol problem, and he left [facility] 3 days ago. Patient reports he left because he was \"not up for it at that time\" and he was becoming severely fatigued while there. Patient reports he was sober while in [facility] and was sober for 2 days after leaving [facility]. Patient notes that it was a \"demanding schedule\" there. Patient reports drinking 1.5 L of vodka a day since relapsing. Patient in the Emergency Department is also complaining of a lesion on his inner lower lip. He reports the lesion has been there for a few weeks, and has not been improving. He denies biting his lip or having this in the past; but he notes smoking cigarettes. Patient also complains of chest pain that has been getting worse since colliding with another person while playing volleyball 1 week ago. He notes some labored breathing due to the pain. Patient also reports having suicidal ideation, but no current plan.. He denies any nausea.  Patient notes when he drinks that he does have withdrawal seizures.  Patient would like to be admitted to detox also.  No current hallucinations  Patient has abdominal pain or other complaints    I have reviewed the Medications, Allergies, Past Medical and Surgical History, and Social History in the [employer] system.    Past Medical History:   Diagnosis Date     Anxiety      " "Depressive disorder      Hypertension      Lyme disease        Past Surgical History:   Procedure Laterality Date     GENITOURINARY SURGERY      testical recessed       No family history on file.    Social History   Substance Use Topics     Smoking status: Current Every Day Smoker     Packs/day: 1.00     Smokeless tobacco: Current User     Alcohol use Yes      Comment: Drinking about 10 drinks a day for past 3 days       No current facility-administered medications for this encounter.      Current Outpatient Prescriptions   Medication     acetaminophen (TYLENOL) 325 MG tablet     melatonin 3 MG tablet     psyllium (METAMUCIL/KONSYL) Packet     ibuprofen (ADVIL/MOTRIN) 600 MG tablet     hydrOXYzine (ATARAX) 25 MG tablet     gabapentin (NEURONTIN) 300 MG capsule     traZODone (DESYREL) 50 MG tablet     nystatin (MYCOSTATIN) 847628 UNITS TABS tablet     valACYclovir (VALTREX) 500 MG tablet     cefdinir (OMNICEF) 300 MG capsule     PHENobarbital (LUMINAL) 32.4 MG TABS tablet     clarithromycin (BIAXIN) 500 MG tablet     metroNIDAZOLE (FLAGYL) 250 MG tablet     sulfamethoxazole-trimethoprim (BACTRIM DS/SEPTRA DS) 800-160 MG per tablet     doxycycline Monohydrate 100 MG CAPS     ranitidine (ZANTAC) 300 MG tablet     vortioxetine (TRINTELLIX/BRINTELLIX) 5 MG tablet     Facility-Administered Medications Ordered in Other Encounters   Medication     Self Administer Medications: Behavioral Services        Allergies   Allergen Reactions     Abilify [Aripiprazole]      Patient reports tardive dyskinesia effect in 2004 but likely akathisia since patient reports the effects only occurred while on med and resolved with a few days after discontinuing med.      Wellbutrin [Bupropion] Anxiety     Patient reports felt \"reved\" up and anxious when taken in 2001.          Review of Systems   Constitutional: Positive for activity change and appetite change. Negative for fever.   HENT: Positive for mouth sores (patient has left lower lip " tenderness for last 6 months.). Negative for congestion.    Eyes: Negative for redness.   Respiratory: Positive for shortness of breath (  Mild). Negative for cough and wheezing.    Cardiovascular: Positive for chest pain (right lower anterior chest playing volleyball 10 days ago). Negative for leg swelling.   Gastrointestinal: Positive for abdominal pain (RUQ). Negative for blood in stool, nausea and vomiting.   Genitourinary: Negative for difficulty urinating.   Musculoskeletal: Negative for arthralgias, back pain, gait problem, neck pain and neck stiffness.   Skin: Negative for color change, rash and wound.   Allergic/Immunologic: Negative for immunocompromised state.   Neurological: Negative for weakness and headaches. Seizures: History of withdrawal but none current.   Psychiatric/Behavioral: Positive for suicidal ideas (chronic passive). Negative for confusion, decreased concentration, dysphoric mood and hallucinations. The patient is not nervous/anxious.    All other systems reviewed and are negative.      Physical Exam   BP: (!) 138/97  Pulse: 124  Temp: 97.2  F (36.2  C)  Resp: 18  SpO2: 96 %       Physical Exam   Constitutional: He is oriented to person, place, and time. He appears well-developed and well-nourished. He appears distressed.   Patient smells of alcohol but cooperative here.  No respiratory distress.   HENT:   Head: Normocephalic and atraumatic.   Patient's left lower lip there is a small cystic area noted a few millimeters without skin changes   Eyes: Conjunctivae and EOM are normal. Pupils are equal, round, and reactive to light. No scleral icterus.   Neck: Normal range of motion. Neck supple.   Cardiovascular: Normal rate and regular rhythm.    Pulmonary/Chest: No stridor. No respiratory distress. He has no wheezes. He exhibits tenderness (ild reproducible chest wall tenderness in the right lower anterior rib area without step-off deformity bruising swelling).   Abdominal: He exhibits no  distension and no mass. There is no tenderness. There is no rebound and no guarding.   Musculoskeletal: He exhibits no edema, tenderness or deformity.   Neurological: He is alert and oriented to person, place, and time. He has normal reflexes. No cranial nerve deficit. Coordination normal.   Skin: Skin is warm and dry. No rash noted. He is not diaphoretic. No erythema. No pallor.   Psychiatric:   Flat affect noted otherwise cooperative here is not delusional.  Patient notes some chronic passive suicidal overtones but no active plan.   Nursing note and vitals reviewed.      ED Course   1:15 PM  The patient was seen and examined by Giancarlo Griffith MD in Room ED08.     ED Course     Patient value in ER.    Chest x-ray done revealing no pneumothorax no rib fractures no hemothorax.    Patient otherwise stable here in the ER was mildly anxious.    Patient did receive 1 mg Ativan here orally.    Discussed case with intake we'll plan to admit to detox patient is voluntary stable in ER.          Procedures             Critical Care time:  none             Labs Ordered and Resulted from Time of ED Arrival Up to the Time of Departure from the ED   GLUCOSE BY METER - Abnormal; Notable for the following:        Result Value    Glucose 128 (*)     All other components within normal limits   UA MACROSCOPIC WITH REFLEX TO MICRO AND CULTURE - Abnormal; Notable for the following:     Specific Gravity Urine 1.001 (*)     All other components within normal limits   DRUG ABUSE SCREEN 6 CHEM DEP URINE ([facility]) - Abnormal; Notable for the following:     Barbiturates Qual Urine Positive (*)     Ethanol Qual Urine Positive (*)     All other components within normal limits   ALCOHOL BREATH TEST POCT - Abnormal; Notable for the following:     Alcohol Breath Test 0.098 (*)     All other components within normal limits   ALCOHOL BREATH TEST POCT - Abnormal; Notable for the following:     Alcohol Breath Test 0.098 (*)     All other components within  normal limits     Results for orders placed or performed during the hospital encounter of 10/10/17   XR Chest 2 Views    Narrative    XR CHEST 2 VW 10/10/2017 2:12 PM    HISTORY: Right-sided rib trauma.    COMPARISON: 8/4/2017    FINDINGS: No airspace consolidation, pleural effusion or pneumothorax.  Normal heart size. Right-sided ribs appear intact.      Impression    IMPRESSION: No acute abnormality.    DWIGHT TA MD   Glucose by meter   Result Value Ref Range    Glucose 128 (H) 70 - 99 mg/dL   UA reflex to Microscopic and Culture   Result Value Ref Range    Color Urine Straw     Appearance Urine Clear     Glucose Urine Negative NEG^Negative mg/dL    Bilirubin Urine Negative NEG^Negative    Ketones Urine Negative NEG^Negative mg/dL    Specific Gravity Urine 1.001 (L) 1.003 - 1.035    Blood Urine Negative NEG^Negative    pH Urine 6.0 5.0 - 7.0 pH    Protein Albumin Urine Negative NEG^Negative mg/dL    Urobilinogen mg/dL Normal 0.0 - 2.0 mg/dL    Nitrite Urine Negative NEG^Negative    Leukocyte Esterase Urine Negative NEG^Negative    Source Midstream Urine    Drug abuse screen 6 urine (chem dep) ([facility])   Result Value Ref Range    Amphetamine Qual Urine Negative NEG^Negative    Barbiturates Qual Urine Positive (A) NEG^Negative    Benzodiazepine Qual Urine Negative NEG^Negative    Cannabinoids Qual Urine Negative NEG^Negative    Cocaine Qual Urine Negative NEG^Negative    Ethanol Qual Urine Positive (A) NEG^Negative    Opiates Qualitative Urine Negative NEG^Negative   Alcohol breath test POCT   Result Value Ref Range    Alcohol Breath Test 0.098 (A) 0.00 - 0.01   Alcohol breath test POCT   Result Value Ref Range    Alcohol Breath Test 0.098 (A) 0.00 - 0.01            Assessments & Plan (with Medical Decision Making)  32-year-old male history alcohol abuse was [facility] the left 3 days ago a maze been drinking since.  Patient history of withdrawal seizures.  Mild anxiousness here in the ER was playing  volleyball in [facility] and had right anterior chest wall injury 10 days ago.  Vital signs are stable.  Chest x-ray negative.  Patient otherwise stable did receive Ativan 1 mg orally MSSA protocol patient to be admitted to detox is voluntary.  Has chronic depression with some suicidal overtones which are chronic only passive nothing active.  Patient agrees          I have reviewed the nursing notes.    I have reviewed the findings, diagnosis, plan and need for follow up with the patient.    New Prescriptions    No medications on file       Final diagnoses:   Alcohol intoxication, uncomplicated (H)   Chemical dependency (H)   Injury of chest wall, initial encounter   Dysthymia     ICharly, am serving as a trained medical scribe to document services personally performed by Giancarlo Griffith MD, based on the provider's statements to me.   IGiancarlo MD, was physically present and have reviewed and verified the accuracy of this note documented by Charly Hong.    10/10/2017   [facility] EMERGENCY DEPARTMENT      This note was created at least in part by the use of dragon voice dictation system. Inadvertent typographical errors may still exist.  Giancarlo Griffith MD.         Giancarlo Griffith MD  10/10/17 2002

## 2017-10-10 NOTE — ED NOTES
Bed: HW03  Expected date: 10/10/17  Expected time: 12:27 PM  Means of arrival:   Comments:  HE 33yo M crisis / etoh

## 2017-10-10 NOTE — PHARMACY-ADMISSION MEDICATION HISTORY
"Admission medication history interview status for the 10/10/2017 admission is complete. See Epic admission navigator for allergy information, pharmacy, prior to admission medications and immunization status.     Medication history interview sources:  Jonny (patient)    Changes made to PTA medication list (reason)  Added: None  Deleted: Cariprazine 1.5 mg capsules - stopped by patient, side effects (restless legs at night), MD unaware      Naltrexone 50 mg tablet - stopped by patient, side effects (queasiness), MD unaware      Olanzapine 5 mg tablet - stopped by patient, side effects (made him feel too tired), MD unaware      Propranolol 20 mg tablet - stopped by patient, side effects (dizziness), MD unaware   *All stopped by patient in the last week (3-7 days)  Changed: Psyllium packet - was entered as wafer, patient has never used wafer, always uses the stir-able powder       Ranitidine 300 mg tablet - Take 1 tablet by mouth 3 times daily (not twice daily).    Additional medication history information (including reliability of information, actions taken by pharmacist):    Jonny was very pleasant to speak with and an excellent historian of his medication use. He seemed fairly coherent, but reported that his short-term memory was lacking and I had to repeat questions repeatedly. However, he was able to describe his medication use during the interview without additional prompting.    Of note:  - Patient's phenobarbital dose is at the 2 tablets daily phase of the taper schedule. He last took a dose 7 days ago due to not receiving his medication while at [facility].    - All meds that were taken in the \"past week\" have been taken in the last 3-7 days. Jonny reports he did not want to mix alcohol with his meds during his binge.    Allergies and immunization status confirmed with patient.    Home pharmacy: [facility] in [facility] [place] (on profile)    Prior to Admission medications    Medication Sig Last Dose Taking? Auth " Provider   acetaminophen (TYLENOL) 325 MG tablet Take 650 mg by mouth every 4 hours as needed for mild pain or fever Past Week at Unknown time Yes Unknown, Entered By History   melatonin 3 MG tablet Take 3 mg by mouth nightly as needed for sleep Past Week at Unknown time Yes Unknown, Entered By History   psyllium (METAMUCIL/KONSYL) Packet Take 1 packet by mouth 3 times daily as needed for constipation Past Week at Unknown time Yes Unknown, Entered By History   ibuprofen (ADVIL/MOTRIN) 600 MG tablet Take 1 tablet (600 mg) by mouth 3 times daily for 7 days Past Week at Unknown time Yes Gabriel Sierra MD   hydrOXYzine (ATARAX) 25 MG tablet Take 1-2 tablets (25-50 mg) by mouth every 4 hours as needed for anxiety Past Week at Unknown time Yes Eliud Vital MD   gabapentin (NEURONTIN) 300 MG capsule Take 1 capsule (300 mg) by mouth 3 times daily 10/10/2017 at AM Yes Eliud Vital MD   traZODone (DESYREL) 50 MG tablet Take 1-3 tablets ( mg) by mouth nightly as needed for sleep Past Week at Unknown time Yes Eliud Vital MD   nystatin (MYCOSTATIN) 593976 UNITS TABS tablet Take 1 tablet (500,000 Units) by mouth 4 times daily Past Week at Unknown time Yes Eliud Vital MD   valACYclovir (VALTREX) 500 MG tablet Take 1 tablet (500 mg) by mouth 2 times daily Past Week at Unknown time Yes Eliud Vital MD   cefdinir (OMNICEF) 300 MG capsule Take 1 capsule (300 mg) by mouth 2 times daily Past Week at Unknown time Yes Eliud Vital MD   PHENobarbital (LUMINAL) 32.4 MG TABS tablet Take 1 tab 4 times daily for 5 days, then  Take 1 tab 3 times daily for 5 days, then  Take 1 tab 2 times daily for 5 days, then  Take 1 tab 1 times daily for 5 days Past Week at Unknown time Yes Eliud Vital MD   clarithromycin (BIAXIN) 500 MG tablet Take 1 tablet (500 mg) by mouth 2 times daily with food Past Week at Unknown time Yes Eliud Vital MD   metroNIDAZOLE (FLAGYL) 250 MG tablet  Take 1 tablet (250 mg) by mouth 2 times daily with food Past Week at Unknown time Yes Eliud Vital MD   sulfamethoxazole-trimethoprim (BACTRIM DS/SEPTRA DS) 800-160 MG per tablet Take 1 tablet by mouth 2 times daily Past Week at Unknown time Yes Eliud Vital MD   doxycycline Monohydrate 100 MG CAPS Take 1 capsule (100 mg) by mouth 2 times daily With food. Avoid calcium, magnesium, dairy, and the sun in 24 hours. Past Week at Unknown time Yes Eliud Vital MD   ranitidine (ZANTAC) 300 MG tablet Take 1 tablet (300 mg) by mouth 2 times daily  Patient taking differently: Take 300 mg by mouth 3 times daily  10/10/2017 at AM Yes Eliud Vital MD   vortioxetine (TRINTELLIX/BRINTELLIX) 5 MG tablet Take 2 tablets (10 mg) by mouth daily Past Week at Unknown time Yes Morgan Stephen MD         Medication history completed by: Aysha Hollis, 2nd Year Student Pharmacist

## 2017-10-11 LAB
ALBUMIN SERPL-MCNC: 3.8 G/DL (ref 3.4–5)
ALP SERPL-CCNC: 131 U/L (ref 40–150)
ALT SERPL W P-5'-P-CCNC: 254 U/L (ref 0–70)
ANION GAP SERPL CALCULATED.3IONS-SCNC: 11 MMOL/L (ref 3–14)
AST SERPL W P-5'-P-CCNC: 97 U/L (ref 0–45)
BASOPHILS # BLD AUTO: 0 10E9/L (ref 0–0.2)
BASOPHILS NFR BLD AUTO: 0.2 %
BILIRUB SERPL-MCNC: 1 MG/DL (ref 0.2–1.3)
BUN SERPL-MCNC: 13 MG/DL (ref 7–30)
CALCIUM SERPL-MCNC: 10.3 MG/DL (ref 8.5–10.1)
CHLORIDE SERPL-SCNC: 97 MMOL/L (ref 94–109)
CO2 SERPL-SCNC: 26 MMOL/L (ref 20–32)
CREAT SERPL-MCNC: 0.81 MG/DL (ref 0.66–1.25)
DIFFERENTIAL METHOD BLD: ABNORMAL
EOSINOPHIL # BLD AUTO: 0 10E9/L (ref 0–0.7)
EOSINOPHIL NFR BLD AUTO: 0.3 %
ERYTHROCYTE [DISTWIDTH] IN BLOOD BY AUTOMATED COUNT: 12.2 % (ref 10–15)
GFR SERPL CREATININE-BSD FRML MDRD: >90 ML/MIN/1.7M2
GGT SERPL-CCNC: 309 U/L (ref 0–75)
GLUCOSE SERPL-MCNC: 137 MG/DL (ref 70–99)
HCT VFR BLD AUTO: 45 % (ref 40–53)
HGB BLD-MCNC: 16.2 G/DL (ref 13.3–17.7)
IMM GRANULOCYTES # BLD: 0 10E9/L (ref 0–0.4)
IMM GRANULOCYTES NFR BLD: 0.3 %
LYMPHOCYTES # BLD AUTO: 1 10E9/L (ref 0.8–5.3)
LYMPHOCYTES NFR BLD AUTO: 15.1 %
MCH RBC QN AUTO: 34 PG (ref 26.5–33)
MCHC RBC AUTO-ENTMCNC: 36 G/DL (ref 31.5–36.5)
MCV RBC AUTO: 95 FL (ref 78–100)
MONOCYTES # BLD AUTO: 1.2 10E9/L (ref 0–1.3)
MONOCYTES NFR BLD AUTO: 18.3 %
NEUTROPHILS # BLD AUTO: 4.3 10E9/L (ref 1.6–8.3)
NEUTROPHILS NFR BLD AUTO: 65.8 %
NRBC # BLD AUTO: 0 10*3/UL
NRBC BLD AUTO-RTO: 0 /100
PLATELET # BLD AUTO: 174 10E9/L (ref 150–450)
POTASSIUM SERPL-SCNC: 3.6 MMOL/L (ref 3.4–5.3)
PROT SERPL-MCNC: 7.2 G/DL (ref 6.8–8.8)
RBC # BLD AUTO: 4.76 10E12/L (ref 4.4–5.9)
SODIUM SERPL-SCNC: 134 MMOL/L (ref 133–144)
TSH SERPL DL<=0.005 MIU/L-ACNC: 1.21 MU/L (ref 0.4–4)
WBC # BLD AUTO: 6.5 10E9/L (ref 4–11)

## 2017-10-11 PROCEDURE — 82977 ASSAY OF GGT: CPT | Performed by: PSYCHIATRY & NEUROLOGY

## 2017-10-11 PROCEDURE — 80053 COMPREHEN METABOLIC PANEL: CPT | Performed by: PSYCHIATRY & NEUROLOGY

## 2017-10-11 PROCEDURE — 84443 ASSAY THYROID STIM HORMONE: CPT | Performed by: PSYCHIATRY & NEUROLOGY

## 2017-10-11 PROCEDURE — 25000132 ZZH RX MED GY IP 250 OP 250 PS 637: Performed by: PSYCHIATRY & NEUROLOGY

## 2017-10-11 PROCEDURE — 85025 COMPLETE CBC W/AUTO DIFF WBC: CPT | Performed by: PSYCHIATRY & NEUROLOGY

## 2017-10-11 PROCEDURE — 12800012 ZZH R&B CD MH INTERMEDIATE ADULT

## 2017-10-11 PROCEDURE — 36415 COLL VENOUS BLD VENIPUNCTURE: CPT | Performed by: PSYCHIATRY & NEUROLOGY

## 2017-10-11 PROCEDURE — 99221 1ST HOSP IP/OBS SF/LOW 40: CPT | Performed by: PHYSICIAN ASSISTANT

## 2017-10-11 PROCEDURE — 25000125 ZZHC RX 250: Performed by: PSYCHIATRY & NEUROLOGY

## 2017-10-11 RX ORDER — ONDANSETRON 4 MG/1
4 TABLET, ORALLY DISINTEGRATING ORAL EVERY 6 HOURS PRN
Status: DISCONTINUED | OUTPATIENT
Start: 2017-10-11 | End: 2017-10-13 | Stop reason: HOSPADM

## 2017-10-11 RX ORDER — ACAMPROSATE CALCIUM 333 MG/1
666 TABLET, DELAYED RELEASE ORAL 3 TIMES DAILY
Status: DISCONTINUED | OUTPATIENT
Start: 2017-10-11 | End: 2017-10-13 | Stop reason: HOSPADM

## 2017-10-11 RX ORDER — ASENAPINE 5 MG/1
5 TABLET SUBLINGUAL 2 TIMES DAILY
Status: DISCONTINUED | OUTPATIENT
Start: 2017-10-11 | End: 2017-10-13

## 2017-10-11 RX ORDER — ACETAMINOPHEN 325 MG/1
650 TABLET ORAL EVERY 4 HOURS PRN
Status: DISCONTINUED | OUTPATIENT
Start: 2017-10-11 | End: 2017-10-13 | Stop reason: HOSPADM

## 2017-10-11 RX ADMIN — MULTIPLE VITAMINS W/ MINERALS TAB 1 TABLET: TAB at 10:25

## 2017-10-11 RX ADMIN — DIAZEPAM 10 MG: 5 TABLET ORAL at 03:50

## 2017-10-11 RX ADMIN — METRONIDAZOLE 250 MG: 250 TABLET ORAL at 20:32

## 2017-10-11 RX ADMIN — DIAZEPAM 10 MG: 5 TABLET ORAL at 20:28

## 2017-10-11 RX ADMIN — ACAMPROSATE CALCIUM ENTERIC-COATED 666 MG: 333 TABLET, DELAYED RELEASE ORAL at 20:28

## 2017-10-11 RX ADMIN — NYSTATIN 500000 UNITS: 500000 TABLET, FILM COATED ORAL at 16:08

## 2017-10-11 RX ADMIN — NICOTINE POLACRILEX 8 MG: 4 GUM, CHEWING ORAL at 16:08

## 2017-10-11 RX ADMIN — ACAMPROSATE CALCIUM ENTERIC-COATED 666 MG: 333 TABLET, DELAYED RELEASE ORAL at 14:04

## 2017-10-11 RX ADMIN — DIAZEPAM 10 MG: 5 TABLET ORAL at 19:08

## 2017-10-11 RX ADMIN — CLARITHROMYCIN 500 MG: 500 TABLET ORAL at 10:25

## 2017-10-11 RX ADMIN — DIAZEPAM 10 MG: 5 TABLET ORAL at 16:08

## 2017-10-11 RX ADMIN — NYSTATIN 500000 UNITS: 500000 TABLET, FILM COATED ORAL at 10:26

## 2017-10-11 RX ADMIN — Medication 100 MG: at 10:25

## 2017-10-11 RX ADMIN — IBUPROFEN 600 MG: 600 TABLET ORAL at 06:22

## 2017-10-11 RX ADMIN — NICOTINE POLACRILEX 8 MG: 4 GUM, CHEWING ORAL at 06:55

## 2017-10-11 RX ADMIN — ASENAPINE MALEATE 5 MG: 5 TABLET SUBLINGUAL at 10:25

## 2017-10-11 RX ADMIN — GABAPENTIN 300 MG: 300 CAPSULE ORAL at 10:25

## 2017-10-11 RX ADMIN — CLARITHROMYCIN 500 MG: 500 TABLET ORAL at 20:28

## 2017-10-11 RX ADMIN — NICOTINE POLACRILEX 8 MG: 4 GUM, CHEWING ORAL at 20:32

## 2017-10-11 RX ADMIN — VALACYCLOVIR HYDROCHLORIDE 500 MG: 500 TABLET, FILM COATED ORAL at 20:29

## 2017-10-11 RX ADMIN — IBUPROFEN 600 MG: 600 TABLET ORAL at 14:04

## 2017-10-11 RX ADMIN — CEFDINIR 300 MG: 300 CAPSULE ORAL at 20:28

## 2017-10-11 RX ADMIN — DIAZEPAM 10 MG: 5 TABLET ORAL at 06:19

## 2017-10-11 RX ADMIN — CEFDINIR 300 MG: 300 CAPSULE ORAL at 10:27

## 2017-10-11 RX ADMIN — DOXYCYCLINE HYCLATE 100 MG: 100 CAPSULE ORAL at 20:28

## 2017-10-11 RX ADMIN — SULFAMETHOXAZOLE AND TRIMETHOPRIM 1 TABLET: 800; 160 TABLET ORAL at 10:25

## 2017-10-11 RX ADMIN — GABAPENTIN 300 MG: 300 CAPSULE ORAL at 20:28

## 2017-10-11 RX ADMIN — VORTIOXETINE 10 MG: 5 TABLET, FILM COATED ORAL at 10:26

## 2017-10-11 RX ADMIN — NICOTINE POLACRILEX 8 MG: 4 GUM, CHEWING ORAL at 12:16

## 2017-10-11 RX ADMIN — GABAPENTIN 300 MG: 300 CAPSULE ORAL at 14:04

## 2017-10-11 RX ADMIN — DOXYCYCLINE HYCLATE 100 MG: 100 CAPSULE ORAL at 10:27

## 2017-10-11 RX ADMIN — ATENOLOL 50 MG: 50 TABLET ORAL at 06:19

## 2017-10-11 RX ADMIN — ONDANSETRON 4 MG: 4 TABLET, ORALLY DISINTEGRATING ORAL at 09:11

## 2017-10-11 RX ADMIN — DIAZEPAM 10 MG: 5 TABLET ORAL at 12:16

## 2017-10-11 RX ADMIN — DIAZEPAM 10 MG: 5 TABLET ORAL at 08:55

## 2017-10-11 RX ADMIN — FOLIC ACID 1 MG: 1 TABLET ORAL at 10:27

## 2017-10-11 RX ADMIN — VALACYCLOVIR HYDROCHLORIDE 500 MG: 500 TABLET, FILM COATED ORAL at 10:25

## 2017-10-11 RX ADMIN — SULFAMETHOXAZOLE AND TRIMETHOPRIM 1 TABLET: 800; 160 TABLET ORAL at 20:28

## 2017-10-11 RX ADMIN — METRONIDAZOLE 250 MG: 250 TABLET ORAL at 10:26

## 2017-10-11 RX ADMIN — RANITIDINE 300 MG: 150 TABLET ORAL at 20:28

## 2017-10-11 RX ADMIN — NYSTATIN 500000 UNITS: 500000 TABLET, FILM COATED ORAL at 20:29

## 2017-10-11 RX ADMIN — ASENAPINE MALEATE 5 MG: 5 TABLET SUBLINGUAL at 20:28

## 2017-10-11 RX ADMIN — NYSTATIN 500000 UNITS: 500000 TABLET, FILM COATED ORAL at 12:16

## 2017-10-11 RX ADMIN — RANITIDINE 300 MG: 150 TABLET ORAL at 10:27

## 2017-10-11 RX ADMIN — ACAMPROSATE CALCIUM ENTERIC-COATED 666 MG: 333 TABLET, DELAYED RELEASE ORAL at 10:26

## 2017-10-11 RX ADMIN — IBUPROFEN 600 MG: 600 TABLET ORAL at 20:29

## 2017-10-11 ASSESSMENT — ACTIVITIES OF DAILY LIVING (ADL)
DRESS: INDEPENDENT
ORAL_HYGIENE: INDEPENDENT
GROOMING: INDEPENDENT
LAUNDRY: WITH SUPERVISION

## 2017-10-11 NOTE — H&P
"DATE OF SERVICE:  10/10/2017      Mr. Jonny Gramajo, date of birth, 1985, is a 32-year-old man admitted to [facility] detox unit on 10/10/2017.  He was admitted to detox from alcohol, he had left [facility] against medical advice.  He had been there 9 days.  He left due to paranoid feelings, hearing \"noise and cackling in the hallway\" and had referential thinking regarding this.  He had resumed drinking 2 days after he left, 1-1/2 liters of vodka per day.  He had been drinking that for 3 days before realizing he would be unable to stop without detoxification.  I had seen him in psychiatric consultation and felt he would do well in [facility] and based upon the level of paranoid thinking unfortunately, this was not the case.      He has had previous schizoaffective diagnoses and more recently major depression with psychotic features.  He has seen Dr. Clark for depression for about 22 years.      Anxiety symptoms are also noted with panicky feelings.      He was discharged taking Vraylar at 3 mg per day, Trintellix 10 mg per day and naltrexone for cravings.  In the interim, he feels that the naltrexone has caused nausea, and the Vraylar caused akathisia.      Chemical dependency issues extend back to his teens.      Previous medications he has taken include Elavil, imipramine, Wellbutrin, Paxil, Prozac, Zoloft, Lexapro, Celexa, Effexor, Cymbalta, Pristiq, Viibryd, and current Trintellix.  He has taken Risperdal, Seroquel, Abilify, and current Vraylar.  He has taken Depakote.  He has not tried augmentation with Cytomel, Lamictal, lithium, Tegretol, or carbamazepine.  He has also not taken MAO inhibitors or Fetzima or Clozaril, Saphris or Rexulti.      He has been to residential treatment here, otherwise his treatments were more distantly in the past.  He is not certain if he would be able to be at home doing outpatient treatment.  He has not been to [facility] or other [facility] " programs.      MENTAL STATUS EXAMINATION:  Shows an alert, cooperative man.  He does not appear to be hallucinating but does describe referential thinking and some paranoia.  Affect is good and varies appropriately with content.  He denies being suicidal currently, but had some suicidal thinking in the emergency room.      Mood overall is fair.  He feels the Trintellix is being helpful.  Motor behavior is a bit stiff.      There are no abnormal movements or tardive dyskinesia.  Associations and cognitions appear to be intact.      DIAGNOSTIC IMPRESSION:   1.  Alcohol use disorder, severe with withdrawal.   2.  Major depression, recurrent, severe with psychotic features, F33.3.      PLAN:  Plan at this time is to use Campral, Saphris, and increase the Trintellix.  Estimated length of stay is 2-3 days to stabilize and look at treatment options.         TANNER AARON MD             D: 10/11/2017 08:26   T: 10/11/2017 08:57   MT: CHRISTIANO      Name:     JONNY SUAREZ   MRN:      [id number]        Account:      [id number]   :      1985           Admitted:     [id number]      Document: [id number]

## 2017-10-11 NOTE — PROGRESS NOTES
"Pt being monitored for alcohol withdrawal. Pt medicated x 2 with valium 10 mg. Pt does have a history of hallucinations. States they improved when alcohol withdrawal is complete. Pt reports \"Black shapes that are darting by my peripheral vision\" and \"I'm feeling paranoid like people are talking about me.\" Pt needed to wait to take am medications due to nausea. Zofran order obtained and given. See new medications orders per Dr. Stephen for saphris and campral. Pt continues on antibiotics for lymes disease. BP slightly elevated. Discharge plans pending Pt out on unit. Appetite fair.   "

## 2017-10-11 NOTE — PLAN OF CARE
"Problem: Substance Withdrawal  Goal: Substance Withdrawal  Signs and symptoms of listed problems will be absent or manageable.   ROBAR  Jonny Gramajo is a 32 year old year old male with a chief complaint of Alcohol Intoxication (pt called EMS; drinking 1.5L vodka/day for past 3 days since [facility]); Abdominal Pain (RUQ abd pain, n/v/d for past 2 days); and Suicidal (reported SI to EMS)    S = Situation:   Pt admitted after 3 days of drinking, following elopement from [facility].  Pt was on 3 AW for detox prior to admission to [facility] on 9/17/2017  B  = Background:   Pt reports drinking a liter or more of vodka a day after leaving [facility].  He felt the program was too intense for him.  He didn't feel he was able to learn or absorb the information at the pace they were taking.  He would like to go to a treatment program but something not as intense.  He lives with his wife who is now also in recovery.  A  =  Assessment:   Vital Signs: BP (!) 141/95  Pulse 102  Temp 99.1  F (37.3  C) (Oral)  Resp 16  Ht 1.981 m (6' 6\")  Wt (!) 150.6 kg (332 lb)  SpO2 98%  BMI 38.37 kg/m2  Alert and oriented X 3, no SI, no SIB.  Pt endorses depression, with very low motivation, disinterest in activities formally interesting to him and no interest in getting out of bed in the morning.  He feels like he needs continued help.  R =   Request or Recommendation:   Alcohol withdrawal monitoring, Dr. Stephen to evaluate, case management to see                    "

## 2017-10-11 NOTE — PROGRESS NOTES
10/10/17 2043   Patient Belongings   Did you bring any home meds/supplements to the hospital?  No   Patient Belongings other (see comments)   Disposition of Belongings Other (see comment)  (storage)   Belongings Search Yes   Clothing Search Yes   Second Staff Jorden     STORAGE BIN:   shoes,  cord, shorts    LOCKED DRAWER 329-1:   cell phone  A             Admission:  I am responsible for any personal items that are not sent to the safe or pharmacy.  [facility] is not responsible for loss, theft or damage of any property in my possession.    Signature:  _________________________________ Date: _______  Time: _____                                              Staff Signature:  ____________________________ Date: ________  Time: _____      2nd Staff person, if patient is unable/unwilling to sign:    Signature: ________________________________ Date: ________  Time: _____   Discharge:  [facility] has returned all of my personal belongings:    Signature: _________________________________ Date: ________  Time: _____                                          Staff Signature:  ____________________________ Date: ________  Time: _____

## 2017-10-11 NOTE — PROGRESS NOTES
Met with Pt to initiate discharge planning.  Pt is requesting referral to OP treatment.  He will need his assessment updated and was coached to complete first two pages of assessment paperwork for this purpose.  Pt acknowledged.

## 2017-10-11 NOTE — DISCHARGE INSTRUCTIONS
Behavioral Discharge Planning and Instructions  THANK YOU FOR CHOOSING THE [facility]  3A  [phone]    Summary: You were admitted to Station 3A on 10- for treatment and evaluation of alcohol use. You are being discharged today 10-.    A medical exam was performed that included lab work. You have met with a  and opted to enter treatment at [facility].  You have an appointment with them on 10/25/17 @ 10:30 am.   Please take care and make your recovery a priorityJonny.      [address] [address]  [phone]      Main Diagnosis:   Per Dr. Stephen psychiatrist.   DIAGNOSTIC IMPRESSION:   1.  Alcohol use disorder, severe with withdrawal.   2.  Major depression, recurrent, severe with psychotic features, F33.3. .    Major Treatments, Procedures and Findings:  You have withdrawn from alcohol using valium and the Modified Selective Severity Assessment (MSSA) protocol    You have had a chemical dependency assessment.    You have had labs drawn and those results have been reviewed with you.  Please take a copy of your lab work with you to your next primary care physician appointment.    Continue to work with your primary MD for treatment of your lyme's disease.     Symptoms to Report:  If you experience more anxiety, confusion, sleeplessness, deep sadness or thoughts of suicide, notify your treatment team or notify your primary care physician. IF ANY OF THE SYMPTOMS YOU ARE EXPERIENCING ARE A MEDICAL EMERGENCY CALL 911 IMMEDIATELY.     Lifestyle Adjustment: Health Action Plan:  1.Create a daily schedule  2. Eat Healthy  3. Plan Enjoyable Sober Activities  4. Use Problem Solving Skills and Deal with Issues as they Arise.   5. Be Physically Active  6. Take your medications as prescribed  7. Get enough restful sleep  8. Practice Relaxation  9. Spend time with Supportive People  10. No use of alcohol, illegal drugs or  addictive medications other than what is currently prescribed.   11.AA, NA Sponsor are excellent resources for support    Keeping hands clean is one of the most important steps we can take to avoid getting sick and spreading germs to others.  Please wash your hands frequently.         Primary Provider:      Psychiatry Follow-up:   Dr. Mayank [facility]  [phone]  Wednesday, October 25, 2017 10:30 am       Resources:     [facility] [phone]    Support Group:  AA/NA and Sponsor/support    Crisis Intervention: 895.688.2091 or 554-284-0293 (TTY: 339.977.3273).  Call anytime for help.  National [place] on Mental Illness (www.mn.[place].org): 341.360.7598 or 133-651-9287.  Alcoholics Anonymous (www.alcoholics-anonymous.org): Check your phone book for your local chapter.  Suicide Awareness Voices of Education (SAVE) (www.save.org): 568-779-ACTH (6493)  National Suicide Prevention Line (www.mentalhealthmn.org): 460-016-ZTGE (0173)  Mental Health Consumer/Survivor Network of [place] (www.mhcsn.net): 509.991.9871 or 267-670-6039  Mental Health Association of [place] (www.mentalhealth.org): 440.287.9184 or 077-350-4905   Substance Abuse and Mental Health Services (www.samhsa.gov)    [facility] ([facility])  [facility] connects people seeking recovery to resources that help foster and sustain long-term recovery.  Whether you are seeking resources for treatment, transportation, housing, job training, education, health care or other pathways to recovery, [facility] is a great place to start.  [phone].  Www.[facility].org    General Medication Instructions:   See your medication sheet(s) for instructions.   Take all medicines as directed.  Make no changes unless your doctor suggests them.   Go to all your doctor visits.  Be sure to have all your required lab tests. This way, your medicines can be refilled on time.  Do not use any drugs not prescribed by your provider.  AA/NA and Sponsors are  excellent resources for support  Avoid alcohol.    Please Note:  If you have any questions at anytime after you are discharged please call the [facility] [place] detox unit 3AW unit at [phone].    [facility], Behavioral Intake [phone]    Medical Records [phone]      Please take this discharge folder with you to all your follow up appointments, it contains your lab results, diagnosis, medication list and discharge recommendations.      THANK YOU FOR CHOOSING THE [facility]

## 2017-10-12 LAB
ALBUMIN SERPL-MCNC: 3.8 G/DL (ref 3.4–5)
ALP SERPL-CCNC: 149 U/L (ref 40–150)
ALT SERPL W P-5'-P-CCNC: 211 U/L (ref 0–70)
AST SERPL W P-5'-P-CCNC: 73 U/L (ref 0–45)
BILIRUB DIRECT SERPL-MCNC: 0.3 MG/DL (ref 0–0.2)
BILIRUB SERPL-MCNC: 0.9 MG/DL (ref 0.2–1.3)
CA-I BLD-MCNC: 4.7 MG/DL (ref 4.4–5.2)
INR PPP: 1.05 (ref 0.86–1.14)
PROT SERPL-MCNC: 7.2 G/DL (ref 6.8–8.8)
T3 SERPL-MCNC: 136 NG/DL (ref 60–181)
T4 FREE SERPL-MCNC: 1.15 NG/DL (ref 0.76–1.46)
TSH SERPL DL<=0.005 MIU/L-ACNC: 1.54 MU/L (ref 0.4–4)

## 2017-10-12 PROCEDURE — 80076 HEPATIC FUNCTION PANEL: CPT | Performed by: PHYSICIAN ASSISTANT

## 2017-10-12 PROCEDURE — 12800012 ZZH R&B CD MH INTERMEDIATE ADULT

## 2017-10-12 PROCEDURE — 25000132 ZZH RX MED GY IP 250 OP 250 PS 637: Performed by: PHYSICIAN ASSISTANT

## 2017-10-12 PROCEDURE — 36415 COLL VENOUS BLD VENIPUNCTURE: CPT | Performed by: PHYSICIAN ASSISTANT

## 2017-10-12 PROCEDURE — 25000132 ZZH RX MED GY IP 250 OP 250 PS 637: Performed by: PSYCHIATRY & NEUROLOGY

## 2017-10-12 PROCEDURE — 82330 ASSAY OF CALCIUM: CPT | Performed by: PHYSICIAN ASSISTANT

## 2017-10-12 PROCEDURE — 84443 ASSAY THYROID STIM HORMONE: CPT | Performed by: PHYSICIAN ASSISTANT

## 2017-10-12 PROCEDURE — 85610 PROTHROMBIN TIME: CPT | Performed by: PHYSICIAN ASSISTANT

## 2017-10-12 PROCEDURE — 84480 ASSAY TRIIODOTHYRONINE (T3): CPT | Performed by: PHYSICIAN ASSISTANT

## 2017-10-12 PROCEDURE — 84439 ASSAY OF FREE THYROXINE: CPT | Performed by: PHYSICIAN ASSISTANT

## 2017-10-12 RX ORDER — LACTOBACILLUS RHAMNOSUS GG 10B CELL
1 CAPSULE ORAL
Status: DISCONTINUED | OUTPATIENT
Start: 2017-10-12 | End: 2017-10-13 | Stop reason: HOSPADM

## 2017-10-12 RX ADMIN — ASENAPINE MALEATE 5 MG: 5 TABLET SUBLINGUAL at 08:39

## 2017-10-12 RX ADMIN — DIAZEPAM 10 MG: 5 TABLET ORAL at 03:59

## 2017-10-12 RX ADMIN — HYDROXYZINE HYDROCHLORIDE 50 MG: 25 TABLET ORAL at 14:09

## 2017-10-12 RX ADMIN — DIAZEPAM 10 MG: 5 TABLET ORAL at 00:17

## 2017-10-12 RX ADMIN — IBUPROFEN 600 MG: 600 TABLET ORAL at 20:48

## 2017-10-12 RX ADMIN — VORTIOXETINE 20 MG: 10 TABLET, FILM COATED ORAL at 08:41

## 2017-10-12 RX ADMIN — ACETAMINOPHEN 650 MG: 325 TABLET, FILM COATED ORAL at 03:59

## 2017-10-12 RX ADMIN — METRONIDAZOLE 250 MG: 250 TABLET ORAL at 20:48

## 2017-10-12 RX ADMIN — HYDROXYZINE HYDROCHLORIDE 50 MG: 25 TABLET ORAL at 20:49

## 2017-10-12 RX ADMIN — Medication 100 MG: at 08:40

## 2017-10-12 RX ADMIN — ASENAPINE MALEATE 5 MG: 5 TABLET SUBLINGUAL at 20:49

## 2017-10-12 RX ADMIN — NYSTATIN 500000 UNITS: 500000 TABLET, FILM COATED ORAL at 16:10

## 2017-10-12 RX ADMIN — MELATONIN TAB 3 MG 3 MG: 3 TAB at 22:07

## 2017-10-12 RX ADMIN — ACAMPROSATE CALCIUM ENTERIC-COATED 666 MG: 333 TABLET, DELAYED RELEASE ORAL at 08:39

## 2017-10-12 RX ADMIN — NICOTINE POLACRILEX 8 MG: 4 GUM, CHEWING ORAL at 20:48

## 2017-10-12 RX ADMIN — HYDROXYZINE HYDROCHLORIDE 50 MG: 25 TABLET ORAL at 03:59

## 2017-10-12 RX ADMIN — DOXYCYCLINE HYCLATE 100 MG: 100 CAPSULE ORAL at 20:48

## 2017-10-12 RX ADMIN — DOXYCYCLINE HYCLATE 100 MG: 100 CAPSULE ORAL at 08:39

## 2017-10-12 RX ADMIN — FOLIC ACID 1 MG: 1 TABLET ORAL at 08:39

## 2017-10-12 RX ADMIN — VALACYCLOVIR HYDROCHLORIDE 500 MG: 500 TABLET, FILM COATED ORAL at 20:48

## 2017-10-12 RX ADMIN — NICOTINE POLACRILEX 8 MG: 4 GUM, CHEWING ORAL at 18:16

## 2017-10-12 RX ADMIN — PSYLLIUM HUSK 1 PACKET: 3.4 POWDER ORAL at 18:14

## 2017-10-12 RX ADMIN — CEFDINIR 300 MG: 300 CAPSULE ORAL at 08:39

## 2017-10-12 RX ADMIN — NICOTINE POLACRILEX 8 MG: 4 GUM, CHEWING ORAL at 16:10

## 2017-10-12 RX ADMIN — IBUPROFEN 600 MG: 600 TABLET ORAL at 08:40

## 2017-10-12 RX ADMIN — CEFDINIR 300 MG: 300 CAPSULE ORAL at 20:48

## 2017-10-12 RX ADMIN — NYSTATIN 500000 UNITS: 500000 TABLET, FILM COATED ORAL at 20:49

## 2017-10-12 RX ADMIN — GABAPENTIN 300 MG: 300 CAPSULE ORAL at 13:32

## 2017-10-12 RX ADMIN — RANITIDINE 300 MG: 150 TABLET ORAL at 08:40

## 2017-10-12 RX ADMIN — CLARITHROMYCIN 500 MG: 500 TABLET ORAL at 08:40

## 2017-10-12 RX ADMIN — TRAZODONE HYDROCHLORIDE 150 MG: 50 TABLET ORAL at 20:49

## 2017-10-12 RX ADMIN — ACAMPROSATE CALCIUM ENTERIC-COATED 666 MG: 333 TABLET, DELAYED RELEASE ORAL at 13:32

## 2017-10-12 RX ADMIN — CLARITHROMYCIN 500 MG: 500 TABLET ORAL at 20:49

## 2017-10-12 RX ADMIN — GABAPENTIN 300 MG: 300 CAPSULE ORAL at 20:49

## 2017-10-12 RX ADMIN — ACAMPROSATE CALCIUM ENTERIC-COATED 666 MG: 333 TABLET, DELAYED RELEASE ORAL at 20:48

## 2017-10-12 RX ADMIN — VALACYCLOVIR HYDROCHLORIDE 500 MG: 500 TABLET, FILM COATED ORAL at 08:40

## 2017-10-12 RX ADMIN — GABAPENTIN 300 MG: 300 CAPSULE ORAL at 08:40

## 2017-10-12 RX ADMIN — RANITIDINE 300 MG: 150 TABLET ORAL at 20:48

## 2017-10-12 RX ADMIN — METRONIDAZOLE 250 MG: 250 TABLET ORAL at 08:40

## 2017-10-12 RX ADMIN — SULFAMETHOXAZOLE AND TRIMETHOPRIM 1 TABLET: 800; 160 TABLET ORAL at 20:49

## 2017-10-12 RX ADMIN — SULFAMETHOXAZOLE AND TRIMETHOPRIM 1 TABLET: 800; 160 TABLET ORAL at 08:40

## 2017-10-12 RX ADMIN — NYSTATIN 500000 UNITS: 500000 TABLET, FILM COATED ORAL at 13:32

## 2017-10-12 RX ADMIN — IBUPROFEN 600 MG: 600 TABLET ORAL at 13:32

## 2017-10-12 RX ADMIN — HYDROXYZINE HYDROCHLORIDE 50 MG: 25 TABLET ORAL at 09:59

## 2017-10-12 RX ADMIN — NICOTINE POLACRILEX 8 MG: 4 GUM, CHEWING ORAL at 22:07

## 2017-10-12 RX ADMIN — NYSTATIN 500000 UNITS: 500000 TABLET, FILM COATED ORAL at 08:40

## 2017-10-12 RX ADMIN — NICOTINE POLACRILEX 8 MG: 4 GUM, CHEWING ORAL at 08:46

## 2017-10-12 RX ADMIN — NICOTINE POLACRILEX 8 MG: 4 GUM, CHEWING ORAL at 13:32

## 2017-10-12 RX ADMIN — Medication 1 CAPSULE: at 18:14

## 2017-10-12 ASSESSMENT — ACTIVITIES OF DAILY LIVING (ADL)
GROOMING: INDEPENDENT
LAUNDRY: WITH SUPERVISION
GROOMING: INDEPENDENT
DRESS: STREET CLOTHES
ORAL_HYGIENE: INDEPENDENT

## 2017-10-12 NOTE — PROGRESS NOTES
MSSA monitored through night shift: 8, 11. Valium 10mg given x2. Pt is mildly diaphoretic, has elevated HR and BP, and reports rib pain s/t volleyball injury. Pt struggling to sleep due to discomfort. Egg crate mattress ordered and provided to pt.

## 2017-10-12 NOTE — PLAN OF CARE
Problem: Substance Withdrawal  Goal: Substance Withdrawal  Signs and symptoms of listed problems will be absent or manageable.   Outcome: Improving  No medications for alcohol withdrawal this shift. Pt out on unit. Reports feeling anxious and requested vistaril 50 mg x 2 which patient reports was helpful in reducing his anxiety. States he wants to talk to Dr. Stephen as he feels he is on too many medications. Denies SE of the medications other tan Pt did not take MVI as he feels it makes him ill. Probiotic ordered per medical and pt request. BP remains borderline elevated. Night nurse reported patient did have some gaps in breathing with snoring on night shift. Pt states he now wants to go to outpatient program that his wife attended. States he is hoping to be discharged tomorrow.

## 2017-10-12 NOTE — CONSULTS
"HISTORY OF PRESENT ILLNESS:  Jonny Gramajo is a 32-year-old male with a history of alcohol dependence admitted to station 3A for alcohol abuse and detoxification.  After leaving [facility] against medical advice, a few days ago since drinking a liter and half of vodka last used yesterday morning.  Blood alcohol level on admission 0.09.  An Internal Medicine consultation was ordered by Dr. Arora to assess medical problems including chronic Lyme disease and acute right-sided chest wall pain.  At this time, Jonny admits to right-sided chest wall pain.  Apparently, he was playing volleyball a week ago and collided with another person.  He was made clear that chest x-ray in ED prior to transfer was negative for any type of fractures.  Currently denies fever and chills.  Denies chest pain and shortness of breath.  Denies abdominal pain and nausea.  Denies bowel and bladder concerns.      PAST MEDICAL HISTORY:   1.  Psychiatric history per Dr. Arora.   2.  Chronic Lyme disease.  Chronically on antibiotics for 12 years as prescribed by \"Lyme specialist\" Dr. Frakn at [facility] in [place].  Also, on Valtrex apparently to suppress EBV.  Further details unclear at this point.   3.  GERD.   4.  History of prediabetes.   5.  Status post orchiopexy.   6.  Denies history of other chronic medical problems and surgeries including cardiopulmonary disease, hypertension and cirrhosis.      ADMISSION MEDICATIONS:  Reviewed and listed in the medication reconciliation list.      ALLERGIES AND ADVERSE REACTIONS:  Abilify and Wellbutrin.      SOCIAL HISTORY:   with no children.  Lives in [place].  Smokes up to 2 packs of cigarettes daily.  Last used alcohol yesterday morning.  Denies illicit drug use.      FAMILY HISTORY:  Positive for thyroid disease.      REVIEW OF SYSTEMS:  Ten-point review of systems negative except as stated above in the history of present illness.      PHYSICAL EXAMINATION:   GENERAL:  " Nontoxic man in no acute distress.   VITAL SIGNS:  Stable, blood pressure 130s/80s.   HEENT:  Negative.   NECK:  Supple.  No cervical lymphadenopathy or thyromegaly.   LUNGS:  Clear.   CARDIOVASCULAR:  Regular rate and rhythm, no murmurs appreciated.   ABDOMEN:  Soft, nontender.   EXTREMITIES:  No edema.   SKIN:  No rashes on exposed areas.   NEUROLOGIC:  Awake, alert and oriented x3.  Cranial nerves grossly intact.  Motor strength is symmetric.  He is not tremulous.      LABORATORY DATA:  Breathalyzer on admission 0.098, calcium of 10.3, , AST 97.  .  Urine drug screen positive for barbiturates and alcohol.  Chest x-ray negative.  CBC and rest of comprehensive metabolic panel unremarkable.  Urinalysis negative.      ASSESSMENT:   1.  Alcohol abuse and withdrawal per Dr. Arora.   2.  Acute transaminitis likely due to his recent heavy alcohol abuse, however, not unusual 1:2 ALT to AST ratio.  Patient denies history of viral hepatitis.   3.  Chronic Lyme disease, chronically on antibiotics for 12 years.   4.  Acute right-sided chest wall pain, stable and likely secondary to contusion from recent trauma as chest x-ray on admission negative for fracture.   5.  Mild hypercalcemia on admission labs, likely due to patient admitting he was using a significant amount of Tums to treat his acid reflux symptoms while he was drinking.  Patient denies history of hyperparathyroidism.   6.  Concern for underlying thyroid disease due to his family history of thyroid disease.      PLAN:  Routine ionized calcium tomorrow a.m.  Repeat hepatic panel tomorrow a.m.  Routine TSH, free thyroxine and total T3 tomorrow a.m.  No other medical intervention indicated at this time.  Patient is medically stable, and I will be able to follow up and see him again during his stay for any intercurrent medical issues.         Gautam Marshall PA-C  Internal Medicine Hospitalist   [facility]  [phone]            D:  10/11/2017 17:59   T: 10/11/2017 20:25   MT: TD      Name:     JONNY SUAREZ   MRN:      [id number]        Account:       [id number]   :      1985           Consult Date:  10/11/2017      Document: [id number]

## 2017-10-12 NOTE — PROGRESS NOTES
Pt snoring heavily throughout night shift. RN noted pt to have several gaps in snoring, at times three gaps per minute, followed by loud rapid snoring.

## 2017-10-12 NOTE — PROGRESS NOTES
In basket sent referring Pt to [facility].  Attempting to set up intake prior to discharge.  Pt now declining this referral.  Plans to go to [facility].  Appointment made for 10/25/17 @ 10:30.

## 2017-10-12 NOTE — PLAN OF CARE
Brief medicine note:  - Pt's thyroid function labs normal. His liver function studies are now on trend downward. Ionized Ca WNL. No further medical workup indicated. Please instruct him to follow up with his family physician after discharge for repeat labs including hepatic panel. Medicine will sign off. Please feel free to call with questions.       Gautam Marshall PA-C  Internal Medicine Hospitalist   [facility]  [phone]

## 2017-10-13 VITALS
RESPIRATION RATE: 16 BRPM | HEART RATE: 104 BPM | BODY MASS INDEX: 36.45 KG/M2 | SYSTOLIC BLOOD PRESSURE: 148 MMHG | OXYGEN SATURATION: 98 % | DIASTOLIC BLOOD PRESSURE: 86 MMHG | HEIGHT: 78 IN | TEMPERATURE: 99.3 F | WEIGHT: 315 LBS

## 2017-10-13 PROCEDURE — 25000132 ZZH RX MED GY IP 250 OP 250 PS 637: Performed by: PSYCHIATRY & NEUROLOGY

## 2017-10-13 PROCEDURE — 25000132 ZZH RX MED GY IP 250 OP 250 PS 637: Performed by: PHYSICIAN ASSISTANT

## 2017-10-13 RX ORDER — RISPERIDONE 0.5 MG/1
0.5 TABLET ORAL DAILY
Qty: 30 TABLET | Refills: 3 | Status: SHIPPED | OUTPATIENT
Start: 2017-10-13 | End: 2021-03-20

## 2017-10-13 RX ORDER — ACAMPROSATE CALCIUM 333 MG/1
666 TABLET, DELAYED RELEASE ORAL 3 TIMES DAILY
Qty: 180 TABLET | Refills: 3 | Status: SHIPPED | OUTPATIENT
Start: 2017-10-13 | End: 2021-03-20

## 2017-10-13 RX ORDER — LACTOBACILLUS RHAMNOSUS GG 10B CELL
1 CAPSULE ORAL
Qty: 90 CAPSULE | Refills: 3 | Status: SHIPPED | OUTPATIENT
Start: 2017-10-13 | End: 2024-06-03

## 2017-10-13 RX ORDER — RISPERIDONE 0.5 MG/1
0.5 TABLET ORAL DAILY
Status: DISCONTINUED | OUTPATIENT
Start: 2017-10-13 | End: 2017-10-13 | Stop reason: HOSPADM

## 2017-10-13 RX ADMIN — ASENAPINE MALEATE 5 MG: 5 TABLET SUBLINGUAL at 08:27

## 2017-10-13 RX ADMIN — ACAMPROSATE CALCIUM ENTERIC-COATED 666 MG: 333 TABLET, DELAYED RELEASE ORAL at 08:28

## 2017-10-13 RX ADMIN — CEFDINIR 300 MG: 300 CAPSULE ORAL at 08:27

## 2017-10-13 RX ADMIN — CLARITHROMYCIN 500 MG: 500 TABLET ORAL at 08:27

## 2017-10-13 RX ADMIN — GABAPENTIN 300 MG: 300 CAPSULE ORAL at 08:27

## 2017-10-13 RX ADMIN — VORTIOXETINE 20 MG: 10 TABLET, FILM COATED ORAL at 08:27

## 2017-10-13 RX ADMIN — RANITIDINE 300 MG: 150 TABLET ORAL at 08:27

## 2017-10-13 RX ADMIN — IBUPROFEN 600 MG: 600 TABLET ORAL at 08:27

## 2017-10-13 RX ADMIN — Medication 1 CAPSULE: at 08:27

## 2017-10-13 RX ADMIN — HYDROXYZINE HYDROCHLORIDE 50 MG: 25 TABLET ORAL at 05:55

## 2017-10-13 RX ADMIN — FOLIC ACID 1 MG: 1 TABLET ORAL at 08:27

## 2017-10-13 RX ADMIN — VALACYCLOVIR HYDROCHLORIDE 500 MG: 500 TABLET, FILM COATED ORAL at 08:27

## 2017-10-13 RX ADMIN — MULTIPLE VITAMINS W/ MINERALS TAB 1 TABLET: TAB at 08:27

## 2017-10-13 RX ADMIN — DOXYCYCLINE HYCLATE 100 MG: 100 CAPSULE ORAL at 08:27

## 2017-10-13 RX ADMIN — NYSTATIN 500000 UNITS: 500000 TABLET, FILM COATED ORAL at 13:06

## 2017-10-13 RX ADMIN — METRONIDAZOLE 250 MG: 250 TABLET ORAL at 08:27

## 2017-10-13 RX ADMIN — RISPERIDONE 0.5 MG: 0.5 TABLET ORAL at 10:32

## 2017-10-13 RX ADMIN — Medication 1 CAPSULE: at 13:06

## 2017-10-13 RX ADMIN — SULFAMETHOXAZOLE AND TRIMETHOPRIM 1 TABLET: 800; 160 TABLET ORAL at 08:27

## 2017-10-13 RX ADMIN — NYSTATIN 500000 UNITS: 500000 TABLET, FILM COATED ORAL at 08:27

## 2017-10-13 RX ADMIN — HYDROXYZINE HYDROCHLORIDE 50 MG: 25 TABLET ORAL at 10:32

## 2017-10-13 RX ADMIN — Medication 100 MG: at 08:27

## 2017-10-13 RX ADMIN — NICOTINE POLACRILEX 8 MG: 4 GUM, CHEWING ORAL at 06:15

## 2017-10-13 ASSESSMENT — ACTIVITIES OF DAILY LIVING (ADL)
DRESS: STREET CLOTHES
ORAL_HYGIENE: INDEPENDENT
LAUNDRY: WITH SUPERVISION
GROOMING: INDEPENDENT

## 2017-10-13 NOTE — DISCHARGE SUMMARY
"Mr. Jonny Gramajo, date of birth, 1985, is a 32-year-old man admitted to [facility] detox unit on 10/10/2017.  He was admitted to detox from alcohol, he had left [facility] against medical advice.  He had been there 9 days.  He left due to paranoid feelings, hearing \"noise and cackling in the hallway\" and had referential thinking regarding this.  He had resumed drinking 2 days after he left, 1-1/2 liters of vodka per day.  He had been drinking that for 3 days before realizing he would be unable to stop without detoxification.  I had seen him in psychiatric consultation and felt he would do well in [facility] and based upon the level of paranoid thinking unfortunately, this was not the case.       He has had previous schizoaffective diagnoses and more recently major depression with psychotic features.  He has seen Dr. Clark for depression for about 22 years.       Anxiety symptoms are also noted with panicky feelings.       He was discharged taking Vraylar at 3 mg per day, Trintellix 10 mg per day and naltrexone for cravings.  In the interim, he feels that the naltrexone has caused nausea, and the Vraylar caused akathisia.       Chemical dependency issues extend back to his teens.       Previous medications he has taken include Elavil, imipramine, Wellbutrin, Paxil, Prozac, Zoloft, Lexapro, Celexa, Effexor, Cymbalta, Pristiq, Viibryd, and current Trintellix.  He has taken Risperdal, Seroquel, Abilify, and current Vraylar.  He has taken Depakote.  He has not tried augmentation with Cytomel, Lamictal, lithium, Tegretol, or carbamazepine.  He has also not taken MAO inhibitors or Fetzima or Clozaril, Saphris or Rexulti.       He has been to residential treatment here, otherwise his treatments were more distantly in the past.  He is not certain if he would be able to be at home doing outpatient treatment.  [facility] has not [facility] or [facility].       MENTAL STATUS " EXAMINATION:  Shows an alert, cooperative man.  He does not appear to be hallucinating but does describe referential thinking and some paranoia.  Affect is good and varies appropriately with content.  He denies being suicidal currently, but had some suicidal thinking in the emergency room.       Mood overall is fair.  He feels the Trintellix is being helpful.  Motor behavior is a bit stiff.       There are no abnormal movements or tardive dyskinesia.  Associations and cognitions appear to be intact.       DIAGNOSTIC IMPRESSION:   1.  Alcohol use disorder, severe with withdrawal.   2.  Major depression, recurrent, severe with psychotic features, F33.3.       PLAN:  Plan at this time is to use Campral, Saphris, and increase the Trintellix.  Estimated length of stay is 2-3 days to stabilize and look at treatment options.       Saphris caused some nausea and akathisia, and low dose risperdal was started.  He completed withdrawal and was discharged to OP treatment.      Prescription Medications as of 10/13/2017             vortioxetine (TRINTELLIX/BRINTELLIX) 20 MG tablet Take 1 tablet (20 mg) by mouth daily    risperiDONE (RISPERDAL) 0.5 MG tablet Take 1 tablet (0.5 mg) by mouth daily    acamprosate (CAMPRAL) 333 MG EC tablet Take 2 tablets (666 mg) by mouth 3 times daily    lactobacillus rhamnosus, GG, (CULTURELL) capsule Take 1 capsule by mouth 3 times daily (before meals)    melatonin 3 MG tablet Take 3 mg by mouth nightly as needed for sleep    psyllium (METAMUCIL/KONSYL) Packet Take 1 packet by mouth 3 times daily as needed for constipation    hydrOXYzine (ATARAX) 25 MG tablet Take 1-2 tablets (25-50 mg) by mouth every 4 hours as needed for anxiety    gabapentin (NEURONTIN) 300 MG capsule Take 1 capsule (300 mg) by mouth 3 times daily    traZODone (DESYREL) 50 MG tablet Take 1-3 tablets ( mg) by mouth nightly as needed for sleep    nystatin (MYCOSTATIN) 069970 UNITS TABS tablet Take 1 tablet (500,000 Units)  by mouth 4 times daily    valACYclovir (VALTREX) 500 MG tablet Take 1 tablet (500 mg) by mouth 2 times daily    cefdinir (OMNICEF) 300 MG capsule Take 1 capsule (300 mg) by mouth 2 times daily    clarithromycin (BIAXIN) 500 MG tablet Take 1 tablet (500 mg) by mouth 2 times daily with food    metroNIDAZOLE (FLAGYL) 250 MG tablet Take 1 tablet (250 mg) by mouth 2 times daily with food    sulfamethoxazole-trimethoprim (BACTRIM DS/SEPTRA DS) 800-160 MG per tablet Take 1 tablet by mouth 2 times daily    doxycycline Monohydrate 100 MG CAPS Take 1 capsule (100 mg) by mouth 2 times daily With food. Avoid calcium, magnesium, dairy, and the sun in 24 hours.    ranitidine (ZANTAC) 300 MG tablet Take 1 tablet (300 mg) by mouth 2 times daily      Facility Administered Medications as of 10/13/2017             risperiDONE (risperDAL) tablet 0.5 mg Take 1 tablet (0.5 mg) by mouth daily    lactobacillus rhamnosus (GG) (CULTURELL) capsule 1 capsule Take 1 capsule by mouth 3 times daily (before meals)    vortioxetine (TRINTELLIX/BRINTELLIX) tablet 20 mg Take 2 tablets (20 mg) by mouth daily    acamprosate (CAMPRAL) EC tablet 666 mg Take 2 tablets (666 mg) by mouth 3 times daily    ondansetron (ZOFRAN-ODT) ODT tab 4 mg Take 1 tablet (4 mg) by mouth every 6 hours as needed for nausea or vomiting    acetaminophen (TYLENOL) tablet 650 mg Take 2 tablets (650 mg) by mouth every 4 hours as needed for mild pain or fever    asenapine (SAPHRIS) sublingual tablet 5 mg (Discontinued) Place 1 tablet (5 mg) under the tongue 2 times daily    cefdinir (OMNICEF) capsule 300 mg Take 1 capsule (300 mg) by mouth 2 times daily    clarithromycin (BIAXIN) tablet 500 mg Take 1 tablet (500 mg) by mouth 2 times daily    doxycycline (VIBRAMYCIN) capsule 100 mg Take 1 capsule (100 mg) by mouth 2 times daily    gabapentin (NEURONTIN) capsule 300 mg Take 1 capsule (300 mg) by mouth 3 times daily    ibuprofen (ADVIL/MOTRIN) tablet 600 mg Take 1 tablet (600 mg) by  mouth 3 times daily    melatonin tablet 3 mg Take 1 tablet (3 mg) by mouth nightly as needed for sleep    metroNIDAZOLE (FLAGYL) tablet 250 mg Take 1 tablet (250 mg) by mouth 2 times daily    nystatin (MYCOSTATIN) tablet 500,000 Units Take 1 tablet (500,000 Units) by mouth 4 times daily    psyllium (METAMUCIL/KONSYL) Packet 1 packet Take 1 packet by mouth 3 times daily as needed for constipation    ranitidine (ZANTAC) tablet 300 mg Take 2 tablets (300 mg) by mouth 2 times daily    sulfamethoxazole-trimethoprim (BACTRIM DS/SEPTRA DS) 800-160 MG per tablet 1 tablet Take 1 tablet by mouth 2 times daily    traZODone (DESYREL) tablet  mg Take 1-3 tablets ( mg) by mouth nightly as needed for sleep    valACYclovir (VALTREX) tablet 500 mg Take 1 tablet (500 mg) by mouth 2 times daily    hydrOXYzine (ATARAX) tablet 25-50 mg Take 1-2 tablets (25-50 mg) by mouth every 4 hours as needed for anxiety    diazepam (VALIUM) tablet 5-20 mg Take 1-4 tablets (5-20 mg) by mouth every 30 minutes as needed for other (Dose according to patient's MSSA Score (see admin instructions))    atenolol (TENORMIN) tablet 50 mg Take 1 tablet (50 mg) by mouth daily as needed (For HR greater than 100 bpm and no history of asthma or CHF)    thiamine tablet 100 mg Take 1 tablet (100 mg) by mouth daily    folic acid (FOLVITE) tablet 1 mg Take 1 tablet (1 mg) by mouth daily    multivitamin, therapeutic with minerals (THERA-VIT-M) tablet 1 tablet Take 1 tablet by mouth daily    nicotine polacrilex (NICORETTE) gum 4-8 mg Place 1-2 each (4-8 mg) inside cheek every hour as needed for other (nicotine withdrawal symptoms)    Self Administer Medications: Behavioral Services See Admin Instructions

## 2017-10-13 NOTE — PLAN OF CARE
"Problem: Patient Care Overview  Goal: Discharge Needs Assessment  Outcome: Adequate for Discharge Date Met:  10/13/17  Pt being discharge to home. Alcohol withdrawal is complete. Pt out on unit. Anxious to be discharges. Pt when asked how mood is stated \"It's wonderful.\" Pt denies SI. Affect brighter. Reports sleep and appetite as good. Pt has some medications changes per Dr. Stephen. Denies reports of hallucinations. Pt plans to attend outpatient program. Pt states he has a therapist, sponsor and a home recovery meeting he attends. See discharge instructions. BP borderline elevated. Pt requested vistaril 50 mg x 1 for anxiety.       "

## 2017-10-13 NOTE — PROGRESS NOTES
"Patient seen, chart reviewed, care discussed with staff.  Blood pressure (!) 143/101, pulse 94, temperature 97  F (36.1  C), temperature source Oral, resp. rate 16, height 1.981 m (6' 6\"), weight (!) 150.6 kg (332 lb), SpO2 98 %.    Alert.  Affect fair.  Speech normal.  Eye contact good.  Psychomotor behavior a bit stiff, and gait  normal.  No delusions or hallucinations, psychotic sx low or absent..  Thoughts logical.  Associations intact. Cognitions intact.  Not suicidal.    Plan: dc to OP programming    "

## 2017-10-20 ENCOUNTER — TELEPHONE (OUTPATIENT)
Dept: BEHAVIORAL HEALTH | Facility: CLINIC | Age: 32
End: 2017-10-20

## 2017-10-20 NOTE — TELEPHONE ENCOUNTER
Received an in-basket from Emilie MUHAMMAD on 3A referring pt to [facility]. Writer was on vacation and did not get this message until today. It appears a referral was first initiated by [facility] to [facility], however, it was on hold in intake awaiting follow through from the patient. [facility] did not receive this referral. At this time, a review of notes indicates that the pt may be pursuing programming outside of [facility]. Writer LM with Emilie MUHAMMAD to confirm a referral to [facility] was not active and no follow up is required. Will await response.    Cait Noland, [place], [facility] [place]  10/20/2017

## 2018-04-25 NOTE — IP AVS SNAPSHOT
[facility]    [address] [place] [id number]    Phone:  [phone]                                       After Visit Summary   10/10/2017    Jonny Gramajo    MRN: [id number]           After Visit Summary Signature Page     I have received my discharge instructions, and my questions have been answered. I have discussed any challenges I see with this plan with the nurse or doctor.    ..........................................................................................................................................  Patient/Patient Representative Signature      ..........................................................................................................................................  Patient Representative Print Name and Relationship to Patient    ..................................................               ................................................  Date                                            Time    ..........................................................................................................................................  Reviewed by Signature/Title    ...................................................              ..............................................  Date                                                            Time           9

## 2018-04-30 ENCOUNTER — TELEPHONE (OUTPATIENT)
Dept: PHARMACY | Facility: OTHER | Age: 33
End: 2018-04-30

## 2018-05-07 ENCOUNTER — TELEPHONE (OUTPATIENT)
Dept: PHARMACY | Facility: OTHER | Age: 33
End: 2018-05-07

## 2018-05-07 NOTE — TELEPHONE ENCOUNTER
MTM referral from: [facility] recruitment    MTM referral outreach attempt #3 on May 7, 2018 at 2:09 PM      Outcome: Left Message    Susanne Brown, MTM Coordinator Intern

## 2021-03-20 ENCOUNTER — AMBULATORY - HEALTHEAST (OUTPATIENT)
Dept: CARE COORDINATION | Facility: HOSPITAL | Age: 36
End: 2021-03-20

## 2021-03-20 ENCOUNTER — TELEPHONE (OUTPATIENT)
Dept: BEHAVIORAL HEALTH | Facility: CLINIC | Age: 36
End: 2021-03-20

## 2021-03-20 ENCOUNTER — HOSPITAL ENCOUNTER (EMERGENCY)
Facility: CLINIC | Age: 36
Discharge: PSYCHIATRIC OR SUBSTANCE USE FACILITY | End: 2021-03-20
Attending: EMERGENCY MEDICINE | Admitting: EMERGENCY MEDICINE
Payer: COMMERCIAL

## 2021-03-20 ENCOUNTER — APPOINTMENT (OUTPATIENT)
Dept: GENERAL RADIOLOGY | Facility: CLINIC | Age: 36
End: 2021-03-20
Attending: EMERGENCY MEDICINE
Payer: COMMERCIAL

## 2021-03-20 VITALS
RESPIRATION RATE: 18 BRPM | OXYGEN SATURATION: 100 % | DIASTOLIC BLOOD PRESSURE: 93 MMHG | SYSTOLIC BLOOD PRESSURE: 137 MMHG | HEART RATE: 115 BPM | TEMPERATURE: 98.4 F

## 2021-03-20 DIAGNOSIS — R45.851 SUICIDAL IDEATION: ICD-10-CM

## 2021-03-20 DIAGNOSIS — F32.A DEPRESSION, UNSPECIFIED DEPRESSION TYPE: ICD-10-CM

## 2021-03-20 LAB
ALBUMIN SERPL-MCNC: 4.1 G/DL (ref 3.4–5)
ALP SERPL-CCNC: 105 U/L (ref 40–150)
ALT SERPL W P-5'-P-CCNC: 96 U/L (ref 0–70)
ANION GAP SERPL CALCULATED.3IONS-SCNC: 8 MMOL/L (ref 3–14)
APAP SERPL-MCNC: <2 MG/L (ref 10–20)
AST SERPL W P-5'-P-CCNC: 43 U/L (ref 0–45)
BASOPHILS # BLD AUTO: 0 10E9/L (ref 0–0.2)
BASOPHILS NFR BLD AUTO: 0.1 %
BILIRUB SERPL-MCNC: 0.5 MG/DL (ref 0.2–1.3)
BUN SERPL-MCNC: 9 MG/DL (ref 7–30)
CALCIUM SERPL-MCNC: 8.3 MG/DL (ref 8.5–10.1)
CHLORIDE SERPL-SCNC: 110 MMOL/L (ref 94–109)
CO2 SERPL-SCNC: 25 MMOL/L (ref 20–32)
CREAT SERPL-MCNC: 0.72 MG/DL (ref 0.66–1.25)
D DIMER PPP FEU-MCNC: 0.3 UG/ML FEU (ref 0–0.5)
DIFFERENTIAL METHOD BLD: ABNORMAL
EOSINOPHIL # BLD AUTO: 0 10E9/L (ref 0–0.7)
EOSINOPHIL NFR BLD AUTO: 0.1 %
ERYTHROCYTE [DISTWIDTH] IN BLOOD BY AUTOMATED COUNT: 12.2 % (ref 10–15)
ETHANOL SERPL-MCNC: 0.14 G/DL
GFR SERPL CREATININE-BSD FRML MDRD: >90 ML/MIN/{1.73_M2}
GLUCOSE SERPL-MCNC: 99 MG/DL (ref 70–99)
HCT VFR BLD AUTO: 50.9 % (ref 40–53)
HGB BLD-MCNC: 17.9 G/DL (ref 13.3–17.7)
IMM GRANULOCYTES # BLD: 0 10E9/L (ref 0–0.4)
IMM GRANULOCYTES NFR BLD: 0.4 %
LABORATORY COMMENT REPORT: NORMAL
LYMPHOCYTES # BLD AUTO: 3.5 10E9/L (ref 0.8–5.3)
LYMPHOCYTES NFR BLD AUTO: 48.7 %
MCH RBC QN AUTO: 32.7 PG (ref 26.5–33)
MCHC RBC AUTO-ENTMCNC: 35.2 G/DL (ref 31.5–36.5)
MCV RBC AUTO: 93 FL (ref 78–100)
MONOCYTES # BLD AUTO: 0.7 10E9/L (ref 0–1.3)
MONOCYTES NFR BLD AUTO: 10 %
NEUTROPHILS # BLD AUTO: 2.9 10E9/L (ref 1.6–8.3)
NEUTROPHILS NFR BLD AUTO: 40.7 %
NRBC # BLD AUTO: 0 10*3/UL
NRBC BLD AUTO-RTO: 0 /100
PLATELET # BLD AUTO: 204 10E9/L (ref 150–450)
POTASSIUM SERPL-SCNC: 3.5 MMOL/L (ref 3.4–5.3)
PROT SERPL-MCNC: 7.4 G/DL (ref 6.8–8.8)
RBC # BLD AUTO: 5.48 10E12/L (ref 4.4–5.9)
SALICYLATES SERPL-MCNC: 2 MG/DL
SARS-COV-2 RNA RESP QL NAA+PROBE: NEGATIVE
SODIUM SERPL-SCNC: 143 MMOL/L (ref 133–144)
SPECIMEN SOURCE: NORMAL
TROPONIN I SERPL-MCNC: <0.015 UG/L (ref 0–0.04)
WBC # BLD AUTO: 7.1 10E9/L (ref 4–11)

## 2021-03-20 PROCEDURE — 250N000011 HC RX IP 250 OP 636

## 2021-03-20 PROCEDURE — 250N000013 HC RX MED GY IP 250 OP 250 PS 637: Performed by: EMERGENCY MEDICINE

## 2021-03-20 PROCEDURE — 80053 COMPREHEN METABOLIC PANEL: CPT | Performed by: EMERGENCY MEDICINE

## 2021-03-20 PROCEDURE — 96372 THER/PROPH/DIAG INJ SC/IM: CPT | Mod: XS | Performed by: EMERGENCY MEDICINE

## 2021-03-20 PROCEDURE — 99292 CRITICAL CARE ADDL 30 MIN: CPT | Mod: 25

## 2021-03-20 PROCEDURE — 84484 ASSAY OF TROPONIN QUANT: CPT | Performed by: EMERGENCY MEDICINE

## 2021-03-20 PROCEDURE — 250N000011 HC RX IP 250 OP 636: Performed by: EMERGENCY MEDICINE

## 2021-03-20 PROCEDURE — C9803 HOPD COVID-19 SPEC COLLECT: HCPCS

## 2021-03-20 PROCEDURE — 90791 PSYCH DIAGNOSTIC EVALUATION: CPT

## 2021-03-20 PROCEDURE — 80179 DRUG ASSAY SALICYLATE: CPT | Performed by: EMERGENCY MEDICINE

## 2021-03-20 PROCEDURE — 80143 DRUG ASSAY ACETAMINOPHEN: CPT | Performed by: EMERGENCY MEDICINE

## 2021-03-20 PROCEDURE — 99291 CRITICAL CARE FIRST HOUR: CPT | Mod: 25

## 2021-03-20 PROCEDURE — 85025 COMPLETE CBC W/AUTO DIFF WBC: CPT | Performed by: EMERGENCY MEDICINE

## 2021-03-20 PROCEDURE — 71045 X-RAY EXAM CHEST 1 VIEW: CPT

## 2021-03-20 PROCEDURE — 96374 THER/PROPH/DIAG INJ IV PUSH: CPT

## 2021-03-20 PROCEDURE — 82077 ASSAY SPEC XCP UR&BREATH IA: CPT | Performed by: EMERGENCY MEDICINE

## 2021-03-20 PROCEDURE — 93005 ELECTROCARDIOGRAM TRACING: CPT

## 2021-03-20 PROCEDURE — 87635 SARS-COV-2 COVID-19 AMP PRB: CPT | Performed by: EMERGENCY MEDICINE

## 2021-03-20 PROCEDURE — 85379 FIBRIN DEGRADATION QUANT: CPT | Performed by: EMERGENCY MEDICINE

## 2021-03-20 RX ORDER — SODIUM CHLORIDE 9 MG/ML
INJECTION, SOLUTION INTRAVENOUS CONTINUOUS
Status: DISCONTINUED | OUTPATIENT
Start: 2021-03-20 | End: 2021-03-20 | Stop reason: HOSPADM

## 2021-03-20 RX ORDER — DIAZEPAM 10 MG
10 TABLET ORAL EVERY 8 HOURS PRN
COMMUNITY
End: 2024-06-03

## 2021-03-20 RX ORDER — NALOXONE HYDROCHLORIDE 0.4 MG/ML
0.4 INJECTION, SOLUTION INTRAMUSCULAR; INTRAVENOUS; SUBCUTANEOUS
Status: DISCONTINUED | OUTPATIENT
Start: 2021-03-20 | End: 2021-03-20

## 2021-03-20 RX ORDER — OLANZAPINE 10 MG/2ML
INJECTION, POWDER, FOR SOLUTION INTRAMUSCULAR
Status: COMPLETED
Start: 2021-03-20 | End: 2021-03-20

## 2021-03-20 RX ORDER — DEXTROAMPHETAMINE SACCHARATE, AMPHETAMINE ASPARTATE MONOHYDRATE, DEXTROAMPHETAMINE SULFATE AND AMPHETAMINE SULFATE 7.5; 7.5; 7.5; 7.5 MG/1; MG/1; MG/1; MG/1
60 CAPSULE, EXTENDED RELEASE ORAL EVERY MORNING
COMMUNITY
End: 2024-06-03

## 2021-03-20 RX ORDER — FLUMAZENIL 0.1 MG/ML
0.2 INJECTION, SOLUTION INTRAVENOUS
Status: DISCONTINUED | OUTPATIENT
Start: 2021-03-20 | End: 2021-03-20

## 2021-03-20 RX ORDER — TADALAFIL 20 MG/1
20 TABLET ORAL PRN
COMMUNITY
End: 2024-06-03

## 2021-03-20 RX ORDER — OLANZAPINE 10 MG/2ML
10 INJECTION, POWDER, FOR SOLUTION INTRAMUSCULAR ONCE
Status: COMPLETED | OUTPATIENT
Start: 2021-03-20 | End: 2021-03-20

## 2021-03-20 RX ORDER — DIPHENHYDRAMINE HYDROCHLORIDE 50 MG/ML
50 INJECTION INTRAMUSCULAR; INTRAVENOUS ONCE
Status: COMPLETED | OUTPATIENT
Start: 2021-03-20 | End: 2021-03-20

## 2021-03-20 RX ORDER — TESTOSTERONE 1.62 MG/G
3 GEL TRANSDERMAL DAILY
COMMUNITY
End: 2024-06-03

## 2021-03-20 RX ORDER — OLANZAPINE 10 MG/2ML
INJECTION, POWDER, FOR SOLUTION INTRAMUSCULAR
Status: DISCONTINUED
Start: 2021-03-20 | End: 2021-03-20 | Stop reason: HOSPADM

## 2021-03-20 RX ORDER — NALOXONE HYDROCHLORIDE 0.4 MG/ML
0.2 INJECTION, SOLUTION INTRAMUSCULAR; INTRAVENOUS; SUBCUTANEOUS
Status: DISCONTINUED | OUTPATIENT
Start: 2021-03-20 | End: 2021-03-20

## 2021-03-20 RX ORDER — DIAZEPAM 5 MG
10 TABLET ORAL ONCE
Status: COMPLETED | OUTPATIENT
Start: 2021-03-20 | End: 2021-03-20

## 2021-03-20 RX ORDER — HALOPERIDOL 5 MG/ML
INJECTION INTRAMUSCULAR
Status: DISCONTINUED
Start: 2021-03-20 | End: 2021-03-20 | Stop reason: WASHOUT

## 2021-03-20 RX ORDER — DIPHENHYDRAMINE HYDROCHLORIDE 50 MG/ML
INJECTION INTRAMUSCULAR; INTRAVENOUS
Status: COMPLETED
Start: 2021-03-20 | End: 2021-03-20

## 2021-03-20 RX ORDER — HALOPERIDOL 5 MG/ML
5 INJECTION INTRAMUSCULAR ONCE
Status: DISCONTINUED | OUTPATIENT
Start: 2021-03-20 | End: 2021-03-20

## 2021-03-20 RX ADMIN — OLANZAPINE 10 MG: 10 INJECTION, POWDER, FOR SOLUTION INTRAMUSCULAR at 13:27

## 2021-03-20 RX ADMIN — DIPHENHYDRAMINE HYDROCHLORIDE 50 MG: 50 INJECTION, SOLUTION INTRAMUSCULAR; INTRAVENOUS at 12:50

## 2021-03-20 RX ADMIN — OLANZAPINE 10 MG: 10 INJECTION, POWDER, FOR SOLUTION INTRAMUSCULAR at 13:45

## 2021-03-20 RX ADMIN — DIPHENHYDRAMINE HYDROCHLORIDE 50 MG: 50 INJECTION INTRAMUSCULAR; INTRAVENOUS at 12:50

## 2021-03-20 RX ADMIN — DIAZEPAM 10 MG: 5 TABLET ORAL at 12:31

## 2021-03-20 NOTE — ED NOTES
Cooperative with using urinal; assisting with turning in bed to remove pants. 1300ml output at this time.

## 2021-03-20 NOTE — TELEPHONE ENCOUNTER
Pt brought to [facility] ED by EMS.  B: pt worsening depression Si past week, will not divulge a plan to staff. States today intense and imminent. Hx sx attempts and 7 MH admits in past. Last at [facility] detox in 2017. Wife is in process of divorce, pt has Lyme disease and is fixated on this, Parents bought him a house and it has become unlivable as its a horading house and parents are tryingto get the county involved, overwhelmed. Pt intoxicated, 0/14.   Pt had several code 21's in ED for agitation and attempts to leave. Pt was not aggressive with staff but uncooperative. Pacing.   A: pt will be placed on hold.  R:   Patient cleared and ready for behavioral bed placement: Yes

## 2021-03-20 NOTE — ED NOTES
Pt attempted to leave. Code 21 called and pt was escorted back to room. MD Dumont at bedside and pt placed in seclusion. Pt is agitated and shouting conspiratorial thoughts. Pt was not physically aggressive with staff, but uncooperative. Pt returned to bed under his own power. Pt states the 10mg of diazepam he was given was not working. Pt refused IV Haldol and Zyprexa, agreeable to Benadryl.

## 2021-03-20 NOTE — ED NOTES
Pt attempted to leave again but due to being in seclusion he was unable to leave the room. Pt began banging on the door and then stood on the bed trying to grab objects to remove from the ceiling in attempt to escape. ER staff ordered restraints for safety. Pt was placed in restraints at 1340 and seclusion was ended. Pt cooperative in restraints and not resisting.

## 2021-03-20 NOTE — TELEPHONE ENCOUNTER
S 35 year old male Pt brought to [facility] ED by EMS.     B: pt worsening depression Si past week, will not divulge a plan to staff. States today intense and imminent. Hx sx attempts and 7 MH admits in past. Last at [facility] [place] was detox in 2017. Wife is in process of divorce, pt has Lyme disease and is fixated on this, Parents bought him a house and it has become unlivable as its a horading house and parents are tryingto get the county involved, overwhelmed. Pt intoxicated, 0/14.   Pt had several code 21's in ED for agitation and attempts to leave. Pt was not aggressive with staff but uncooperative. Pacing. UTOX and covid19 ordered not collected.      A: pt will be placed on hold. 72hh     R: 4500/Leoncio      Patient cleared and ready for behavioral bed placement: Yes     - 154pm per ed md pt will be placed on a 72 hh     RN documentation 1245pm  Pt attempted to leave. Code 21 called and pt was escorted back to room. MD Dumont at bedside and pt placed in seclusion. Pt is agitated and shouting conspiratorial thoughts. Pt was not physically aggressive with staff, but uncooperative. Pt returned to bed under his own power. Pt states the 10mg of diazepam he was given was not working. Pt refused IV Haldol and Zyprexa, agreeable to Benadryl.      RN Documentaion 135pm  Pt attempted to leave again but due to being in seclusion he was unable to leave the room. Pt began banging on the door and then stood on the bed trying to grab objects to remove from the ceiling in attempt to escape. ER staff ordered restraints for safety. Pt was placed in restraints at 1340 and seclusion was ended. Pt cooperative in restraints and not resisting.      - 210pm unit updated on pt and admit will be on jm shift  - 211pm ed updated on need for 72hh, covid19 (must be attempted to collect) and utox (must be attempted to collect)

## 2021-03-20 NOTE — ED PROVIDER NOTES
"  History   Chief Complaint:  Generalized Weakness and Suicidal     HPI   Jonny Gramajo is a 35 year old male with a history of anxiety, depression, suicidality and lyme diseasea who presents for evaluation of suicidality and generalized weakness. The patient states that his mother called him this morning around 7 AM asking him if he wanted to go on a walk. They completed 3/4 of the walk when he states that he felt the onset of profound muscle weakness that is common for him due to his lyme disease. He reports faceplanting into a bush and rolling onto his side. He denies loss of consciousness or anticoagulation. He states that his mother told him that if needed help, he could call 911. He reports that they have a strained relationship and that she is the reason for his therapy and PTSD. The patient reports a significant history of mental illness and psych hospitalizations for suicidal ideation and multiple suicide attempts. Today, he reports suicidal ideation and wanting to die. He denies attempting suicide today or intentional overdose. He states that if he had a gun he would \"blow [his] head off\" but he doesn't trust himself to own one. He notes feeling depressed about various issues including current divorce, no children, unemployment and his lyme disease diagnosis.  He denies alcohol or drug use today. He denies fever, cough, shortness of breath, nausea or vomiting. He reports intermittent chest pain and chronic diarrhea. He states that he has an appointment with his PCP on Monday 03/22/21.    Review of Systems  All other systems are reviewed negative except as above in history of present illness    Allergies:  Abilify [Aripiprazole]  Wellbutrin [Bupropion]  Vilazodone    Medications:  Campral   Cefdinir  Biaxin  Neurontin  Doxycycline  Atarax  Culturell   Melatonin  Flagyl   Zantac  Risperdal   Bactrim  Desryel   Trintelleix  Valtrex  Nystatin  Psyllium    Past Medical History:    Anxiety "   Depression  Hypertension  Lyme disease  Chemical dependency; alcohol, benzodiazepine     Past Surgical History:    Testicle recessed     Social History:  The patient arrived to the ED Alone via EMS.  PCP: Kenneth G. Pallas  Occupation: Unemployed  Marital status:   Tobacco Use: History of   Alcohol Use: Yes  Drug Use: History of    Physical Exam     Patient Vitals for the past 24 hrs:   BP Temp Temp src Pulse Resp SpO2   03/20/21 1415 (!) 162/109 -- -- 117 -- --   03/20/21 1400 (!) 148/86 -- -- 95 -- 99 %   03/20/21 1345 (!) 151/91 -- -- 107 -- 99 %   03/20/21 1230 (!) 199/116 -- -- 122 -- 100 %   03/20/21 1145 -- -- -- -- -- 100 %   03/20/21 1130 -- -- -- -- -- 100 %   03/20/21 1115 -- -- -- -- -- 98 %   03/20/21 1100 (!) 153/103 98.8  F (37.1  C) Oral 105 18 96 %       Physical Exam  General: Resting on the bed.  Head: No obvious trauma to head.  Ears, Nose, Throat:  External ears normal.  Nose normal.  No pharyngeal erythema, swelling or exudate.  Midline uvula.    Eyes:  Conjunctivae clear.  Pupils are equal, round, and reactive.   Neck: Normal range of motion.  Neck supple.   CV: Tachycardic rate and rhythm.  No murmurs.  2+ radial pulses  Respiratory: Effort normal and breath sounds normal.  No wheezing or crackles.   Gastrointestinal: Soft.  No distension. There is no tenderness.  There is no rigidity, no rebound and no guarding.   Musculoskeletal: Non tender non edematous calves  Neuro: Alert. Moving all extremities appropriately.  Pressured speech.  CN II-XII grossly intact.  Gross muscle strength intact of the proximal and distal bilateral upper and lower extremities.  Sensation intact to light touch in all 4 extremities.    Skin: Skin is warm and dry.  No rash noted.   Psych: Normal mood and affect. Behavior is erratic and labile.  Patient impulsive.  [employer].    Emergency Department Course     ECG:  ECG taken at 1133, ECG read at 1140  Normal sinus rhythm  Nonspecific T wave abnormality    Abnormal ECG  When compared to prior ECG from 08/04/17: no significant change was found.  Rate 94 bpm. ND interval 136 ms. QRS duration 92 ms. QT/QTc 352/440 ms. P-R-T axes 46 54 43.    Imaging:  Chest XR Port 1 View:  Pending    Laboratory:  CBC: WBC: 7.1, HGB: 17.9 (H), PLT: 204    CMP: Glucose 99, Chloride: 110 (H), Calcium 8.3 (L), ALT 96 (H) o/w WNL (Creatinine: 0.72)    Troponin (Collected 1143): <0.015    D-dimer: <0.3    Alcohol ethyl: 0.14 (H)    Acetaminophen Level: <2    Salicylate Level: 2    Asymptomatic COVID-19 PCR: Pending     Emergency Department Course:    Reviewed:  I reviewed the patient's nursing notes, vitals and past medical history.     Assessments:  1110 I performed an exam of the patient in room ED07 as documented above.   1208 Nursing informed me that the patient's behavior was escalating.  1224 Code 21 called.   1242 Discussed patient's safety with nursing. They inquired about secluding the patient. Will hold off for now.  1250 Code 21 called.  1338 Code 21 called.  1340 In person face to face assessment completed, including an evaluation of the patient's immediate reaction to the intervention, complete review of systems assessment, behavioral assessment and review/assessment of history, drugs and medications, recent labs, etc., and behavioral condition. The patient experienced: No adverse physical outcome from seclusion/restraint initiation. The intervention of restraint or seclusion needs to continue.  1356 Patient rechecked and updated.   Consults:    1150 I spoke with DEC regarding patient's presentation and findings. They will plan to evaluate virtually.  1258 I spoke with DEC regarding patient's evaluation and plan of care. Plan to admit to inpatient mental health bed.    Interventions:  1231 Valium, 10 mg, Oral  1250 Benadryl, 50 mg, IV   1327 Zyprexa, 10 mg, IM  1345 Zyprexa, 10 mg, IM     Disposition:  Care of the patient was transferred to my colleague Dr. Casper pending bed  placement.     Impression & Plan     CMS Diagnoses: None    Medical Decision Making:  Jonny Gramajo is a 35 year old male who presents to the emergency department today for evaluation of suicidal ideation.  Patient also endorsed generalized weakness and a multitude of other symptoms therefore blood was obtained.  Vital signs mildly tachycardic but otherwise unremarkable.  CBC shows no leukocytosis or anemia.  BMP shows no acute electrolyte, metabolic or renal dysfunction.  Mild elevation ALT, does not appear clinically significant.  EKG showed sinus rhythm, no evidence of acute ST-T wave changes.  No evidence of acute ischemia.  No evidence of arrhythmia.  Troponin is negative.  I do not see evidence of ACS.  Do not suspect PE with negative D-dimer.  Salicylate and Tylenol levels are negative.  Alcohol is +0.14.  This is likely contributing to patient's labile mood.  Urine drug screen pending.  Covid swab sent pending.  Patient reports that the generalized weakness seems to be chronic.  Further work-up will be deferred.  Patient has a nonfocal neurologic exam.  No signs of trauma, do not think that head imaging is indicated.  Patient was calm and cooperative initially although had pressured speech.  Over the course of his stay he became impulsive and aggressive at times.  He is requesting his home Valium which was given.  He then claimed that he would be calm but was unable to remain calm, standing on a bed.  He then was given Benadryl after verbal discussion with him and he is agreeable to taking this and this was not successful either.  He remained calm for a period time but became impulsive again.  He was then given 2 doses of IM Zyprexa.  He was placed in restraints.  I rechecked the restraints to ensure the patient was safe.  No harm was done patient willingly was placed in restraints.  He began to verbally escalate again but was able to be deescalated with offering him some water.  Patient has been chemically  and physically restrained at this point.  He was accepted to [facility] and is awaiting transfer at the time of signout.      Critical Care Time: was 45 minutes for this patient excluding procedures    Covid-19  Jonny Gramajo was evaluated during a global COVID-19 pandemic, which necessitated consideration that the patient might be at risk for infection with the SARS-CoV-2 virus that causes COVID-19.   Applicable protocols for evaluation were followed during the patient's care.   COVID-19 was considered as part of the patient's evaluation. The plan for testing is:  a test was obtained during this visit.    Diagnosis:    ICD-10-CM    1. Suicidal ideation  R45.851    2. Depression, unspecified depression type  F32.9        Discharge Medications:   New Prescriptions    No medications on file       Scribe Disclosure:  I, Alyce Whitehead, am serving as a scribe at 11:24 AM on 3/20/2021 to document services personally performed by Irina Dumont MD based on my observations and the provider's statements to me.      Scribe Disclosure:  I, Fay Blank, am serving as a scribe at 2:17 PM on 3/20/2021 to document services personally performed by Irina Dumont MD based on my observations and the provider's statements to me.        This note was completed in part using Dragon voice recognition software. Although reviewed after completion, some word and grammatical errors may occur.      Irina Dumont MD  03/20/21 6741

## 2021-03-20 NOTE — SAFE
Jonny Gramajo  March 20, 2021    It is the recommendation of this clinician that the pt be admitted to [facility] for further evaluation of his safety and for stabilization. Pt is stating that he has the intent to end his life, he is unwilling to share the method resulting the pt not being able to contract for safety. The pt has a long  hx and has struggled more the last year due to some additional life stressors. Clinician consulted with pts parents and the attending, Dr. Dumont who all supported the disposition for IP. Due to some impulsive behaviors the pt presented, despite his initial agreement for IP, he will be on a 72hr hold.      Current Suicidal Ideation/Self-Injurious Concerns/Methods: Other pt refused to state.    Inappropriate Sexual Behavior: No    Aggression/Homicidal Ideation: Impaired Self-Control(impuslive, not physically aggressive historically)       For additional details see full DEC assessment.       Hayley Baker, [facility]

## 2021-03-20 NOTE — PHARMACY-ADMISSION MEDICATION HISTORY
Admission medication history interview status for this patient is complete. See [facility] admission navigator for allergy information, prior to admission medications and immunization status.     Medication history interview done, indicate source(s): Family and Pharmacy records  Medication history resources (including written lists, pill bottles, clinic record): SureScripts and Care Everywhere  Pharmacy: [facility]. [facility] for discharge    Changes made to PTA medication list:  Added: Augmenting, Adderall, tadalafil, testosterone gel, diazepam  Deleted: from 2017: acamprosate, clarithromycin, hydroxyzine, metronidazole, nystatin, ranitidine, Bactrim, trazodone, Trintellix. From 2020: valacyclovir, cefdinir  Changed: none    Actions taken by pharmacist (provider contacted, etc): Pt AMS and multiple code 21s. Called patient's parent to verify home med list, they confirmed Augmentin, Adderall, and diazepam. Other medications are per pharmacy records. Verify last doses with patient when able.      Additional medication history information:None    Medication reconciliation/reorder completed by provider prior to medication history?  N   (Y/N)       Prior to Admission medications    Medication Sig Last Dose Taking? Auth Provider   amoxicillin-clavulanate (AUGMENTIN) 875-125 MG tablet Take 1 tablet by mouth 3 times daily With food Unknown at Unknown time  Unknown, Entered By History   amphetamine-dextroamphetamine (ADDERALL XR) 30 MG 24 hr capsule Take 60 mg by mouth every morning Unknown at Unknown time  Unknown, Entered By History   diazepam (VALIUM) 10 MG tablet Take 10 mg by mouth every 8 hours as needed for anxiety Unknown at Unknown time  Unknown, Entered By History   gabapentin (NEURONTIN) 300 MG capsule Take 1 capsule (300 mg) by mouth 3 times daily Unknown at Unknown time  Eliud Vital MD   lactobacillus rhamnosus, GG, (CULTURELL) capsule Take 1 capsule by mouth 3 times daily (before meals)    Morgan Stephen MD   melatonin 3 MG tablet Take 3 mg by mouth nightly as needed for sleep   Unknown, Entered By History   psyllium (METAMUCIL/KONSYL) Packet Take 1 packet by mouth 3 times daily as needed for constipation   Unknown, Entered By History   tadalafil (CIALIS) 20 MG tablet Take 20 mg by mouth as needed Every other day as needed Unknown at Unknown time  Unknown, Entered By History   testosterone (ANDROGEL 1.62 % PUMP) 20.25 MG/ACT gel Place 3 Pump onto the skin daily Unknown at Unknown time  Unknown, Entered By History

## 2021-03-20 NOTE — ED NOTES
Pt was on the phone and became agitated. He aggressively removed monitoring equipment and wristband and moved to end of bed towards door. Security and RN entered room and asked what happened. He stated nothing was wrong and he didn't need any help. IV still in place and appears intact. Virtual DEC brought into room and patient is currently speaking to them. Pt now calm and cooperative with DEC.

## 2021-03-20 NOTE — ED NOTES
Pt aggressively exited room and attempted to leave the area. Pt refused to return to room, code 21 called. Pt escorted back to room by security and RN. Pt cooperative but agitated.

## 2021-03-20 NOTE — PROGRESS NOTES
ELIAS received a call from patients mom Lacey ([phone]). She said she has been trying to tracking down patients car keys as his car is parked in front of their house and the city will tow his car if not moved after 2 am. ELIAS spoke with GALDINO Early ([phone]) and patient is not able to give consent at this time. He advised that patients mom call in a few hours. ELIAS called Lacey and provided her with the number to the floor.       CYNTHIA Sexton, [facility]  [phone]  [address]   [facility]. [address]  [facility]

## 2021-03-21 ENCOUNTER — COMMUNICATION - HEALTHEAST (OUTPATIENT)
Dept: SCHEDULING | Facility: CLINIC | Age: 36
End: 2021-03-21

## 2021-03-22 LAB — INTERPRETATION ECG - MUSE: NORMAL

## 2021-04-24 ENCOUNTER — HEALTH MAINTENANCE LETTER (OUTPATIENT)
Age: 36
End: 2021-04-24

## 2021-06-05 NOTE — TELEPHONE ENCOUNTER
----- Message from Katherine De Luna sent at 9/21/2017  1:18 PM CDT -----  Regarding: Referral - 3A to [facility] [employer] - writer will fax auth request to .    [facility] [employer] A or C.      Per Epic insurance notes, patient was not referred to [facility].   hard copy, drawn during this pregnancy

## 2021-06-16 NOTE — PROGRESS NOTES
S 35 year old male Pt brought to [facility] ED by EMS.    B: pt worsening depression Si past week, will not divulge a plan to staff. States today intense and imminent. Hx sx attempts and 7 MH admits in past. Last at [place] was detox in 2017. Wife is in process of divorce, pt has Lyme disease and is fixated on this, Parents bought him a house and it has become unlivable as its a horading house and parents are tryingto get the county involved, overwhelmed. Pt intoxicated, 0/14.   Pt had several code 21's in ED for agitation and attempts to leave. Pt was not aggressive with staff but uncooperative. Pacing. UTOX and covid19 ordered not collected.     A: pt will be placed on hold. 72hh    R: 4500/Leoncio     Patient cleared and ready for behavioral bed placement: Yes    - 154pm per ed md pt will be placed on a 72 hh    RN documentation 1245pm  Pt attempted to leave. Code 21 called and pt was escorted back to room. MD Dumont at bedside and pt placed in seclusion. Pt is agitated and shouting conspiratorial thoughts. Pt was not physically aggressive with staff, but uncooperative. Pt returned to bed under his own power. Pt states the 10mg of diazepam he was given was not working. Pt refused IV Haldol and Zyprexa, agreeable to Benadryl.     RN Documentaion 135pm  Pt attempted to leave again but due to being in seclusion he was unable to leave the room. Pt began banging on the door and then stood on the bed trying to grab objects to remove from the ceiling in attempt to escape. ER staff ordered restraints for safety. Pt was placed in restraints at 1340 and seclusion was ended. Pt cooperative in restraints and not resisting.     - 210pm unit updated on pt and admit will be on jm shift  - 211pm ed updated on need for 72hh, covid19 (must be attempted to collect) and utox (must be attempted to collect)

## 2021-07-01 NOTE — PROCEDURES
"Procedures by Sarah Almendarez RN at 1/19/2017 11:30 AM     Author: Sarah Almendarez RN Service: -- Author Type: Registered Nurse    Filed: 1/19/2017 12:33 PM Encounter Date: 1/19/2017 Status: Signed    : Sarah Almendarez RN (Registered Nurse)     Procedures    1. PICC [XRG384 (Custom)]                 PICC Line Insertion Procedure Note  Pt. Name: Jonny Gramajo  MRN:        [id number]    Procedure: Insertion of a  single Lumen  4 fr  Bard SOLO (valved) Power PICC, Lot number [id number]    Indications: Long term antibiotic    Contraindications : none    Procedure Details   Patient identified with 2 identifiers and \"Time Out\" conducted.  .     Central line insertion bundle followed: hand hygeine performed prior to procedure, site cleansed with cholraprep, hat, mask, sterile gloves,sterile gown worn, patient draped with maximum barrier head to toe drape, sterile field maintained.    1 ml 1% Lidocaine administered sq to the insertion site. A 4 Fr PICC was inserted into the basilic vein of the right arm with ultrasound guidance. 1 attempt(s) required to access vein.   Catheter threaded with difficulty. Good blood return noted.    Modified Seldinger Technique used for insertion.    Catheter secured with Statlock, biopatch and Tegaderm dressing applied.    Findings:  Total catheter length  46 cm, with 1 cm exposed. Mid upper arm circumference is 38 cm. Catheter was flushed with 10 cc NS. Patient  tolerated procedure well.    Tip placement verified by 3CG technology.    CLABSI prevention brochure left at bedside.    Patient's primary RN notified PICC is ready for use.    Comments:          Sarah Almendarez RN    [facility]  [phone]           "

## 2021-10-09 ENCOUNTER — HEALTH MAINTENANCE LETTER (OUTPATIENT)
Age: 36
End: 2021-10-09

## 2022-05-16 ENCOUNTER — HEALTH MAINTENANCE LETTER (OUTPATIENT)
Age: 37
End: 2022-05-16

## 2022-09-11 ENCOUNTER — HEALTH MAINTENANCE LETTER (OUTPATIENT)
Age: 37
End: 2022-09-11

## 2023-05-23 NOTE — ED NOTES
Bed: ED08  Expected date: 8/30/17  Expected time: 9:30 AM  Means of arrival: Ambulance  Comments:  [facility]   32 Y Male  ETOH, klonopin use  Received Ativan by EMS   Medication(s) Requested: Zolpidem 10 mg  1 tablet nightly  Last office visit: 1/10/23  fuv scheduled 7/11/23  Last refill: Dispensed 1/10/23  #90  Is the patient due for refill of this medication(s): Yes  PDMP review: Criteria met. Forwarded to Physician/MACHELLE for signature.

## 2023-06-03 ENCOUNTER — HEALTH MAINTENANCE LETTER (OUTPATIENT)
Age: 38
End: 2023-06-03

## 2024-06-03 ENCOUNTER — HOSPITAL ENCOUNTER (INPATIENT)
Facility: CLINIC | Age: 39
LOS: 6 days | Discharge: HOME | DRG: 871 | End: 2024-06-09
Attending: EMERGENCY MEDICINE | Admitting: STUDENT IN AN ORGANIZED HEALTH CARE EDUCATION/TRAINING PROGRAM
Payer: COMMERCIAL

## 2024-06-03 ENCOUNTER — APPOINTMENT (OUTPATIENT)
Dept: GENERAL RADIOLOGY | Facility: CLINIC | Age: 39
DRG: 871 | End: 2024-06-03
Attending: EMERGENCY MEDICINE
Payer: COMMERCIAL

## 2024-06-03 DIAGNOSIS — J18.9 ATYPICAL PNEUMONIA: ICD-10-CM

## 2024-06-03 DIAGNOSIS — R73.03 PREDIABETES: Primary | ICD-10-CM

## 2024-06-03 DIAGNOSIS — U07.0 ACUTE LUNG INJURY ASSOCIATED WITH VAPING: ICD-10-CM

## 2024-06-03 DIAGNOSIS — G47.33 OSA (OBSTRUCTIVE SLEEP APNEA): ICD-10-CM

## 2024-06-03 LAB
ALBUMIN SERPL BCG-MCNC: 3.8 G/DL (ref 3.5–5.2)
ALP SERPL-CCNC: 83 U/L (ref 40–150)
ALT SERPL W P-5'-P-CCNC: 24 U/L (ref 0–70)
ANION GAP SERPL CALCULATED.3IONS-SCNC: 12 MMOL/L (ref 7–15)
AST SERPL W P-5'-P-CCNC: 24 U/L (ref 0–45)
ATRIAL RATE - MUSE: 97 BPM
BASE EXCESS BLDV CALC-SCNC: 1.5 MMOL/L (ref -3–3)
BASE EXCESS BLDV CALC-SCNC: 2 MMOL/L (ref -3–3)
BILIRUB DIRECT SERPL-MCNC: 0.26 MG/DL (ref 0–0.3)
BILIRUB SERPL-MCNC: 1 MG/DL
BUN SERPL-MCNC: 6.4 MG/DL (ref 6–20)
CALCIUM SERPL-MCNC: 9.1 MG/DL (ref 8.6–10)
CHLORIDE SERPL-SCNC: 97 MMOL/L (ref 98–107)
CREAT SERPL-MCNC: 0.72 MG/DL (ref 0.67–1.17)
DEPRECATED HCO3 PLAS-SCNC: 25 MMOL/L (ref 22–29)
DIASTOLIC BLOOD PRESSURE - MUSE: NORMAL MMHG
EGFRCR SERPLBLD CKD-EPI 2021: >90 ML/MIN/1.73M2
ERYTHROCYTE [DISTWIDTH] IN BLOOD BY AUTOMATED COUNT: 12.5 % (ref 10–15)
ETHANOL SERPL-MCNC: <0.01 G/DL
FLUAV RNA SPEC QL NAA+PROBE: NEGATIVE
FLUBV RNA RESP QL NAA+PROBE: NEGATIVE
GLUCOSE BLDC GLUCOMTR-MCNC: 316 MG/DL (ref 70–99)
GLUCOSE SERPL-MCNC: 179 MG/DL (ref 70–99)
HCO3 BLDV-SCNC: 26 MMOL/L (ref 21–28)
HCO3 BLDV-SCNC: 27 MMOL/L (ref 21–28)
HCT VFR BLD AUTO: 41.3 % (ref 40–53)
HGB BLD-MCNC: 14.6 G/DL (ref 13.3–17.7)
HOLD SPECIMEN: NORMAL
INTERPRETATION ECG - MUSE: NORMAL
LACTATE BLD-SCNC: 1.6 MMOL/L
LIPASE SERPL-CCNC: 45 U/L (ref 13–60)
MAGNESIUM SERPL-MCNC: 1.7 MG/DL (ref 1.7–2.3)
MCH RBC QN AUTO: 31.5 PG (ref 26.5–33)
MCHC RBC AUTO-ENTMCNC: 35.4 G/DL (ref 31.5–36.5)
MCV RBC AUTO: 89 FL (ref 78–100)
NT-PROBNP SERPL-MCNC: 276 PG/ML (ref 0–450)
O2/TOTAL GAS SETTING VFR VENT: 30 %
OXYHGB MFR BLDV: 93 % (ref 70–75)
P AXIS - MUSE: 48 DEGREES
PCO2 BLDV: 37 MM HG (ref 40–50)
PCO2 BLDV: 45 MM HG (ref 40–50)
PH BLDV: 7.39 [PH] (ref 7.32–7.43)
PH BLDV: 7.45 [PH] (ref 7.32–7.43)
PLATELET # BLD AUTO: 204 10E3/UL (ref 150–450)
PO2 BLDV: 36 MM HG (ref 25–47)
PO2 BLDV: 68 MM HG (ref 25–47)
POTASSIUM SERPL-SCNC: 3.7 MMOL/L (ref 3.4–5.3)
PR INTERVAL - MUSE: 136 MS
PROCALCITONIN SERPL IA-MCNC: 4.24 NG/ML
PROT SERPL-MCNC: 7.4 G/DL (ref 6.4–8.3)
QRS DURATION - MUSE: 78 MS
QT - MUSE: 366 MS
QTC - MUSE: 464 MS
R AXIS - MUSE: 64 DEGREES
RBC # BLD AUTO: 4.63 10E6/UL (ref 4.4–5.9)
RSV RNA SPEC NAA+PROBE: NEGATIVE
SAO2 % BLDV: 73 % (ref 70–75)
SAO2 % BLDV: 95 % (ref 70–75)
SARS-COV-2 RNA RESP QL NAA+PROBE: NEGATIVE
SODIUM SERPL-SCNC: 134 MMOL/L (ref 135–145)
SYSTOLIC BLOOD PRESSURE - MUSE: NORMAL MMHG
T AXIS - MUSE: 60 DEGREES
TROPONIN T SERPL HS-MCNC: <6 NG/L
VENTRICULAR RATE- MUSE: 97 BPM
WBC # BLD AUTO: 18.8 10E3/UL (ref 4–11)

## 2024-06-03 PROCEDURE — 272N000054 HC CANNULA HIGH FLOW, ADULT

## 2024-06-03 PROCEDURE — 83690 ASSAY OF LIPASE: CPT | Performed by: EMERGENCY MEDICINE

## 2024-06-03 PROCEDURE — 82805 BLOOD GASES W/O2 SATURATION: CPT | Performed by: STUDENT IN AN ORGANIZED HEALTH CARE EDUCATION/TRAINING PROGRAM

## 2024-06-03 PROCEDURE — 87899 AGENT NOS ASSAY W/OPTIC: CPT | Performed by: STUDENT IN AN ORGANIZED HEALTH CARE EDUCATION/TRAINING PROGRAM

## 2024-06-03 PROCEDURE — 94640 AIRWAY INHALATION TREATMENT: CPT

## 2024-06-03 PROCEDURE — 87637 SARSCOV2&INF A&B&RSV AMP PRB: CPT | Performed by: EMERGENCY MEDICINE

## 2024-06-03 PROCEDURE — 250N000011 HC RX IP 250 OP 636: Performed by: EMERGENCY MEDICINE

## 2024-06-03 PROCEDURE — 85049 AUTOMATED PLATELET COUNT: CPT | Performed by: EMERGENCY MEDICINE

## 2024-06-03 PROCEDURE — 80053 COMPREHEN METABOLIC PANEL: CPT | Performed by: EMERGENCY MEDICINE

## 2024-06-03 PROCEDURE — 250N000013 HC RX MED GY IP 250 OP 250 PS 637: Performed by: EMERGENCY MEDICINE

## 2024-06-03 PROCEDURE — 36415 COLL VENOUS BLD VENIPUNCTURE: CPT | Performed by: EMERGENCY MEDICINE

## 2024-06-03 PROCEDURE — 80048 BASIC METABOLIC PNL TOTAL CA: CPT | Performed by: EMERGENCY MEDICINE

## 2024-06-03 PROCEDURE — 84484 ASSAY OF TROPONIN QUANT: CPT | Performed by: EMERGENCY MEDICINE

## 2024-06-03 PROCEDURE — 258N000003 HC RX IP 258 OP 636: Performed by: EMERGENCY MEDICINE

## 2024-06-03 PROCEDURE — 82077 ASSAY SPEC XCP UR&BREATH IA: CPT | Performed by: EMERGENCY MEDICINE

## 2024-06-03 PROCEDURE — 93005 ELECTROCARDIOGRAM TRACING: CPT

## 2024-06-03 PROCEDURE — 87640 STAPH A DNA AMP PROBE: CPT | Performed by: STUDENT IN AN ORGANIZED HEALTH CARE EDUCATION/TRAINING PROGRAM

## 2024-06-03 PROCEDURE — 94640 AIRWAY INHALATION TREATMENT: CPT | Mod: 76

## 2024-06-03 PROCEDURE — 93010 ELECTROCARDIOGRAM REPORT: CPT | Performed by: INTERNAL MEDICINE

## 2024-06-03 PROCEDURE — 82803 BLOOD GASES ANY COMBINATION: CPT

## 2024-06-03 PROCEDURE — 120N000013 HC R&B IMCU

## 2024-06-03 PROCEDURE — 84145 PROCALCITONIN (PCT): CPT | Performed by: EMERGENCY MEDICINE

## 2024-06-03 PROCEDURE — 87040 BLOOD CULTURE FOR BACTERIA: CPT | Performed by: EMERGENCY MEDICINE

## 2024-06-03 PROCEDURE — 99291 CRITICAL CARE FIRST HOUR: CPT

## 2024-06-03 PROCEDURE — 96365 THER/PROPH/DIAG IV INF INIT: CPT

## 2024-06-03 PROCEDURE — 99223 1ST HOSP IP/OBS HIGH 75: CPT | Performed by: STUDENT IN AN ORGANIZED HEALTH CARE EDUCATION/TRAINING PROGRAM

## 2024-06-03 PROCEDURE — 87633 RESP VIRUS 12-25 TARGETS: CPT | Performed by: STUDENT IN AN ORGANIZED HEALTH CARE EDUCATION/TRAINING PROGRAM

## 2024-06-03 PROCEDURE — 250N000011 HC RX IP 250 OP 636: Performed by: STUDENT IN AN ORGANIZED HEALTH CARE EDUCATION/TRAINING PROGRAM

## 2024-06-03 PROCEDURE — 82248 BILIRUBIN DIRECT: CPT | Performed by: EMERGENCY MEDICINE

## 2024-06-03 PROCEDURE — 250N000013 HC RX MED GY IP 250 OP 250 PS 637: Performed by: STUDENT IN AN ORGANIZED HEALTH CARE EDUCATION/TRAINING PROGRAM

## 2024-06-03 PROCEDURE — 999N000157 HC STATISTIC RCP TIME EA 10 MIN

## 2024-06-03 PROCEDURE — 71045 X-RAY EXAM CHEST 1 VIEW: CPT

## 2024-06-03 PROCEDURE — 96367 TX/PROPH/DG ADDL SEQ IV INF: CPT

## 2024-06-03 PROCEDURE — 83880 ASSAY OF NATRIURETIC PEPTIDE: CPT | Performed by: EMERGENCY MEDICINE

## 2024-06-03 PROCEDURE — 36415 COLL VENOUS BLD VENIPUNCTURE: CPT | Performed by: STUDENT IN AN ORGANIZED HEALTH CARE EDUCATION/TRAINING PROGRAM

## 2024-06-03 PROCEDURE — 83036 HEMOGLOBIN GLYCOSYLATED A1C: CPT | Performed by: STUDENT IN AN ORGANIZED HEALTH CARE EDUCATION/TRAINING PROGRAM

## 2024-06-03 PROCEDURE — 96375 TX/PRO/DX INJ NEW DRUG ADDON: CPT

## 2024-06-03 PROCEDURE — 83735 ASSAY OF MAGNESIUM: CPT | Performed by: EMERGENCY MEDICINE

## 2024-06-03 PROCEDURE — 87070 CULTURE OTHR SPECIMN AEROBIC: CPT | Performed by: STUDENT IN AN ORGANIZED HEALTH CARE EDUCATION/TRAINING PROGRAM

## 2024-06-03 PROCEDURE — 250N000009 HC RX 250: Performed by: STUDENT IN AN ORGANIZED HEALTH CARE EDUCATION/TRAINING PROGRAM

## 2024-06-03 RX ORDER — LISDEXAMFETAMINE DIMESYLATE 50 MG/1
50 CAPSULE ORAL EVERY MORNING
Status: DISCONTINUED | OUTPATIENT
Start: 2024-06-04 | End: 2024-06-09 | Stop reason: HOSPADM

## 2024-06-03 RX ORDER — IPRATROPIUM BROMIDE AND ALBUTEROL SULFATE 2.5; .5 MG/3ML; MG/3ML
3 SOLUTION RESPIRATORY (INHALATION)
Qty: 9 ML | Refills: 0 | Status: COMPLETED | OUTPATIENT
Start: 2024-06-03 | End: 2024-06-04

## 2024-06-03 RX ORDER — OLANZAPINE 15 MG/1
30 TABLET ORAL AT BEDTIME
COMMUNITY

## 2024-06-03 RX ORDER — AZITHROMYCIN 500 MG/1
500 INJECTION, POWDER, LYOPHILIZED, FOR SOLUTION INTRAVENOUS ONCE
Status: COMPLETED | OUTPATIENT
Start: 2024-06-03 | End: 2024-06-03

## 2024-06-03 RX ORDER — DEXAMETHASONE SODIUM PHOSPHATE 10 MG/ML
10 INJECTION, SOLUTION INTRAMUSCULAR; INTRAVENOUS ONCE
Status: DISCONTINUED | OUTPATIENT
Start: 2024-06-03 | End: 2024-06-03

## 2024-06-03 RX ORDER — LIDOCAINE 40 MG/G
CREAM TOPICAL
Status: DISCONTINUED | OUTPATIENT
Start: 2024-06-03 | End: 2024-06-03

## 2024-06-03 RX ORDER — CALCIUM CARBONATE 500 MG/1
1000 TABLET, CHEWABLE ORAL 4 TIMES DAILY PRN
Status: DISCONTINUED | OUTPATIENT
Start: 2024-06-03 | End: 2024-06-09 | Stop reason: HOSPADM

## 2024-06-03 RX ORDER — MAGNESIUM SULFATE HEPTAHYDRATE 40 MG/ML
2 INJECTION, SOLUTION INTRAVENOUS ONCE
Status: DISCONTINUED | OUTPATIENT
Start: 2024-06-03 | End: 2024-06-03

## 2024-06-03 RX ORDER — IPRATROPIUM BROMIDE AND ALBUTEROL SULFATE 2.5; .5 MG/3ML; MG/3ML
3 SOLUTION RESPIRATORY (INHALATION)
Status: DISCONTINUED | OUTPATIENT
Start: 2024-06-03 | End: 2024-06-03

## 2024-06-03 RX ORDER — AMOXICILLIN 250 MG
2 CAPSULE ORAL 2 TIMES DAILY PRN
Status: DISCONTINUED | OUTPATIENT
Start: 2024-06-03 | End: 2024-06-09 | Stop reason: HOSPADM

## 2024-06-03 RX ORDER — AMOXICILLIN 250 MG
1 CAPSULE ORAL 2 TIMES DAILY PRN
Status: DISCONTINUED | OUTPATIENT
Start: 2024-06-03 | End: 2024-06-09 | Stop reason: HOSPADM

## 2024-06-03 RX ORDER — CLONAZEPAM 1 MG/1
2 TABLET ORAL 3 TIMES DAILY
Status: DISCONTINUED | OUTPATIENT
Start: 2024-06-03 | End: 2024-06-09 | Stop reason: HOSPADM

## 2024-06-03 RX ORDER — PIPERACILLIN SODIUM, TAZOBACTAM SODIUM 3; .375 G/15ML; G/15ML
3.38 INJECTION, POWDER, LYOPHILIZED, FOR SOLUTION INTRAVENOUS EVERY 6 HOURS
Status: DISCONTINUED | OUTPATIENT
Start: 2024-06-03 | End: 2024-06-04

## 2024-06-03 RX ORDER — CLONAZEPAM 2 MG/1
2 TABLET ORAL 3 TIMES DAILY
COMMUNITY

## 2024-06-03 RX ORDER — CLONAZEPAM 0.5 MG/1
2 TABLET ORAL ONCE
Status: DISCONTINUED | OUTPATIENT
Start: 2024-06-03 | End: 2024-06-03

## 2024-06-03 RX ORDER — LISDEXAMFETAMINE DIMESYLATE 50 MG/1
50 CAPSULE ORAL EVERY MORNING
COMMUNITY

## 2024-06-03 RX ORDER — PANTOPRAZOLE SODIUM 40 MG/1
40 TABLET, DELAYED RELEASE ORAL DAILY
Status: DISCONTINUED | OUTPATIENT
Start: 2024-06-03 | End: 2024-06-09 | Stop reason: HOSPADM

## 2024-06-03 RX ORDER — OLANZAPINE 15 MG/1
30 TABLET ORAL AT BEDTIME
Status: DISCONTINUED | OUTPATIENT
Start: 2024-06-03 | End: 2024-06-09 | Stop reason: HOSPADM

## 2024-06-03 RX ORDER — GABAPENTIN 300 MG/1
300 CAPSULE ORAL ONCE
Status: DISCONTINUED | OUTPATIENT
Start: 2024-06-03 | End: 2024-06-03

## 2024-06-03 RX ORDER — METHYLPREDNISOLONE SODIUM SUCCINATE 40 MG/ML
40 INJECTION, POWDER, LYOPHILIZED, FOR SOLUTION INTRAMUSCULAR; INTRAVENOUS EVERY 12 HOURS
Status: DISCONTINUED | OUTPATIENT
Start: 2024-06-04 | End: 2024-06-06

## 2024-06-03 RX ORDER — GABAPENTIN 300 MG/1
300 CAPSULE ORAL 3 TIMES DAILY
Status: DISCONTINUED | OUTPATIENT
Start: 2024-06-03 | End: 2024-06-09 | Stop reason: HOSPADM

## 2024-06-03 RX ORDER — PIPERACILLIN SODIUM, TAZOBACTAM SODIUM 4; .5 G/20ML; G/20ML
4.5 INJECTION, POWDER, LYOPHILIZED, FOR SOLUTION INTRAVENOUS ONCE
Status: COMPLETED | OUTPATIENT
Start: 2024-06-03 | End: 2024-06-03

## 2024-06-03 RX ORDER — NICOTINE POLACRILEX 4 MG
15-30 LOZENGE BUCCAL
Status: DISCONTINUED | OUTPATIENT
Start: 2024-06-03 | End: 2024-06-04

## 2024-06-03 RX ORDER — ALPRAZOLAM 0.25 MG
0.25 TABLET ORAL EVERY 12 HOURS PRN
Status: DISCONTINUED | OUTPATIENT
Start: 2024-06-03 | End: 2024-06-06

## 2024-06-03 RX ORDER — LIDOCAINE 40 MG/G
CREAM TOPICAL
Status: DISCONTINUED | OUTPATIENT
Start: 2024-06-03 | End: 2024-06-09 | Stop reason: HOSPADM

## 2024-06-03 RX ORDER — DOXYCYCLINE 100 MG/10ML
100 INJECTION, POWDER, LYOPHILIZED, FOR SOLUTION INTRAVENOUS 2 TIMES DAILY
Status: DISCONTINUED | OUTPATIENT
Start: 2024-06-04 | End: 2024-06-07

## 2024-06-03 RX ORDER — DEXTROSE MONOHYDRATE 25 G/50ML
25-50 INJECTION, SOLUTION INTRAVENOUS
Status: DISCONTINUED | OUTPATIENT
Start: 2024-06-03 | End: 2024-06-04

## 2024-06-03 RX ADMIN — CALCIUM CARBONATE (ANTACID) CHEW TAB 500 MG 1000 MG: 500 CHEW TAB at 18:31

## 2024-06-03 RX ADMIN — IPRATROPIUM BROMIDE AND ALBUTEROL SULFATE 3 ML: .5; 3 SOLUTION RESPIRATORY (INHALATION) at 17:41

## 2024-06-03 RX ADMIN — GABAPENTIN 300 MG: 300 CAPSULE ORAL at 11:55

## 2024-06-03 RX ADMIN — SODIUM CHLORIDE, POTASSIUM CHLORIDE, SODIUM LACTATE AND CALCIUM CHLORIDE 1000 ML: 600; 310; 30; 20 INJECTION, SOLUTION INTRAVENOUS at 12:00

## 2024-06-03 RX ADMIN — OLANZAPINE 30 MG: 15 TABLET, FILM COATED ORAL at 21:03

## 2024-06-03 RX ADMIN — AZITHROMYCIN MONOHYDRATE 500 MG: 500 INJECTION, POWDER, LYOPHILIZED, FOR SOLUTION INTRAVENOUS at 12:52

## 2024-06-03 RX ADMIN — SODIUM CHLORIDE, POTASSIUM CHLORIDE, SODIUM LACTATE AND CALCIUM CHLORIDE 1000 ML: 600; 310; 30; 20 INJECTION, SOLUTION INTRAVENOUS at 11:56

## 2024-06-03 RX ADMIN — PIPERACILLIN AND TAZOBACTAM 3.38 G: 3; .375 INJECTION, POWDER, FOR SOLUTION INTRAVENOUS at 18:33

## 2024-06-03 RX ADMIN — PANTOPRAZOLE SODIUM 40 MG: 40 TABLET, DELAYED RELEASE ORAL at 21:04

## 2024-06-03 RX ADMIN — MAGNESIUM SULFATE HEPTAHYDRATE 2 G: 40 INJECTION, SOLUTION INTRAVENOUS at 11:55

## 2024-06-03 RX ADMIN — CLONAZEPAM 2 MG: 0.5 TABLET ORAL at 11:54

## 2024-06-03 RX ADMIN — CLONAZEPAM 2 MG: 1 TABLET ORAL at 21:03

## 2024-06-03 RX ADMIN — GABAPENTIN 300 MG: 300 CAPSULE ORAL at 21:04

## 2024-06-03 RX ADMIN — PIPERACILLIN AND TAZOBACTAM 4.5 G: 4; .5 INJECTION, POWDER, FOR SOLUTION INTRAVENOUS at 12:15

## 2024-06-03 RX ADMIN — IPRATROPIUM BROMIDE AND ALBUTEROL SULFATE 3 ML: .5; 3 SOLUTION RESPIRATORY (INHALATION) at 21:16

## 2024-06-03 RX ADMIN — PIPERACILLIN AND TAZOBACTAM 3.38 G: 3; .375 INJECTION, POWDER, FOR SOLUTION INTRAVENOUS at 23:52

## 2024-06-03 RX ADMIN — DEXAMETHASONE SODIUM PHOSPHATE 10 MG: 10 INJECTION INTRAMUSCULAR; INTRAVENOUS at 11:56

## 2024-06-03 ASSESSMENT — ACTIVITIES OF DAILY LIVING (ADL)
ADLS_ACUITY_SCORE: 35
ADLS_ACUITY_SCORE: 41
ADLS_ACUITY_SCORE: 41
ADLS_ACUITY_SCORE: 35
ADLS_ACUITY_SCORE: 35
ADLS_ACUITY_SCORE: 41
ADLS_ACUITY_SCORE: 41
ADLS_ACUITY_SCORE: 35
ADLS_ACUITY_SCORE: 35
ADLS_ACUITY_SCORE: 41
ADLS_ACUITY_SCORE: 41
ADLS_ACUITY_SCORE: 35
ADLS_ACUITY_SCORE: 35

## 2024-06-03 ASSESSMENT — COLUMBIA-SUICIDE SEVERITY RATING SCALE - C-SSRS
1. IN THE PAST MONTH, HAVE YOU WISHED YOU WERE DEAD OR WISHED YOU COULD GO TO SLEEP AND NOT WAKE UP?: NO
2. HAVE YOU ACTUALLY HAD ANY THOUGHTS OF KILLING YOURSELF IN THE PAST MONTH?: NO
6. HAVE YOU EVER DONE ANYTHING, STARTED TO DO ANYTHING, OR PREPARED TO DO ANYTHING TO END YOUR LIFE?: NO

## 2024-06-03 NOTE — PROGRESS NOTES
RECEIVING UNIT ED HANDOFF REVIEW    ED Nurse Handoff Report was reviewed by: Mary Lou Chaidez RN on Sara 3, 2024 at 3:58 PM

## 2024-06-03 NOTE — ED PROVIDER NOTES
Emergency Department Note      History of Present Illness     Chief Complaint  Shortness of Breath, Cough, and Fever    HPI  Jonny Gramajo is a 39 year old male with history of hypertension who presents to the emergency department for a cough, fever, and shortness of breath. The patient reports that he has had progressively worsening fever of 102.9, shortness of breath, and productive cough with phlegm over the past week. The patient also reports having headaches and body aches. The patient denies having any diarrhea, vomiting, or chest pain. He is tolerating liquids fine though he is not eating well. He reports vaping tobacco and CBD. The patient's mother reports that he has a history of lifetime lyme's disease. The patient denies history of asthma.  He reports usually taking 6 mg Klonopin daily and has not taken it today.    Independent Historian  Mother as above    Review of External Notes  Urgent care office visit from today for similar symptoms     Past Medical History   Medical History and Problem List  Anxiety   Depression   Obesity   Hypertension   Lyme disease  Benzodiazepine withdrawal   ADHD  Bipolar disorder  Insomnia  Chronic lower back pain  Schizoaffective disorder  Chemical dependency  Male erectile disorder     Medications  Norvasc   Vraylar  Klonopin   Focalin   Diflucan   Plaquenil   Vistaril   Synthroid   Vyvanse   Concerta   Toprol XL  Lopressor   Minocin   Mycostatin   Zyprexa   Prilosec  Protonix   Veetid   Seroquel   Bactrim   Trintellix  Augmentin   Adderall   Valium   Neurontin   Cialis     Surgical History   Genitourinary surgery   PICC     Physical Exam   Patient Vitals for the past 24 hrs:   BP Temp Temp src Pulse Resp SpO2   06/03/24 1314 -- -- -- -- -- (!) 89 %   06/03/24 1310 -- -- -- -- -- 91 %   06/03/24 1300 (!) 141/69 -- -- -- -- --   06/03/24 1039 -- -- -- 115 (!) 48 92 %   06/03/24 1012 (!) 127/24 -- -- 117 (!) 32 (!) 87 %   06/03/24 1011 -- 99.9  F (37.7  C) Oral 116 (!) 32  --     Physical Exam     Constitutional: Well developed, ill, nontox appearance  Head: Atraumatic.   Neck:  no stridor  Eyes: no scleral icterus  Cardiovascular: Regular tachycardia, 2+ bilat radial pulses  Pulmonary/Chest: Increased respiratory effort, wheezing and diminished breath sounds bilaterally  Ext: Warm, well perfused, no edema  Neurological: A&O, symmetric facies, moves ext x4  Skin: Skin is warm and dry.   Psychiatric: Behavior is normal. Thought content normal.   Nursing note and vitals reviewed.  Diagnostics   Lab Results   Labs Ordered and Resulted from Time of ED Arrival to Time of ED Departure   CBC WITH PLATELETS - Abnormal       Result Value    WBC Count 18.8 (*)     RBC Count 4.63      Hemoglobin 14.6      Hematocrit 41.3      MCV 89      MCH 31.5      MCHC 35.4      RDW 12.5      Platelet Count 204     BASIC METABOLIC PANEL - Abnormal    Sodium 134 (*)     Potassium 3.7      Chloride 97 (*)     Carbon Dioxide (CO2) 25      Anion Gap 12      Urea Nitrogen 6.4      Creatinine 0.72      GFR Estimate >90      Calcium 9.1      Glucose 179 (*)    ISTAT GASES LACTATE VENOUS POCT - Abnormal    Lactic Acid POCT 1.6      Bicarbonate Venous POCT 26      O2 Sat, Venous POCT 73      pCO2 Venous POCT 37 (*)     pH Venous POCT 7.45 (*)     pO2 Venous POCT 36      Base Excess/Deficit (+/-) POCT 2.0     PROCALCITONIN - Abnormal    Procalcitonin 4.24 (*)    INFLUENZA A/B, RSV, & SARS-COV2 PCR - Normal    Influenza A PCR Negative      Influenza B PCR Negative      RSV PCR Negative      SARS CoV2 PCR Negative     HEPATIC FUNCTION PANEL - Normal    Protein Total 7.4      Albumin 3.8      Bilirubin Total 1.0      Alkaline Phosphatase 83      AST 24      ALT 24      Bilirubin Direct 0.26     LIPASE - Normal    Lipase 45     ETHYL ALCOHOL LEVEL - Normal    Alcohol ethyl <0.01     MAGNESIUM - Normal    Magnesium 1.7     NT PROBNP INPATIENT - Normal    N terminal Pro BNP Inpatient 276     TROPONIN T, HIGH SENSITIVITY -  Normal    Troponin T, High Sensitivity <6     BLOOD CULTURE   BLOOD CULTURE       Imaging  XR Chest Port 1 View   Preliminary Result   IMPRESSION: Increased interstitial and airspace opacities that could   reflect edema or sequelae of atypical infection. No significant   pleural effusion, or pneumothorax. Unremarkable heart size.      Round lucency overlying the visualized lower neck is indeterminate,   possibly artifactual or dilated proximal cervical esophagus.            Independent Interpretation  Chest x-ray concerning for multifocal pneumonia, no pneumothorax    ED Course    Medications Administered  Medications   ipratropium - albuterol 0.5 mg/2.5 mg/3 mL (DUONEB) neb solution 3 mL (has no administration in time range)   azithromycin (ZITHROMAX) 500 mg vial to attach to  mL bag (500 mg Intravenous $New Bag 6/3/24 1252)   lactated ringers BOLUS 1,000 mL (has no administration in time range)   lactated ringers BOLUS 1,000 mL (1,000 mLs Intravenous $New Bag 6/3/24 1156)   dexAMETHasone PF (DECADRON) injection 10 mg (10 mg Intravenous $Given 6/3/24 1156)   magnesium sulfate 2 g in 50 mL sterile water intermittent infusion (2 g Intravenous $New Bag 6/3/24 1155)   clonazePAM (klonoPIN) tablet 2 mg (2 mg Oral $Given 6/3/24 1154)   gabapentin (NEURONTIN) capsule 300 mg (300 mg Oral $Given 6/3/24 1155)   piperacillin-tazobactam (ZOSYN) 4.5 g vial to attach to  mL bag (0 g Intravenous Stopped 6/3/24 1259)       Discussion of Management  1315 I spoke with Dr. Gerard, hospitalist, who accepts the patient    Social Determinants of Health adding to complexity of care  Vaping tobacco and CBD procedures for complications     ED Course  ED Course as of 06/03/24 1320   Mon Jun 03, 2024   1052 I obtained history and examined the patient as noted above      Medical Decision Making / Diagnosis   CMS Diagnoses: None    MIPS  None    MDM  Lyle Avila is a 39 year old male presenting w/ cough, body aches, reported  fever and shortness of breath     DDx includes viral syndrome NOS, influenza-like illness, influenza, COVID-19, bacterial pneumonia, sepsis, severe sepsis, pneumonitis/vaping lung injury.  Doubt PE given history of bodyaches, cough, fever.  Labs significant for leukocytosis, normal lactic acid, COVID-19 negative.  Imaging sig for concern for multifocal or atypical pneumonia.  Vaping lung injury remains in the differential.  Interventions as noted above.  Antibiotics and steroids given as noted above.  The patient subsequently admitted to the hospital service for further evaluation and management.  High flow nasal cannula ordered in the ED. Pt and mother counseled on all results, disposition and diagnosis.  They are understanding and agreeable to plan. Patient admitted in guarded condition.      Critical care time: 35 minutes excluding procedures    Disposition  The patient was admitted to the hospital    ICD-10 Codes:    ICD-10-CM    1. Atypical pneumonia  J18.9       2. Acute lung injury associated with vaping  U07.0              Scribe Disclosure:  Jorge DAVIS am serving as a scribe  for Rachana Saab at 1:15 PM on 6/3/2024  Rachana DAVIS am serving as a scribe at 1:15 PM on 6/3/2024 to document services personally performed by Nacho Mccabe MD based on my observations and the provider's statements to me.        Nacho Mccabe MD  06/03/24 9335

## 2024-06-03 NOTE — ED NOTES
Bed: ED11  Expected date:   Expected time:   Means of arrival:   Comments:  598  39 M hypoxia/low sats/fever  0980

## 2024-06-03 NOTE — H&P
[facility]    History and Physical - Hospitalist Service       Date of Admission:  6/3/2024    Assessment & Plan      Jonny Gramajo is a 39 year old male admitted on 6/3/2024. He         # Acute hypoxemic respiratory failure  -Differential pneumonia vs vaping injury   Patient vapes CBD.  *Patient having symptoms of fever, muscle aches congestion and shortness of breath since the last 5 days and the symptoms have been progressively getting worse here.  He developed a fever of 103 last time.  Patient went to the clinic where he was found to have saturation in 82-83 and was sent to the ER.  Influenza COVID-19 RSV and  -Pulmonology consulted Discussed  with Dr. Flores who recommended treating as pneumonia and also possible vaping related injury  -Patient was given dexamethasone in the ER and I started him solumedrol 40 mg twice daily  -Continue Zosyn  -MRSA nares ordered and if positive will add vancomycin  -Admit as inpatient to [facility]  -Patient's oxygenation and respiratory status is improved after he was started on high flow in the ER  -Respiratory watch  -Keep oxygen saturation above 90  -DuoNebs every 4 hours  -Follow blood cultures and sputum culture  -Urine pneumonia and urine Legionella   -Increased interstitial and airspace opacities that could  reflect edema or sequelae of atypical infection. No significant  pleural effusion, or pneumothorax. Unremarkable heart size.  -QTC mildly prolonged and will use doxycline instead of azithromycin for atypical coverage    # Mild hyponatremia  Continue to monitor      # Hyperglycemia  Likely due to steriods  LDSSI  -Hypoglycemia protocol  -Check Hba1c        # Schizoaffective disorder  # Panic attacks continue  # ADHD  -PTA Klonopin 2 mg 3 times daily, Zyprexa 30 mg at bedtime.  Continue gabapentin 300 mg 3 times daily  -Continue Vyvanse 50 mg by mouth every morning      # GERD  -Continue PPI       # History of Lyme's disease  -Has post lyme's   "symptoms  including chronic arthritis, arthralgias and brain fog     Diet: Combination Diet Regular Diet Adult  DVT Prophylaxis: Enoxaparin (Lovenox) SQ  Gil Catheter: Not present  Lines: PRESENT             Cardiac Monitoring: ACTIVE order. Indication: imc  Code Status: Full CodeFull code     Clinically Significant Risk Factors Present on Admission                    # Non-Invasive mechanical ventilation: current O2 Device: High Flow Nasal Cannula (HFNC)  # Acute hypoxic respiratory failure: continue supplemental O2 as needed            # Obesity: Estimated body mass index is 36.38 kg/m  as calculated from the following:    Height as of this encounter: 1.956 m (6' 5.01\").    Weight as of this encounter: 139.2 kg (306 lb 14.1 oz).              Disposition Plan     Medically Ready for Discharge: Anticipated in 2-4 Days           Lionel Gerard MD  Hospitalist Service  [facility]  Securely message with [employer] (more info)  Text page via [employer] Paging/Directory     ______________________________________________________________________    Chief Complaint   Fever and shortness of breath    History is obtained from the patient    History of Present Illness   Jonny Gramajo is a 39 year old male who past medical history of schizoaffective disorder, panic attacks, anxiety,, ADHD  Patient having symptoms of fever, muscle aches congestion and shortness of breath since the last 5 days and the symptoms have been progressively getting worse here.  He developed a fever of 103 last time.  Patient went to the clinic where he was found to have saturation in 82-83 and was sent to the ER      Denies any chest pain, abdominal pain, denies runny nose     Denies traveling.  Denies sick contacts.  Denies diarrhea.    Patient's mother at the bedside who said that patient is not compliant with his CPAP because of claustrophobia              Past Medical History     Past Medical History:   Diagnosis " Date    Anxiety     Class 1 obesity due to excess calories without serious comorbidity with body mass index (BMI) of 34.0 to 34.9 in adult     Depression     Depressive disorder     Elevated ALT measurement     Hypertension     Lyme disease     Lyme disease        Past Surgical History   Past Surgical History:   Procedure Laterality Date    GENITOURINARY SURGERY      testical recessed    PICC  1/19/2017            Prior to Admission Medications   Prior to Admission Medications   Prescriptions Last Dose Informant Patient Reported? Taking?   OLANZapine (ZYPREXA) 15 MG tablet 6/2/2024  Yes Yes   Sig: Take 30 mg by mouth at bedtime   clonazePAM (KLONOPIN) 2 MG tablet 6/2/2024  Yes Yes   Sig: Take 2 mg by mouth 3 times daily   gabapentin (NEURONTIN) 300 MG capsule 6/2/2024  No Yes   Sig: Take 1 capsule (300 mg) by mouth 3 times daily   lisdexamfetamine (VYVANSE) 50 MG capsule 6/2/2024  Yes Yes   Sig: Take 50 mg by mouth every morning   melatonin 3 MG tablet  at PRN  Yes Yes   Sig: Take 3 mg by mouth nightly as needed for sleep   omeprazole (PRILOSEC) 20 MG DR capsule 6/2/2024  Yes Yes   Sig: Take 20 mg by mouth daily      Facility-Administered Medications: None           Physical Exam   Vital Signs: Temp: 99  F (37.2  C) Temp src: Oral BP: (!) 142/74 Pulse: 104   Resp: 21 SpO2: 97 % O2 Device: High Flow Nasal Cannula (HFNC) Oxygen Delivery: 45 LPM  Weight: 306 lbs 14.09 oz    Physical Exam  Cardiovascular:      Rate and Rhythm: Regular rhythm. Tachycardia present.   Pulmonary:      Effort: Pulmonary effort is normal.      Comments: Diminished breath sounds with no wheezing or rales  Abdominal:      General: There is no distension.      Palpations: Abdomen is soft.      Tenderness: There is no abdominal tenderness.   Neurological:      Mental Status: He is alert.          Medical Decision Making       78 MINUTES SPENT BY ME on the date of service doing chart review, history, exam, documentation & further activities per  the note.      Data     I have personally reviewed the following data over the past 24 hrs:    18.8 (H)  \   14.6   / 204     134 (L) 97 (L) 6.4 /  316 (H)   3.7 25 0.72 \     ALT: 24 AST: 24 AP: 83 TBILI: 1.0   ALB: 3.8 TOT PROTEIN: 7.4 LIPASE: 45     Trop: <6 BNP: 276     Procal: 4.24 (H) CRP: N/A Lactic Acid: 1.6         Imaging results reviewed over the past 24 hrs:   Recent Results (from the past 24 hour(s))   XR Chest Port 1 View    Narrative    CHEST ONE VIEW Sara 3, 2024 12:06 PM     HISTORY: Hypoxia, tachycardia.    COMPARISON: Chest radiograph 3/20/2021.      Impression    IMPRESSION: Increased interstitial and airspace opacities that could  reflect edema or sequelae of atypical infection. No significant  pleural effusion, or pneumothorax. Unremarkable heart size.    Round lucency overlying the visualized lower neck is indeterminate,  possibly artifactual or dilated proximal cervical esophagus.    NANCIE MOREL MD         SYSTEM ID:  [id number]

## 2024-06-03 NOTE — ED NOTES
"[facility]  ED Nurse Handoff Report    ED Chief complaint: Shortness of Breath, Cough, and Fever      ED Diagnosis:   Final diagnoses:   Atypical pneumonia   Acute lung injury associated with vaping       Code Status: Full Code    Allergies:   Allergies   Allergen Reactions    Abilify [Aripiprazole]      Patient reports tardive dyskinesia effect in 2004 but likely akathisia since patient reports the effects only occurred while on med and resolved with a few days after discontinuing med.     Wellbutrin [Bupropion] Anxiety     Patient reports felt \"reved\" up and anxious when taken in 2001.        Patient Story: SOB  Focused Assessment:   39 year old male with history of hypertension who presents to the emergency department for a cough, fever, and shortness of breath. The patient reports that he has had progressively worsening fever of 102.9, shortness of breath, and productive cough with phlegm over the past week. The patient also reports having headaches and body aches. The patient denies having any diarrhea, vomiting, or chest pain. He is tolerating liquids fine though he is not eating well. He reports vaping tobacco and CBD. The patient's mother reports that he has a history of lifetime lyme's disease.     Treatments and/or interventions provided: bolus x2, zosyn, decadron, zithromax, duo neb, mag, O2 mask, high flow O2  Patient's response to treatments and/or interventions: improved    To be done/followed up on inpatient unit:        Does this patient have any cognitive concerns?:  cognitive delay    Activity level - Baseline/Home:  Independent  Activity Level - Current:   Stand with Assist    Patient's Preferred language: English   Needed?: No    Isolation: None  Infection: Not Applicable  Patient tested for COVID 19 prior to admission: YES  Bariatric?: No    Vital Signs:   Vitals:    06/03/24 1039 06/03/24 1300 06/03/24 1310 06/03/24 1314   BP:  (!) 141/69     Pulse: 115      Resp: " (!) 48      Temp:       TempSrc:       SpO2: 92%  91% (!) 89%       Cardiac Rhythm:     Was the PSS-3 completed:   Yes  What interventions are required if any?               Family Comments: nadine TAVAREZ brochure/video discussed/provided to patient/family: N/A         ED NURSE PHONE NUMBER: [id number]

## 2024-06-03 NOTE — ED NOTES
MD at bedside; pt remains on 10L oxymask, snoring and labored breathing; sats in 90s; calling RT for high flow

## 2024-06-03 NOTE — ED TRIAGE NOTES
Fever, cough and body aches for 5 days, had oxygen saturation down to 80% in clinic, prompted to come to ED via EMS. Had difficulty tolerating neb treatment in clinic     Triage Assessment (Adult)       Row Name 06/03/24 1013          Triage Assessment    Airway WDL WDL        Respiratory WDL    Respiratory WDL X        Skin Circulation/Temperature WDL    Skin Circulation/Temperature WDL WDL        Cardiac WDL    Cardiac WDL X        Peripheral/Neurovascular WDL    Peripheral Neurovascular WDL WDL        Cognitive/Neuro/Behavioral WDL    Cognitive/Neuro/Behavioral WDL WDL

## 2024-06-03 NOTE — PHARMACY-ADMISSION MEDICATION HISTORY
Pharmacy Intern Admission Medication History    Admission medication history is complete. The information provided in this note is only as accurate as the sources available at the time of the update.    Information Source(s): Patient and CareEverywhere/SureScripts via in-person    Pertinent Information:  None    Changes made to PTA medication list:  Added: Clonazepam, omeprazole, Vyvanse, and olanzapine  Deleted: Augmentin, Adderall, diazepam, lactobacillus, psyllium, tadalafil, testosterone  Changed: None    Allergies reviewed with patient and updates made in EHR: yes    Medication History Completed By: Iram Gibbons 6/3/2024 1:51 PM    PTA Med List   Medication Sig Last Dose    clonazePAM (KLONOPIN) 2 MG tablet Take 2 mg by mouth 3 times daily 6/2/2024    gabapentin (NEURONTIN) 300 MG capsule Take 1 capsule (300 mg) by mouth 3 times daily 6/2/2024    lisdexamfetamine (VYVANSE) 50 MG capsule Take 50 mg by mouth every morning 6/2/2024    melatonin 3 MG tablet Take 3 mg by mouth nightly as needed for sleep  at PRN    OLANZapine (ZYPREXA) 15 MG tablet Take 30 mg by mouth at bedtime 6/2/2024    omeprazole (PRILOSEC) 20 MG DR capsule Take 20 mg by mouth daily 6/2/2024

## 2024-06-04 LAB
ALBUMIN SERPL BCG-MCNC: 3.4 G/DL (ref 3.5–5.2)
ALP SERPL-CCNC: 79 U/L (ref 40–150)
ALT SERPL W P-5'-P-CCNC: 23 U/L (ref 0–70)
ANION GAP SERPL CALCULATED.3IONS-SCNC: 13 MMOL/L (ref 7–15)
AST SERPL W P-5'-P-CCNC: 19 U/L (ref 0–45)
BACTERIA SPT CULT: ABNORMAL
BACTERIA SPT CULT: ABNORMAL
BACTERIA SPT CULT: NORMAL
BASE EXCESS BLDV CALC-SCNC: 2.4 MMOL/L (ref -3–3)
BASE EXCESS BLDV CALC-SCNC: 3.3 MMOL/L (ref -3–3)
BASE EXCESS BLDV CALC-SCNC: 3.3 MMOL/L (ref -3–3)
BASOPHILS # BLD AUTO: ABNORMAL 10*3/UL
BASOPHILS # BLD MANUAL: 0 10E3/UL (ref 0–0.2)
BASOPHILS NFR BLD AUTO: ABNORMAL %
BASOPHILS NFR BLD MANUAL: 0 %
BILIRUB DIRECT SERPL-MCNC: <0.2 MG/DL (ref 0–0.3)
BILIRUB SERPL-MCNC: 0.3 MG/DL
BUN SERPL-MCNC: 13.1 MG/DL (ref 6–20)
C PNEUM DNA SPEC QL NAA+PROBE: NOT DETECTED
CALCIUM SERPL-MCNC: 8.8 MG/DL (ref 8.6–10)
CHLORIDE SERPL-SCNC: 99 MMOL/L (ref 98–107)
CREAT SERPL-MCNC: 0.66 MG/DL (ref 0.67–1.17)
DEPRECATED HCO3 PLAS-SCNC: 23 MMOL/L (ref 22–29)
EGFRCR SERPLBLD CKD-EPI 2021: >90 ML/MIN/1.73M2
EOSINOPHIL # BLD AUTO: ABNORMAL 10*3/UL
EOSINOPHIL # BLD MANUAL: 0 10E3/UL (ref 0–0.7)
EOSINOPHIL NFR BLD AUTO: ABNORMAL %
EOSINOPHIL NFR BLD MANUAL: 0 %
ERYTHROCYTE [DISTWIDTH] IN BLOOD BY AUTOMATED COUNT: 12.7 % (ref 10–15)
ERYTHROCYTE [DISTWIDTH] IN BLOOD BY AUTOMATED COUNT: 12.8 % (ref 10–15)
FLUAV H1 2009 PAND RNA SPEC QL NAA+PROBE: NOT DETECTED
FLUAV H1 RNA SPEC QL NAA+PROBE: NOT DETECTED
FLUAV H3 RNA SPEC QL NAA+PROBE: NOT DETECTED
FLUAV RNA SPEC QL NAA+PROBE: NOT DETECTED
FLUBV RNA SPEC QL NAA+PROBE: NOT DETECTED
GLUCOSE BLDC GLUCOMTR-MCNC: 196 MG/DL (ref 70–99)
GLUCOSE BLDC GLUCOMTR-MCNC: 243 MG/DL (ref 70–99)
GLUCOSE BLDC GLUCOMTR-MCNC: 259 MG/DL (ref 70–99)
GLUCOSE BLDC GLUCOMTR-MCNC: 296 MG/DL (ref 70–99)
GLUCOSE SERPL-MCNC: 338 MG/DL (ref 70–99)
GRAM STAIN RESULT: ABNORMAL
GRAM STAIN RESULT: NORMAL
HADV DNA SPEC QL NAA+PROBE: NOT DETECTED
HBA1C MFR BLD: 5.7 %
HCO3 BLDV-SCNC: 27 MMOL/L (ref 21–28)
HCO3 BLDV-SCNC: 28 MMOL/L (ref 21–28)
HCO3 BLDV-SCNC: 28 MMOL/L (ref 21–28)
HCOV PNL SPEC NAA+PROBE: NOT DETECTED
HCT VFR BLD AUTO: 38.5 % (ref 40–53)
HCT VFR BLD AUTO: 38.5 % (ref 40–53)
HGB BLD-MCNC: 13.1 G/DL (ref 13.3–17.7)
HGB BLD-MCNC: 13.3 G/DL (ref 13.3–17.7)
HMPV RNA SPEC QL NAA+PROBE: NOT DETECTED
HPIV1 RNA SPEC QL NAA+PROBE: NOT DETECTED
HPIV2 RNA SPEC QL NAA+PROBE: NOT DETECTED
HPIV3 RNA SPEC QL NAA+PROBE: NOT DETECTED
HPIV4 RNA SPEC QL NAA+PROBE: NOT DETECTED
IMM GRANULOCYTES # BLD: ABNORMAL 10*3/UL
IMM GRANULOCYTES NFR BLD: ABNORMAL %
L PNEUMO1 AG UR QL IA: NEGATIVE
LACTATE SERPL-SCNC: 2.7 MMOL/L (ref 0.7–2)
LACTATE SERPL-SCNC: 3.1 MMOL/L (ref 0.7–2)
LYMPHOCYTES # BLD AUTO: ABNORMAL 10*3/UL
LYMPHOCYTES # BLD MANUAL: 1.5 10E3/UL (ref 0.8–5.3)
LYMPHOCYTES NFR BLD AUTO: ABNORMAL %
LYMPHOCYTES NFR BLD MANUAL: 9 %
M PNEUMO DNA SPEC QL NAA+PROBE: NOT DETECTED
MCH RBC QN AUTO: 31 PG (ref 26.5–33)
MCH RBC QN AUTO: 31.6 PG (ref 26.5–33)
MCHC RBC AUTO-ENTMCNC: 34 G/DL (ref 31.5–36.5)
MCHC RBC AUTO-ENTMCNC: 34.5 G/DL (ref 31.5–36.5)
MCV RBC AUTO: 91 FL (ref 78–100)
MCV RBC AUTO: 91 FL (ref 78–100)
MONOCYTES # BLD AUTO: ABNORMAL 10*3/UL
MONOCYTES # BLD MANUAL: 1.3 10E3/UL (ref 0–1.3)
MONOCYTES NFR BLD AUTO: ABNORMAL %
MONOCYTES NFR BLD MANUAL: 8 %
MRSA DNA SPEC QL NAA+PROBE: NEGATIVE
NEUTROPHILS # BLD AUTO: ABNORMAL 10*3/UL
NEUTROPHILS # BLD MANUAL: 13.9 10E3/UL (ref 1.6–8.3)
NEUTROPHILS NFR BLD AUTO: ABNORMAL %
NEUTROPHILS NFR BLD MANUAL: 83 %
NRBC # BLD AUTO: 0 10E3/UL
NRBC BLD AUTO-RTO: 0 /100
O2/TOTAL GAS SETTING VFR VENT: 50 %
O2/TOTAL GAS SETTING VFR VENT: 97 %
O2/TOTAL GAS SETTING VFR VENT: 97 %
OXYHGB MFR BLDV: 77 % (ref 70–75)
OXYHGB MFR BLDV: 91 % (ref 70–75)
OXYHGB MFR BLDV: 91 % (ref 70–75)
PCO2 BLDV: 40 MM HG (ref 40–50)
PCO2 BLDV: 42 MM HG (ref 40–50)
PCO2 BLDV: 44 MM HG (ref 40–50)
PH BLDV: 7.42 [PH] (ref 7.32–7.43)
PH BLDV: 7.43 [PH] (ref 7.32–7.43)
PH BLDV: 7.44 [PH] (ref 7.32–7.43)
PLAT MORPH BLD: ABNORMAL
PLATELET # BLD AUTO: 203 10E3/UL (ref 150–450)
PLATELET # BLD AUTO: 217 10E3/UL (ref 150–450)
PO2 BLDV: 44 MM HG (ref 25–47)
PO2 BLDV: 58 MM HG (ref 25–47)
PO2 BLDV: 61 MM HG (ref 25–47)
POTASSIUM SERPL-SCNC: 4.1 MMOL/L (ref 3.4–5.3)
PROT SERPL-MCNC: 6.7 G/DL (ref 6.4–8.3)
RBC # BLD AUTO: 4.21 10E6/UL (ref 4.4–5.9)
RBC # BLD AUTO: 4.22 10E6/UL (ref 4.4–5.9)
RBC MORPH BLD: ABNORMAL
RSV RNA SPEC QL NAA+PROBE: NOT DETECTED
RSV RNA SPEC QL NAA+PROBE: NOT DETECTED
RV+EV RNA SPEC QL NAA+PROBE: NOT DETECTED
S PNEUM AG SPEC QL: NEGATIVE
SA TARGET DNA: POSITIVE
SAO2 % BLDV: 77.4 % (ref 70–75)
SAO2 % BLDV: 91.8 % (ref 70–75)
SAO2 % BLDV: 92.1 % (ref 70–75)
SODIUM SERPL-SCNC: 135 MMOL/L (ref 135–145)
WBC # BLD AUTO: 16.4 10E3/UL (ref 4–11)
WBC # BLD AUTO: 16.8 10E3/UL (ref 4–11)

## 2024-06-04 PROCEDURE — 250N000013 HC RX MED GY IP 250 OP 250 PS 637: Performed by: INTERNAL MEDICINE

## 2024-06-04 PROCEDURE — 87070 CULTURE OTHR SPECIMN AEROBIC: CPT | Performed by: INTERNAL MEDICINE

## 2024-06-04 PROCEDURE — 85027 COMPLETE CBC AUTOMATED: CPT | Performed by: STUDENT IN AN ORGANIZED HEALTH CARE EDUCATION/TRAINING PROGRAM

## 2024-06-04 PROCEDURE — 82805 BLOOD GASES W/O2 SATURATION: CPT | Performed by: STUDENT IN AN ORGANIZED HEALTH CARE EDUCATION/TRAINING PROGRAM

## 2024-06-04 PROCEDURE — 99221 1ST HOSP IP/OBS SF/LOW 40: CPT | Performed by: STUDENT IN AN ORGANIZED HEALTH CARE EDUCATION/TRAINING PROGRAM

## 2024-06-04 PROCEDURE — 250N000013 HC RX MED GY IP 250 OP 250 PS 637: Performed by: STUDENT IN AN ORGANIZED HEALTH CARE EDUCATION/TRAINING PROGRAM

## 2024-06-04 PROCEDURE — 99233 SBSQ HOSP IP/OBS HIGH 50: CPT | Performed by: INTERNAL MEDICINE

## 2024-06-04 PROCEDURE — 94640 AIRWAY INHALATION TREATMENT: CPT

## 2024-06-04 PROCEDURE — 120N000013 HC R&B IMCU

## 2024-06-04 PROCEDURE — 83605 ASSAY OF LACTIC ACID: CPT | Performed by: INTERNAL MEDICINE

## 2024-06-04 PROCEDURE — 87205 SMEAR GRAM STAIN: CPT | Performed by: INTERNAL MEDICINE

## 2024-06-04 PROCEDURE — 250N000011 HC RX IP 250 OP 636: Performed by: STUDENT IN AN ORGANIZED HEALTH CARE EDUCATION/TRAINING PROGRAM

## 2024-06-04 PROCEDURE — 258N000003 HC RX IP 258 OP 636: Performed by: INTERNAL MEDICINE

## 2024-06-04 PROCEDURE — 85007 BL SMEAR W/DIFF WBC COUNT: CPT | Performed by: STUDENT IN AN ORGANIZED HEALTH CARE EDUCATION/TRAINING PROGRAM

## 2024-06-04 PROCEDURE — 250N000009 HC RX 250: Performed by: STUDENT IN AN ORGANIZED HEALTH CARE EDUCATION/TRAINING PROGRAM

## 2024-06-04 PROCEDURE — 250N000012 HC RX MED GY IP 250 OP 636 PS 637: Performed by: INTERNAL MEDICINE

## 2024-06-04 PROCEDURE — 82248 BILIRUBIN DIRECT: CPT | Performed by: STUDENT IN AN ORGANIZED HEALTH CARE EDUCATION/TRAINING PROGRAM

## 2024-06-04 PROCEDURE — 250N000012 HC RX MED GY IP 250 OP 636 PS 637: Performed by: STUDENT IN AN ORGANIZED HEALTH CARE EDUCATION/TRAINING PROGRAM

## 2024-06-04 PROCEDURE — 999N000157 HC STATISTIC RCP TIME EA 10 MIN

## 2024-06-04 PROCEDURE — 80048 BASIC METABOLIC PNL TOTAL CA: CPT | Performed by: STUDENT IN AN ORGANIZED HEALTH CARE EDUCATION/TRAINING PROGRAM

## 2024-06-04 PROCEDURE — 36415 COLL VENOUS BLD VENIPUNCTURE: CPT | Performed by: STUDENT IN AN ORGANIZED HEALTH CARE EDUCATION/TRAINING PROGRAM

## 2024-06-04 RX ORDER — LEVALBUTEROL INHALATION SOLUTION 0.31 MG/3ML
0.63 SOLUTION RESPIRATORY (INHALATION) EVERY 6 HOURS PRN
Status: DISCONTINUED | OUTPATIENT
Start: 2024-06-04 | End: 2024-06-06

## 2024-06-04 RX ORDER — DEXTROSE MONOHYDRATE 25 G/50ML
25-50 INJECTION, SOLUTION INTRAVENOUS
Status: DISCONTINUED | OUTPATIENT
Start: 2024-06-04 | End: 2024-06-09 | Stop reason: HOSPADM

## 2024-06-04 RX ORDER — PIPERACILLIN SODIUM, TAZOBACTAM SODIUM 4; .5 G/20ML; G/20ML
4.5 INJECTION, POWDER, LYOPHILIZED, FOR SOLUTION INTRAVENOUS EVERY 6 HOURS
Status: DISCONTINUED | OUTPATIENT
Start: 2024-06-04 | End: 2024-06-07

## 2024-06-04 RX ORDER — NICOTINE POLACRILEX 4 MG
15-30 LOZENGE BUCCAL
Status: DISCONTINUED | OUTPATIENT
Start: 2024-06-04 | End: 2024-06-09 | Stop reason: HOSPADM

## 2024-06-04 RX ADMIN — NICOTINE POLACRILEX 2 MG: 2 GUM, CHEWING BUCCAL at 14:01

## 2024-06-04 RX ADMIN — CLONAZEPAM 2 MG: 1 TABLET ORAL at 15:21

## 2024-06-04 RX ADMIN — NICOTINE POLACRILEX 2 MG: 2 GUM, CHEWING BUCCAL at 19:33

## 2024-06-04 RX ADMIN — SODIUM CHLORIDE, POTASSIUM CHLORIDE, SODIUM LACTATE AND CALCIUM CHLORIDE 1000 ML: 600; 310; 30; 20 INJECTION, SOLUTION INTRAVENOUS at 12:52

## 2024-06-04 RX ADMIN — LISDEXAMFETAMINE DIMESYLATE 50 MG: 50 CAPSULE ORAL at 08:52

## 2024-06-04 RX ADMIN — NICOTINE POLACRILEX 2 MG: 2 GUM, CHEWING BUCCAL at 20:42

## 2024-06-04 RX ADMIN — OLANZAPINE 30 MG: 15 TABLET, FILM COATED ORAL at 22:18

## 2024-06-04 RX ADMIN — DOXYCYCLINE 100 MG: 100 INJECTION, POWDER, LYOPHILIZED, FOR SOLUTION INTRAVENOUS at 08:52

## 2024-06-04 RX ADMIN — PIPERACILLIN AND TAZOBACTAM 4.5 G: 4; .5 INJECTION, POWDER, FOR SOLUTION INTRAVENOUS at 17:54

## 2024-06-04 RX ADMIN — INSULIN GLARGINE 12 UNITS: 100 INJECTION, SOLUTION SUBCUTANEOUS at 13:50

## 2024-06-04 RX ADMIN — NICOTINE POLACRILEX 2 MG: 2 GUM, CHEWING BUCCAL at 11:00

## 2024-06-04 RX ADMIN — GABAPENTIN 300 MG: 300 CAPSULE ORAL at 08:52

## 2024-06-04 RX ADMIN — INSULIN ASPART 1 UNITS: 100 INJECTION, SOLUTION INTRAVENOUS; SUBCUTANEOUS at 01:15

## 2024-06-04 RX ADMIN — CLONAZEPAM 2 MG: 1 TABLET ORAL at 11:00

## 2024-06-04 RX ADMIN — NICOTINE POLACRILEX 2 MG: 2 GUM, CHEWING BUCCAL at 12:29

## 2024-06-04 RX ADMIN — DOXYCYCLINE 100 MG: 100 INJECTION, POWDER, LYOPHILIZED, FOR SOLUTION INTRAVENOUS at 20:21

## 2024-06-04 RX ADMIN — METHYLPREDNISOLONE SODIUM SUCCINATE 40 MG: 40 INJECTION, POWDER, FOR SOLUTION INTRAMUSCULAR; INTRAVENOUS at 08:52

## 2024-06-04 RX ADMIN — SODIUM CHLORIDE, POTASSIUM CHLORIDE, SODIUM LACTATE AND CALCIUM CHLORIDE 1000 ML: 600; 310; 30; 20 INJECTION, SOLUTION INTRAVENOUS at 18:27

## 2024-06-04 RX ADMIN — GABAPENTIN 300 MG: 300 CAPSULE ORAL at 13:50

## 2024-06-04 RX ADMIN — PIPERACILLIN AND TAZOBACTAM 4.5 G: 4; .5 INJECTION, POWDER, FOR SOLUTION INTRAVENOUS at 12:49

## 2024-06-04 RX ADMIN — PANTOPRAZOLE SODIUM 40 MG: 40 TABLET, DELAYED RELEASE ORAL at 06:11

## 2024-06-04 RX ADMIN — METHYLPREDNISOLONE SODIUM SUCCINATE 40 MG: 40 INJECTION, POWDER, FOR SOLUTION INTRAMUSCULAR; INTRAVENOUS at 20:20

## 2024-06-04 RX ADMIN — NICOTINE POLACRILEX 2 MG: 2 GUM, CHEWING BUCCAL at 15:25

## 2024-06-04 RX ADMIN — CLONAZEPAM 2 MG: 1 TABLET ORAL at 20:20

## 2024-06-04 RX ADMIN — NICOTINE POLACRILEX 2 MG: 2 GUM, CHEWING BUCCAL at 22:18

## 2024-06-04 RX ADMIN — GABAPENTIN 300 MG: 300 CAPSULE ORAL at 20:20

## 2024-06-04 RX ADMIN — CALCIUM CARBONATE (ANTACID) CHEW TAB 500 MG 1000 MG: 500 CHEW TAB at 03:08

## 2024-06-04 RX ADMIN — PIPERACILLIN AND TAZOBACTAM 3.38 G: 3; .375 INJECTION, POWDER, FOR SOLUTION INTRAVENOUS at 05:06

## 2024-06-04 RX ADMIN — IPRATROPIUM BROMIDE AND ALBUTEROL SULFATE 3 ML: .5; 3 SOLUTION RESPIRATORY (INHALATION) at 07:46

## 2024-06-04 ASSESSMENT — ACTIVITIES OF DAILY LIVING (ADL)
ADLS_ACUITY_SCORE: 41
ADLS_ACUITY_SCORE: 45
ADLS_ACUITY_SCORE: 45
ADLS_ACUITY_SCORE: 41
ADLS_ACUITY_SCORE: 45
ADLS_ACUITY_SCORE: 41
ADLS_ACUITY_SCORE: 45
ADLS_ACUITY_SCORE: 45
ADLS_ACUITY_SCORE: 40
ADLS_ACUITY_SCORE: 45
ADLS_ACUITY_SCORE: 45

## 2024-06-04 NOTE — PROVIDER NOTIFICATION
MD notification    Notified person: MD    Notified person name: Sneha    Notified date/time: 2212 on 6/3/24    Notification interaction: alfie    Purpose of notification: blood glucose check = 316. No insulin orders.     Orders received: pending

## 2024-06-04 NOTE — CONSULTS
[facility]    Pulmonary, Allergy, Critical Care and Sleep Medicine    Initial Consultation  06/04/2024    Jonny Gramajo MRN# [id number]   Age: 39 year old YOB: 1985     Date of Admission: 6/3/2024  Reason for Consultation: Hypoxia, vaping lung injury  Requesting Team: Hospital Medicine    Primary care provider: Maryanne Mitchell     Assessment and Recommendations:    Jonny Gramajo is a 39 year old male with a history of severe RASHEED, vaping, HTN, Schizoaffective disorder, depression who presented on 6/3/2024 with cough, fever, and dyspnea and found to have acute hypoxic respiratory failure.    Acute Hypoxic Respiratory Failure  Concern for vaping induced lung injury and/or pneumonia. Cough, fever, and dyspnea with leukocytosis, high procal, and bilateral infiltrates on admission. Strep, legionella, viral testing negative, sputum culture ordered. Thus far with normal blood gas. Will continue empiric steroids and antibiotics for now, patient thinks having some improvement in breathing. Other considerations include aspiration pneumonia/pneumonitis given severe reflux and recumbent position most of the time. Will get differential on CBC to look for eosinophilia. Given prior history of recurrent illnesses and pneumonias will send off immunoglobulin levels. Will repeat imaging tomorrow, consider Chest CT if not improving.   - CXR vs CT Chest tomorrow pending clinical trajectory  - IgG, IgA, IgM, differential on CBC  - Further discussion on vaping cessation prior to discharge  - Intermittent productive cough, reportedly did not have good experience with Duoneb and will try lower dose Xopenex PRN to help with airway clearance if needed    Severe RASHEED  Seen on home sleep study. Unfortunately patient's mother reports he is unable to tolerate CPAP due to agoraphobia. Do have some concern that (suspected) longstanding untreated RASHEED could contribute to pulmonary hypertension.   - Would get an  Echo to assess cardiac function and pulmonary pressures while inpatient  - For Sleep is following with [facility], should have follow up to further discuss options for management if unable to tolerate CPAP    Sharon Flores MD PhD  Pulmonary and Critical Care     History of Present Illness:    HPI: History taken from patient and chart review.     Home sleep study 2/2024 with severe RASHEED. Hypoxia noted that thought disproportionate to sleep apnea and recommendation for lab study. Received CPAP but has not been able to tolerate it.     Patient seen alongside parents who help provide some of the history. Ongoing shortness of breath for days to weeks prior to admission.   Started to have cough productive of yellow sputum and ultimately had fever which prompted presentation to ED at encouragement of mother. Has had a scratchy feeling in chest. Has bad acid reflux. Reportedly is sleeping much of the day and lying down majority of the time.     Patient's mother reports he is agoraphobic and rarely leaves the house except to go to gas station and grocery store. He does vape several times per day and parents do not buy him vaping products so has to go out. Vapes nicotine and CBD, denies any recent change in supplier. No recent travel, no animal, no mold or water damage but bedroom is not regularly cleaned and there is dust. Prior house lived in was not in good shape but mold testing was negative.     Other medical history significant for ITP when aged 7. Per mother later on was diagnosed with Lyme and that had significant effect on mind and joints over time. Is on disability. Patient states use to get recurrent pneumonias then got better once got pneumococcal shot.    Review of Systems:  Complete 12 point ROS negative unless mentioned in HPI    Histories, Prior to Admission Medications, Allergies:    Past Medical History:  Past Medical History:   Diagnosis Date    Anxiety     Class 1 obesity due to excess calories  without serious comorbidity with body mass index (BMI) of 34.0 to 34.9 in adult     Depression     Depressive disorder     Elevated ALT measurement     Hypertension     Lyme disease     Lyme disease      Past Surgical History:  Past Surgical History:   Procedure Laterality Date    GENITOURINARY SURGERY      testical recessed    PICC  1/19/2017          Past Social History:  Social History     Socioeconomic History    Marital status:      Spouse name: Not on file    Number of children: Not on file    Years of education: Not on file    Highest education level: Not on file   Occupational History    Not on file   Tobacco Use    Smoking status: Former     Current packs/day: 0.00     Average packs/day: 2.0 packs/day for 15.0 years (30.0 ttl pk-yrs)     Types: Cigarettes     Start date: 3/20/2006     Quit date: 3/20/2021     Years since quitting: 3.2    Smokeless tobacco: Former     Quit date: 3/20/2021    Tobacco comments:     want to continue smoking   Substance and Sexual Activity    Alcohol use: Not Currently     Comment: Alcoholic Drinks/day: denies drinking alcohol    Drug use: Not Currently     Comment: Drug use: denies    Sexual activity: Not on file   Other Topics Concern    Not on file   Social History Narrative    Not on file     Social Determinants of Health     Financial Resource Strain: High Risk (1/1/2022)    Received from [place]    Financial Resource Strain     Difficulty of Paying Living Expenses: Not on file     Difficulty of Paying Living Expenses: Not on file   Food Insecurity: Not on file   Transportation Needs: Not on file   Physical Activity: Not on file   Stress: Not on file   Social Connections: Unknown (1/1/2022)    Received from [place]    Social Connections     Frequency of Communication with Friends and Family: Not on file   Interpersonal Safety: Not on file   Housing Stability: Not on file     Family History:  No  family history on file.  Medications:  Current Facility-Administered Medications   Medication Dose Route Frequency Provider Last Rate Last Admin    clonazePAM (klonoPIN) tablet 2 mg  2 mg Oral TID Lionel Gerard MD   2 mg at 06/03/24 2103    doxycycline (VIBRAMYCIN) 100 mg vial to attach to  mL bag  100 mg Intravenous BID Lionel Gerard MD        gabapentin (NEURONTIN) capsule 300 mg  300 mg Oral TID Lionel Gerard MD   300 mg at 06/03/24 2104    insulin aspart (NovoLOG) injection (RAPID ACTING)  1-3 Units Subcutaneous TID AC Lionel Gerard MD        insulin aspart (NovoLOG) injection (RAPID ACTING)  1-3 Units Subcutaneous At Bedtime Lionel Gerard MD   1 Units at 06/04/24 0115    ipratropium - albuterol 0.5 mg/2.5 mg/3 mL (DUONEB) neb solution 3 mL  3 mL Nebulization Q4H WA Lionel Gerard MD   3 mL at 06/03/24 2116    lisdexamfetamine (VYVANSE) capsule 50 mg  50 mg Oral QAM Lionel Gerard MD        methylPREDNISolone sodium succinate (SOLU-MEDROL) injection 40 mg  40 mg Intravenous Q12H Lionel Gerard MD        OLANZapine (zyPREXA) tablet 30 mg  30 mg Oral At Bedtime Lionel Gerard MD   30 mg at 06/03/24 2103    pantoprazole (PROTONIX) EC tablet 40 mg  40 mg Oral Daily Lionel Gerard MD   40 mg at 06/04/24 0611    piperacillin-tazobactam (ZOSYN) 3.375 g vial to attach to  mL bag  3.375 g Intravenous Q6H Lionel Gerard MD   3.375 g at 06/04/24 0506    sodium chloride (PF) 0.9% PF flush 3 mL  3 mL Intracatheter Q8H Lionel Gerard MD   3 mL at 06/03/24 2352    sodium chloride (PF) 0.9% PF flush 3 mL  3 mL Intracatheter Q8H Lionel Gerard, MD   3 mL at 06/03/24 1838     Current Facility-Administered Medications   Medication Dose Route Frequency Provider Last Rate Last Admin    ALPRAZolam (XANAX) tablet 0.25 mg  0.25 mg Oral Q12H  "PRN Lionel Gerard MD        calcium carbonate (TUMS) chewable tablet 1,000 mg  1,000 mg Oral 4x Daily PRN Lionel Gerard MD   1,000 mg at 06/04/24 0308    glucose gel 15-30 g  15-30 g Oral Q15 Min PRN Lionel Gerard MD        Or    dextrose 50 % injection 25-50 mL  25-50 mL Intravenous Q15 Min PRN Lionel Gerard MD        Or    glucagon injection 1 mg  1 mg Subcutaneous Q15 Min PRLionel Sandoval MD        lidocaine (LMX4) cream   Topical Q1H PRN Lionel Gerard MD        lidocaine 1 % 0.1-1 mL  0.1-1 mL Other Q1H PRN Lionel Gerard MD        senna-docusate (SENOKOT-S/PERICOLACE) 8.6-50 MG per tablet 1 tablet  1 tablet Oral BID PRN Lionel Gerard MD        Or    senna-docusate (SENOKOT-S/PERICOLACE) 8.6-50 MG per tablet 2 tablet  2 tablet Oral BID PRLionel Sandoval MD        sodium chloride (PF) 0.9% PF flush 3 mL  3 mL Intracatheter q1 min prLionel Sandoval MD        sodium chloride (PF) 0.9% PF flush 3 mL  3 mL Intracatheter q1 min prLionel Sandoval MD         Allergies:     Allergies   Allergen Reactions    Abilify [Aripiprazole]      Patient reports tardive dyskinesia effect in 2004 but likely akathisia since patient reports the effects only occurred while on med and resolved with a few days after discontinuing med.     Wellbutrin [Bupropion] Anxiety     Patient reports felt \"reved\" up and anxious when taken in 2001.      Physical Exam:    Temp:  [98.9  F (37.2  C)-99.9  F (37.7  C)] 99.1  F (37.3  C)  Pulse:  [] 87  Resp:  [21-48] 28  BP: ()/(24-74) 103/64  FiO2 (%):  [70 %-95 %] 85 %  SpO2:  [87 %-100 %] 100 %    Intake/Output Summary (Last 24 hours) at 6/4/2024 0701  Last data filed at 6/4/2024 0600  Gross per 24 hour   Intake 2980 ml   Output 2950 ml   Net 30 ml       General: laying in bed in NAD  HEENT: anicteric, moist mucosa  Chest: CTAB, " no wheezing  Cardiac: RRR no murmurs  Abdomen: Soft, flat, non tender, active BS  Extremities: No LE Edema  Neuro: A&Ox3, no focal deficits   Skin: no rash noted    Laboratory, imaging, and microbiologic data:    All laboratory and imaging data reviewed, pertinent results discussed above.

## 2024-06-04 NOTE — PROGRESS NOTES
[facility]    Medicine Progress Note - Hospitalist Service    Date of Admission:  6/3/2024    Assessment & Plan     Jonny Gramajo is a 39-year-old male with history of schizoaffective disorder, panic attacks, ADHD, GERD, history of Lyme's disease, who presented on 6/3/2024 with 5 days of worsening shortness of breath, fever up to 103.  He initially went to clinic where he was noted to be hypoxic with saturations of 82% and was thus sent to ER.    Workup showed pulmonary infiltrates and acute hypoxic respiratory failure.  He was placed on high flow oxygen and started on IV antibiotics and IV steroids.    Acute hypoxic respiratory failure-secondary to bilateral pneumonia/pneumonitis  -Was hypoxic with saturations of 82% on room air on admission.  -Now on high flow oxygen at 15 L/min, 65% FiO2  -Continue high flow oxygen.  Wean as able.  Treat pneumonia/pneumonitis as below      Bilateral pneumonia/pneumonitis, organism unspecified  Severe sepsis due to above  Lactic acidosis  -Patient presented with fever of 103 at home, leukocytosis of 18.8, elevated procalcitonin of 4.24, respiratory rate 31, heart rate in 90s, hypoxic requiring high flow oxygen.  -Chest x-ray showed bilateral interstitial and airspace opacities.  -Patient also has history of vaping and GERD.  Vaping induced lung injury, aspiration pneumonitis, CHF are also in the differential but given fever, leukocytosis, elevated procalcitonin, pneumonia is the most likely etiology.  -Blood cultures negative so far.  Urine Legionella and pneumococcal antigens negative  -respiratory panel negative.  MRSA nasal swab negative  -Obtain sputum culture  -Pulmonary consulted  -Continue IV Zosyn and doxycycline.  Continue IV Solu-Medrol  -Continue high flow oxygen  -Obtain echo  -Obtain chest x-ray or CT chest on 6/5    Mild hyponatremia, sodium 134  -Improved to 135    Prediabetes with steroid-induced hyperglycemia, A1c 5.7, 6/3/2024  -Blood  "sugar elevated in 300s due to systemic steroids  -Start on Lantus 12 units daily, NovoLog 1 unit per carb 3 times daily 15 units and sliding scale  -Monitor blood sugars and adjust insulin accordingly    Schizoaffective disorder  Panic attacks  ADHD  -Continue clonazepam, Zyprexa, gabapentin, Vyvanse     GERD  -Continue Protonix    History of Lyme's disease  -Has post Lymes sequela-chronic arthritis with arthralgias and brain fog            Diet: Combination Diet Regular Diet Adult    DVT Prophylaxis: Enoxaparin (Lovenox) SQ  Gil Catheter: Not present  Lines: None     Cardiac Monitoring: ACTIVE order. Indication: c  Code Status: Full Code      Clinically Significant Risk Factors Present on Admission              # Hypoalbuminemia: Lowest albumin = 3.4 g/dL at 6/4/2024 10:24 AM, will monitor as appropriate       # Non-Invasive mechanical ventilation: current O2 Device: High Flow Nasal Cannula (HFNC)  # Acute hypoxic respiratory failure: continue supplemental O2 as needed            # Obesity: Estimated body mass index is 37.11 kg/m  as calculated from the following:    Height as of this encounter: 1.956 m (6' 5.01\").    Weight as of this encounter: 142 kg (313 lb 0.9 oz).              Disposition Plan         Medically Ready for Discharge: Anticipated in 2-4 Days             Arline Barbosa MD  Hospitalist Service  [facility]  Securely message with onkea (more info)  Text page via [employer] Paging/Directory   ______________________________________________________________________    Interval History   Patient lying in bed.  Parents present at bedside.  Patient reports he feels slightly better than yesterday  Has been afebrile since admission  Continues to be on high flow oxygen    Physical Exam   Vital Signs: Temp: 98.8  F (37.1  C) Temp src: Oral BP: 133/88 Pulse: 89   Resp: 24 SpO2: 100 % O2 Device: High Flow Nasal Cannula (HFNC) Oxygen Delivery: 50 LPM  Weight: 313 lbs .85 " oz      Constitutional - awake and alert, resting in bed, in no acute distress  Cardiovascular - regular rate and rhythm, no murmurs, no edema  Pulmonary - lungs are clear to auscultation bilaterally, no wheezing or rhonchi  GI - abdomen is soft, nontender, nondistended, no hepatosplenomegaly or masses  Integumentary - skin is warm and dry, no rashes or ulcers  Neurological - awake, alert and oriented x3.  Moving all 4 extremities, normal speech, no focal deficits    Medical Decision Making       60 MINUTES SPENT BY ME on the date of service doing chart review, history, exam, documentation & further activities per the note.      Data     I have personally reviewed the following data over the past 24 hrs:    16.4 (H)  \   13.3   / 203     135 99 13.1 /  296 (H)   4.1 23 0.66 (L) \     ALT: 23 AST: 19 AP: 79 TBILI: 0.3   ALB: 3.4 (L) TOT PROTEIN: 6.7 LIPASE: N/A     TSH: N/A T4: N/A A1C: N/A     Procal: N/A CRP: N/A Lactic Acid: 2.7 (H)         Imaging results reviewed over the past 24 hrs:   No results found for this or any previous visit (from the past 24 hour(s)).

## 2024-06-04 NOTE — PLAN OF CARE
DATE & TIME:6/4/2024 8704-6160  Cognitive Concerns/ Orientation : A&O x4, allow time to respond to questions. Forgetful.   BEHAVIOR & AGGRESSION TOOL COLOR: green   CIWA SCORE: na    ABNL VS/O2: vss, tachypnic, coarse on LLL. Maintain on HFNC 65% 50 L, O2 sat ranging 89% to 96%. Using IS as encouraged, O2 sat much improved after IS use.   MOBILITY: SBA to commode due to lines, using urinal.   PAIN MANAGMENT: denied pain.   DIET: regular. Good appetite.   BOWEL/BLADDER: continent. +BM.   ABNL LAB/BG: lactic 2.7, bolus LR given x1 liter, repeat lactic 3.1--repeat LR bolus, recheck in 3 hours.   DRAIN/DEVICES: PIV left hand.   TELEMETRY RHYTHM: NSR to ST  SKIN: intact, clammy. Declined bed bath, expressed concern for his resp. Status.   TESTS/PROCEDURES: sputum sample sent. ECHO and CT w/o contrast to be done in am.   D/C DATE: pending resp status.     OTHER IMPORTANT INFO: pulmonology following.

## 2024-06-04 NOTE — PLAN OF CARE
A+Ox4 VSS on HFNC 85% FiO2 at 45 lpm. Lung sounds diminished. Tele SR/ST. Dyspneic on exertion. Denies pain. Voiding adequately. Bowels active, flatus+, no BM overnight. Repositions self in bed. Tolerating regular diet. Up w/ Ax1

## 2024-06-04 NOTE — PLAN OF CARE
"Chief Complaint / current diagnosis: admitted 6/2/24 with acute hypoxic respiratory failure with increased SOB, fever, muscle aches, and congestion sent to ER from clinic setting. Hx anxiety, depression, ADHD, panic attacks, vaping, sleep apnea, lyme disease, obesity.    [id number]:  Neuro/Psych: A&O to self. Lethargic, snoring loudly at the moment. Unable to complete sputum sample, nares samples at this time. Patient requested Ativan earlier; doctor ordered xanax as needed instead to prevent respiratory depression and encouraged patient to only take as \"rescue\". Scheduled Zyprexa, gabapentin and Klonopin given. PRN Xanax available for anxiety. FYI team is considering diagnosis r/t vaping injury.  Vitals/Pain: VSS on high flow nasal cannula 45 LPM 85%, endorsed lung pain earlier (see chart for interventions). Monitoring elevated temperature. Room temperature decreased.  Cardiac: stable sinus tachycardia and blood pressures 120s-140s, trace edema  Respiratory: snoring loudly, sounds rhonchorous. Recommend sleep clinic follow-up outpatient. Tachypneic, shallow breathing. Would benefit from breath work exercises. Respiratory paged for increased WOB. Nebulizer given. ABG to result soon. Refuses CPAP d/t claustrophobia. Pulm consulted and doctor recommends treating as abnormal pneumonia vs vaping injury. Solumedrol steroid also started today to help decrease lung inflammation.   Activity: x1 assist, weak/tired, turns self independently. Would benefit from outpatient exercise program.  Skin/Wounds: intact, pale  GI/: continent, voiding large, almost clear amounts (see I/Os)  Diet/nutrition: regular diet, blood sugar checked this evening. Blood sugar elevated with steroid dose most likely and high-carb diet (pizza, cassie crackers, ice cream, etc tonight). Patient would benefit from dietician consult during the day.  Fluids: intermittent antibiotics  Access: PIV C/D/I  Labs: increased WBC, monitor " hyponatremia  Anticipated discharge: 2-4 days; lives at home with his mom  Patient/Staff Safety: bed in low/locked position, call light in reach, ID band on, all appropriate equipment/monitors on & audible.    _____________________________________________________    Electronically signed by:  Nilda Gomez RN-BSN  [facility] Nurse    Chart documentation was completed, in part, with [employer] voice-recognition software. Even though reviewed, some grammatical, spelling, and word errors may remain.     Goal Outcome Evaluation:      Plan of Care Reviewed With: patient    Overall Patient Progress: no changeOverall Patient Progress: no change    Problem: Adult Inpatient Plan of Care  Goal: Absence of Hospital-Acquired Illness or Injury  Intervention: Prevent Infection  Recent Flowsheet Documentation  Taken 6/3/2024 1700 by Nilda Gomez, RN  Infection Prevention:   cohorting utilized   hand hygiene promoted     Problem: Skin Injury Risk Increased  Goal: Skin Health and Integrity  Intervention: Plan: Nurse Driven Intervention: Moisture Management  Recent Flowsheet Documentation  Taken 6/3/2024 2000 by Nilda Gomez, RN  Moisture Interventions: Encourage regular toileting  Bathing/Skin Care: electrode patches/site rotation

## 2024-06-04 NOTE — PLAN OF CARE
Orientation: A&Ox4, slow to respond to assessment questions at times   Vitals/Pain: VSS on 65%50L. Pt denies pain throughout shift  Tele: NST, ST with activity   Lines/Drains: R PIV SL   LS: diminished, shallow breathing   GI/: BS hypoactive, x1 BM this shift. Pt having adequate urine output throughout shift with urinal   Labs: Abnormal/Trends, Electrolyte Replacement- AM labs pending  Ambulation/Assist: pivot to commode as O2 tolerates   Skin/Wounds: CDI   Plan: wean O2 as pt tolerates, promote pulmonary hygiene     Goal Outcome Evaluation:    Plan of Care Reviewed With: patient  Overall Patient Progress: improving  Outcome Evaluation: HFNC weaned from 85% to 65%50L

## 2024-06-05 ENCOUNTER — APPOINTMENT (OUTPATIENT)
Dept: CARDIOLOGY | Facility: CLINIC | Age: 39
DRG: 871 | End: 2024-06-05
Attending: INTERNAL MEDICINE
Payer: COMMERCIAL

## 2024-06-05 LAB
ANION GAP SERPL CALCULATED.3IONS-SCNC: 14 MMOL/L (ref 7–15)
BACTERIA SPT CULT: ABNORMAL
BASOPHILS # BLD AUTO: 0 10E3/UL (ref 0–0.2)
BASOPHILS NFR BLD AUTO: 0 %
BUN SERPL-MCNC: 14.4 MG/DL (ref 6–20)
CALCIUM SERPL-MCNC: 8.9 MG/DL (ref 8.6–10)
CHLORIDE SERPL-SCNC: 104 MMOL/L (ref 98–107)
CREAT SERPL-MCNC: 0.61 MG/DL (ref 0.67–1.17)
DEPRECATED HCO3 PLAS-SCNC: 23 MMOL/L (ref 22–29)
EGFRCR SERPLBLD CKD-EPI 2021: >90 ML/MIN/1.73M2
EOSINOPHIL # BLD AUTO: 0 10E3/UL (ref 0–0.7)
EOSINOPHIL NFR BLD AUTO: 0 %
ERYTHROCYTE [DISTWIDTH] IN BLOOD BY AUTOMATED COUNT: 12.8 % (ref 10–15)
GLUCOSE BLDC GLUCOMTR-MCNC: 182 MG/DL (ref 70–99)
GLUCOSE BLDC GLUCOMTR-MCNC: 223 MG/DL (ref 70–99)
GLUCOSE BLDC GLUCOMTR-MCNC: 257 MG/DL (ref 70–99)
GLUCOSE BLDC GLUCOMTR-MCNC: 269 MG/DL (ref 70–99)
GLUCOSE SERPL-MCNC: 239 MG/DL (ref 70–99)
GRAM STAIN RESULT: ABNORMAL
HCT VFR BLD AUTO: 39.2 % (ref 40–53)
HGB BLD-MCNC: 13.1 G/DL (ref 13.3–17.7)
IGA SERPL-MCNC: 355 MG/DL (ref 84–499)
IGG SERPL-MCNC: 600 MG/DL (ref 610–1616)
IGM SERPL-MCNC: 89 MG/DL (ref 35–242)
IMM GRANULOCYTES # BLD: 0.3 10E3/UL
IMM GRANULOCYTES NFR BLD: 3 %
LACTATE SERPL-SCNC: 1.5 MMOL/L (ref 0.7–2)
LACTATE SERPL-SCNC: 2.4 MMOL/L (ref 0.7–2)
LVEF ECHO: NORMAL
LYMPHOCYTES # BLD AUTO: 1.2 10E3/UL (ref 0.8–5.3)
LYMPHOCYTES NFR BLD AUTO: 9 %
MCH RBC QN AUTO: 31.1 PG (ref 26.5–33)
MCHC RBC AUTO-ENTMCNC: 33.4 G/DL (ref 31.5–36.5)
MCV RBC AUTO: 93 FL (ref 78–100)
MONOCYTES # BLD AUTO: 0.6 10E3/UL (ref 0–1.3)
MONOCYTES NFR BLD AUTO: 4 %
NEUTROPHILS # BLD AUTO: 11 10E3/UL (ref 1.6–8.3)
NEUTROPHILS NFR BLD AUTO: 84 %
NRBC # BLD AUTO: 0 10E3/UL
NRBC BLD AUTO-RTO: 0 /100
PLATELET # BLD AUTO: 236 10E3/UL (ref 150–450)
POTASSIUM SERPL-SCNC: 4.4 MMOL/L (ref 3.4–5.3)
PROCALCITONIN SERPL IA-MCNC: 1.54 NG/ML
RBC # BLD AUTO: 4.21 10E6/UL (ref 4.4–5.9)
SODIUM SERPL-SCNC: 141 MMOL/L (ref 135–145)
WBC # BLD AUTO: 13.1 10E3/UL (ref 4–11)

## 2024-06-05 PROCEDURE — 85041 AUTOMATED RBC COUNT: CPT | Performed by: INTERNAL MEDICINE

## 2024-06-05 PROCEDURE — 93306 TTE W/DOPPLER COMPLETE: CPT | Mod: 26 | Performed by: INTERNAL MEDICINE

## 2024-06-05 PROCEDURE — 80048 BASIC METABOLIC PNL TOTAL CA: CPT | Performed by: INTERNAL MEDICINE

## 2024-06-05 PROCEDURE — 83605 ASSAY OF LACTIC ACID: CPT | Performed by: INTERNAL MEDICINE

## 2024-06-05 PROCEDURE — 250N000013 HC RX MED GY IP 250 OP 250 PS 637: Performed by: INTERNAL MEDICINE

## 2024-06-05 PROCEDURE — 99232 SBSQ HOSP IP/OBS MODERATE 35: CPT | Performed by: INTERNAL MEDICINE

## 2024-06-05 PROCEDURE — 84145 PROCALCITONIN (PCT): CPT | Performed by: INTERNAL MEDICINE

## 2024-06-05 PROCEDURE — 255N000002 HC RX 255 OP 636: Performed by: INTERNAL MEDICINE

## 2024-06-05 PROCEDURE — 36415 COLL VENOUS BLD VENIPUNCTURE: CPT | Performed by: INTERNAL MEDICINE

## 2024-06-05 PROCEDURE — 87205 SMEAR GRAM STAIN: CPT | Performed by: INTERNAL MEDICINE

## 2024-06-05 PROCEDURE — 36415 COLL VENOUS BLD VENIPUNCTURE: CPT | Performed by: STUDENT IN AN ORGANIZED HEALTH CARE EDUCATION/TRAINING PROGRAM

## 2024-06-05 PROCEDURE — 120N000013 HC R&B IMCU

## 2024-06-05 PROCEDURE — C8929 TTE W OR WO FOL WCON,DOPPLER: HCPCS

## 2024-06-05 PROCEDURE — 250N000011 HC RX IP 250 OP 636: Performed by: STUDENT IN AN ORGANIZED HEALTH CARE EDUCATION/TRAINING PROGRAM

## 2024-06-05 PROCEDURE — 258N000003 HC RX IP 258 OP 636: Performed by: INTERNAL MEDICINE

## 2024-06-05 PROCEDURE — 82784 ASSAY IGA/IGD/IGG/IGM EACH: CPT | Performed by: STUDENT IN AN ORGANIZED HEALTH CARE EDUCATION/TRAINING PROGRAM

## 2024-06-05 PROCEDURE — 250N000013 HC RX MED GY IP 250 OP 250 PS 637: Performed by: STUDENT IN AN ORGANIZED HEALTH CARE EDUCATION/TRAINING PROGRAM

## 2024-06-05 PROCEDURE — 999N000157 HC STATISTIC RCP TIME EA 10 MIN

## 2024-06-05 RX ORDER — HYDROXYZINE HYDROCHLORIDE 25 MG/1
25 TABLET, FILM COATED ORAL ONCE
Status: COMPLETED | OUTPATIENT
Start: 2024-06-05 | End: 2024-06-05

## 2024-06-05 RX ORDER — GUAIFENESIN/DEXTROMETHORPHAN 100-10MG/5
10 SYRUP ORAL EVERY 4 HOURS
Status: DISCONTINUED | OUTPATIENT
Start: 2024-06-05 | End: 2024-06-09 | Stop reason: HOSPADM

## 2024-06-05 RX ORDER — HYDROXYZINE HYDROCHLORIDE 25 MG/1
25 TABLET, FILM COATED ORAL EVERY 6 HOURS PRN
Status: DISCONTINUED | OUTPATIENT
Start: 2024-06-05 | End: 2024-06-09 | Stop reason: HOSPADM

## 2024-06-05 RX ORDER — ACETAMINOPHEN 650 MG/1
650 SUPPOSITORY RECTAL EVERY 4 HOURS PRN
Status: DISCONTINUED | OUTPATIENT
Start: 2024-06-05 | End: 2024-06-09 | Stop reason: HOSPADM

## 2024-06-05 RX ORDER — ACETAMINOPHEN 325 MG/1
650 TABLET ORAL EVERY 4 HOURS PRN
Status: DISCONTINUED | OUTPATIENT
Start: 2024-06-05 | End: 2024-06-09 | Stop reason: HOSPADM

## 2024-06-05 RX ORDER — LANOLIN ALCOHOL/MO/W.PET/CERES
3 CREAM (GRAM) TOPICAL
Status: DISCONTINUED | OUTPATIENT
Start: 2024-06-05 | End: 2024-06-09 | Stop reason: HOSPADM

## 2024-06-05 RX ORDER — HYDROXYZINE HYDROCHLORIDE 25 MG/1
50 TABLET, FILM COATED ORAL EVERY 6 HOURS PRN
Status: DISCONTINUED | OUTPATIENT
Start: 2024-06-05 | End: 2024-06-09 | Stop reason: HOSPADM

## 2024-06-05 RX ADMIN — NICOTINE POLACRILEX 2 MG: 2 GUM, CHEWING BUCCAL at 21:44

## 2024-06-05 RX ADMIN — GABAPENTIN 300 MG: 300 CAPSULE ORAL at 20:25

## 2024-06-05 RX ADMIN — PIPERACILLIN AND TAZOBACTAM 4.5 G: 4; .5 INJECTION, POWDER, FOR SOLUTION INTRAVENOUS at 00:34

## 2024-06-05 RX ADMIN — NICOTINE POLACRILEX 2 MG: 2 GUM, CHEWING BUCCAL at 13:20

## 2024-06-05 RX ADMIN — MELATONIN TAB 3 MG 5 MG: 3 TAB at 20:25

## 2024-06-05 RX ADMIN — NICOTINE POLACRILEX 2 MG: 2 GUM, CHEWING BUCCAL at 02:15

## 2024-06-05 RX ADMIN — LISDEXAMFETAMINE DIMESYLATE 50 MG: 50 CAPSULE ORAL at 09:48

## 2024-06-05 RX ADMIN — HUMAN ALBUMIN MICROSPHERES AND PERFLUTREN 9 ML: 10; .22 INJECTION, SOLUTION INTRAVENOUS at 11:34

## 2024-06-05 RX ADMIN — NICOTINE POLACRILEX 2 MG: 2 GUM, CHEWING BUCCAL at 18:19

## 2024-06-05 RX ADMIN — CLONAZEPAM 1 MG: 1 TABLET ORAL at 15:07

## 2024-06-05 RX ADMIN — METHYLPREDNISOLONE SODIUM SUCCINATE 40 MG: 40 INJECTION, POWDER, FOR SOLUTION INTRAMUSCULAR; INTRAVENOUS at 09:48

## 2024-06-05 RX ADMIN — GUAIFENESIN AND DEXTROMETHORPHAN 10 ML: 100; 10 SYRUP ORAL at 20:26

## 2024-06-05 RX ADMIN — NICOTINE POLACRILEX 2 MG: 2 GUM, CHEWING BUCCAL at 20:26

## 2024-06-05 RX ADMIN — HYDROXYZINE HYDROCHLORIDE 25 MG: 25 TABLET, FILM COATED ORAL at 00:39

## 2024-06-05 RX ADMIN — CLONAZEPAM 1 MG: 1 TABLET ORAL at 14:45

## 2024-06-05 RX ADMIN — ACETAMINOPHEN 650 MG: 325 TABLET, FILM COATED ORAL at 20:36

## 2024-06-05 RX ADMIN — GUAIFENESIN AND DEXTROMETHORPHAN 10 ML: 100; 10 SYRUP ORAL at 14:45

## 2024-06-05 RX ADMIN — PIPERACILLIN AND TAZOBACTAM 4.5 G: 4; .5 INJECTION, POWDER, FOR SOLUTION INTRAVENOUS at 18:17

## 2024-06-05 RX ADMIN — SODIUM CHLORIDE, POTASSIUM CHLORIDE, SODIUM LACTATE AND CALCIUM CHLORIDE 1000 ML: 600; 310; 30; 20 INJECTION, SOLUTION INTRAVENOUS at 02:16

## 2024-06-05 RX ADMIN — MELATONIN TAB 3 MG 3 MG: 3 TAB at 00:39

## 2024-06-05 RX ADMIN — NICOTINE POLACRILEX 2 MG: 2 GUM, CHEWING BUCCAL at 00:39

## 2024-06-05 RX ADMIN — ACETAMINOPHEN 650 MG: 325 TABLET, FILM COATED ORAL at 12:33

## 2024-06-05 RX ADMIN — ACETAMINOPHEN 650 MG: 325 TABLET, FILM COATED ORAL at 00:39

## 2024-06-05 RX ADMIN — NICOTINE POLACRILEX 2 MG: 2 GUM, CHEWING BUCCAL at 14:58

## 2024-06-05 RX ADMIN — PIPERACILLIN AND TAZOBACTAM 4.5 G: 4; .5 INJECTION, POWDER, FOR SOLUTION INTRAVENOUS at 06:09

## 2024-06-05 RX ADMIN — NICOTINE POLACRILEX 2 MG: 2 GUM, CHEWING BUCCAL at 03:22

## 2024-06-05 RX ADMIN — CLONAZEPAM 2 MG: 1 TABLET ORAL at 20:24

## 2024-06-05 RX ADMIN — DOXYCYCLINE 100 MG: 100 INJECTION, POWDER, LYOPHILIZED, FOR SOLUTION INTRAVENOUS at 09:48

## 2024-06-05 RX ADMIN — METHYLPREDNISOLONE SODIUM SUCCINATE 40 MG: 40 INJECTION, POWDER, FOR SOLUTION INTRAMUSCULAR; INTRAVENOUS at 20:26

## 2024-06-05 RX ADMIN — CLONAZEPAM 2 MG: 1 TABLET ORAL at 09:48

## 2024-06-05 RX ADMIN — NICOTINE POLACRILEX 2 MG: 2 GUM, CHEWING BUCCAL at 12:07

## 2024-06-05 RX ADMIN — PIPERACILLIN AND TAZOBACTAM 4.5 G: 4; .5 INJECTION, POWDER, FOR SOLUTION INTRAVENOUS at 12:13

## 2024-06-05 RX ADMIN — NICOTINE POLACRILEX 2 MG: 2 GUM, CHEWING BUCCAL at 16:25

## 2024-06-05 RX ADMIN — GABAPENTIN 300 MG: 300 CAPSULE ORAL at 09:48

## 2024-06-05 RX ADMIN — DOXYCYCLINE 100 MG: 100 INJECTION, POWDER, LYOPHILIZED, FOR SOLUTION INTRAVENOUS at 20:26

## 2024-06-05 RX ADMIN — GABAPENTIN 300 MG: 300 CAPSULE ORAL at 14:45

## 2024-06-05 RX ADMIN — GUAIFENESIN AND DEXTROMETHORPHAN 10 ML: 100; 10 SYRUP ORAL at 12:06

## 2024-06-05 RX ADMIN — PANTOPRAZOLE SODIUM 40 MG: 40 TABLET, DELAYED RELEASE ORAL at 09:48

## 2024-06-05 ASSESSMENT — ACTIVITIES OF DAILY LIVING (ADL)
ADLS_ACUITY_SCORE: 40
ADLS_ACUITY_SCORE: 40
ADLS_ACUITY_SCORE: 44
ADLS_ACUITY_SCORE: 40
ADLS_ACUITY_SCORE: 44
ADLS_ACUITY_SCORE: 40
ADLS_ACUITY_SCORE: 44
ADLS_ACUITY_SCORE: 40
ADLS_ACUITY_SCORE: 40
ADLS_ACUITY_SCORE: 44
ADLS_ACUITY_SCORE: 40
ADLS_ACUITY_SCORE: 40
ADLS_ACUITY_SCORE: 44
ADLS_ACUITY_SCORE: 44
ADLS_ACUITY_SCORE: 40
ADLS_ACUITY_SCORE: 40
ADLS_ACUITY_SCORE: 44
ADLS_ACUITY_SCORE: 40
ADLS_ACUITY_SCORE: 40

## 2024-06-05 NOTE — PLAN OF CARE
DATE & TIME: 6/5/2024 0700-1930    Cognitive Concerns/ Orientation : A&O x4, slow to response at times, pt stated it was due to his lyme disease.  Pleasant with care.   BEHAVIOR & AGGRESSION TOOL COLOR: green   CIWA SCORE: na    ABNL VS/O2: tachypnea when coughing, RR 30 to 40s. Lungs coarse on right lobes. HFNC 55% 45L.    MOBILITY: SBA.   PAIN MANAGMENT: denied pain.   DIET: regular, encouraged low carb meals due to high blood sugar. Good appetite.   BOWEL/BLADDER: continent.   ABNL LAB/BG: lactic 1.5, procal 1.54. WBC 13.1.   DRAIN/DEVICES: PIV x1.   TELEMETRY RHYTHM: NSR to ST with activities.   SKIN: intact. Bed bath done.   TESTS/PROCEDURES: ECHO: EF 55 to 60%, did not suggest pulmonary HTN.   D/C DATE: pending resp. Status.     OTHER IMPORTANT INFO: Sent at least 4 sputum sample within the last 24 hours, all erjected due to all containment. MD aware, no need to collect again.

## 2024-06-05 NOTE — PROVIDER NOTIFICATION
MD Notification    Notified Person: MD    Notified Person Name:Cui    Notification Date/Time:12:15    Notification Interaction: Vocera massaging.     Purpose of Notification: pt  requesting meds for sleep, he states he previously took melatonin and hydroxyzine at night. he also wants Tylenol for pain. Thanks!      Orders Received: order received per Emar.     Comments:     Paged DR cui [phone] regarding pt Lactic  (2.4) -orders received for 1L LR bolus per Emar and repeat lactic orders.

## 2024-06-05 NOTE — PROVIDER NOTIFICATION
Brief update:    Added as needed acetaminophen, melatonin.  Also added one-time dose of hydroxyzine for sleep.    Julian Cui MD  12:17 AM

## 2024-06-05 NOTE — PROGRESS NOTES
[facility]    Medicine Progress Note - Hospitalist Service    Date of Admission:  6/3/2024    Assessment & Plan     Jonny Gramajo is a 39-year-old male with history of schizoaffective disorder, panic attacks, ADHD, GERD, history of Lyme's disease, who presented on 6/3/2024 with 5 days of worsening shortness of breath, fever up to 103.  He initially went to clinic where he was noted to be hypoxic with saturations of 82% and was thus sent to ER.    Workup showed pulmonary infiltrates and acute hypoxic respiratory failure.  He was placed on high flow oxygen and started on IV antibiotics and IV steroids.    Acute hypoxic respiratory failure-secondary to bilateral pneumonia/pneumonitis  - Was hypoxic with saturations of 82% on room air on admission.  - Now on high flow oxygen at 50 L/min, 60% FiO2. Overall stable  - Continue high flow oxygen.  Wean as able.  Treat pneumonia/pneumonitis as below      Bilateral pneumonia/pneumonitis, organism unspecified, likely bacterial   Severe sepsis due to above  Lactic acidosis  - Patient presented with fever of 103F, WBC 18.8, elevated procalcitonin of 4.24, respiratory rate 31, heart rate in 90s, hypoxic requiring high flow oxygen.  - lactic acid was normal on admission but increased to 3.1 after admission. Resolved with IV fluids and IV antibiotics.   - Chest x-ray showed bilateral interstitial and airspace opacities.  - Patient also has history of vaping and GERD.  Vaping induced lung injury, aspiration pneumonitis, CHF are also in the differential but given fever, leukocytosis, elevated procalcitonin, bacterial pneumonia is the most likely etiology.  - Blood cultures negative so far.  Urine Legionella and pneumococcal antigens negative  - respiratory panel negative.  MRSA nasal swab negative  - sputum culture pending  - leukocytosis and procalcitonin improving  - Pulmonary following  - Continue IV Zosyn and doxycycline.  Continue IV Solu-Medrol  -  "Continue high flow oxygen, wean as able  - Obtain echo and chest CT    Mild hypovolemic hyponatremia, sodium 134  -Improved from 134 to 141 with IV fluid    Prediabetes with steroid-induced hyperglycemia, A1c 5.7, 6/3/2024  - Blood sugar elevated in 300s due to systemic steroids  - increase Lantus to 16 units daily  - increase NovoLog to 2 unit per carb 3 times daily  - continue sliding scale  - Monitor blood sugars and adjust insulin accordingly    Schizoaffective disorder  Panic attacks  ADHD  - Continue clonazepam, Zyprexa, gabapentin, Vyvanse     GERD  - Continue Protonix    History of Lyme's disease  - Has post Lymes sequela-chronic arthritis with arthralgias and brain fog            Diet: Combination Diet Regular Diet Adult    DVT Prophylaxis: Enoxaparin (Lovenox) SQ  Gil Catheter: Not present  Lines: None     Cardiac Monitoring: ACTIVE order. Indication: imc  Code Status: Full Code      Clinically Significant Risk Factors              # Hypoalbuminemia: Lowest albumin = 3.4 g/dL at 6/4/2024 10:24 AM, will monitor as appropriate                    # Obesity: Estimated body mass index is 37.11 kg/m  as calculated from the following:    Height as of this encounter: 1.956 m (6' 5.01\").    Weight as of this encounter: 142 kg (313 lb 0.9 oz)., PRESENT ON ADMISSION            Disposition Plan         Medically Ready for Discharge: Anticipated in 2-4 Days             Arline Barbosa MD  Hospitalist Service  [facility]  Securely message with [employer] (more info)  Text page via [employer] Paging/Directory   ______________________________________________________________________    Interval History     Patient reports he feels same as yesterday  Oxygen requirements have been stable  No chest pain  Afebrile  Labs improving   Complains of cough and insomnia      Physical Exam   Vital Signs: Temp: 98.1  F (36.7  C) Temp src: Oral BP: 138/79 Pulse: 106   Resp: (!) 36 SpO2: 95 % O2 Device: High Flow Nasal " Cannula (HFNC) Oxygen Delivery: 50 LPM  Weight: 313 lbs .85 oz      Constitutional - awake and alert, resting in bed, in no acute distress  Cardiovascular - regular rate and rhythm, no murmurs, no edema  Pulmonary - lungs are clear to auscultation bilaterally, no wheezing or rhonchi  GI - abdomen is soft, nontender, nondistended, no hepatosplenomegaly or masses  Integumentary - skin is warm and dry, no rashes or ulcers  Neurological - awake, alert and oriented x3.  Moving all 4 extremities, normal speech, no focal deficits    Medical Decision Making       40 MINUTES SPENT BY ME on the date of service doing chart review, history, exam, documentation & further activities per the note.      Data     I have personally reviewed the following data over the past 24 hrs:    13.1 (H)  \   13.1 (L)   / 236     141 104 14.4 /  269 (H)   4.4 23 0.61 (L) \     Procal: 1.54 (H) CRP: N/A Lactic Acid: 1.5         Imaging results reviewed over the past 24 hrs:   Recent Results (from the past 24 hour(s))   Echocardiogram Complete   Result Value    LVEF  55-60%    Narrative    [id number]  [id number]  [id number]  [id number]^ELIESER^KIMANI     [facility]  Echocardiography Laboratory  [address] [place] [address]     Name: JONNY SUAREZ  MRN: [id number]  : 1985  Study Date: 2024 11:16 AM  Age: 39 yrs  Gender: Male  Patient Location: [facility]  Reason For Study: Respiratory Failure  Ordering Physician: KIMANI MON  Referring Physician: Maryanne Mitchell  Performed By: Jorden Frey RDCS     BSA: 2.7 m2  Height: 77 in  Weight: 313 lb  HR: 87  BP: 126/75 mmHg  ______________________________________________________________________________  Procedure  Complete Portable Echo Adult. Optison (NDC #[id number]) given intravenously.  ______________________________________________________________________________  Interpretation Summary     Limited image quality due to patient body habitus  There is borderline  concentric left ventricular hypertrophy.  Left ventricular systolic function is normal.  Left ventricular diastolic function is normal.  The right ventricle is normal in size and function.  Doppler findings do not suggest pulmonary hypertension.  Unable to assess mean RA pressure due to technically difficult study.  ______________________________________________________________________________  Left Ventricle  The left ventricle is normal in size. There is borderline concentric left  ventricular hypertrophy. Left ventricular systolic function is normal. The  visual ejection fraction is 55-60%. Left ventricular diastolic function is  normal. Normal left ventricular wall motion.     Right Ventricle  The right ventricle is normal in size and function.     Atria  Normal left atrial size. Right atrial size is normal.     Mitral Valve  The mitral valve leaflets appear normal. There is no evidence of stenosis,  fluttering, or prolapse.     Tricuspid Valve  Normal tricuspid valve. There is trace tricuspid regurgitation. The right  ventricular systolic pressure is approximated at 22.7 mmHg plus the right  atrial pressure. Doppler findings do not suggest pulmonary hypertension.  Unable to assess mean RA pressure due to technically difficult study.     Aortic Valve  The aortic valve is trileaflet. No hemodynamically significant valvular aortic  stenosis.     Pulmonic Valve  The pulmonic valve is not well seen, but is grossly normal.     Vessels  The aortic root is normal size.     Pericardium  The pericardium appears normal.     Rhythm  Sinus rhythm was noted.  ______________________________________________________________________________  MMode/2D Measurements & Calculations  IVSd: 1.2 cm     LVIDd: 4.5 cm  LVIDs: 3.5 cm  LVPWd: 1.2 cm  FS: 22.8 %  LV mass(C)d: 197.1 grams  LV mass(C)dI: 72.8 grams/m2  Ao root diam: 3.6 cm  LA dimension: 4.0 cm  asc Aorta Diam: 3.4 cm  LA/Ao: 1.1  Ao root diam index Ht(cm/m): 1.8  Ao root  diam index BSA (cm/m2): 1.3  Asc Ao diam index BSA (cm/m2): 1.3  Asc Ao diam index Ht(cm/m): 1.7  LA Volume (BP): 61.9 ml     LA Volume Index (BP): 22.8 ml/m2  RV Base: 4.0 cm  RWT: 0.53  TAPSE: 2.7 cm     Doppler Measurements & Calculations  MV E max fish: 111.0 cm/sec  MV A max fish: 82.7 cm/sec  MV E/A: 1.3  MV dec slope: 654.1 cm/sec2  MV dec time: 0.17 sec  PA acc time: 0.11 sec  TR max fish: 238.1 cm/sec  TR max P.7 mmHg  E/E' av.3  Lateral E/e': 8.1  Medial E/e': 10.4  RV S Fish: 11.7 cm/sec     ______________________________________________________________________________  Report approved by: Radha Reen 2024 02:13 PM

## 2024-06-05 NOTE — PLAN OF CARE
Goal Outcome Evaluation:  Date: 6/4-6/[id number].     Overall Patient Progress: no changeOverall Patient Progress: no change    Outcome Evaluation: On HFNC 65-70% 50L. tried to wean;  desats <90% with sleep.     Orientations: A&Ox4  Vitals/Pain: VSS on HFNC 65% 50L. Except tachypnic. C/o of generalized  pain. PRN med's given per Emar with relief.   Tele: ST  Respiratory: LS diminished, Coarse on bases. Is encouraged.   Lines/Drains: PIV x1 SL. Intermittent Abx.  Skin/Wounds: intact.   GI/: adequate uop via urinal. BS+,flatus+, Bmx1. Denies n/v. Tolerating diet. Denies n/v.   Labs: Abnormal/Trends, Electrolyte Replacement- N/A. Lactic (2.4) (1.5). BG(196).   Ambulation/Assist: up SBA    Sleep Quality: fair  Plan: wean 02 as able. 1L LR given per Emar. Lactic re-check (1.5). sputum culture contaminated; needs to be re-collected. ECHO and CT w/o contrast at AM.  Pulmonology following.

## 2024-06-06 ENCOUNTER — APPOINTMENT (OUTPATIENT)
Dept: CT IMAGING | Facility: CLINIC | Age: 39
DRG: 871 | End: 2024-06-06
Attending: INTERNAL MEDICINE
Payer: COMMERCIAL

## 2024-06-06 LAB
ANION GAP SERPL CALCULATED.3IONS-SCNC: 10 MMOL/L (ref 7–15)
BUN SERPL-MCNC: 15.4 MG/DL (ref 6–20)
CALCIUM SERPL-MCNC: 8.6 MG/DL (ref 8.6–10)
CHLORIDE SERPL-SCNC: 104 MMOL/L (ref 98–107)
CREAT SERPL-MCNC: 0.69 MG/DL (ref 0.67–1.17)
DEPRECATED HCO3 PLAS-SCNC: 26 MMOL/L (ref 22–29)
EGFRCR SERPLBLD CKD-EPI 2021: >90 ML/MIN/1.73M2
ERYTHROCYTE [DISTWIDTH] IN BLOOD BY AUTOMATED COUNT: 12.4 % (ref 10–15)
GLUCOSE BLDC GLUCOMTR-MCNC: 185 MG/DL (ref 70–99)
GLUCOSE BLDC GLUCOMTR-MCNC: 218 MG/DL (ref 70–99)
GLUCOSE BLDC GLUCOMTR-MCNC: 218 MG/DL (ref 70–99)
GLUCOSE BLDC GLUCOMTR-MCNC: 243 MG/DL (ref 70–99)
GLUCOSE BLDC GLUCOMTR-MCNC: 270 MG/DL (ref 70–99)
GLUCOSE SERPL-MCNC: 225 MG/DL (ref 70–99)
HCT VFR BLD AUTO: 38.2 % (ref 40–53)
HGB BLD-MCNC: 13.1 G/DL (ref 13.3–17.7)
MCH RBC QN AUTO: 31.4 PG (ref 26.5–33)
MCHC RBC AUTO-ENTMCNC: 34.3 G/DL (ref 31.5–36.5)
MCV RBC AUTO: 92 FL (ref 78–100)
PLATELET # BLD AUTO: 212 10E3/UL (ref 150–450)
POTASSIUM SERPL-SCNC: 4.4 MMOL/L (ref 3.4–5.3)
RBC # BLD AUTO: 4.17 10E6/UL (ref 4.4–5.9)
SODIUM SERPL-SCNC: 140 MMOL/L (ref 135–145)
WBC # BLD AUTO: 13 10E3/UL (ref 4–11)

## 2024-06-06 PROCEDURE — 999N000157 HC STATISTIC RCP TIME EA 10 MIN

## 2024-06-06 PROCEDURE — 99232 SBSQ HOSP IP/OBS MODERATE 35: CPT | Performed by: INTERNAL MEDICINE

## 2024-06-06 PROCEDURE — 120N000013 HC R&B IMCU

## 2024-06-06 PROCEDURE — 71250 CT THORAX DX C-: CPT

## 2024-06-06 PROCEDURE — 85014 HEMATOCRIT: CPT | Performed by: INTERNAL MEDICINE

## 2024-06-06 PROCEDURE — 94640 AIRWAY INHALATION TREATMENT: CPT

## 2024-06-06 PROCEDURE — 250N000013 HC RX MED GY IP 250 OP 250 PS 637: Performed by: STUDENT IN AN ORGANIZED HEALTH CARE EDUCATION/TRAINING PROGRAM

## 2024-06-06 PROCEDURE — 99231 SBSQ HOSP IP/OBS SF/LOW 25: CPT | Performed by: STUDENT IN AN ORGANIZED HEALTH CARE EDUCATION/TRAINING PROGRAM

## 2024-06-06 PROCEDURE — 36415 COLL VENOUS BLD VENIPUNCTURE: CPT | Performed by: INTERNAL MEDICINE

## 2024-06-06 PROCEDURE — 80048 BASIC METABOLIC PNL TOTAL CA: CPT | Performed by: INTERNAL MEDICINE

## 2024-06-06 PROCEDURE — 250N000013 HC RX MED GY IP 250 OP 250 PS 637: Performed by: INTERNAL MEDICINE

## 2024-06-06 PROCEDURE — 250N000011 HC RX IP 250 OP 636: Mod: JZ | Performed by: STUDENT IN AN ORGANIZED HEALTH CARE EDUCATION/TRAINING PROGRAM

## 2024-06-06 PROCEDURE — 250N000009 HC RX 250: Performed by: STUDENT IN AN ORGANIZED HEALTH CARE EDUCATION/TRAINING PROGRAM

## 2024-06-06 PROCEDURE — 250N000011 HC RX IP 250 OP 636: Performed by: STUDENT IN AN ORGANIZED HEALTH CARE EDUCATION/TRAINING PROGRAM

## 2024-06-06 RX ORDER — IBUPROFEN 200 MG
400 TABLET ORAL EVERY 6 HOURS PRN
Status: DISCONTINUED | OUTPATIENT
Start: 2024-06-06 | End: 2024-06-09 | Stop reason: HOSPADM

## 2024-06-06 RX ORDER — ALPRAZOLAM 0.25 MG
1 TABLET ORAL 4 TIMES DAILY PRN
Status: DISCONTINUED | OUTPATIENT
Start: 2024-06-06 | End: 2024-06-06

## 2024-06-06 RX ORDER — METHYLPREDNISOLONE SODIUM SUCCINATE 40 MG/ML
40 INJECTION, POWDER, LYOPHILIZED, FOR SOLUTION INTRAMUSCULAR; INTRAVENOUS DAILY
Status: DISCONTINUED | OUTPATIENT
Start: 2024-06-06 | End: 2024-06-08

## 2024-06-06 RX ORDER — LEVALBUTEROL INHALATION SOLUTION 1.25 MG/3ML
0.63 SOLUTION RESPIRATORY (INHALATION)
Status: DISCONTINUED | OUTPATIENT
Start: 2024-06-06 | End: 2024-06-09 | Stop reason: HOSPADM

## 2024-06-06 RX ORDER — ALPRAZOLAM 0.5 MG
0.5 TABLET ORAL 3 TIMES DAILY PRN
Status: DISCONTINUED | OUTPATIENT
Start: 2024-06-06 | End: 2024-06-09 | Stop reason: HOSPADM

## 2024-06-06 RX ADMIN — GUAIFENESIN AND DEXTROMETHORPHAN 10 ML: 100; 10 SYRUP ORAL at 15:32

## 2024-06-06 RX ADMIN — IBUPROFEN 400 MG: 400 TABLET ORAL at 00:46

## 2024-06-06 RX ADMIN — NICOTINE POLACRILEX 4 MG: 4 GUM, CHEWING BUCCAL at 21:52

## 2024-06-06 RX ADMIN — HYDROXYZINE HYDROCHLORIDE 50 MG: 25 TABLET, FILM COATED ORAL at 00:46

## 2024-06-06 RX ADMIN — NICOTINE POLACRILEX 4 MG: 4 GUM, CHEWING BUCCAL at 11:50

## 2024-06-06 RX ADMIN — ACETAMINOPHEN 650 MG: 325 TABLET, FILM COATED ORAL at 02:04

## 2024-06-06 RX ADMIN — ACETAMINOPHEN 650 MG: 325 TABLET, FILM COATED ORAL at 08:17

## 2024-06-06 RX ADMIN — DOXYCYCLINE 100 MG: 100 INJECTION, POWDER, LYOPHILIZED, FOR SOLUTION INTRAVENOUS at 08:17

## 2024-06-06 RX ADMIN — PIPERACILLIN AND TAZOBACTAM 4.5 G: 4; .5 INJECTION, POWDER, FOR SOLUTION INTRAVENOUS at 00:00

## 2024-06-06 RX ADMIN — ALPRAZOLAM 0.25 MG: 0.25 TABLET ORAL at 02:04

## 2024-06-06 RX ADMIN — GUAIFENESIN AND DEXTROMETHORPHAN 10 ML: 100; 10 SYRUP ORAL at 23:51

## 2024-06-06 RX ADMIN — PIPERACILLIN AND TAZOBACTAM 4.5 G: 4; .5 INJECTION, POWDER, FOR SOLUTION INTRAVENOUS at 18:05

## 2024-06-06 RX ADMIN — GUAIFENESIN AND DEXTROMETHORPHAN 10 ML: 100; 10 SYRUP ORAL at 00:00

## 2024-06-06 RX ADMIN — ACETAMINOPHEN 650 MG: 325 TABLET, FILM COATED ORAL at 20:31

## 2024-06-06 RX ADMIN — ALPRAZOLAM 0.25 MG: 0.25 TABLET ORAL at 13:00

## 2024-06-06 RX ADMIN — NICOTINE POLACRILEX 4 MG: 4 GUM, CHEWING BUCCAL at 02:02

## 2024-06-06 RX ADMIN — NICOTINE POLACRILEX 4 MG: 4 GUM, CHEWING BUCCAL at 09:42

## 2024-06-06 RX ADMIN — CLONAZEPAM 2 MG: 1 TABLET ORAL at 20:31

## 2024-06-06 RX ADMIN — OLANZAPINE 30 MG: 15 TABLET, FILM COATED ORAL at 00:00

## 2024-06-06 RX ADMIN — CALCIUM CARBONATE (ANTACID) CHEW TAB 500 MG 1000 MG: 500 CHEW TAB at 03:47

## 2024-06-06 RX ADMIN — LEVALBUTEROL HYDROCHLORIDE 0.63 MG: 1.25 SOLUTION RESPIRATORY (INHALATION) at 20:15

## 2024-06-06 RX ADMIN — NICOTINE POLACRILEX 4 MG: 4 GUM, CHEWING BUCCAL at 13:00

## 2024-06-06 RX ADMIN — GUAIFENESIN AND DEXTROMETHORPHAN 10 ML: 100; 10 SYRUP ORAL at 08:16

## 2024-06-06 RX ADMIN — ALPRAZOLAM 0.5 MG: 0.5 TABLET ORAL at 23:52

## 2024-06-06 RX ADMIN — NICOTINE POLACRILEX 4 MG: 4 GUM, CHEWING BUCCAL at 03:47

## 2024-06-06 RX ADMIN — GABAPENTIN 300 MG: 300 CAPSULE ORAL at 13:01

## 2024-06-06 RX ADMIN — PIPERACILLIN AND TAZOBACTAM 4.5 G: 4; .5 INJECTION, POWDER, FOR SOLUTION INTRAVENOUS at 13:01

## 2024-06-06 RX ADMIN — CLONAZEPAM 2 MG: 1 TABLET ORAL at 15:32

## 2024-06-06 RX ADMIN — NICOTINE POLACRILEX 4 MG: 4 GUM, CHEWING BUCCAL at 20:31

## 2024-06-06 RX ADMIN — CLONAZEPAM 2 MG: 1 TABLET ORAL at 08:17

## 2024-06-06 RX ADMIN — NICOTINE POLACRILEX 2 MG: 2 GUM, CHEWING BUCCAL at 00:00

## 2024-06-06 RX ADMIN — LISDEXAMFETAMINE DIMESYLATE 50 MG: 50 CAPSULE ORAL at 08:17

## 2024-06-06 RX ADMIN — DOXYCYCLINE 100 MG: 100 INJECTION, POWDER, LYOPHILIZED, FOR SOLUTION INTRAVENOUS at 20:32

## 2024-06-06 RX ADMIN — NICOTINE POLACRILEX 4 MG: 4 GUM, CHEWING BUCCAL at 23:54

## 2024-06-06 RX ADMIN — PANTOPRAZOLE SODIUM 40 MG: 40 TABLET, DELAYED RELEASE ORAL at 08:17

## 2024-06-06 RX ADMIN — GUAIFENESIN AND DEXTROMETHORPHAN 10 ML: 100; 10 SYRUP ORAL at 03:47

## 2024-06-06 RX ADMIN — NICOTINE POLACRILEX 4 MG: 4 GUM, CHEWING BUCCAL at 18:03

## 2024-06-06 RX ADMIN — OLANZAPINE 30 MG: 15 TABLET, FILM COATED ORAL at 21:51

## 2024-06-06 RX ADMIN — NICOTINE POLACRILEX 4 MG: 4 GUM, CHEWING BUCCAL at 15:32

## 2024-06-06 RX ADMIN — NICOTINE POLACRILEX 4 MG: 4 GUM, CHEWING BUCCAL at 06:09

## 2024-06-06 RX ADMIN — GUAIFENESIN AND DEXTROMETHORPHAN 10 ML: 100; 10 SYRUP ORAL at 11:50

## 2024-06-06 RX ADMIN — METHYLPREDNISOLONE SODIUM SUCCINATE 40 MG: 40 INJECTION, POWDER, FOR SOLUTION INTRAMUSCULAR; INTRAVENOUS at 08:16

## 2024-06-06 RX ADMIN — NICOTINE POLACRILEX 4 MG: 4 GUM, CHEWING BUCCAL at 08:17

## 2024-06-06 RX ADMIN — GABAPENTIN 300 MG: 300 CAPSULE ORAL at 08:17

## 2024-06-06 RX ADMIN — SENNOSIDES AND DOCUSATE SODIUM 2 TABLET: 50; 8.6 TABLET ORAL at 09:42

## 2024-06-06 RX ADMIN — GABAPENTIN 300 MG: 300 CAPSULE ORAL at 20:32

## 2024-06-06 RX ADMIN — GUAIFENESIN AND DEXTROMETHORPHAN 10 ML: 100; 10 SYRUP ORAL at 20:31

## 2024-06-06 RX ADMIN — MELATONIN TAB 3 MG 5 MG: 3 TAB at 20:31

## 2024-06-06 RX ADMIN — ALPRAZOLAM 0.5 MG: 0.5 TABLET ORAL at 17:53

## 2024-06-06 RX ADMIN — ACETAMINOPHEN 650 MG: 325 TABLET, FILM COATED ORAL at 15:32

## 2024-06-06 RX ADMIN — PIPERACILLIN AND TAZOBACTAM 4.5 G: 4; .5 INJECTION, POWDER, FOR SOLUTION INTRAVENOUS at 23:51

## 2024-06-06 RX ADMIN — PIPERACILLIN AND TAZOBACTAM 4.5 G: 4; .5 INJECTION, POWDER, FOR SOLUTION INTRAVENOUS at 06:09

## 2024-06-06 ASSESSMENT — ACTIVITIES OF DAILY LIVING (ADL)
ADLS_ACUITY_SCORE: 42
ADLS_ACUITY_SCORE: 44
ADLS_ACUITY_SCORE: 42
ADLS_ACUITY_SCORE: 42
ADLS_ACUITY_SCORE: 46
ADLS_ACUITY_SCORE: 42
ADLS_ACUITY_SCORE: 44
ADLS_ACUITY_SCORE: 42
ADLS_ACUITY_SCORE: 44
ADLS_ACUITY_SCORE: 42
ADLS_ACUITY_SCORE: 44
ADLS_ACUITY_SCORE: 44
ADLS_ACUITY_SCORE: 42
ADLS_ACUITY_SCORE: 44
ADLS_ACUITY_SCORE: 42
ADLS_ACUITY_SCORE: 44
ADLS_ACUITY_SCORE: 42
ADLS_ACUITY_SCORE: 44
ADLS_ACUITY_SCORE: 42
ADLS_ACUITY_SCORE: 44
ADLS_ACUITY_SCORE: 42

## 2024-06-06 NOTE — PLAN OF CARE
Goal Outcome Evaluation:    Orientations: A/O x 4, anxiety. Occasional brain fog due to Lyme's disease  PRN xanax given x 2  Vitals/Pain: VSS on 6L oxymask ex; labile BP. Intermittent HTN in 170s systolic. MD made aware. C/o pain in lower back. PRN tylenol x 2. Denies nausea  -Nicorette gum given PRN  Respiratory: LS diminished. IS level at 2,250. Scheduled nebs  Tele: SR/ST  Lines/Drains: PIV x 1 SL. Intermittent antibiotics  Skin/Wounds: Intact  GI/: AUOP using urinal. BM x 1  Labs: Abnormal/Trends, Electrolyte Replacement- BG ACHS  Hgb 13.1  Ambulation/Assist: SBA  Sleep Quality: good  Plan: Wean O2 as able

## 2024-06-06 NOTE — PLAN OF CARE
Neuro: A&O x4, anxious  Tele/cardiac: SR  Respiration: weaned to 10 L Oxymax  Activity: SBA  Pain: Ibuprofen and tylenol for back pain  Drips: PIV SL  Drains/tubes: None  Skin: intact  GI/: continent, adequate UOP  Aggression color: green  Isolation: none  Plan: wean off O2 as able

## 2024-06-06 NOTE — PROGRESS NOTES
[facility]    Pulmonary, Allergy, Critical Care and Sleep Medicine    Progress Note  06/06/2024    Jonny Gramajo MRN# [id number]   Age: 39 year old YOB: 1985     Date of Admission: 6/3/2024     Assessment and Recommendations:    Jonny Gramajo is a 39 year old male with a history of severe RASHEED, vaping, HTN, Schizoaffective disorder, depression who presented on 6/3/2024 with cough, fever, and dyspnea and found to have acute hypoxic respiratory failure.    Acute Hypoxic Respiratory Failure  CT today with patchy ground glass opacities, more centrally located within the upper lobes. Concern for vaping induced lung injury and pneumonia. Cough, fever, and dyspnea with leukocytosis, high procal, and bilateral infiltrates on admission. Strep, legionella, viral testing negative, sputum cultures contaminated, have shown yeast and GPCs. No eosinophilia, IgG just below normal. Is improving on steroids and antibiotics, weaned from high flow to face mask. Will wean steroids today, if continued improvement can transition to PO in next 1-2 days. Will start scheduled airway clearance to help with cough and chest congestion.  - Methylpred 40 mg daily today, if stable tomorrow can plan to go to prednisone for several days then stop  - Xopenex BID scheduled with flutter valve  - Stressed importance of abstinence from vaping     Severe RASHEED  Seen on home sleep study. Unfortunately unable to tolerate CPAP due to claustrophobia. Sounds like was using nasal pillow mask. Did encourage follow up with Sleep once outpatient to discuss other options for treatment of his RASHEED. Echo showed normal cardiac function but unable to fully assess pulmonary pressures.     Sharon Flores MD PhD  Pulmonary and Critical Care     Interval History:    Reports feeling better. Able to wean from high flow to 6-7L NC with sats in low 90s. Producing a little mucus. Good relief from the Robitussin but does feel like there is  some chest congestion. Discussed home CPAP, sounds like was using a nasal pillow     Review of Systems:  Complete 12 point ROS negative unless mentioned in HPI    Medications, Allergies:      Medications:  Current Facility-Administered Medications   Medication Dose Route Frequency Provider Last Rate Last Admin    clonazePAM (klonoPIN) tablet 2 mg  2 mg Oral TID Lionel Gerard MD   2 mg at 06/06/24 0817    doxycycline (VIBRAMYCIN) 100 mg vial to attach to  mL bag  100 mg Intravenous BID Lionel Gerard MD   100 mg at 06/06/24 0817    gabapentin (NEURONTIN) capsule 300 mg  300 mg Oral TID Lionel Gerard MD   300 mg at 06/06/24 1301    guaiFENesin-dextromethorphan (ROBITUSSIN DM) 100-10 MG/5ML syrup 10 mL  10 mL Oral Q4H Arline Barbosa MD   10 mL at 06/06/24 1150    insulin aspart (NovoLOG) injection (RAPID ACTING)   Subcutaneous Daily with breakfast Arline Barbosa MD   10 Units at 06/06/24 0941    insulin aspart (NovoLOG) injection (RAPID ACTING)   Subcutaneous Daily with lunch Arline Barbosa MD   8 Units at 06/06/24 1301    insulin aspart (NovoLOG) injection (RAPID ACTING)   Subcutaneous Daily with supper Arline Barbosa MD   18 Units at 06/05/24 1626    insulin aspart (NovoLOG) injection (RAPID ACTING)  1-10 Units Subcutaneous TID AC Arline Barbosa MD   6 Units at 06/06/24 1150    insulin aspart (NovoLOG) injection (RAPID ACTING)  1-7 Units Subcutaneous At Bedtime Arline Barbosa MD        insulin glargine (LANTUS PEN) injection 12 Units  12 Units Subcutaneous QAM AC Arline Barbosa MD   12 Units at 06/06/24 0709    lisdexamfetamine (VYVANSE) capsule 50 mg  50 mg Oral QAM Lionel Gerard MD   50 mg at 06/06/24 0817    melatonin tablet 5 mg  5 mg Oral QPM Arline Barbosa MD   5 mg at 06/05/24 2025    methylPREDNISolone sodium succinate (SOLU-MEDROL) injection 40 mg  40 mg Intravenous Daily Sharon Flores MD   40 mg at 06/06/24 0816     OLANZapine (zyPREXA) tablet 30 mg  30 mg Oral At Bedtime Lionel Gerard MD   30 mg at 06/06/24 0000    pantoprazole (PROTONIX) EC tablet 40 mg  40 mg Oral Daily Lionel Gerard MD   40 mg at 06/06/24 0817    piperacillin-tazobactam (ZOSYN) 4.5 g vial to attach to  mL bag  4.5 g Intravenous Q6H Lionel Gerard MD   4.5 g at 06/06/24 1301    sodium chloride (PF) 0.9% PF flush 3 mL  3 mL Intracatheter Q8H Lionel Gerard MD   3 mL at 06/06/24 0817     Current Facility-Administered Medications   Medication Dose Route Frequency Provider Last Rate Last Admin    acetaminophen (TYLENOL) tablet 650 mg  650 mg Oral Q4H PRN Julian Cui MD   650 mg at 06/06/24 0817    Or    acetaminophen (TYLENOL) Suppository 650 mg  650 mg Rectal Q4H PRN Julian Cui MD        ALPRAZolam (XANAX) tablet 1 mg  1 mg Oral 4x Daily PRN Arline Barbosa MD        calcium carbonate (TUMS) chewable tablet 1,000 mg  1,000 mg Oral 4x Daily PRN Lionel Gerard MD   1,000 mg at 06/06/24 0347    glucose gel 15-30 g  15-30 g Oral Q15 Min PRN Arline Barbosa MD        Or    dextrose 50 % injection 25-50 mL  25-50 mL Intravenous Q15 Min PRN Arline Barbosa MD        Or    glucagon injection 1 mg  1 mg Subcutaneous Q15 Min PRN Arline Barbosa MD        hydrOXYzine HCl (ATARAX) tablet 25 mg  25 mg Oral Q6H PRN Arline Barbosa MD        Or    hydrOXYzine HCl (ATARAX) tablet 50 mg  50 mg Oral Q6H PRN Arline Barbosa MD   50 mg at 06/06/24 0046    ibuprofen (ADVIL/MOTRIN) tablet 400 mg  400 mg Oral Q6H PRN Julian Cui MD   400 mg at 06/06/24 0046    insulin aspart (NovoLOG) injection (RAPID ACTING)   Subcutaneous With Snacks or Supplements Arline Barbosa MD        levalbuterol (XOPENEX) neb solution 0.63 mg  0.63 mg Nebulization Q6H PRN Sharon Flores MD        lidocaine (LMX4) cream   Topical Q1H PRN Lionel Gerard MD        lidocaine 1 % 0.1-1 mL   "0.1-1 mL Other Q1H PRN Lionel Gerard MD        melatonin tablet 3 mg  3 mg Oral At Bedtime PRN Cui, Julian Leonardo MD   3 mg at 06/05/24 0039    nicotine (NICORETTE) gum 4 mg  4 mg Buccal Q1H PRN Cui, Julian Leonardo MD   4 mg at 06/06/24 1300    senna-docusate (SENOKOT-S/PERICOLACE) 8.6-50 MG per tablet 1 tablet  1 tablet Oral BID PRN Lionel Gerard MD        Or    senna-docusate (SENOKOT-S/PERICOLACE) 8.6-50 MG per tablet 2 tablet  2 tablet Oral BID PRN Lionel Gerard MD   2 tablet at 06/06/24 0942    sodium chloride (PF) 0.9% PF flush 3 mL  3 mL Intracatheter q1 min prn Lionel Gerard MD         Allergies:     Allergies   Allergen Reactions    Abilify [Aripiprazole]      Patient reports tardive dyskinesia effect in 2004 but likely akathisia since patient reports the effects only occurred while on med and resolved with a few days after discontinuing med.     Wellbutrin [Bupropion] Anxiety     Patient reports felt \"reved\" up and anxious when taken in 2001.      Physical Exam:    Temp:  [97.4  F (36.3  C)-98.6  F (37  C)] 97.4  F (36.3  C)  Pulse:  [] 101  Resp:  [12-37] 34  BP: (114-172)/() 172/109  FiO2 (%):  [55 %-60 %] 55 %  SpO2:  [86 %-98 %] 92 %      Intake/Output Summary (Last 24 hours) at 6/6/2024 1525  Last data filed at 6/6/2024 1341  Gross per 24 hour   Intake 800 ml   Output 7240 ml   Net -6440 ml     General: laying in bed in NAD  HEENT: anicteric, moist mucosa, on face mask  Chest: CTAB, no wheezing  Cardiac: RRR no murmurs  Abdomen: Soft, flat, non tender, active BS  Extremities: No LE Edema  Neuro: A&Ox3, no focal deficits   Skin: no rash noted    Laboratory, imaging, and microbiologic data:    All laboratory and imaging data reviewed, pertinent results discussed above.        "

## 2024-06-06 NOTE — PROVIDER NOTIFICATION
Added as needed ibuprofen for pain control    Julian Cui MD  12:14 AM    Nicotine gum increased to 4mg dose per request    Julian Cui MD  1:35 AM

## 2024-06-06 NOTE — PROGRESS NOTES
[facility]    Medicine Progress Note - Hospitalist Service    Date of Admission:  6/3/2024    Assessment & Plan     Jonny Gramajo is a 39-year-old male with history of schizoaffective disorder, panic attacks, ADHD, GERD, history of Lyme's disease, who presented on 6/3/2024 with 5 days of worsening shortness of breath, fever up to 103.  He initially went to clinic where he was noted to be hypoxic with saturations of 82% and was thus sent to ER.    Workup showed pulmonary infiltrates and acute hypoxic respiratory failure.  He was placed on high flow oxygen and started on IV antibiotics and IV steroids.    Acute hypoxic respiratory failure-secondary to bilateral pneumonia/pneumonitis  - Was hypoxic with saturations of 82% on room air on admission.  - required high flow oxygen at 50 L/min, 60% FiO2 initially.  Weaned to oxygen by facemask at 8 L/min on 6/6.  Continue to wean as able.  - Treat pneumonia/pneumonitis as below      Bilateral pneumonia/pneumonitis, organism unspecified, likely bacterial   Severe sepsis due to above  Lactic acidosis  - Presented with fever of 103F, WBC 18.8, elevated procalcitonin of 4.24, RR 31, HR in 90s, hypoxic requiring high flow oxygen.  - lactic acid was normal on admission but increased to 3.1 after admission. Resolved with IV fluids and IV antibiotics.   - Chest x-ray showed bilateral interstitial and airspace opacities.  - Patient also has history of vaping and GERD.  Vaping induced lung injury, aspiration pneumonitis, CHF are also in the differential but given fever, leukocytosis, elevated procalcitonin, bacterial pneumonia is the most likely etiology.  - Blood cultures negative.  Urine Legionella and pneumococcal antigens negative  - respiratory panel negative.  MRSA nasal swab negative  - sputum culture was contaminated on multiple occasions  - CT chest 6/6 showed bilateral upper lobes and perihilar patchy groundglass and coarse interstitial opacities  "likely infectious.  - Echo 6/5 showed borderline concentric LVH, normal LV and RV systolic and diastolic function. No pulm HTN.   - leukocytosis and procalcitonin improving. Pulmonary following  - Continue IV Zosyn and doxycycline.  Continue IV Solu-Medrol    Mild hypovolemic hyponatremia, sodium 134  - Improved from 134 to 140 with IV fluid. Now stable.     Prediabetes with steroid-induced hyperglycemia, A1c 5.7, 6/3/2024  - Blood sugar were elevated in 300s due to systemic steroids.  Started on insulin Lantus and NovoLog.  Blood sugars now improved to 200-250  - continue Lantus 16 units daily  - continue NovoLog to 2 unit per carb 3 times daily  - continue sliding scale  - Monitor blood sugars and adjust insulin accordingly    Schizoaffective disorder  Panic attacks  ADHD  - Continue clonazepam, Zyprexa, gabapentin, Vyvanse     GERD  - Continue Protonix    History of Lyme's disease  - Has post Lymes sequela-chronic arthritis with arthralgias and brain fog            Diet: Combination Diet Regular Diet Adult    DVT Prophylaxis: Enoxaparin (Lovenox) SQ  Gil Catheter: Not present  Lines: None     Cardiac Monitoring: ACTIVE order. [place] [facility] [place] Status: Full Code      Clinically Significant Risk Factors              # Hypoalbuminemia: Lowest albumin = 3.4 g/dL at 6/4/2024 10:24 AM, will monitor as appropriate                    # Obesity: Estimated body mass index is 35.95 kg/m  as calculated from the following:    Height as of this encounter: 1.956 m (6' 5.01\").    Weight as of this encounter: 137.5 kg (303 lb 3.2 oz)., PRESENT ON ADMISSION            Disposition Plan         Medically Ready for Discharge: Anticipated in 2-4 Days             Arline Barbosa MD  Hospitalist Service  [facility]  Securely message with [facility] (more info)  Text page via [email or web address] Paging/Directory   ______________________________________________________________________    Interval History     Patient reports " he feels better today.  Shortness of breath has improved.    Weaned off high flow oxygen.  Now on oxime mask at 8 L/min   Leukocytosis improved to 13   Afebrile       Physical Exam   Vital Signs: Temp: 97.4  F (36.3  C) Temp src: Axillary BP: (!) 137/99 Pulse: 89   Resp: 12 SpO2: 97 % O2 Device: Oxymask Oxygen Delivery: 8 LPM  Weight: 303 lbs 3.2 oz      Constitutional - awake and alert, resting in bed, in no acute distress  Cardiovascular - regular rate and rhythm, no murmurs, no edema  Pulmonary - lungs are clear to auscultation bilaterally, no wheezing or rhonchi  GI - abdomen is soft, nontender, nondistended, no hepatosplenomegaly or masses  Integumentary - skin is warm and dry, no rashes or ulcers  Neurological - awake, alert and oriented x3.  Moving all 4 extremities, normal speech, no focal deficits    Medical Decision Making       40 MINUTES SPENT BY ME on the date of service doing chart review, history, exam, documentation & further activities per the note.      Data     I have personally reviewed the following data over the past 24 hrs:    13.0 (H)  \   13.1 (L)   / 212     140 104 15.4 /  225 (H)   4.4 26 0.69 \     Procal: 1.54 (H) CRP: N/A Lactic Acid: N/A         Imaging results reviewed over the past 24 hrs:   Recent Results (from the past 24 hour(s))   Echocardiogram Complete   Result Value    LVEF  55-60%    Narrative    [id number]  [id number]  [id number]  [id number]^[id number]^KIMANI     [facility]  Echocardiography Laboratory  [address] [place] [address]     Name: JONNY SUAREZ  MRN: [id number]  : 1985  Study Date: 2024 11:16 AM  Age: 39 yrs  Gender: Male  Patient Location: [facility]  Reason For Study: Respiratory Failure  Ordering Physician: KIMANI MON  Referring Physician: Maryanne Mitchell  Performed By: Jorden Frey WAGNER     BSA: 2.7 m2  Height: 77 in  Weight: 313 lb  HR: 87  BP: 126/75  mmHg  ______________________________________________________________________________  Procedure  Complete Portable Echo Adult. Optison (NDC #[id number]) given intravenously.  ______________________________________________________________________________  Interpretation Summary     Limited image quality due to patient body habitus  There is borderline concentric left ventricular hypertrophy.  Left ventricular systolic function is normal.  Left ventricular diastolic function is normal.  The right ventricle is normal in size and function.  Doppler findings do not suggest pulmonary hypertension.  Unable to assess mean RA pressure due to technically difficult study.  ______________________________________________________________________________  Left Ventricle  The left ventricle is normal in size. There is borderline concentric left  ventricular hypertrophy. Left ventricular systolic function is normal. The  visual ejection fraction is 55-60%. Left ventricular diastolic function is  normal. Normal left ventricular wall motion.     Right Ventricle  The right ventricle is normal in size and function.     Atria  Normal left atrial size. Right atrial size is normal.     Mitral Valve  The mitral valve leaflets appear normal. There is no evidence of stenosis,  fluttering, or prolapse.     Tricuspid Valve  Normal tricuspid valve. There is trace tricuspid regurgitation. The right  ventricular systolic pressure is approximated at 22.7 mmHg plus the right  atrial pressure. Doppler findings do not suggest pulmonary hypertension.  Unable to assess mean RA pressure due to technically difficult study.     Aortic Valve  The aortic valve is trileaflet. No hemodynamically significant valvular aortic  stenosis.     Pulmonic Valve  The pulmonic valve is not well seen, but is grossly normal.     Vessels  The aortic root is normal size.     Pericardium  The pericardium appears normal.     Rhythm  Sinus rhythm was  noted.  ______________________________________________________________________________  MMode/2D Measurements & Calculations  IVSd: 1.2 cm     LVIDd: 4.5 cm  LVIDs: 3.5 cm  LVPWd: 1.2 cm  FS: 22.8 %  LV mass(C)d: 197.1 grams  LV mass(C)dI: 72.8 grams/m2  Ao root diam: 3.6 cm  LA dimension: 4.0 cm  asc Aorta Diam: 3.4 cm  LA/Ao: 1.1  Ao root diam index Ht(cm/m): 1.8  Ao root diam index BSA (cm/m2): 1.3  Asc Ao diam index BSA (cm/m2): 1.3  Asc Ao diam index Ht(cm/m): 1.7  LA Volume (BP): 61.9 ml     LA Volume Index (BP): 22.8 ml/m2  RV Base: 4.0 cm  RWT: 0.53  TAPSE: 2.7 cm     Doppler Measurements & Calculations  MV E max fish: 111.0 cm/sec  MV A max fish: 82.7 cm/sec  MV E/A: 1.3  MV dec slope: 654.1 cm/sec2  MV dec time: 0.17 sec  PA acc time: 0.11 sec  TR max fish: 238.1 cm/sec  TR max P.7 mmHg  E/E' av.3  Lateral E/e': 8.1  Medial E/e': 10.4  RV S Fish: 11.7 cm/sec     ______________________________________________________________________________  Report approved by: Radha Rene 2024 02:13 PM         CT Chest w/o Contrast    Narrative    EXAM: CT CHEST W/O CONTRAST  LOCATION: [facility]  DATE: 2024    INDICATION: Bilateral infiltrates, resp failure.  COMPARISON: Chest x-ray on 6/3/2024.  TECHNIQUE: CT chest without IV contrast. Multiplanar reformats were obtained. Dose reduction techniques were used.  CONTRAST: None.    FINDINGS:   LUNGS AND PLEURA: No pleural effusion or pneumothorax. Bilateral upper lobes and perihilar predominant patchy groundglass and coarse interstitial pulmonary opacities, likely infectious. No significant pleural effusion or pneumothorax.    MEDIASTINUM/AXILLAE: No cardiomegaly or significant pericardial effusion. Multiple enlarged mediastinal and hilar lymph nodes, could be reactive.    CORONARY ARTERY CALCIFICATION: None.     UPPER ABDOMEN: Diffuse hypodense appearance of the liver, likely due to underlying hepatic steatosis. The spleen  is enlarged measuring 15.5 cm in craniocaudal dimension.    MUSCULOSKELETAL: No suspicious osseous lesion.      Impression    IMPRESSION:   1.  Bilateral upper lobes and perihilar predominant patchy groundglass and coarse interstitial pulmonary opacities, likely infectious.  2.  Hepatic steatosis.  3.  Splenomegaly.

## 2024-06-06 NOTE — PROGRESS NOTES
Antimicrobial Stewardship Team Note    Antimicrobial Stewardship Program - A joint venture between [facility] and [facility] to optimize antibiotic management.     Patient: Jonny Gramajo  MRN: [id number]  Allergies: Abilify [aripiprazole] and Wellbutrin [bupropion]    Brief Summary: Jonny Gramajo is a 39 year old male admitted on 6/3/24 with shortness of breath and fever. PMH significant for schizoaffective disorder, panic attacks, ADHD, GERD, and hx of Lyme disease. He was started on Zosyn and doxycycline for pneumonia.    CXR with bilateral interstitial and airspace opacities. WBC count 18.8, procalcitonin 4.24. Blood cultures, RVP, MRSA nares PCR negative. Sputum culture attempted to be collected x3 but contaminated. CT chest 6/6 with bilateral upper lobe and perihilar patchy groundglass and coarse interstitial opacities likely infectious.     Afebrile, initially required 45LPM O2, now on 6 LPM.         Active Anti-infective Medications   (From admission, onward)                 Start     Stop    06/04/24 0900  doxycycline  100 mg,   Intravenous,   2 TIMES DAILY        Community Acquired Pneumonia       --    06/03/24 1800  piperacillin-tazobactam  3.375 g,   Intravenous,   EVERY 6 HOURS        Community Acquired Pneumonia       --                  Assessment: Pneumonia  Patient presented with fever and shortness of breath, found to have imaging consistent with pneumonia. He was started on broad spectrum antibiotics with Zosyn and doxycycline, currently on day 4. Patient does not have a history of resistant drug infections and presented from the community, recommend narrowing Zosyn to Augmentin to complete 7 day course.    Recommendations:  Narrow Zosyn to Augmentin 875mg BID to complete 7 day course.  Complete 7 day course of doxycycline.    Discussed with ID Staff MD Iraida Fry, PharmD, BCIDP    Vital Signs/Clinical Features:  Vitals         06/04  0700  06/05 0659 06/05 0700  06/06 0659 06/06 0700  06/06 1431   Most Recent      Temp ( F) 98.6 -  99    97.7 -  98.5    97.4 -  98.6     97.4 (36.3) 06/06 1117    Pulse 89 -  115    73 -  109    88 -  113     113 06/06 1400    Resp 15 -  39    14 -  37    12 -  34     29 06/06 1400    /73 -  133/88    110/83 -  146/90    132/89 -  172/109     132/89 06/06 1400    SpO2 (%) 89 -  100    86 -  98    91 -  97     93 06/06 1400            Labs  Estimated Creatinine Clearance: 220.6 mL/min (based on SCr of 0.69 mg/dL).  Recent Labs   Lab Test 10/11/17  0805 03/20/21  1143 06/03/24  1029 06/04/24  1024 06/05/24  1242 06/06/24  0655   CR 0.81 0.72 0.72 0.66* 0.61* 0.69       Recent Labs   Lab Test 07/25/17  1345 08/04/17  1130 08/31/17  0815 09/19/17  1236 10/11/17  0805 03/20/21  1143 06/03/24  1029 06/04/24  1024 06/05/24  1242 06/06/24  0655   WBC 6.9 6.1   < > 10.5 6.5 7.1 18.8* 16.8*  16.4* 13.1* 13.0*   ANEU 4.6 4.6  --  7.9 4.3 2.9  --  13.9*  --   --    ALYM 1.7 0.9  --  1.5 1.0 3.5  --  1.5  --   --    YAJAIRA 0.7 0.4  --  1.0 1.2 0.7  --  1.3  --   --    AEOS 0.0 0.1  --  0.0 0.0 0.0  --  0.0  --   --    HGB 15.7 14.4   < > 16.6 16.2 17.9* 14.6 13.1*  13.3 13.1* 13.1*   HCT 42.8 38.7*   < > 45.0 45.0 50.9 41.3 38.5*  38.5* 39.2* 38.2*   MCV 90 92   < > 92 95 93 89 91  91 93 92    126*   < > 239 174 204 204 217  203 236 212    < > = values in this interval not displayed.       Recent Labs   Lab Test 09/19/17  1236 10/11/17  0805 10/12/17  0804 03/20/21  1143 06/03/24  1029 06/04/24  1024   BILITOTAL 0.9 1.0 0.9 0.5 1.0 0.3   ALKPHOS 105 131 149 105 83 79   ALBUMIN 4.0 3.8 3.8 4.1 3.8 3.4*   * 97* 73* 43 24 19   * 254* 211* 96* 24 23       Recent Labs   Lab Test 06/22/17  0031 07/15/17  1934 06/03/24  1029 06/03/24  1034 06/04/24  1139 06/04/24  1555 06/05/24  0040 06/05/24  0643 06/05/24  1242   PCAL  --   --  4.24*  --   --   --   --   --  1.54*   LACT 1.3 1.5  --  1.6 2.7* 3.1* 2.4*  1.5  --    CRP <2.9  --   --   --   --   --   --   --   --    SED 4  --   --   --   --   --   --   --   --        Recent Labs   Lab Test 08/01/17  0945   VANCOMYCIN 1.6       Culture Results:  7-Day Micro Results       Procedure Component Value Units Date/Time    Respiratory Aerobic Bacterial Culture with Gram Stain [[id number]]  (Abnormal) Collected: 06/05/24 1008    Order Status: Completed Lab Status: Final result Updated: 06/05/24 1459    Specimen: Sputum from Expectorate      Culture >10 Squamous epithelial cells/low power field indicates oral contamination. Please recollect.     Gram Stain Result >10 Squamous epithelial cells/low power field      <25 PMNs/low power field      1+ Yeast      1+ Gram positive cocci    Respiratory Aerobic Bacterial Culture with Gram Stain [[id number]]  (Abnormal) Collected: 06/04/24 1801    Order Status: Completed Lab Status: Final result Updated: 06/04/24 2014    Specimen: Sputum from Expectorate      Culture >10 Squamous epithelial cells/low power field indicates oral contamination. Please recollect.     Gram Stain Result >10 Squamous epithelial cells/low power field      >25 PMNs/low power field      2+ Gram positive cocci      2+ Yeast    Respiratory Aerobic Bacterial Culture with Gram Stain [[id number]]  (Abnormal) Collected: 06/04/24 1101    Order Status: Completed Lab Status: Final result Updated: 06/04/24 1513    Specimen: Sputum from Expectorate      Culture >10 Squamous epithelial cells/low power field indicates oral contamination. Please recollect.     Gram Stain Result >10 Squamous epithelial cells/low power field      <25 PMNs/low power field      3+ Gram positive cocci      2+ Budding yeast with pseudohyphae    Respiratory Aerobic Bacterial Culture with Gram Stain [[id number]] Collected: 06/03/24 2127    Order Status: Completed Lab Status: Final result Updated: 06/04/24 0421    Specimen: Sputum from Expectorate      Culture >10 Squamous epithelial cells/low  power field indicates oral contamination. Please recollect.     Gram Stain Result >10 Squamous epithelial cells/low power field      <25 PMNs/low power field      2+ Mixed nat    Respiratory Panel PCR [[id number]]  (Normal) Collected: 06/03/24 2127    Order Status: Completed Lab Status: Final result Updated: 06/04/24 0608    Specimen: Swab from Nasopharyngeal     Narrative:      The following orders were created for panel order Respiratory Panel PCR.  Procedure                               Abnormality         Status                     ---------                               -----------         ------                     Respiratory Panel PCR, N...[[id number]]  Normal              Final result                 Please view results for these tests on the individual orders.    MRSA MSSA PCR, Nasal Swab [[id number]] Collected: 06/03/24 2127    Order Status: Completed Lab Status: Final result Updated: 06/04/24 0519    Specimen: Swab from Nares, Bilateral      MRSA Target DNA Negative     SA Target DNA Positive    Narrative:      The [employer]  Xpert SA Nasal Complete assay performed in the [employer]  Dx System is a qualitative in vitro diagnostic test designed for rapid detection of Staphylococcus aureus (SA) and methicillin-resistant Staphylococcus aureus (MRSA) from nasal swabs in patients at risk for nasal colonization. The test utilizes automated real-time polymerase chain reaction (PCR) to detect MRSA/SA DNA. The Xpert SA Nasal Complete assay is intended to aid in the prevention and control of MRSA/SA infections in healthcare settings. The assay is not intended to diagnose, guide or monitor treatment for MRSA/SA infections, or provide results of susceptibility to methicillin. A negative result does not preclude MRSA/SA nasal colonization.     Respiratory Panel PCR, Nasopharyngeal [[id number]]  (Normal) Collected: 06/03/24 2127    Order Status: Completed Lab Status: Final result Updated: 06/04/24 0608     Specimen: Swab from Nasopharyngeal      Adenovirus Not Detected     Coronavirus Not Detected     Comment: This test detects Coronavirus 229E, HKU1, NL63 and OC43 but does not distinguish between them. It does not detect MERS ( Respiratory Syndrome), SARS (Severe Acute Respiratory Syndrome) or 2019-nCoV (Novel 2019) Coronavirus.        Human Metapneumovirus Not Detected     Human Rhin/Enterovirus Not Detected     Influenza A Not Detected     Influenza A, H1 Not Detected     Influenza A 2009 H1N1 Not Detected     Influenza A, H3 Not Detected     Influenza B Not Detected     Parainfluenza Virus 1 Not Detected     Parainfluenza Virus 2 Not Detected     Parainfluenza Virus 3 Not Detected     Parainfluenza Virus 4 Not Detected     Respiratory Syncytial Virus A Not Detected     Respiratory Syncytial Virus B Not Detected     Chlamydia Pneumoniae Not Detected     Mycoplasma Pneumoniae Not Detected    Narrative:      The [employer] Respiratory Panel is a qualitative nucleic acid, multiplex, in vitro diagnostic test for the simultaneous detection and identification of multiple respiratory viral and bacterial nucleic acids in nasopharyngeal swabs collected in viral transport media from individual exhibiting signs and symptoms of respiratory infection. The assay has received FDA approval for the testing of nasopharyngeal (NP) swabs only. The Infectious Diseases Diagnostic Laboratory at [facility] has validated the performance characteristics for bronchial alveolar lavage specimens. This test is used for clinical purposes and should not be regarded as investigational or for research. This laboratory is certified under the Clinical Laboratory Improvement Amendments of 1988 (CLIA-88) as qualified to perform high complexity clinical laboratory testing.     Legionella pneumophila antigen urine [[id number]]  (Normal) Collected: 06/03/24 9707    Order Status: Completed Lab Status: Final result Updated: 06/04/24 6040     Specimen: Urine, Midstream      Legionella pneumophila serogroup 1 urinary antigen Negative     Comment: Suggests no recent or current infection. Infection due to Legionella cannot be ruled out, since other serogroups and species may cause disease, antigen may not be present in urine in early infection, and the level of antigen present in the urine may be below detectable limits of the test.       Streptococcus pneumoniae antigen [[id number]]  (Normal) Collected: 06/03/24 2126    Order Status: Completed Lab Status: Final result Updated: 06/04/24 0425    Specimen: Urine, Midstream      Streptococcus pneumoniae antigen Negative     Comment: A negative Streptococcus pneumoniae antigen result does not rule out infection with Streptococcus pneumoniae.       MRSA MSSA PCR, Nasal Swab     Order Status: Canceled Lab Status: No result     Specimen: Swab from Nares, Bilateral     Blood Culture Peripheral Blood [[id number]]  (Normal) Collected: 06/03/24 1253    Order Status: Completed Lab Status: Preliminary result Updated: 06/05/24 1717    Specimen: Peripheral Blood      Culture No growth after 2 days    Blood Culture Peripheral Blood [[id number]]  (Normal) Collected: 06/03/24 1253    Order Status: Completed Lab Status: Preliminary result Updated: 06/05/24 1717    Specimen: Peripheral Blood      Culture No growth after 2 days            Recent Labs   Lab Test 08/31/17  1837 09/19/17  1357 10/10/17  1348   URINEPH 6.5 6.5 6.0   NITRITE Negative Negative Negative   LEUKEST Negative Negative Negative   WBCU 1  --   --              Recent Labs   Lab Test 06/03/24 2127   IFLUA Not Detected   FLUAH1 Not Detected   FLUAH3 Not Detected   KL5145 Not Detected   IFLUB Not Detected   RSVA Not Detected   RSVB Not Detected   PIV1 Not Detected   PIV2 Not Detected   PIV3 Not Detected   HMPV Not Detected       Recent Labs   Lab Test 06/03/17  0848   CDBPCT Negative  Negative: Clostridium difficile target DNA sequences NOT detected,  presumed   negative for Clostridium difficile toxin B or the number of bacteria present   may be below the limit of detection for the test.   FDA approved assay performed using [employer] Gene[employer]pert real-time PCR.   A negative result does not exclude actual disease due to Clostridium difficile   and may be due to improper collection, handling and storage of the specimen or   the number of organisms in the specimen is below the detection limit of the   assay.         Imaging: CT Chest w/o Contrast    Result Date: 2024  EXAM: CT CHEST W/O CONTRAST LOCATION: [facility] DATE: 2024 INDICATION: Bilateral infiltrates, resp failure. COMPARISON: Chest x-ray on 6/3/2024. TECHNIQUE: CT chest without IV contrast. Multiplanar reformats were obtained. Dose reduction techniques were used. CONTRAST: None. FINDINGS: LUNGS AND PLEURA: No pleural effusion or pneumothorax. Bilateral upper lobes and perihilar predominant patchy groundglass and coarse interstitial pulmonary opacities, likely infectious. No significant pleural effusion or pneumothorax. MEDIASTINUM/AXILLAE: No cardiomegaly or significant pericardial effusion. Multiple enlarged mediastinal and hilar lymph nodes, could be reactive. CORONARY ARTERY CALCIFICATION: None. UPPER ABDOMEN: Diffuse hypodense appearance of the liver, likely due to underlying hepatic steatosis. The spleen is enlarged measuring 15.5 cm in craniocaudal dimension. MUSCULOSKELETAL: No suspicious osseous lesion.     IMPRESSION: 1.  Bilateral upper lobes and perihilar predominant patchy groundglass and coarse interstitial pulmonary opacities, likely infectious. 2.  Hepatic steatosis. 3.  Splenomegaly.     Echocardiogram Complete    Result Date: 2024  [id number]^ELIESER^KIMANI  [facility] [address] [place], [place] [address]  Name: JONNY SUAREZ J MRN: [id number] : 1985 Study Date: 2024  11:16 AM Age: 39 yrs Gender: Male Patient Location: [facility] Reason For Study: Respiratory Failure Ordering Physician: KIMANI MON Referring Physician: Maryanne Mitchell Performed By: Jorden Frey RDCS  BSA: 2.7 m2 Height: 77 in Weight: 313 lb HR: 87 BP: 126/75 mmHg ______________________________________________________________________________ Procedure Complete Portable Echo Adult. Optison (NDC #8314-7091) given intravenously. ______________________________________________________________________________ Interpretation Summary  Limited image quality due to patient body habitus There is borderline concentric left ventricular hypertrophy. Left ventricular systolic function is normal. Left ventricular diastolic function is normal. The right ventricle is normal in size and function. Doppler findings do not suggest pulmonary hypertension. Unable to assess mean RA pressure due to technically difficult study. ______________________________________________________________________________ Left Ventricle The left ventricle is normal in size. There is borderline concentric left ventricular hypertrophy. Left ventricular systolic function is normal. The visual ejection fraction is 55-60%. Left ventricular diastolic function is normal. Normal left ventricular wall motion.  Right Ventricle The right ventricle is normal in size and function.  Atria Normal left atrial size. Right atrial size is normal.  Mitral Valve The mitral valve leaflets appear normal. There is no evidence of stenosis, fluttering, or prolapse.  Tricuspid Valve Normal tricuspid valve. There is trace tricuspid regurgitation. The right ventricular systolic pressure is approximated at 22.7 mmHg plus the right atrial pressure. Doppler findings do not suggest pulmonary hypertension. Unable to assess mean RA pressure due to technically difficult study.  Aortic Valve The aortic valve is trileaflet. No hemodynamically significant valvular aortic stenosis.  Pulmonic Valve  The pulmonic valve is not well seen, but is grossly normal.  Vessels The aortic root is normal size.  Pericardium The pericardium appears normal.  Rhythm Sinus rhythm was noted. ______________________________________________________________________________ MMode/2D Measurements & Calculations IVSd: 1.2 cm  LVIDd: 4.5 cm LVIDs: 3.5 cm LVPWd: 1.2 cm FS: 22.8 % LV mass(C)d: 197.1 grams LV mass(C)dI: 72.8 grams/m2 Ao root diam: 3.6 cm LA dimension: 4.0 cm asc Aorta Diam: 3.4 cm LA/Ao: 1.1 Ao root diam index Ht(cm/m): 1.8 Ao root diam index BSA (cm/m2): 1.3 Asc Ao diam index BSA (cm/m2): 1.3 Asc Ao diam index Ht(cm/m): 1.7 LA Volume (BP): 61.9 ml  LA Volume Index (BP): 22.8 ml/m2 RV Base: 4.0 cm RWT: 0.53 TAPSE: 2.7 cm  Doppler Measurements & Calculations MV E max fish: 111.0 cm/sec MV A max fish: 82.7 cm/sec MV E/A: 1.3 MV dec slope: 654.1 cm/sec2 MV dec time: 0.17 sec PA acc time: 0.11 sec TR max fish: 238.1 cm/sec TR max P.7 mmHg E/E' av.3 Lateral E/e': 8.1 Medial E/e': 10.4 RV S Fish: 11.7 cm/sec  ______________________________________________________________________________ Report approved by: Radha Rene 2024 02:13 PM       XR Chest Port 1 View    Result Date: 6/3/2024  CHEST ONE VIEW Sara 3, 2024 12:06 PM HISTORY: Hypoxia, tachycardia. COMPARISON: Chest radiograph 3/20/2021.     IMPRESSION: Increased interstitial and airspace opacities that could reflect edema or sequelae of atypical infection. No significant pleural effusion, or pneumothorax. Unremarkable heart size. Round lucency overlying the visualized lower neck is indeterminate, possibly artifactual or dilated proximal cervical esophagus. NANCIE MOREL MD   SYSTEM ID:  [id number]

## 2024-06-07 LAB
ANION GAP SERPL CALCULATED.3IONS-SCNC: 9 MMOL/L (ref 7–15)
BUN SERPL-MCNC: 12.8 MG/DL (ref 6–20)
CALCIUM SERPL-MCNC: 8.3 MG/DL (ref 8.6–10)
CHLORIDE SERPL-SCNC: 105 MMOL/L (ref 98–107)
CREAT SERPL-MCNC: 0.79 MG/DL (ref 0.67–1.17)
DEPRECATED HCO3 PLAS-SCNC: 28 MMOL/L (ref 22–29)
EGFRCR SERPLBLD CKD-EPI 2021: >90 ML/MIN/1.73M2
ERYTHROCYTE [DISTWIDTH] IN BLOOD BY AUTOMATED COUNT: 12.4 % (ref 10–15)
GLUCOSE BLDC GLUCOMTR-MCNC: 105 MG/DL (ref 70–99)
GLUCOSE BLDC GLUCOMTR-MCNC: 138 MG/DL (ref 70–99)
GLUCOSE BLDC GLUCOMTR-MCNC: 183 MG/DL (ref 70–99)
GLUCOSE BLDC GLUCOMTR-MCNC: 198 MG/DL (ref 70–99)
GLUCOSE SERPL-MCNC: 111 MG/DL (ref 70–99)
HCT VFR BLD AUTO: 42.2 % (ref 40–53)
HGB BLD-MCNC: 14.1 G/DL (ref 13.3–17.7)
MCH RBC QN AUTO: 31.1 PG (ref 26.5–33)
MCHC RBC AUTO-ENTMCNC: 33.4 G/DL (ref 31.5–36.5)
MCV RBC AUTO: 93 FL (ref 78–100)
PLATELET # BLD AUTO: 216 10E3/UL (ref 150–450)
POTASSIUM SERPL-SCNC: 3.9 MMOL/L (ref 3.4–5.3)
RBC # BLD AUTO: 4.54 10E6/UL (ref 4.4–5.9)
SODIUM SERPL-SCNC: 142 MMOL/L (ref 135–145)
WBC # BLD AUTO: 13.9 10E3/UL (ref 4–11)

## 2024-06-07 PROCEDURE — 94640 AIRWAY INHALATION TREATMENT: CPT

## 2024-06-07 PROCEDURE — 250N000013 HC RX MED GY IP 250 OP 250 PS 637: Performed by: INTERNAL MEDICINE

## 2024-06-07 PROCEDURE — 36415 COLL VENOUS BLD VENIPUNCTURE: CPT | Performed by: INTERNAL MEDICINE

## 2024-06-07 PROCEDURE — 99232 SBSQ HOSP IP/OBS MODERATE 35: CPT | Performed by: INTERNAL MEDICINE

## 2024-06-07 PROCEDURE — 85041 AUTOMATED RBC COUNT: CPT | Performed by: INTERNAL MEDICINE

## 2024-06-07 PROCEDURE — 250N000013 HC RX MED GY IP 250 OP 250 PS 637: Performed by: STUDENT IN AN ORGANIZED HEALTH CARE EDUCATION/TRAINING PROGRAM

## 2024-06-07 PROCEDURE — 94640 AIRWAY INHALATION TREATMENT: CPT | Mod: 76

## 2024-06-07 PROCEDURE — 250N000011 HC RX IP 250 OP 636: Mod: JZ | Performed by: STUDENT IN AN ORGANIZED HEALTH CARE EDUCATION/TRAINING PROGRAM

## 2024-06-07 PROCEDURE — 999N000157 HC STATISTIC RCP TIME EA 10 MIN

## 2024-06-07 PROCEDURE — 250N000009 HC RX 250: Performed by: STUDENT IN AN ORGANIZED HEALTH CARE EDUCATION/TRAINING PROGRAM

## 2024-06-07 PROCEDURE — 80048 BASIC METABOLIC PNL TOTAL CA: CPT | Performed by: INTERNAL MEDICINE

## 2024-06-07 PROCEDURE — 120N000001 HC R&B MED SURG/OB

## 2024-06-07 RX ORDER — DOXYCYCLINE 100 MG/1
100 CAPSULE ORAL EVERY 12 HOURS SCHEDULED
Qty: 7 CAPSULE | Refills: 0 | Status: DISCONTINUED | OUTPATIENT
Start: 2024-06-07 | End: 2024-06-09 | Stop reason: HOSPADM

## 2024-06-07 RX ADMIN — GABAPENTIN 300 MG: 300 CAPSULE ORAL at 08:46

## 2024-06-07 RX ADMIN — GABAPENTIN 300 MG: 300 CAPSULE ORAL at 20:33

## 2024-06-07 RX ADMIN — ALPRAZOLAM 0.5 MG: 0.5 TABLET ORAL at 10:23

## 2024-06-07 RX ADMIN — DOXYCYCLINE HYCLATE 100 MG: 100 CAPSULE ORAL at 20:32

## 2024-06-07 RX ADMIN — GUAIFENESIN AND DEXTROMETHORPHAN 10 ML: 100; 10 SYRUP ORAL at 16:05

## 2024-06-07 RX ADMIN — GUAIFENESIN AND DEXTROMETHORPHAN 10 ML: 100; 10 SYRUP ORAL at 20:33

## 2024-06-07 RX ADMIN — HYDROXYZINE HYDROCHLORIDE 50 MG: 25 TABLET, FILM COATED ORAL at 11:31

## 2024-06-07 RX ADMIN — NICOTINE POLACRILEX 4 MG: 4 GUM, CHEWING BUCCAL at 09:02

## 2024-06-07 RX ADMIN — PIPERACILLIN AND TAZOBACTAM 4.5 G: 4; .5 INJECTION, POWDER, FOR SOLUTION INTRAVENOUS at 05:32

## 2024-06-07 RX ADMIN — PANTOPRAZOLE SODIUM 40 MG: 40 TABLET, DELAYED RELEASE ORAL at 08:46

## 2024-06-07 RX ADMIN — MELATONIN TAB 3 MG 5 MG: 3 TAB at 20:29

## 2024-06-07 RX ADMIN — CLONAZEPAM 2 MG: 1 TABLET ORAL at 08:46

## 2024-06-07 RX ADMIN — NICOTINE POLACRILEX 4 MG: 4 GUM, CHEWING BUCCAL at 23:12

## 2024-06-07 RX ADMIN — NICOTINE POLACRILEX 4 MG: 4 GUM, CHEWING BUCCAL at 05:32

## 2024-06-07 RX ADMIN — GUAIFENESIN AND DEXTROMETHORPHAN 10 ML: 100; 10 SYRUP ORAL at 23:20

## 2024-06-07 RX ADMIN — LISDEXAMFETAMINE DIMESYLATE 50 MG: 50 CAPSULE ORAL at 08:46

## 2024-06-07 RX ADMIN — LEVALBUTEROL HYDROCHLORIDE 0.63 MG: 1.25 SOLUTION RESPIRATORY (INHALATION) at 19:37

## 2024-06-07 RX ADMIN — OLANZAPINE 30 MG: 15 TABLET, FILM COATED ORAL at 21:55

## 2024-06-07 RX ADMIN — LEVALBUTEROL HYDROCHLORIDE 0.63 MG: 1.25 SOLUTION RESPIRATORY (INHALATION) at 07:23

## 2024-06-07 RX ADMIN — CLONAZEPAM 2 MG: 1 TABLET ORAL at 20:31

## 2024-06-07 RX ADMIN — NICOTINE POLACRILEX 4 MG: 4 GUM, CHEWING BUCCAL at 17:29

## 2024-06-07 RX ADMIN — AMOXICILLIN AND CLAVULANATE POTASSIUM 1 TABLET: 875; 125 TABLET, FILM COATED ORAL at 13:18

## 2024-06-07 RX ADMIN — NICOTINE POLACRILEX 4 MG: 4 GUM, CHEWING BUCCAL at 13:22

## 2024-06-07 RX ADMIN — NICOTINE POLACRILEX 4 MG: 4 GUM, CHEWING BUCCAL at 20:37

## 2024-06-07 RX ADMIN — CLONAZEPAM 2 MG: 1 TABLET ORAL at 16:05

## 2024-06-07 RX ADMIN — GUAIFENESIN AND DEXTROMETHORPHAN 10 ML: 100; 10 SYRUP ORAL at 05:32

## 2024-06-07 RX ADMIN — ALPRAZOLAM 0.5 MG: 0.5 TABLET ORAL at 20:37

## 2024-06-07 RX ADMIN — NICOTINE POLACRILEX 4 MG: 4 GUM, CHEWING BUCCAL at 10:23

## 2024-06-07 RX ADMIN — GUAIFENESIN AND DEXTROMETHORPHAN 10 ML: 100; 10 SYRUP ORAL at 13:18

## 2024-06-07 RX ADMIN — NICOTINE POLACRILEX 4 MG: 4 GUM, CHEWING BUCCAL at 16:05

## 2024-06-07 RX ADMIN — METHYLPREDNISOLONE SODIUM SUCCINATE 40 MG: 40 INJECTION, POWDER, FOR SOLUTION INTRAMUSCULAR; INTRAVENOUS at 08:49

## 2024-06-07 RX ADMIN — NICOTINE POLACRILEX 4 MG: 4 GUM, CHEWING BUCCAL at 11:29

## 2024-06-07 RX ADMIN — AMOXICILLIN AND CLAVULANATE POTASSIUM 1 TABLET: 875; 125 TABLET, FILM COATED ORAL at 20:32

## 2024-06-07 RX ADMIN — GABAPENTIN 300 MG: 300 CAPSULE ORAL at 13:18

## 2024-06-07 RX ADMIN — GUAIFENESIN AND DEXTROMETHORPHAN 10 ML: 100; 10 SYRUP ORAL at 08:47

## 2024-06-07 RX ADMIN — DOXYCYCLINE 100 MG: 100 INJECTION, POWDER, LYOPHILIZED, FOR SOLUTION INTRAVENOUS at 08:56

## 2024-06-07 RX ADMIN — ALPRAZOLAM 0.5 MG: 0.5 TABLET ORAL at 16:05

## 2024-06-07 ASSESSMENT — ACTIVITIES OF DAILY LIVING (ADL)
ADLS_ACUITY_SCORE: 39
ADLS_ACUITY_SCORE: 40
ADLS_ACUITY_SCORE: 39
ADLS_ACUITY_SCORE: 42
ADLS_ACUITY_SCORE: 42
ADLS_ACUITY_SCORE: 39
ADLS_ACUITY_SCORE: 42
ADLS_ACUITY_SCORE: 40
ADLS_ACUITY_SCORE: 40
ADLS_ACUITY_SCORE: 39
ADLS_ACUITY_SCORE: 40
ADLS_ACUITY_SCORE: 39
ADLS_ACUITY_SCORE: 40

## 2024-06-07 NOTE — PLAN OF CARE
Neuro: A&O x4, anxious  Tele/cardiac: SR  Respiration:  very bad sleep apnea, needed 15 L, can be weaned off to 6 L or lower when awake  Activity: SBA  Pain: tylenol for back pain  Drips: PIV SL  Drains/tubes: None  Skin: intact  GI/: continent, adequate UOP  Aggression color: green  Isolation: none  Plan: wean off O2 as able

## 2024-06-07 NOTE — PROGRESS NOTES
Patient is AO X 4; VSS; on supplemental oxygen 2 LPM via nasal cannula.  Jonny keeps oxygen on and off; has RAY; is very anxious; in no acute distress.   He requires Xanax PRN TID; hydroxyzine 50 mg was administered X 1 and it helped to reduce anxiety.  Unfortunately, Jonny drinks lots of caffeinated  drinks : coke and coffee. He is independent in the room, continent.  Had BM X 2; voiding fine.   Jonny was witched from IV ABX to PO ABX.

## 2024-06-07 NOTE — PROGRESS NOTES
[facility]    Medicine Progress Note - Hospitalist Service    Date of Admission:  6/3/2024    Assessment & Plan     Jonny Gramajo is a 39-year-old male with history of schizoaffective disorder, panic attacks, ADHD, GERD, history of Lyme's disease, who presented on 6/3/2024 with 5 days of worsening shortness of breath, fever up to 103.  He initially went to clinic where he was noted to be hypoxic with saturations of 82% and was thus sent to ER.    Workup showed pulmonary infiltrates and acute hypoxic respiratory failure.  He was placed on high flow oxygen and started on IV antibiotics and IV steroids.    Bilateral pneumonia/pneumonitis, organism unspecified, likely bacterial   Severe sepsis due to above  Lactic acidosis  - Presented with fever of 103F, WBC 18.8, elevated procalcitonin of 4.24, RR 31, HR in 90s, hypoxic requiring high flow oxygen.  - lactic acid was normal on admission but increased to 3.1 after admission. Resolved with IV fluids and IV antibiotics.   - Chest x-ray showed bilateral interstitial and airspace opacities.  - Patient also has history of vaping and GERD.  Vaping induced lung injury, aspiration pneumonitis, CHF are also in the differential. Given fever, leukocytosis, elevated procalcitonin, bacterial pneumonia is the most likely etiology along with vaping induced lung injury  - Blood cultures negative.  Urine Legionella and pneumococcal antigens negative  - respiratory panel negative.  MRSA nasal swab negative  - sputum culture was contaminated on multiple occasions  - CT chest 6/6 showed bilateral upper lobes and perihilar patchy groundglass and coarse interstitial opacities likely infectious.  - Echo 6/5 showed borderline concentric LVH, normal LV and RV systolic and diastolic function. No pulm HTN.   - leukocytosis and procalcitonin improving. Pulmonary following  - Received IV Zosyn and IV doxycycline from 6/3 to 6/7.  Switch to p.o. Augmentin and p.o.  doxycycline on 6/7.  Plan total 7-day antibiotic course  - Was initially on IV Solu-Medrol 40 mg twice daily.  Switch to 40 mg daily on 6/6.  Further dosing as per pulmonary  - Continue nebs and flutter valve  - Cessation of vaping is strongly recommended  - Discontinue IMC status on telemetry    Acute hypoxic respiratory failure-secondary to bilateral pneumonia/pneumonitis  - Was hypoxic with saturations of 82% on room air on admission.  - required high flow oxygen at 50 L/min, 60% FiO2 initially.  Weaned to 8 L/min by facemask on 6/6.  Continue to wean as able.  Now on 2 L/min.  Saturations to 83% oxygen  - Treat pneumonia/pneumonitis as above    Severe obstructive sleep apnea  - has not been able to tolerate CPAP at home due to claustrophobia.  He was noted to be hypoxic during the night requiring up to 15 L/min  - Should follow-up with sleep medicine as outpatient to discuss other options for RASHEED    Mild hypovolemic hyponatremia, sodium 134  - Improved from 134 to 140 with IV fluid. Now stable.     Prediabetes with steroid-induced hyperglycemia, A1c 5.7, 6/3/2024  - Blood sugar were elevated in 300s due to systemic steroids.  Started on insulin Lantus and NovoLog.  Blood sugars now improved to 200-250  - continue Lantus 16 units daily  - decrease NovoLog to 1 unit per carb 3 times daily as steroids are being decreased  - continue sliding scale  - Monitor blood sugars and adjust insulin accordingly    Schizoaffective disorder  Panic attacks  ADHD  - Continue clonazepam, Zyprexa, gabapentin, Vyvanse     GERD  - Continue Protonix    History of Lyme's disease  - Has post Lymes sequela-chronic arthritis with arthralgias and brain fog            Diet: Combination Diet Regular Diet Adult    DVT Prophylaxis: Enoxaparin (Lovenox) SQ  Gil Catheter: Not present  Lines: None     Cardiac Monitoring: None  Code Status: Full Code      Clinically Significant Risk Factors              # Hypoalbuminemia: Lowest albumin = 3.4  "g/dL at 6/4/2024 10:24 AM, will monitor as appropriate                    # Obesity: Estimated body mass index is 38.35 kg/m  as calculated from the following:    Height as of this encounter: 1.956 m (6' 5.01\").    Weight as of this encounter: 146.7 kg (323 lb 8 oz)., PRESENT ON ADMISSION            Disposition Plan         Medically Ready for Discharge: Anticipated in 2-4 Days             Arline Barbosa MD  Hospitalist Service  [facility]  Securely message with [employer] (more info)  Text page via [employer] Paging/Directory   ______________________________________________________________________    Interval History     Patient reports he feels better each day. Shortness of breath has improved.  Continues to have cough  Now on 2 L of oxygen.  Was noted to be hypoxic with saturation of 83% when taken off oxygen.    Requires up to 15 L during the night due to RASHEED   WBC stable at 13       Physical Exam   Vital Signs: Temp: 97.5  F (36.4  C) Temp src: Oral BP: 130/85 Pulse: 104   Resp: 17 SpO2: 91 % O2 Device: None (Room air) Oxygen Delivery: 15 LPM  Weight: 323 lbs 8 oz      Constitutional - awake and alert, resting in bed, in no acute distress  Cardiovascular - regular rate and rhythm, no murmurs, no edema  Pulmonary - lungs are clear to auscultation bilaterally, no wheezing or rhonchi  GI - abdomen is soft, nontender, nondistended, no hepatosplenomegaly or masses  Integumentary - skin is warm and dry, no rashes or ulcers  Neurological - awake, alert and oriented x3.  Moving all 4 extremities, normal speech, no focal deficits    Medical Decision Making       40 MINUTES SPENT BY ME on the date of service doing chart review, history, exam, documentation & further activities per the note.      Data     I have personally reviewed the following data over the past 24 hrs:    13.9 (H)  \   14.1   / 216     142 105 12.8 /  198 (H)   3.9 28 0.79 \       Imaging results reviewed over the past 24 hrs:   No " results found for this or any previous visit (from the past 24 hour(s)).

## 2024-06-08 LAB
BACTERIA BLD CULT: NO GROWTH
BACTERIA BLD CULT: NO GROWTH
BASOPHILS # BLD AUTO: ABNORMAL 10*3/UL
BASOPHILS # BLD MANUAL: 0 10E3/UL (ref 0–0.2)
BASOPHILS NFR BLD AUTO: ABNORMAL %
BASOPHILS NFR BLD MANUAL: 0 %
EOSINOPHIL # BLD AUTO: ABNORMAL 10*3/UL
EOSINOPHIL # BLD MANUAL: 0 10E3/UL (ref 0–0.7)
EOSINOPHIL NFR BLD AUTO: ABNORMAL %
EOSINOPHIL NFR BLD MANUAL: 0 %
GLUCOSE BLDC GLUCOMTR-MCNC: 138 MG/DL (ref 70–99)
GLUCOSE BLDC GLUCOMTR-MCNC: 189 MG/DL (ref 70–99)
GLUCOSE BLDC GLUCOMTR-MCNC: 228 MG/DL (ref 70–99)
GLUCOSE BLDC GLUCOMTR-MCNC: 232 MG/DL (ref 70–99)
GLUCOSE BLDC GLUCOMTR-MCNC: 247 MG/DL (ref 70–99)
IMM GRANULOCYTES # BLD: ABNORMAL 10*3/UL
IMM GRANULOCYTES NFR BLD: ABNORMAL %
LYMPHOCYTES # BLD AUTO: ABNORMAL 10*3/UL
LYMPHOCYTES # BLD MANUAL: 3.5 10E3/UL (ref 0.8–5.3)
LYMPHOCYTES NFR BLD AUTO: ABNORMAL %
LYMPHOCYTES NFR BLD MANUAL: 25 %
METAMYELOCYTES # BLD MANUAL: 0.4 10E3/UL
METAMYELOCYTES NFR BLD MANUAL: 3 %
MONOCYTES # BLD AUTO: ABNORMAL 10*3/UL
MONOCYTES # BLD MANUAL: 0.4 10E3/UL (ref 0–1.3)
MONOCYTES NFR BLD AUTO: ABNORMAL %
MONOCYTES NFR BLD MANUAL: 3 %
MYELOCYTES # BLD MANUAL: 0.6 10E3/UL
MYELOCYTES NFR BLD MANUAL: 4 %
NEUTROPHILS # BLD AUTO: ABNORMAL 10*3/UL
NEUTROPHILS # BLD MANUAL: 9.2 10E3/UL (ref 1.6–8.3)
NEUTROPHILS NFR BLD AUTO: ABNORMAL %
NEUTROPHILS NFR BLD MANUAL: 65 %
NRBC # BLD AUTO: 0 10E3/UL
NRBC # BLD AUTO: 0.1 10E3/UL
NRBC BLD AUTO-RTO: 0 /100
NRBC BLD MANUAL-RTO: 1 %
PLAT MORPH BLD: ABNORMAL
RBC MORPH BLD: ABNORMAL
WBC # BLD AUTO: 14.1 10E3/UL (ref 4–11)

## 2024-06-08 PROCEDURE — 120N000001 HC R&B MED SURG/OB

## 2024-06-08 PROCEDURE — 999N000157 HC STATISTIC RCP TIME EA 10 MIN

## 2024-06-08 PROCEDURE — 999N000040 HC STATISTIC CONSULT NO CHARGE VASC ACCESS

## 2024-06-08 PROCEDURE — 85048 AUTOMATED LEUKOCYTE COUNT: CPT | Performed by: INTERNAL MEDICINE

## 2024-06-08 PROCEDURE — 36415 COLL VENOUS BLD VENIPUNCTURE: CPT | Performed by: INTERNAL MEDICINE

## 2024-06-08 PROCEDURE — 250N000012 HC RX MED GY IP 250 OP 636 PS 637: Performed by: INTERNAL MEDICINE

## 2024-06-08 PROCEDURE — 94640 AIRWAY INHALATION TREATMENT: CPT

## 2024-06-08 PROCEDURE — 99233 SBSQ HOSP IP/OBS HIGH 50: CPT | Performed by: INTERNAL MEDICINE

## 2024-06-08 PROCEDURE — 94640 AIRWAY INHALATION TREATMENT: CPT | Mod: 76

## 2024-06-08 PROCEDURE — 250N000011 HC RX IP 250 OP 636: Mod: JZ | Performed by: STUDENT IN AN ORGANIZED HEALTH CARE EDUCATION/TRAINING PROGRAM

## 2024-06-08 PROCEDURE — 250N000009 HC RX 250: Performed by: STUDENT IN AN ORGANIZED HEALTH CARE EDUCATION/TRAINING PROGRAM

## 2024-06-08 PROCEDURE — 999N000128 HC STATISTIC PERIPHERAL IV START W/O US GUIDANCE

## 2024-06-08 PROCEDURE — 85007 BL SMEAR W/DIFF WBC COUNT: CPT | Performed by: INTERNAL MEDICINE

## 2024-06-08 PROCEDURE — 250N000013 HC RX MED GY IP 250 OP 250 PS 637: Performed by: INTERNAL MEDICINE

## 2024-06-08 PROCEDURE — 250N000013 HC RX MED GY IP 250 OP 250 PS 637: Performed by: STUDENT IN AN ORGANIZED HEALTH CARE EDUCATION/TRAINING PROGRAM

## 2024-06-08 RX ORDER — PREDNISONE 20 MG/1
40 TABLET ORAL DAILY
Status: DISCONTINUED | OUTPATIENT
Start: 2024-06-09 | End: 2024-06-08

## 2024-06-08 RX ORDER — PREDNISONE 20 MG/1
40 TABLET ORAL DAILY
Status: DISCONTINUED | OUTPATIENT
Start: 2024-06-08 | End: 2024-06-09 | Stop reason: HOSPADM

## 2024-06-08 RX ADMIN — LISDEXAMFETAMINE DIMESYLATE 50 MG: 50 CAPSULE ORAL at 08:28

## 2024-06-08 RX ADMIN — GABAPENTIN 300 MG: 300 CAPSULE ORAL at 13:13

## 2024-06-08 RX ADMIN — IBUPROFEN 400 MG: 400 TABLET ORAL at 08:41

## 2024-06-08 RX ADMIN — ALPRAZOLAM 0.5 MG: 0.5 TABLET ORAL at 15:12

## 2024-06-08 RX ADMIN — PANTOPRAZOLE SODIUM 40 MG: 40 TABLET, DELAYED RELEASE ORAL at 08:29

## 2024-06-08 RX ADMIN — LEVALBUTEROL HYDROCHLORIDE 0.63 MG: 1.25 SOLUTION RESPIRATORY (INHALATION) at 19:13

## 2024-06-08 RX ADMIN — MELATONIN TAB 3 MG 5 MG: 3 TAB at 20:12

## 2024-06-08 RX ADMIN — ACETAMINOPHEN 650 MG: 325 TABLET, FILM COATED ORAL at 13:13

## 2024-06-08 RX ADMIN — GUAIFENESIN AND DEXTROMETHORPHAN 10 ML: 100; 10 SYRUP ORAL at 20:14

## 2024-06-08 RX ADMIN — NICOTINE POLACRILEX 4 MG: 4 GUM, CHEWING BUCCAL at 10:23

## 2024-06-08 RX ADMIN — NICOTINE POLACRILEX 4 MG: 4 GUM, CHEWING BUCCAL at 20:13

## 2024-06-08 RX ADMIN — CLONAZEPAM 2 MG: 1 TABLET ORAL at 15:12

## 2024-06-08 RX ADMIN — GUAIFENESIN AND DEXTROMETHORPHAN 10 ML: 100; 10 SYRUP ORAL at 08:28

## 2024-06-08 RX ADMIN — DOXYCYCLINE HYCLATE 100 MG: 100 CAPSULE ORAL at 08:28

## 2024-06-08 RX ADMIN — PREDNISONE 40 MG: 20 TABLET ORAL at 11:45

## 2024-06-08 RX ADMIN — NICOTINE POLACRILEX 4 MG: 4 GUM, CHEWING BUCCAL at 22:34

## 2024-06-08 RX ADMIN — NICOTINE POLACRILEX 4 MG: 4 GUM, CHEWING BUCCAL at 15:25

## 2024-06-08 RX ADMIN — DOXYCYCLINE HYCLATE 100 MG: 100 CAPSULE ORAL at 20:13

## 2024-06-08 RX ADMIN — OLANZAPINE 30 MG: 15 TABLET, FILM COATED ORAL at 22:34

## 2024-06-08 RX ADMIN — GABAPENTIN 300 MG: 300 CAPSULE ORAL at 20:12

## 2024-06-08 RX ADMIN — GUAIFENESIN AND DEXTROMETHORPHAN 10 ML: 100; 10 SYRUP ORAL at 15:12

## 2024-06-08 RX ADMIN — LEVALBUTEROL HYDROCHLORIDE 0.63 MG: 1.25 SOLUTION RESPIRATORY (INHALATION) at 07:31

## 2024-06-08 RX ADMIN — GABAPENTIN 300 MG: 300 CAPSULE ORAL at 08:29

## 2024-06-08 RX ADMIN — CLONAZEPAM 2 MG: 1 TABLET ORAL at 08:29

## 2024-06-08 RX ADMIN — GUAIFENESIN AND DEXTROMETHORPHAN 10 ML: 100; 10 SYRUP ORAL at 11:45

## 2024-06-08 RX ADMIN — NICOTINE POLACRILEX 4 MG: 4 GUM, CHEWING BUCCAL at 08:29

## 2024-06-08 RX ADMIN — HYDROXYZINE HYDROCHLORIDE 50 MG: 25 TABLET, FILM COATED ORAL at 20:26

## 2024-06-08 RX ADMIN — NICOTINE POLACRILEX 4 MG: 4 GUM, CHEWING BUCCAL at 17:49

## 2024-06-08 RX ADMIN — GUAIFENESIN AND DEXTROMETHORPHAN 10 ML: 100; 10 SYRUP ORAL at 05:25

## 2024-06-08 RX ADMIN — AMOXICILLIN AND CLAVULANATE POTASSIUM 1 TABLET: 875; 125 TABLET, FILM COATED ORAL at 08:28

## 2024-06-08 RX ADMIN — CLONAZEPAM 2 MG: 1 TABLET ORAL at 20:12

## 2024-06-08 RX ADMIN — NICOTINE POLACRILEX 4 MG: 4 GUM, CHEWING BUCCAL at 11:45

## 2024-06-08 RX ADMIN — ALPRAZOLAM 0.5 MG: 0.5 TABLET ORAL at 08:41

## 2024-06-08 RX ADMIN — NICOTINE POLACRILEX 4 MG: 4 GUM, CHEWING BUCCAL at 13:08

## 2024-06-08 RX ADMIN — AMOXICILLIN AND CLAVULANATE POTASSIUM 1 TABLET: 875; 125 TABLET, FILM COATED ORAL at 20:13

## 2024-06-08 ASSESSMENT — ACTIVITIES OF DAILY LIVING (ADL)
ADLS_ACUITY_SCORE: 39
ADLS_ACUITY_SCORE: 39
ADLS_ACUITY_SCORE: 22
ADLS_ACUITY_SCORE: 39
ADLS_ACUITY_SCORE: 22
ADLS_ACUITY_SCORE: 39
ADLS_ACUITY_SCORE: 22
ADLS_ACUITY_SCORE: 39
ADLS_ACUITY_SCORE: 39
ADLS_ACUITY_SCORE: 22
ADLS_ACUITY_SCORE: 22
ADLS_ACUITY_SCORE: 39
ADLS_ACUITY_SCORE: 22

## 2024-06-08 NOTE — PLAN OF CARE
Goal Outcome Evaluation:    Summary: SOB    Orientation: Aox4, calm and cooperative    Pain management: No c/o pain this shift    Vitals: VSS, on 5 L NC sat at 91&-92%, dyspnea with exertion    Tele: NA    IV Access/drains: PIV SL    Diet: Regular     Mobility: SBA    GI/: Cont. B&B    Wound/Skin: WDL    Discharge Plan: TBD    See Flow sheets for assessment

## 2024-06-08 NOTE — PROGRESS NOTES
[facility]    Medicine Progress Note - Hospitalist Service    Date of Admission:  6/3/2024    Assessment & Plan     Jonny Gramajo is a 39-year-old male with history of schizoaffective disorder, panic attacks, ADHD, GERD, history of Lyme's disease, who presented on 6/3/2024 with 5 days of worsening shortness of breath, fever up to 103.  He initially went to clinic where he was noted to be hypoxic with saturations of 82% and was thus sent to ER.    Workup showed pulmonary infiltrates and acute hypoxic respiratory failure.  He was placed on high flow oxygen and started on IV antibiotics and IV steroids.    Bilateral pneumonia/pneumonitis, organism unspecified, likely bacterial   Severe sepsis due to above  Lactic acidosis  - Presented with fever of 103F, WBC 18.8, elevated procalcitonin of 4.24, RR 31, HR in 90s, hypoxic requiring high flow oxygen.  - lactic acid was normal on admission but increased to 3.1 after admission. Resolved with IV fluids and IV antibiotics.   - Chest x-ray showed bilateral interstitial and airspace opacities.  - Patient also has history of vaping and GERD.  Vaping induced lung injury, aspiration pneumonitis, CHF are also in the differential. Given fever, leukocytosis, elevated procalcitonin, bacterial pneumonia is the most likely etiology along with vaping induced lung injury  - Blood cultures negative.  Urine Legionella and pneumococcal antigens negative  - respiratory panel negative.  MRSA nasal swab negative  - sputum culture was contaminated on multiple occasions  - CT chest 6/6 showed bilateral upper lobes and perihilar patchy groundglass and coarse interstitial opacities likely infectious.  - Echo 6/5 showed borderline concentric LVH, normal LV and RV systolic and diastolic function. No pulm HTN.   - leukocytosis and procalcitonin improving. Pulmonary following  - Received IV Zosyn and IV doxycycline from 6/3 to 6/7.  Switched to p.o. Augmentin and p.o.  doxycycline on 6/7.  Plan total 7-day antibiotic course  - Was initially on IV Solu-Medrol 40 mg twice daily.  Switched to 40 mg daily on 6/6.  Switch to po prednisone 40 mg daily on 6/8  - Continue nebs and flutter valve  - Cessation of vaping is strongly recommended      Acute hypoxic respiratory failure-secondary to bilateral pneumonia/pneumonitis  - Was hypoxic with saturations of 82% on room air on admission.  - required high flow oxygen at 50 L/min, 60% FiO2 initially.  Weaned to 8 L/min by facemask on 6/6.  Continue to wean as able.  Now on 4 L/min.  Desaturates to 83% on room air.  Feels short of breath with minimal exertion   - Treat pneumonia/pneumonitis as above    Severe obstructive sleep apnea  - has not been able to tolerate CPAP at home due to claustrophobia.  He was noted to be hypoxic during the night requiring up to 15 L/min  - Should follow-up with sleep medicine as outpatient to discuss other options for RASHEED    Mild hypovolemic hyponatremia, sodium 134  - Improved from 134 to 140 with IV fluid. Now stable.     Prediabetes with steroid-induced hyperglycemia, A1c 5.7, 6/3/2024  - Blood sugar were elevated in 300s due to systemic steroids.  Started on insulin Lantus and NovoLog.  Blood sugars now improved to 200-250  - continue Lantus 16 units daily  - NovoLog to 1 unit per carb 3 times daily   - continue sliding scale  - Monitor blood sugars and adjust insulin accordingly    Schizoaffective disorder  Panic attacks  ADHD  - Continue clonazepam, Zyprexa, gabapentin, Vyvanse   - Mother reports that due to agoraphobia, patient usually stays in his room.  He does not leave his room often    GERD  - Continue Protonix    History of Lyme's disease  - Has post Lymes sequela-chronic arthritis with arthralgias and brain fog  - Reports he was treated with so-called Lyme's disease experts for over 20 years with different antibiotics including penicillins.  Has been off antibiotics for 2-3 years            Diet:  "Combination Diet Regular Diet Adult    DVT Prophylaxis: Enoxaparin (Lovenox) SQ  Gil Catheter: Not present  Lines: None     Cardiac Monitoring: None  Code Status: Full Code      Clinically Significant Risk Factors              # Hypoalbuminemia: Lowest albumin = 3.4 g/dL at 6/4/2024 10:24 AM, will monitor as appropriate                    # Obesity: Estimated body mass index is 37.46 kg/m  as calculated from the following:    Height as of this encounter: 1.956 m (6' 5.01\").    Weight as of this encounter: 143.3 kg (316 lb).             Disposition Plan         Medically Ready for Discharge: Anticipated in 2-4 Days             Arline Barbosa MD  Hospitalist Service  [facility]  Securely message with [employer] (more info)  Text page via [employer] Paging/Directory   ______________________________________________________________________    Interval History     Patient reports he feels ok.  Currently on 5 L of oxygen.  Becomes hypoxic with minimal exertion   Patient expressed concerns about using Augmentin and doxycycline as he was treated with these antibiotics for prolonged period of time for Lyme's disease but none for past 3 years.    Continues to have cough  Extensive discussion today about antibiotic choices, cessation of vaping, increasing activity      Physical Exam   Vital Signs: Temp: 98.1  F (36.7  C) Temp src: Oral BP: (!) 139/93 Pulse: 89   Resp: 20 SpO2: 92 % O2 Device: Nasal cannula Oxygen Delivery: 5 LPM  Weight: 316 lbs 0 oz      Constitutional - awake and alert, resting in bed, in no acute distress  Cardiovascular - regular rate and rhythm, no murmurs, no edema  Pulmonary - lungs are clear to auscultation bilaterally, no wheezing or rhonchi  GI - abdomen is soft, nontender, nondistended, no hepatosplenomegaly or masses  Integumentary - skin is warm and dry, no rashes or ulcers  Neurological - awake, alert and oriented x3.  Moving all 4 extremities, normal speech, no focal " deficits    Medical Decision Making       50 MINUTES SPENT BY ME on the date of service doing chart review, history, exam, documentation & further activities per the note.      Data     I have personally reviewed the following data over the past 24 hrs:    14.1 (H)  \   N/A   / N/A     N/A N/A N/A /  247 (H)   N/A N/A N/A \       Imaging results reviewed over the past 24 hrs:   No results found for this or any previous visit (from the past 24 hour(s)).

## 2024-06-08 NOTE — PROGRESS NOTES
VAT responds to page for PIV placement.  Patient seated on edge of bed, states he is about to take a shower.  VAT will defer placement until after, please place a new consult when patient is ready for line.  Also discuss necessity of PIV with primary nurse, as patient has no IV meds ordered.  Primary nurse states she already spoke with MD about the issue, and MD wants patient to have IV access as patient is having panic attacks.

## 2024-06-08 NOTE — PLAN OF CARE
[id number]  Orientations: A&O x4, often anxious. Patient has PRN xanax and atarax for anxiety.   Vitals/Pain: VSS on 2L NC as needed by patient. Has home CPAP with him, states he doesn't like it. C/o of lower back pain, PRN tylenol given x1.   Lines/Drains: 1 PIV left hand  Skin/Wounds: WNL  Resp: dyspnea with exertion   GI/: 2 BMs today, BS audible, passing flatus. Continent of urine   Ambulation/Assist: Ax1   Diet: Regular. Carb counting/insulin with snacks  Plan: Wean O2. Transferred pt to station [id number] around 2200. Continue plan of care

## 2024-06-08 NOTE — PLAN OF CARE
06/[birth year]/-1930    Summary: presented on 6/3/2024 with 5 days of worsening shortness of breath, fever up to 103.  He initially went to clinic where he was noted to be hypoxic and sent to ER    Primary Diagnosis: Bilateral pneumonia/pneumonitis, organism unspecified, likely bacterial   Severe sepsis, Lactic acidosis, Acute hypoxic respiratory failure-secondary to bilateral pneumonia/pneumonitis  Hx Schizoaffective disorder  Panic attacks, ADHD, History of Lyme's disease    Orientation: A&OX4, anxious    Activity: Ind    Diet/BS Checks: Regular, BG-ACHS, CC Insulin    Tele:  N/A    IV Access/Drains:     Pain Management: Lower back pain- PRN Tylenol, Ibuprofen    Abnormal VS/Results: VSS on 3-4 L O2 via NC. Pt removes O2 sometimes, but maintaining 92-96% on RA. RAY    Bowel/Bladder: Continent, had BM today    Skin/Wounds:  small bruises on both forearm    Consults: RT    D/C Disposition: TBD    Other Info: Pt wants to set up CPAP before sleep, RT can set up.  Encouraging IS. Vascular team came twice to insert PIV but couldn't, they will try again later. MD wants to keep PIV due to Dx.. Pt was on RA most of the shift, maintaining SpO2> 92. May need O2 at night.

## 2024-06-09 VITALS
RESPIRATION RATE: 16 BRPM | WEIGHT: 315 LBS | DIASTOLIC BLOOD PRESSURE: 87 MMHG | HEART RATE: 72 BPM | HEIGHT: 77 IN | BODY MASS INDEX: 37.19 KG/M2 | TEMPERATURE: 96.7 F | SYSTOLIC BLOOD PRESSURE: 138 MMHG | OXYGEN SATURATION: 94 %

## 2024-06-09 LAB
GLUCOSE BLDC GLUCOMTR-MCNC: 142 MG/DL (ref 70–99)
GLUCOSE BLDC GLUCOMTR-MCNC: 160 MG/DL (ref 70–99)
GLUCOSE BLDC GLUCOMTR-MCNC: 195 MG/DL (ref 70–99)

## 2024-06-09 PROCEDURE — 5A09357 ASSISTANCE WITH RESPIRATORY VENTILATION, LESS THAN 24 CONSECUTIVE HOURS, CONTINUOUS POSITIVE AIRWAY PRESSURE: ICD-10-PCS | Performed by: INTERNAL MEDICINE

## 2024-06-09 PROCEDURE — 99239 HOSP IP/OBS DSCHRG MGMT >30: CPT | Performed by: INTERNAL MEDICINE

## 2024-06-09 PROCEDURE — 250N000013 HC RX MED GY IP 250 OP 250 PS 637: Performed by: STUDENT IN AN ORGANIZED HEALTH CARE EDUCATION/TRAINING PROGRAM

## 2024-06-09 PROCEDURE — 250N000013 HC RX MED GY IP 250 OP 250 PS 637: Performed by: INTERNAL MEDICINE

## 2024-06-09 PROCEDURE — 94640 AIRWAY INHALATION TREATMENT: CPT

## 2024-06-09 PROCEDURE — 250N000012 HC RX MED GY IP 250 OP 636 PS 637: Performed by: INTERNAL MEDICINE

## 2024-06-09 PROCEDURE — 999N000157 HC STATISTIC RCP TIME EA 10 MIN

## 2024-06-09 PROCEDURE — 250N000009 HC RX 250: Performed by: STUDENT IN AN ORGANIZED HEALTH CARE EDUCATION/TRAINING PROGRAM

## 2024-06-09 RX ORDER — DOXYCYCLINE 100 MG/1
100 CAPSULE ORAL EVERY 12 HOURS
Qty: 4 CAPSULE | Refills: 0 | Status: SHIPPED | OUTPATIENT
Start: 2024-06-09 | End: 2024-06-11

## 2024-06-09 RX ORDER — METFORMIN HCL 500 MG
1000 TABLET, EXTENDED RELEASE 24 HR ORAL
Qty: 30 TABLET | Refills: 0 | Status: SHIPPED | OUTPATIENT
Start: 2024-06-09

## 2024-06-09 RX ADMIN — GABAPENTIN 300 MG: 300 CAPSULE ORAL at 08:10

## 2024-06-09 RX ADMIN — NICOTINE POLACRILEX 4 MG: 4 GUM, CHEWING BUCCAL at 00:19

## 2024-06-09 RX ADMIN — PANTOPRAZOLE SODIUM 40 MG: 40 TABLET, DELAYED RELEASE ORAL at 08:10

## 2024-06-09 RX ADMIN — GUAIFENESIN AND DEXTROMETHORPHAN 10 ML: 100; 10 SYRUP ORAL at 08:12

## 2024-06-09 RX ADMIN — AMOXICILLIN AND CLAVULANATE POTASSIUM 1 TABLET: 875; 125 TABLET, FILM COATED ORAL at 08:09

## 2024-06-09 RX ADMIN — LISDEXAMFETAMINE DIMESYLATE 50 MG: 50 CAPSULE ORAL at 08:09

## 2024-06-09 RX ADMIN — DOXYCYCLINE HYCLATE 100 MG: 100 CAPSULE ORAL at 08:09

## 2024-06-09 RX ADMIN — GUAIFENESIN AND DEXTROMETHORPHAN 10 ML: 100; 10 SYRUP ORAL at 11:49

## 2024-06-09 RX ADMIN — LEVALBUTEROL HYDROCHLORIDE 0.63 MG: 1.25 SOLUTION RESPIRATORY (INHALATION) at 07:10

## 2024-06-09 RX ADMIN — ACETAMINOPHEN 650 MG: 325 TABLET, FILM COATED ORAL at 08:20

## 2024-06-09 RX ADMIN — GUAIFENESIN AND DEXTROMETHORPHAN 10 ML: 100; 10 SYRUP ORAL at 00:19

## 2024-06-09 RX ADMIN — CLONAZEPAM 2 MG: 1 TABLET ORAL at 08:09

## 2024-06-09 RX ADMIN — ALPRAZOLAM 0.5 MG: 0.5 TABLET ORAL at 00:19

## 2024-06-09 RX ADMIN — NICOTINE POLACRILEX 4 MG: 4 GUM, CHEWING BUCCAL at 11:49

## 2024-06-09 RX ADMIN — ACETAMINOPHEN 650 MG: 325 TABLET, FILM COATED ORAL at 00:19

## 2024-06-09 RX ADMIN — ALPRAZOLAM 0.5 MG: 0.5 TABLET ORAL at 08:20

## 2024-06-09 RX ADMIN — GUAIFENESIN AND DEXTROMETHORPHAN 10 ML: 100; 10 SYRUP ORAL at 04:58

## 2024-06-09 RX ADMIN — NICOTINE POLACRILEX 4 MG: 4 GUM, CHEWING BUCCAL at 08:20

## 2024-06-09 RX ADMIN — PREDNISONE 40 MG: 20 TABLET ORAL at 08:09

## 2024-06-09 ASSESSMENT — ACTIVITIES OF DAILY LIVING (ADL)
ADLS_ACUITY_SCORE: 22
ADLS_ACUITY_SCORE: 23
ADLS_ACUITY_SCORE: 22
ADLS_ACUITY_SCORE: 23
ADLS_ACUITY_SCORE: 23
ADLS_ACUITY_SCORE: 22
ADLS_ACUITY_SCORE: 23

## 2024-06-09 NOTE — PROGRESS NOTES
Patient has been assessed for Home Oxygen needs. Oxygen readings:    *Pulse oximetry (SpO2) = 96% on room air at rest while awake.    *SpO2 improved to 96% on 0 liters/minute at rest.    *SpO2 = 96% on room air during activity/with exercise.    *SpO2 improved to 96% on 0 liters/minute during activity/with exercise.

## 2024-06-09 NOTE — PLAN OF CARE
Goal Outcome Evaluation:  Patient is alert and oriented x4. Up independently to the bathroom. VSS expect elevated BP on CPAP overnight with 5L of O2. Regular diet. PIV SL. Pain managed with Tylenol and Xanax. Small bruising on forearm.

## 2024-06-09 NOTE — PROGRESS NOTES
Shift Summary [id number]    Admitting Diagnosis: Atypical pneumonia [J18.9]  Acute lung injury associated with vaping [U07.0]   Vitals: VSS.RA.  Pain 7/10. Taking Tyl PRN.   A&Ox4  Voiding: continent.   Mobility: indep, ambulated to the BR and in hallway  CMS: intact  Lung Sounds: diminished  GI: BM yesterday  Diet: regular. BG check. Uses insulin       Plan: discharge home today with mother transport. AVS and med reviewed with pt. All questions answered.

## 2024-06-09 NOTE — DISCHARGE SUMMARY
"[facility]  Hospitalist Discharge Summary      Date of Admission:  6/3/2024  Date of Discharge:  6/9/2024  Discharging Provider: Arline Barbosa MD  Discharge Service: Hospitalist Service    Discharge Diagnoses     Bilateral pneumonia/pneumonitis, organism unspecified, likely bacterial   Severe sepsis due to above - resolved  Lactic acidosis - resolved     Acute hypoxic respiratory failure-secondary to bilateral pneumonia/pneumonitis  - resolved     Severe obstructive sleep apnea     Mild hypovolemic hyponatremia, sodium 134  - resolved     Prediabetes with steroid-induced hyperglycemia, A1c 5.7, 6/3/2024     Schizoaffective disorder  Panic attacks  ADHD     GERD     History of Lyme's disease     Hypoalbuminemia: Lowest albumin = 3.4 g/dL      Obesity: Estimated body mass index is 37.35 kg/m      Clinically Significant Risk Factors     # Obesity: Estimated body mass index is 37.35 kg/m  as calculated from the following:    Height as of this encounter: 1.956 m (6' 5.01\").    Weight as of this encounter: 142.9 kg (315 lb).       Follow-ups Needed After Discharge   Follow-up Appointments     Follow-up and recommended labs and tests       Follow up with primary care provider, Maryanne Mitchell, within 7 days for   hospital follow- up.  No follow up labs or test are needed.        {Additional follow-up instructions/to-do's for PCP    : Follow-up for prediabetes and steroid-induced hyperglycemia    Unresulted Labs Ordered in the Past 30 Days of this Admission       No orders found from 5/4/2024 to 6/4/2024.            Discharge Disposition   Discharged to home  Condition at discharge: Stable    Hospital Course     Jonny Gramajo is a 39-year-old male with history of schizoaffective disorder, panic attacks, ADHD, GERD, history of Lyme's disease, who presented on 6/3/2024 with 5 days of worsening shortness of breath, fever up to 103.  He initially went to clinic where he was noted to be hypoxic with " saturations of 82% and was thus sent to ER.     Workup showed pulmonary infiltrates and acute hypoxic respiratory failure.  He was placed on high flow oxygen and started on IV antibiotics and IV steroids.  He improved and was weaned off oxygen.  He was discharged home on 6/9/2024     Bilateral pneumonia/pneumonitis, organism unspecified, likely bacterial   Severe sepsis due to above  Lactic acidosis  - Presented with fever of 103F, WBC 18.8, elevated procalcitonin of 4.24, RR 31, HR in 90s, hypoxic requiring high flow oxygen.  - lactic acid was normal on admission but increased to 3.1 after admission. Resolved with IV fluids and IV antibiotics.   - Chest x-ray showed bilateral interstitial and airspace opacities.  - Patient also has history of vaping and GERD.  Vaping induced lung injury, aspiration pneumonitis, CHF are also in the differential. Given fever, leukocytosis, elevated procalcitonin, bacterial pneumonia is the most likely etiology along with vaping induced lung injury  - Blood cultures negative.  Urine Legionella and pneumococcal antigens negative  - respiratory panel negative.  MRSA nasal swab negative  - sputum culture was contaminated on multiple occasions  - CT chest 6/6 showed bilateral upper lobes and perihilar patchy groundglass and coarse interstitial opacities likely infectious.  - Echo 6/5 showed borderline concentric LVH, normal LV and RV systolic and diastolic function. No pulm HTN.   - leukocytosis and procalcitonin improved. Pulmonary was consulted  - Received IV Zosyn and IV doxycycline from 6/3 to 6/7.  Switched to p.o. Augmentin and p.o. doxycycline on 6/7.  Plan total 7-day antibiotic course  - Was initially on IV Solu-Medrol 40 mg twice daily.  Switched to 40 mg daily on 6/6.  Switch to po prednisone 40 mg daily on 6/8.  Completed 6 days of prednisone 6/9.  Not prescribed at discharge.  - Cessation of vaping is strongly recommended        Acute hypoxic respiratory failure-secondary to  "bilateral pneumonia/pneumonitis  - Was hypoxic with saturations of 82% on room air on admission.  - required high flow oxygen at 50 L/min, 60% FiO2 initially.  Weaned to 8 L/min by facemask on 6/6.  Continue to improve.  Weaned off oxygen on 6/9.  Saturations remained over 90% with activity prior to discharge.       Severe obstructive sleep apnea  - has not been able to tolerate CPAP at home due to claustrophobia.  He was noted to be hypoxic during the night requiring up to 15 L/min  - Patient tried his CPAP during hospital stay and tolerated well.  5 L of oxygen was bled into CPAP.  - Should follow-up with sleep medicine as outpatient     Mild hypovolemic hyponatremia, sodium 134  - Improved from 134 to 140 with IV fluid. Now stable.      Prediabetes with steroid-induced hyperglycemia, A1c 5.7, 6/3/2024  - Blood sugar were elevated in 300s due to systemic steroids. Received insulin Lantus and NovoLog in the hospital.    - Blood sugars now improving.  Completed prednisone course in hospital.    - Expect blood sugars to continue to improve now that he is done with systemic steroids   - Started on metformin 1000 mg daily at discharge   - Follow-up with PCP      Schizoaffective disorder  Panic attacks  ADHD  - Continue clonazepam, Zyprexa, gabapentin, Vyvanse   - Mother reports that due to agoraphobia, patient usually stays in his room.  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  Has been off antibiotics for 2-3 years      Hypoalbuminemia: Lowest albumin = 3.4 g/dL      Obesity: Estimated body mass index is 37.35 kg/m  as calculated from the following:    Height as of this encounter: 1.956 m (6' 5.01\").    Weight as of this encounter: 142.9 kg (315 lb)   "     Consultations This Hospital Stay   PULMONARY IP CONSULT  VASCULAR ACCESS ADULT IP CONSULT  VASCULAR ACCESS ADULT IP CONSULT    Code Status   Full Code    Time Spent on this Encounter   I, Arline Barbosa MD, personally saw the patient today and spent greater than 30 minutes discharging this patient.       Arline Barbosa MD  [facility] [id number] [facility]  [address], [address]  [place] [address]  Phone: [phone]  ______________________________________________________________________    Physical Exam   Vital Signs: Temp: (!) 96.7  F (35.9  C) Temp src: Axillary BP: 138/87 Pulse: 72   Resp: 16 SpO2: 94 % O2 Device: None (Room air) Oxygen Delivery: 5 LPM  Weight: 315 lbs 0 oz    Constitutional - awake and alert, resting in bed, in no acute distress  Cardiovascular - regular rate and rhythm, no murmurs, no edema  Pulmonary - lungs are clear to auscultation bilaterally, no wheezing or rhonchi  GI - abdomen is soft, nontender, nondistended, no hepatosplenomegaly or masses  Integumentary - skin is warm and dry, no rashes or ulcers  Neurological - awake, alert and oriented x3.  Moving all 4 extremities, normal speech, no focal deficits       Primary Care Physician   Maryanne Mitchell    Discharge Orders      Reason for your hospital stay    Pneumonia     Follow-up and recommended labs and tests     Follow up with primary care provider, Maryanne Mitchell, within 7 days for hospital follow- up.  No follow up labs or test are needed.     Activity    Your activity upon discharge: activity as tolerated     Diet    Follow this diet upon discharge: Regular       Significant Results and Procedures   Most Recent 3 CBC's:  Recent Labs   Lab Test 06/08/24  0748 06/07/24  0705 06/06/24  0655 06/05/24  1242   WBC 14.1* 13.9* 13.0* 13.1*   HGB  --  14.1 13.1* 13.1*   MCV  --  93 92 93   PLT  --  216 212 236     Most Recent 3 BMP's:  Recent Labs   Lab Test 06/09/24  1211 06/09/24  0703 06/09/24  0151 06/07/24  1040  06/07/24  0705 06/06/24  1137 06/06/24  0655 06/05/24  1449 06/05/24  1242   NA  --   --   --   --  142  --  140  --  141   POTASSIUM  --   --   --   --  3.9  --  4.4  --  4.4   CHLORIDE  --   --   --   --  105  --  104  --  104   CO2  --   --   --   --  28  --  26  --  23   BUN  --   --   --   --  12.8  --  15.4  --  14.4   CR  --   --   --   --  0.79  --  0.69  --  0.61*   ANIONGAP  --   --   --   --  9  --  10  --  14   JAYLEN  --   --   --   --  8.3*  --  8.6  --  8.9   * 160* 142*   < > 111*   < > 225*   < > 239*    < > = values in this interval not displayed.     Most Recent Hemoglobin A1c:  Recent Labs   Lab Test 06/03/24  1029   A1C 5.7*   ,   Results for orders placed or performed during the hospital encounter of 06/03/24   XR Chest Port 1 View    Narrative    CHEST ONE VIEW Sara 3, 2024 12:06 PM     HISTORY: Hypoxia, tachycardia.    COMPARISON: Chest radiograph 3/20/2021.      Impression    IMPRESSION: Increased interstitial and airspace opacities that could  reflect edema or sequelae of atypical infection. No significant  pleural effusion, or pneumothorax. Unremarkable heart size.    Round lucency overlying the visualized lower neck is indeterminate,  possibly artifactual or dilated proximal cervical esophagus.    NANCIE MOREL MD         SYSTEM ID:  [id number]   CT Chest w/o Contrast    Narrative    EXAM: CT CHEST W/O CONTRAST  LOCATION: [facility]  DATE: 6/6/2024    INDICATION: Bilateral infiltrates, resp failure.  COMPARISON: Chest x-ray on 6/3/2024.  TECHNIQUE: CT chest without IV contrast. Multiplanar reformats were obtained. Dose reduction techniques were used.  CONTRAST: None.    FINDINGS:   LUNGS AND PLEURA: No pleural effusion or pneumothorax. Bilateral upper lobes and perihilar predominant patchy groundglass and coarse interstitial pulmonary opacities, likely infectious. No significant pleural effusion or pneumothorax.    MEDIASTINUM/AXILLAE: No cardiomegaly  or significant pericardial effusion. Multiple enlarged mediastinal and hilar lymph nodes, could be reactive.    CORONARY ARTERY CALCIFICATION: None.     UPPER ABDOMEN: Diffuse hypodense appearance of the liver, likely due to underlying hepatic steatosis. The spleen is enlarged measuring 15.5 cm in craniocaudal dimension.    MUSCULOSKELETAL: No suspicious osseous lesion.      Impression    IMPRESSION:   1.  Bilateral upper lobes and perihilar predominant patchy groundglass and coarse interstitial pulmonary opacities, likely infectious.  2.  Hepatic steatosis.  3.  Splenomegaly.     Echocardiogram Complete     Value    LVEF  55-60%    [facility]    [id number]  [id number]  [id number]^ELIESER^KIMANI     [facility]  Echocardiography Laboratory  [address] [place] [address]     Name: JONNY SUAREZ  MRN: [id number]  : 1985  Study Date: 2024 11:16 AM  Age: 39 yrs  Gender: Male  Patient Location: [facility]  Reason For Study: Respiratory Failure  Ordering Physician: KIMANI MON  Referring Physician: Maryanne Mitchell  Performed By: Jorden Frey RDCS     BSA: 2.7 m2  Height: 77 in  Weight: 313 lb  HR: 87  BP: 126/75 mmHg  ______________________________________________________________________________  Procedure  Complete Portable Echo Adult. Optison (NDC #0129-9139) given intravenously.  ______________________________________________________________________________  Interpretation Summary     Limited image quality due to patient body habitus  There is borderline concentric left ventricular hypertrophy.  Left ventricular systolic function is normal.  Left ventricular diastolic function is normal.  The right ventricle is normal in size and function.  Doppler findings do not suggest pulmonary hypertension.  Unable to assess mean RA pressure due to technically difficult study.  ______________________________________________________________________________  Left Ventricle  The left  ventricle is normal in size. There is borderline concentric left  ventricular hypertrophy. Left ventricular systolic function is normal. The  visual ejection fraction is 55-60%. Left ventricular diastolic function is  normal. Normal left ventricular wall motion.     Right Ventricle  The right ventricle is normal in size and function.     Atria  Normal left atrial size. Right atrial size is normal.     Mitral Valve  The mitral valve leaflets appear normal. There is no evidence of stenosis,  fluttering, or prolapse.     Tricuspid Valve  Normal tricuspid valve. There is trace tricuspid regurgitation. The right  ventricular systolic pressure is approximated at 22.7 mmHg plus the right  atrial pressure. Doppler findings do not suggest pulmonary hypertension.  Unable to assess mean RA pressure due to technically difficult study.     Aortic Valve  The aortic valve is trileaflet. No hemodynamically significant valvular aortic  stenosis.     Pulmonic Valve  The pulmonic valve is not well seen, but is grossly normal.     Vessels  The aortic root is normal size.     Pericardium  The pericardium appears normal.     Rhythm  Sinus rhythm was noted.  ______________________________________________________________________________  MMode/2D Measurements & Calculations  IVSd: 1.2 cm     LVIDd: 4.5 cm  LVIDs: 3.5 cm  LVPWd: 1.2 cm  FS: 22.8 %  LV mass(C)d: 197.1 grams  LV mass(C)dI: 72.8 grams/m2  Ao root diam: 3.6 cm  LA dimension: 4.0 cm  asc Aorta Diam: 3.4 cm  LA/Ao: 1.1  Ao root diam index Ht(cm/m): 1.8  Ao root diam index BSA (cm/m2): 1.3  Asc Ao diam index BSA (cm/m2): 1.3  Asc Ao diam index Ht(cm/m): 1.7  LA Volume (BP): 61.9 ml     LA Volume Index (BP): 22.8 ml/m2  RV Base: 4.0 cm  RWT: 0.53  TAPSE: 2.7 cm     Doppler Measurements & Calculations  MV E max rochelle: 111.0 cm/sec  MV A max rochelle: 82.7 cm/sec  MV E/A: 1.3  MV dec slope: 654.1 cm/sec2  MV dec time: 0.17 sec  PA acc time: 0.11 sec  TR max rochelle: 238.1 cm/sec  TR max PG:  "22.7 mmHg  E/E' av.3  Lateral E/e': 8.1  Medial E/e': 10.4  RV S Fish: 11.7 cm/sec     ______________________________________________________________________________  Report approved by: Radha Rene 2024 02:13 PM             Discharge Medications   Current Discharge Medication List        START taking these medications    Details   amoxicillin-clavulanate (AUGMENTIN) 875-125 MG tablet Take 1 tablet by mouth every 12 hours for 2 days  Qty: 4 tablet, Refills: 0    Associated Diagnoses: Atypical pneumonia      doxycycline hyclate (VIBRAMYCIN) 100 MG capsule Take 1 capsule (100 mg) by mouth every 12 hours for 2 days  Qty: 4 capsule, Refills: 0    Associated Diagnoses: Atypical pneumonia      metFORMIN (GLUCOPHAGE XR) 500 MG 24 hr tablet Take 2 tablets (1,000 mg) by mouth daily (with dinner)  Qty: 30 tablet, Refills: 0    Associated Diagnoses: Prediabetes           CONTINUE these medications which have NOT CHANGED    Details   clonazePAM (KLONOPIN) 2 MG tablet Take 2 mg by mouth 3 times daily      gabapentin (NEURONTIN) 300 MG capsule Take 1 capsule (300 mg) by mouth 3 times daily  Qty: 90 capsule, Refills: 1    Associated Diagnoses: Adjustment disorder with mixed anxiety and depressed mood      lisdexamfetamine (VYVANSE) 50 MG capsule Take 50 mg by mouth every morning      melatonin 3 MG tablet Take 3 mg by mouth nightly as needed for sleep      OLANZapine (ZYPREXA) 15 MG tablet Take 30 mg by mouth at bedtime      omeprazole (PRILOSEC) 20 MG DR capsule Take 20 mg by mouth daily           Allergies   Allergies   Allergen Reactions    Abilify [Aripiprazole]      Patient reports tardive dyskinesia effect in  but likely akathisia since patient reports the effects only occurred while on med and resolved with a few days after discontinuing med.     Wellbutrin [Bupropion] Anxiety     Patient reports felt \"reved\" up and anxious when taken in .      "

## 2024-06-09 NOTE — PROGRESS NOTES
Oxygen Documentation  I certify that this patient, Jonny Garmajo has been under my care (or a nurse practitioner or physican's assistant working with me). This is the face-to-face encounter for oxygen medical necessity.      At the time of this encounter, I have reviewed the qualifying testing and have determined that supplemental oxygen is reasonable and necessary and is expected to improve the patient's condition in a home setting.         Patient has continued oxygen desaturation due to RASHEED.    If portability is ordered, is the patient mobile within the home? yes    Was this visit performed as a telehealth visit: No      Arline Barbosa MD on 6/9/2024 at 11:23 AM

## 2024-06-11 ENCOUNTER — PATIENT OUTREACH (OUTPATIENT)
Dept: CARE COORDINATION | Facility: CLINIC | Age: 39
End: 2024-06-11
Payer: COMMERCIAL

## 2024-06-11 NOTE — PROGRESS NOTES
[facility]: Transitions of Care Outreach  Chief Complaint   Patient presents with    Clinic Care Coordination - Post Hospital       Most Recent Admission Date: 6/3/2024   Most Recent Admission Diagnosis: Atypical pneumonia - J18.9  Acute lung injury associated with vaping - U07.0     Most Recent Discharge Date: 6/9/2024   Most Recent Discharge Diagnosis: Atypical pneumonia - J18.9  Acute lung injury associated with vaping - U07.0  Prediabetes - R73.03  RASHEED (obstructive sleep apnea) - G47.33     Transitions of Care Assessment    Follow up Plan   Follow-up Appointments     Follow-up and recommended labs and tests       Follow up with primary care provider, Maryanne Mitchell, within 7 days for   hospital follow- up.  No follow up labs or test are needed.        {Additional follow-up instructions/to-do's for PCP    : Follow-up for prediabetes and steroid-induced hyperglycemia       No future appointments.    Outpatient Plan as outlined on AVS reviewed with patient.    For any urgent concerns, please contact our 24 hour nurse triage line: [phone] ([phone])       CYNTHIA Lin (she/her/hers)  Social Work Clinic Care Coordinator   [facility]  [email or web address]  [phone]

## 2024-11-30 ENCOUNTER — HEALTH MAINTENANCE LETTER (OUTPATIENT)
Age: 39
End: 2024-11-30

## 2025-02-05 ENCOUNTER — HOSPITAL ENCOUNTER (INPATIENT)
Facility: CLINIC | Age: 40
End: 2025-02-05
Attending: EMERGENCY MEDICINE
Payer: COMMERCIAL

## 2025-02-05 ENCOUNTER — TELEPHONE (OUTPATIENT)
Dept: BEHAVIORAL HEALTH | Facility: CLINIC | Age: 40
End: 2025-02-05

## 2025-02-05 ENCOUNTER — TELEPHONE (OUTPATIENT)
Dept: BEHAVIORAL HEALTH | Facility: CLINIC | Age: 40
End: 2025-02-05
Payer: COMMERCIAL

## 2025-02-05 DIAGNOSIS — F22 DELUSIONS (H): ICD-10-CM

## 2025-02-05 DIAGNOSIS — F22 PARANOIA (H): ICD-10-CM

## 2025-02-05 PROBLEM — F25.0 SCHIZOAFFECTIVE DISORDER, BIPOLAR TYPE (H): Status: ACTIVE | Noted: 2025-02-05

## 2025-02-05 LAB
AMPHETAMINES UR QL SCN: ABNORMAL
BARBITURATES UR QL SCN: ABNORMAL
BENZODIAZ UR QL SCN: ABNORMAL
BZE UR QL SCN: ABNORMAL
CANNABINOIDS UR QL SCN: ABNORMAL
FENTANYL UR QL: ABNORMAL
OPIATES UR QL SCN: ABNORMAL
PCP QUAL URINE (ROCHE): ABNORMAL

## 2025-02-05 PROCEDURE — 80307 DRUG TEST PRSMV CHEM ANLYZR: CPT | Performed by: EMERGENCY MEDICINE

## 2025-02-05 PROCEDURE — 250N000013 HC RX MED GY IP 250 OP 250 PS 637

## 2025-02-05 PROCEDURE — 99285 EMERGENCY DEPT VISIT HI MDM: CPT | Performed by: EMERGENCY MEDICINE

## 2025-02-05 PROCEDURE — 250N000013 HC RX MED GY IP 250 OP 250 PS 637: Performed by: EMERGENCY MEDICINE

## 2025-02-05 PROCEDURE — 99418 PROLNG IP/OBS E/M EA 15 MIN: CPT

## 2025-02-05 PROCEDURE — 99223 1ST HOSP IP/OBS HIGH 75: CPT

## 2025-02-05 RX ORDER — OLANZAPINE 10 MG/1
10 TABLET, ORALLY DISINTEGRATING ORAL 3 TIMES DAILY PRN
Status: DISCONTINUED | OUTPATIENT
Start: 2025-02-05 | End: 2025-02-06

## 2025-02-05 RX ORDER — PANTOPRAZOLE SODIUM 40 MG/1
40 TABLET, DELAYED RELEASE ORAL DAILY
Status: DISPENSED | OUTPATIENT
Start: 2025-02-05

## 2025-02-05 RX ORDER — OLANZAPINE 10 MG/2ML
10 INJECTION, POWDER, FOR SOLUTION INTRAMUSCULAR 3 TIMES DAILY PRN
Status: DISCONTINUED | OUTPATIENT
Start: 2025-02-05 | End: 2025-02-06

## 2025-02-05 RX ORDER — CLONAZEPAM 0.5 MG/1
2 TABLET ORAL 3 TIMES DAILY
Status: DISCONTINUED | OUTPATIENT
Start: 2025-02-05 | End: 2025-02-10

## 2025-02-05 RX ORDER — GABAPENTIN 300 MG/1
300 CAPSULE ORAL 3 TIMES DAILY
Status: DISCONTINUED | OUTPATIENT
Start: 2025-02-05 | End: 2025-02-08

## 2025-02-05 RX ORDER — OLANZAPINE 15 MG/1
30 TABLET ORAL AT BEDTIME
Status: DISCONTINUED | OUTPATIENT
Start: 2025-02-05 | End: 2025-02-13

## 2025-02-05 RX ORDER — TESTOSTERONE 20.25 MG/1.25G
60.75 GEL TOPICAL EVERY MORNING
Status: ON HOLD | COMMUNITY
Start: 2025-01-20

## 2025-02-05 RX ORDER — LISDEXAMFETAMINE DIMESYLATE 50 MG/1
50 CAPSULE ORAL EVERY MORNING
Status: DISCONTINUED | OUTPATIENT
Start: 2025-02-06 | End: 2025-02-05

## 2025-02-05 RX ORDER — CLONAZEPAM 1 MG/1
2 TABLET ORAL 3 TIMES DAILY
Status: DISCONTINUED | OUTPATIENT
Start: 2025-02-05 | End: 2025-02-05

## 2025-02-05 RX ORDER — CARVEDILOL 6.25 MG/1
6.25 TABLET ORAL 2 TIMES DAILY WITH MEALS
Status: ON HOLD | COMMUNITY

## 2025-02-05 RX ORDER — TADALAFIL 20 MG/1
10-20 TABLET ORAL DAILY PRN
Status: ON HOLD | COMMUNITY
Start: 2025-01-20

## 2025-02-05 RX ORDER — QUETIAPINE FUMARATE 100 MG/1
100 TABLET, FILM COATED ORAL 3 TIMES DAILY PRN
Status: DISCONTINUED | OUTPATIENT
Start: 2025-02-05 | End: 2025-02-05

## 2025-02-05 RX ORDER — CHLORAL HYDRATE 500 MG
1 CAPSULE ORAL DAILY
Status: ON HOLD | COMMUNITY

## 2025-02-05 RX ORDER — DEXTROAMPHETAMINE SACCHARATE, AMPHETAMINE ASPARTATE, DEXTROAMPHETAMINE SULFATE AND AMPHETAMINE SULFATE 5; 5; 5; 5 MG/1; MG/1; MG/1; MG/1
1 TABLET ORAL 3 TIMES DAILY
Status: ON HOLD | COMMUNITY
Start: 2025-01-20

## 2025-02-05 RX ORDER — CETIRIZINE HYDROCHLORIDE 10 MG/1
10 TABLET ORAL DAILY
Status: ON HOLD | COMMUNITY

## 2025-02-05 RX ADMIN — GABAPENTIN 300 MG: 300 CAPSULE ORAL at 16:20

## 2025-02-05 RX ADMIN — Medication 3 MG: at 23:48

## 2025-02-05 RX ADMIN — PANTOPRAZOLE SODIUM 40 MG: 40 TABLET, DELAYED RELEASE ORAL at 16:21

## 2025-02-05 RX ADMIN — CLONAZEPAM 2 MG: 1 TABLET ORAL at 16:21

## 2025-02-05 RX ADMIN — CLONAZEPAM 2 MG: 1 TABLET ORAL at 20:06

## 2025-02-05 RX ADMIN — GABAPENTIN 300 MG: 300 CAPSULE ORAL at 20:06

## 2025-02-05 RX ADMIN — NICOTINE POLACRILEX 4 MG: 4 GUM, CHEWING BUCCAL at 22:24

## 2025-02-05 RX ADMIN — NICOTINE POLACRILEX 4 MG: 4 GUM, CHEWING BUCCAL at 23:48

## 2025-02-05 RX ADMIN — OLANZAPINE 30 MG: 15 TABLET, FILM COATED ORAL at 22:10

## 2025-02-05 ASSESSMENT — ACTIVITIES OF DAILY LIVING (ADL)
ADLS_ACUITY_SCORE: 58

## 2025-02-05 NOTE — ED NOTES
IP MH Referral Acuity Rating Score (RARS)    LMHP complete at referral to IP MH, with DEC; and, daily while awaiting IP MH placement. Call score to PPS.  CRITERIA SCORING   New 72 HH and Involuntary for IP MH (not adolescent) 0/3   Boarding over 24 hours 0/1   Vulnerable adult at least 55+ with multiple co morbidities; or, Patient age 11 or under 0/1   Suicide ideation without relief of precipitating factors 0/1   Current plan for suicide 0/1   Current plan for homicide 0/1   Imminent risk or actual attempt to seriously harm another without relief of factors precipitating the attempt 0/1   Severe dysfunction in daily living (ex: complete neglect for self care, extreme disruption in vegetative function, extreme deterioration in social interactions) 1/1   Recent (last 2 weeks) or current physical aggression in the ED 0/1   Restraints or seclusion episode in ED 0/1   Verbal aggression, agitation, yelling, etc., while in the ED 0/1   Active psychosis with psychomotor agitation or catatonia 1/1   Need for constant or near constant redirection (from leaving, from others, etc).  1/1   Intrusive or disruptive behaviors 0/1   TOTAL 3

## 2025-02-05 NOTE — PLAN OF CARE
Jonny Gramajo  February 5, 2025  Plan of Care Hand-off Note     Patient Recommended Care Path: inpatient mental health    Clinical Substantiation:  Pt presents with signs/symptoms of significant paranoia, delusional thoughts about being hacked and his life being in danger. These beliefs do not appear to be based in reality according to family and they are affecting his ability to care for himself and function day to day. He is not leaving the home, showering or sleeping well. He is mis-using his precribed Adderall and drinking large amounts of coffee and energy drinks resulting in increased energy and lack of sleep. He fears for his life and safety and had barricaded himself in his house, covered windows etc. His insight and judgment is impaired. Recommend [facility] for safety and stabilization.    Goals for crisis stabilization:   [facility] admission    Treatment Objectives Addressed:  rapport building, processing feelings    Severe psychiatric, behavioral or other comorbid conditions are appropriate for management at inpatient mental health as indicated by at least one of the following: Symptoms of impact to function, Impaired impulse control, judgement, or insight  Severe dysfunction in daily living is present as indicated by at least one of the following: Incapacitation because of grave disability, Complete inability to maintain any appropriate aspect of personal responsibility in any adult roles, Complete neglect of self care with associated impairment in physical status  Situation and expectations are appropriate for inpatient care: Voluntary treatment at lower level of care is not feasible, Biopsychosocial stresses potentially contributing to clinical presentation (co morbidities) have been assessed and are absent or manageable at proposed level of care, Patient management/treatment at lower level of care is not feasible or is inappropriate  Inpatient mental health services are necessary to meet patient needs and at  least one of the following: Specific condition related to admission diagnosis is present and judged likely to further improve at proposed level of care, Specific condition related to admission diagnosis is present and judged likely to deteriorate in absence of treatment at proposed level of care    Collateral contact information:  Leilani Gramajo: [phone]    Psychiatry Consult: Patient has Psychiatry Consult Order    Fay Flor, LICSW

## 2025-02-05 NOTE — ED PROVIDER NOTES
ED Provider Note  [facility]      History     Chief Complaint   Patient presents with    Paranoid      called EM, pt is coming from his parent house. Pt hasn't been taking his medications, has not taking shower for more a month. Pt think there is someone is watching him.     HPI  Jonny Gramajo is a 39 year old male with history of schizoaffective disorder, panic attacks, ADHD who presents to the emergency department via EMS for paranoia.  The patient reports that he is concerned that people are spying on him.  He states that he thinks someone has tried to steal his identity and he has open multiple cases to try to investigate this but nothing has been resolved.  He believes that people have been putting monitoring devices around to spy on him  and others as well.  He states that he had got into a disagreement with his mother who had called crisis line and subsequently had him brought to the ED.  He states that his mother does not believe him and so attributes all of his believes to schizoaffective disorder.    He has been taking his medications as prescribed.          Past Medical History  Past Medical History:   Diagnosis Date    Anxiety     Class 1 obesity due to excess calories without serious comorbidity with body mass index (BMI) of 34.0 to 34.9 in adult     Depression     Depressive disorder     Elevated ALT measurement     Hypertension     Lyme disease     Lyme disease      Past Surgical History:   Procedure Laterality Date    GENITOURINARY SURGERY      testical recessed    PICC  1/19/2017          clonazePAM (KLONOPIN) 2 MG tablet  gabapentin (NEURONTIN) 300 MG capsule  OLANZapine (ZYPREXA) 15 MG tablet  lisdexamfetamine (VYVANSE) 50 MG capsule  melatonin 3 MG tablet  metFORMIN (GLUCOPHAGE XR) 500 MG 24 hr tablet  omeprazole (PRILOSEC) 20 MG DR capsule      Allergies   Allergen Reactions    Abilify [Aripiprazole]      Patient reports tardive dyskinesia effect in 2004  "but likely akathisia since patient reports the effects only occurred while on med and resolved with a few days after discontinuing med.     Wellbutrin [Bupropion] Anxiety     Patient reports felt \"reved\" up and anxious when taken in 2001.      Family History  No family history on file.  Social History   Social History     Tobacco Use    Smoking status: Former     Current packs/day: 0.00     Average packs/day: 2.0 packs/day for 15.0 years (30.0 ttl pk-yrs)     Types: Cigarettes     Start date: 3/20/2006     Quit date: 3/20/2021     Years since quitting: 3.8    Smokeless tobacco: Former     Quit date: 3/20/2021    Tobacco comments:     want to continue smoking   Substance Use Topics    Alcohol use: Not Currently     Comment: Alcoholic Drinks/day: denies drinking alcohol    Drug use: Not Currently     Comment: Drug use: denies      A medically appropriate review of systems was performed with pertinent positives and negatives noted in the HPI, and all other systems negative.    Physical Exam   BP: (!) 145/84  Pulse: 108  Temp: 98.6  F (37  C)  Resp: 16  SpO2: 97 %  Physical Exam  Constitutional:       General: He is not in acute distress.     Appearance: He is not toxic-appearing.   HENT:      Head: Normocephalic and atraumatic.      Mouth/Throat:      Pharynx: Oropharynx is clear.   Eyes:      Conjunctiva/sclera: Conjunctivae normal.      Pupils: Pupils are equal, round, and reactive to light.   Cardiovascular:      Rate and Rhythm: Normal rate.   Pulmonary:      Effort: Pulmonary effort is normal. No respiratory distress.      Breath sounds: No wheezing.   Musculoskeletal:         General: Normal range of motion.   Skin:     General: Skin is warm and dry.   Neurological:      General: No focal deficit present.      Mental Status: He is alert and oriented to person, place, and time.   Psychiatric:         Speech: Speech is tangential.         Thought Content: Thought content is paranoid. Thought content does not " include suicidal ideation.           ED Course, Procedures, & Data      Procedures                Results for orders placed or performed during the hospital encounter of 02/05/25   Urine Drug Screen Panel     Status: Abnormal   Result Value Ref Range    Amphetamines Urine Screen Positive (A) Screen Negative    Barbituates Urine Screen Negative Screen Negative    Benzodiazepine Urine Screen Positive (A) Screen Negative    Cannabinoids Urine Screen Positive (A) Screen Negative    Cocaine Urine Screen Negative Screen Negative    Fentanyl Qual Urine Screen Negative Screen Negative    Opiates Urine Screen Negative Screen Negative    PCP Urine Screen Negative Screen Negative   Urine Drug Screen     Status: Abnormal    Narrative    The following orders were created for panel order Urine Drug Screen.  Procedure                               Abnormality         Status                     ---------                               -----------         ------                     Urine Drug Screen Panel[[id number]]      Abnormal            Final result                 Please view results for these tests on the individual orders.     Medications   gabapentin (NEURONTIN) capsule 300 mg (300 mg Oral $Given 2/5/25 1620)   OLANZapine (zyPREXA) tablet 30 mg (has no administration in time range)   pantoprazole (PROTONIX) EC tablet 40 mg (40 mg Oral $Given 2/5/25 1621)   clonazePAM (klonoPIN) tablet 2 mg (2 mg Oral $Given 2/5/25 1621)   melatonin tablet 3 mg (has no administration in time range)   cariprazine (VRAYLAR) capsule 1.5 mg (has no administration in time range)   OLANZapine zydis (zyPREXA) ODT tab 10 mg (has no administration in time range)     Or   OLANZapine (zyPREXA) injection 10 mg (has no administration in time range)     Labs Ordered and Resulted from Time of ED Arrival to Time of ED Departure   URINE DRUG SCREEN PANEL - Abnormal       Result Value    Amphetamines Urine Screen Positive (*)     Barbituates Urine Screen  Negative      Benzodiazepine Urine Screen Positive (*)     Cannabinoids Urine Screen Positive (*)     Cocaine Urine Screen Negative      Fentanyl Qual Urine Screen Negative      Opiates Urine Screen Negative      PCP Urine Screen Negative       No orders to display          Critical care was not performed.     Medical Decision Making  The patient's presentation was of high complexity (a chronic illness severe exacerbation, progression, or side effect of treatment).    The patient's evaluation involved:  review of external note(s) from 3+ sources (ED notes, clinic notes, telephone encounters)  review of 2 test result(s) ordered prior to this encounter (CBC, BMP)  ordering and/or review of 1 test(s) in this encounter (see separate area of note for details)  discussion of management or test interpretation with another health professional (see separate area of note for details)    The patient's management necessitated high risk (a decision regarding hospitalization).    Assessment & Plan    This is a 39-year-old male presenting with mental health concerns.  The patient was delusional and quite paranoid.  He denied any suicidal thoughts.  A DEC assessment was requested and collateral was obtained from the family.  He has been reportedly barricading himself at home and blocking the doors with furniture.  He has also sprayed shaving cream or foam on the windows to block those.  Their kitchen was covered in food waste and dishes.     Psychiatry also saw the patient.  He would not voluntarily stay for admission which they recommended.    Given his overall presentation as well as collateral information from family I discussed placing the patient on a 72-hour hold with psychiatry which they agreed was reasonable.  He lacks insight into his condition or need for treatment.  He has not been able to function appropriately take care of himself due to his decompensated schizoaffective disorder.     He is placed on a hold and a  inpatient mental health bed was requested.      I have reviewed the nursing notes. I have reviewed the findings, diagnosis, plan and need for follow up with the patient.    New Prescriptions    No medications on file       Final diagnoses:   Paranoia (H)   Delusions (H)       Andriy Holman MD  [facility] EMERGENCY DEPARTMENT  2/5/2025     Andriy Holman MD  02/05/25 1700

## 2025-02-05 NOTE — TELEPHONE ENCOUNTER
S: [employer] NICOLE Howell  Ana  calling at 4:14 PM about a 39 year old/Male presenting with paranoia and delusions.     B: Pt arrived via  [facility] . Presenting problem, stressors: Pt paranoia and delusions are impacting his ability to function day to day. Pt paranoia is centered around electronics believing he is being hacked through multiple devices. Also believes if he goes outside he will be harmed. Pt barricaded doors and placed shaving cream on the windows of home while his parents were away for a few days out of fear of being harmed. Per mom pt is using cannabis.    Pt affect in ED: Dramatic  Pt Dx: Schizoaffective Disorder  Previous [facility] hx? Yes: [facility] 2022.  Pt denies SI   Hx of suicide attempt? No  Pt denies SIB  Pt denies HI   Pt denies hallucinations .   Pt RARS Score: 3    Hx of aggression/violence, sexual offenses, legal concerns, Epic care plan? describe: No  Current concerns for aggression this visit? No  Does pt have a history of Civil Commitment? No  Is Pt their own guardian? Yes    Pt is prescribed medication. Is patient medication compliant?  Pt taking above prescribed doses.  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  Acute or chronic medical concerns: No  Does Pt present with specific needs, assistive devices, or exclusionary criteria? None      Pt is ambulatory  Pt is able to perform ADLs independently      A: Pt to be reviewed for [facility] admission. Pt is voluntary at this time, if status changes provider has confirmed that this Pt is holdable.   Preferred placement: [facility]    COVID Symptoms: No  If yes, COVID test required   Utox: Positive for Amphetamines, Benzodiazepine and Cannabinoids.    CMP: N/A  CBC: N/A  HCG: N/A    R: Patient cleared and ready for behavioral bed placement: Yes  Pt placed on IP worklist? Yes    Does Patient need a Transfer Center request created? No, Pt is  located within [facility] ED, [facility] ED, or [facility]     R: [place]  Access Inpatient Bed Call Log 02/05/2025 @ 9:04 AM:   Intake has called facilities that have not updated the bed status within the last 12 hours.    [facility] is posting 0 beds.   [facility] is posting 7 beds. [phone] Per call to Tomas 3:40 PM-low acuity, roommate appropriate pt's only. PT NOT CURRENTLY APPROPRIATE D/T ACUITY AND ONLY SHARED BEDS.  [facility] is posting 0 beds.    [facility] is posting 0 beds. No high school or ines psych. [phone]   [facility] ([facility]) is posting 0 beds.   [facility] () is posting 0 beds. [phone]    [facility] is posting 6 beds. Ages 18-35, labs required. [phone]. PT NOT APPROPRIATE D/T UNIT RESTRICTIONS.  [facility] ([facility]) is posting 0 beds.   [facility] ([facility]) is posting 0 beds. [phone]          Pt remains on work list pending appropriate bed availability.

## 2025-02-05 NOTE — ED NOTES
Bed: ED16A  Expected date:   Expected time:   Means of arrival:   Comments:  JOSE 593, 39M, on hold

## 2025-02-05 NOTE — ED TRIAGE NOTES
Triage Assessment (Adult)       Row Name 02/05/25 2011          Triage Assessment    Airway WDL WDL        Respiratory WDL    Respiratory WDL WDL        Skin Circulation/Temperature WDL    Skin Circulation/Temperature WDL WDL        Cardiac WDL    Cardiac WDL WDL        Peripheral/Neurovascular WDL    Peripheral Neurovascular WDL WDL        Cognitive/Neuro/Behavioral WDL    Cognitive/Neuro/Behavioral WDL WDL

## 2025-02-05 NOTE — MEDICATION SCRIBE - ADMISSION MEDICATION HISTORY
Medication Scribe Admission Medication History    Admission medication history is complete. The information provided in this note is only as accurate as the sources available at the time of the update.    Information Source(s): Patient and CareEverywhere/SureScripts via in-person    Pertinent Information: Patient reported he hasn't started carvedilol yet but does have it at home, also stated that he's been prescribed metformin but hasn't taken it in 6 months or so but would like to restart. Patient also stated he takes CBD oil.    Changes made to PTA medication list:  Added: adderall 20mg, carvedilol 6.25mg, tadalafil 20mg, testosterone gel, cetirizine, fish oil  Deleted: vyvanse 50mg, omeprazole 20mg  Changed: None    Allergies reviewed with patient and updates made in EHR: yes    Medication History Completed By: Akila Croft 2/5/2025 5:28 PM    PTA Med List   Medication Sig Note Last Dose/Taking    ALLERGY RELIEF CETIRIZINE 10 MG tablet Take 10 mg by mouth daily.  Past Week    amphetamine-dextroamphetamine (ADDERALL) 20 MG tablet Take 1 tablet by mouth 3 times daily.  Past Week    carvedilol (COREG) 6.25 MG tablet Take 6.25 mg by mouth 2 times daily (with meals). 2/5/2025: Hasn't started Taking    clonazePAM (KLONOPIN) 2 MG tablet Take 2 mg by mouth 3 times daily  2/4/2025 Evening    fish oil-omega-3 fatty acids 1000 MG capsule Take 1 capsule by mouth daily.  Past Week    gabapentin (NEURONTIN) 300 MG capsule Take 1 capsule (300 mg) by mouth 3 times daily  2/4/2025 Morning    metFORMIN (GLUCOPHAGE XR) 500 MG 24 hr tablet Take 2 tablets (1,000 mg) by mouth daily (with dinner)  More than a month    OLANZapine (ZYPREXA) 15 MG tablet Take 30 mg by mouth at bedtime  Past Week    tadalafil (CIALIS) 20 MG tablet Take 10-20 mg by mouth daily as needed.  2/4/2025    Testosterone 1.62 % GEL Place 60.75 mg onto the skin every morning. To clean, dry, intact skin of the shoulders and upper arms. Do NOT apply to any other  parts of the body.  2/4/2025

## 2025-02-05 NOTE — TELEPHONE ENCOUNTER
[facility] crisis called @ 1:08pm    Called calling to give collateral:   Gena  from [facility] [phone]    Pt has a dx of schizoaffective DO, ADHD, and PTSD.   He has been living with his parents.  Reportedly not taking his meds, and abusing his adderal.  Experiencing paranoia, thinks electronics are hacked, not showering or caring for self - as her is afraid cleaning people have a bug in his bathroom.   Called further reports pt is non-sensicle and talking too of cures he found for infections through am radio waves.    Pt has had about 13 hospitalizations as an adult.  Last was in [place] 1 year ago.     Pt has never been stable long enough to work.   Pt was once , but has been  for a few years.         Moms name is Lacey Gramajo  and contact is [phone]

## 2025-02-05 NOTE — CONSULTS
Jonny Gramajo MRN# [id number]   Age: 39 year old YOB: 1985   Date of Admission to ED: 2/5/2025    In person visit Details:     Patient was assessed and interviewed face-to-face in person with this writer deny. Patient was observed to be able to participate in the assessment as evidenced by verbal consent. Assessment methods included conducting a formal interview with patient, review of medical records, collaboration with medical staff, and obtaining relevant collateral information from family and community providers when available.        Reason for Consult:   This note is being entered to supplement the psychiatry consultation note that was completed on February 5, 2025 by the licensed mental health professional Fay Flor LICSW  have reviewed the pertinent clinical details related to their encounter. I am being consulted to offer additional guidance on psychiatric pharmacological interventions    I met patient in his room face-to-face by himself he is very pleasant and cooperative during assessment and interview.  Currently denied any suicidal ideation or homicidal ideation and self-injury behavior.  Patient was very tangential during assessment also continue medical recurrence that he is currently being hacked.    Patient has been on Adderall 60 mg daily also patient is on olanzapine 2 mg 3 times daily for the total of 6 mg a day.  Patient has been misusing his Adderall he told me he ran out of his Adderall already and that he has been taking Zyprexa 30 mg a day.    Will continue his current clonazepam 2 mg 3 times daily he has been on this medication for long time, but will stop Adderall patient is being misusing his Adderall and contributing to his current paranoid delusion.    Per PDMP his last refill of Adderall is January 22, 2025 patient already ran out within 15 days 30-day supply of his medication, in this case patient has been misusing at least 120 mg a day.    Filled  Written  ID  " Drug  QTY  Days  Prescriber  RX #  Dispenser  Refill  Daily Dose*  Pymt Type      01/22/2025 01/21/2025 2 Dextroamp-Amphetamin 20 Mg Tab 90.00 30 Al Ebr [id number] Hy- (9708) 0/0  Medicaid [place]   01/20/2025 07/30/2024 2 Gabapentin 300 Mg Capsule 90.00 30 Al Ebr [id number] Hy- (7508) 5/5  [place]icaid [place]   01/18/2025 11/19/2024 2 Testosterone 1.62% Gel Pump 75.00 20 Ji Sta [id number] Hy- (7508) 1/3  [place]icaid [place]   01/18/2025 07/30/2024 2 Clonazepam 2 Mg Tablet 90.00 30 Al Ebr [id number] Hy- (7508) 5/5 12.00 LME Medicaid [place]   12/20/2024 11/19/2024 2 Dextroamp-Amphetamin 20 Mg Tab 90.00 30 Al Ebr [id number] Hy- (750) 0/0  [place]icaid [place]   12/18/2024 07/30/2024 2 Clonazepam 2 Mg Tablet              Patient lives with his parent currently he has been barricading in the his house, thrashing the house thinking that they are hacking into their houses and their garage and into his phone due to severe paranoia .  Although patient is denied any suicidal ideation or homicidal ideation self-injury behavior due to poor insight into his mental health paranoid ideation misusing his prescribed medication ,  72-hour hold his placed to continue to assess patient.    Although patient aware that Benzodiazepine and stimulant does not go together, he aware that while staying in the emergency will not prescribe for him any stimulant but he will continue to get his Klonopin due to risk of seizure, he ask what else medication can be prescribed for his current ADHD.  Also patient requested to start Vraylar he said he will help him calm down.  Patient said \" Vraylar at 1.5 mg usually help him to calm down but at 3 mg it does not work for me.\"  Patient stated that his parent both his mother and father really hated him to be on Adderall.    I prescribed 1.5 mg Vraylar, this medication may need prior authorization pharmacy consult liaison was ordered.    There is genetic loading for none known.  Medical history does * not appear to be significant.  Substance use does  " appear to be playing a contributing role in the patient's presentation misusing of his prescription medication Adderall.  Patient appears to cope with stress/frustration/emotion by withdrawing.  Stressors include chronic mental health issues,  peer issues, and family dynamics.      I have reviewed the nursing notes. I have reviewed the findings, diagnosis, plan and need for follow up with the patient.         HPI:     Per DEC  note note  History of the Crisis   Pt was brought in via EMS from his home where he lives with his parents, after mom called [facility] team with concern for psychosis symptoms. Pt has hx Schizoaffective Disorder and works with psychiatry. Per collateral from [facility] and mother, pt has been exhibiting increasing symptoms of paranoia and delusional thoughts related to being hacked and being in harm's way from people who are out to get him. Overall, he remains under emotional and behavioral control in the ED. He presents with hyperverbal speech and disorganized thought process. He is focused on technology and the ways in which he believes he is being 'hacked' at his home stating that 'they have full control of [employer] accounts, I'm locked out of my social security account.' He states that he is being 'brute force attacked.' Writer attempts to challenge pt to consider if any of this could be related to his diagnosis and he states that it is likely '80% reality and 20% his imagination.' He admits that he has not been showering or leaving the house due to fear that he will be attacked. He has been disconnecting technology and devices in the house. He minimizes current symptoms stating 'I was minding my own business and my parents had called and had me taken away. Now you guys have to babysit me.' He states that his mom has been threatening to kick him out of the house for a while. He states that she and his dad were gone on a vacation and he admits that he  house was a 'disaster' when they got home with messes everywhere. He understands why his mom was upset but states that they have had ongoing conflict for many years. He denies SI/HI/plan/intent. He commits to safety and wants to discharge. He states that he is taking his medications as prescribed. Talked with him about report that he may be misusing his Adderall he states that he did not take it correctly, first he states it was due to being used to a higher dose previously and needing to take more, then later states that he 'just lost track of time' and 'lost count.' He denies illicit substance use. He denies experiencing hallucinations. Significant collateral information is provided below from mom.      Pt has not required locked seclusion or restraints in the past 24 hours to maintain safety, please refer to RN documentation for further details.  Substance use does appear to be playing a contributing role in the patient's presentation.  Patient misusing his stimulant  Brief Therapeutic Intervention(s):   Provided active listening, unconditional positive regard, and validation. Engaged in cognitive restructuring/ reframing, looked at common cognitive distortions and challenged negative thoughts. Engaged in guided discovery, explored patient's perspectives and helped expand them through socratic dialogue. Provided positive reinforcement for progress towards goals, gains in knowledge, and application of skills previously taught.  Engaged in social skills training. Explored and identified early warning signs to anger        Past Psychiatric History:   See DEC assessment note        Substance Use and History:     Severe stimulant use disorder        Past Medical History:   PAST MEDICAL HISTORY:   Past Medical History:   Diagnosis Date    Anxiety     Class 1 obesity due to excess calories without serious comorbidity with body mass index (BMI) of 34.0 to 34.9 in adult     Depression     Depressive disorder     Elevated  "ALT measurement     Hypertension     Lyme disease     Lyme disease        PAST SURGICAL HISTORY:   Past Surgical History:   Procedure Laterality Date    GENITOURINARY SURGERY      testical recessed    PICC  1/19/2017                    Allergies:     Allergies   Allergen Reactions    Abilify [Aripiprazole]      Patient reports tardive dyskinesia effect in 2004 but likely akathisia since patient reports the effects only occurred while on med and resolved with a few days after discontinuing med.     Wellbutrin [Bupropion] Anxiety     Patient reports felt \"reved\" up and anxious when taken in 2001.              Medications:   I have reviewed this patient's current medications  Current Facility-Administered Medications   Medication Dose Route Frequency Provider Last Rate Last Admin    cariprazine (VRAYLAR) capsule 1.5 mg  1.5 mg Oral Daily Hardy Hoover APRN CNP        clonazePAM (klonoPIN) tablet 2 mg  2 mg Oral TID Hardy Hoover APRN CNP   2 mg at 02/05/25 1621    gabapentin (NEURONTIN) capsule 300 mg  300 mg Oral TID Andriy Holman MD   300 mg at 02/05/25 1620    melatonin tablet 3 mg  3 mg Oral At Bedtime PRN Andriy Holman MD        OLANZapine zydis (zyPREXA) ODT tab 10 mg  10 mg Oral TID PRN Hardy Hoover APRN CNP        Or    OLANZapine (zyPREXA) injection 10 mg  10 mg Intramuscular TID PRN Hardy Hoover APRN CNP        OLANZapine (zyPREXA) tablet 30 mg  30 mg Oral At Bedtime Andriy Holman MD        pantoprazole (PROTONIX) EC tablet 40 mg  40 mg Oral Daily Andriy Holman MD   40 mg at 02/05/25 1621     Current Outpatient Medications   Medication Sig Dispense Refill    clonazePAM (KLONOPIN) 2 MG tablet Take 2 mg by mouth 3 times daily      gabapentin (NEURONTIN) 300 MG capsule Take 1 capsule (300 mg) by mouth 3 times daily 90 capsule 1    OLANZapine (ZYPREXA) 15 MG tablet Take 30 mg by mouth at bedtime      lisdexamfetamine (VYVANSE) 50 MG capsule Take 50 mg by mouth every morning      " "melatonin 3 MG tablet Take 3 mg by mouth nightly as needed for sleep      metFORMIN (GLUCOPHAGE XR) 500 MG 24 hr tablet Take 2 tablets (1,000 mg) by mouth daily (with dinner) 30 tablet 0    omeprazole (PRILOSEC) 20 MG DR capsule Take 20 mg by mouth daily       Facility-Administered Medications Ordered in Other Encounters   Medication Dose Route Frequency Provider Last Rate Last Admin    Self Administer Medications: Behavioral Services   Does not apply See Admin Instructions Eliud Vital MD                  Family History:   FAMILY HISTORY: No family history on file.           Social History:        - Collateral information from the famly/friend: none         PTA Medications:   (Not in a hospital admission)         Allergies:     Allergies   Allergen Reactions    Abilify [Aripiprazole]      Patient reports tardive dyskinesia effect in 2004 but likely akathisia since patient reports the effects only occurred while on med and resolved with a few days after discontinuing med.     Wellbutrin [Bupropion] Anxiety     Patient reports felt \"reved\" up and anxious when taken in 2001.           Labs:     Recent Results (from the past 48 hours)   Urine Drug Screen Panel    Collection Time: 02/05/25  1:22 PM   Result Value Ref Range    Amphetamines Urine Screen Positive (A) Screen Negative    Barbituates Urine Screen Negative Screen Negative    Benzodiazepine Urine Screen Positive (A) Screen Negative    Cannabinoids Urine Screen Positive (A) Screen Negative    Cocaine Urine Screen Negative Screen Negative    Fentanyl Qual Urine Screen Negative Screen Negative    Opiates Urine Screen Negative Screen Negative    PCP Urine Screen Negative Screen Negative          Physical and Psychiatric Examination:     BP (!) 145/84   Pulse 108   Temp 98.6  F (37  C) (Oral)   Resp 16   SpO2 97%   Weight is 0 lbs 0 oz  There is no height or weight on file to calculate BMI.    Mental Status Exam:  Appearance: awake, alert  Attitude:  " cooperative  Eye Contact:  intense  Mood:  anxious  Affect:  appropriate and in normal range  Speech:  clear, coherent  Language: fluent and intact in English  Psychomotor, Gait, Musculoskeletal:  no evidence of tardive dyskinesia, dystonia, or tics  Thought Process:  tangental  Associations:  no loose associations  Thought Content:  no evidence of suicidal ideation or homicidal ideation  Insight:  limited  Judgement:  limited  Oriented to:  time, person, and place  Attention Span and Concentration:  limited  Recent and Remote Memory:  intact  Fund of Knowledge:  appropriate         Diagnoses:      Paranoia (H)  Delusions (H)         Recommendations:         1.* Pt displays the following risk factors that support IP admission: Due to poor insight into his mental health severe paranoid delusion unable to contract for safety. Pt is unable to engage in safety planning to mitigate risk level in a non-secure setting. Lower levels of care have not been successful in mitigating risk. Due to this IP is the least restrictive option of care for pt. Pt should remain in IP until deemed safe to return to the community and engage in OP MH supports    - Continue to recommend inpatient psychiatric hospitalizations for further stabilization   2.Patient was placed on 72-hour hold by ED provider due to poor insight into mental health and the severe stimulant use disorder  3.  Adderall was stopped continue Klonopin 2 mg 3 times daily, continue Zyprexa 30 mg at bedtime, start Vraylar 1.5 mg daily per patient request  Continue gabapentin 300 mg 3 times daily  Zyprexa 10 mg 3 times daily as needed for severe agitation and aggression IM or p.o.  4.  Consult psychiatry as needed  5.   Refer to psychiatric provider for medication management.    treatment per ED team    - Consulted with [facility]  licensed mental health professional, ED physician asked if they would like this writer to enter orders in the EHR,  patient's ED RN regarding  this case.    Please call DEC at [phone] if you have follow-up questions or wish to place another consult.  Hardy Hoover, Psychiatric Nurse practitioner    Attestation:  Time with:  Patient: 40 minutes  Treatment Team: 40 Minutes  Chart Review: 40 minutes    Total time spent was 120 minutes. Over 50% of times was spent counseling and coordination of care.    I thank  primary ED provider and extended* care team very much for letting me participate in the care of this patient.    I, Hardy Hoover, MAVERICK, APRN, Psychiatric Nurse Practitioner have personally performed an examination of this patient.  I have edited the note to reflect all relevant changes.  I have discussed this patient with the care team February 5, 2025.  I have reviewed all vitals and laboratory findings.    Disclaimer: This note consists of symbols derived from keyboarding,

## 2025-02-05 NOTE — CONSULTS
Diagnostic Evaluation Consultation  Crisis Assessment    Patient Name: Jonny Gramajo  Age:  39 year old  Legal Sex: male  Gender Identity: male  Pronouns: he/his  Race: White  Ethnicity: Not  or   Language: English    Patient was assessed: In person   Crisis Assessment Start Date: 02/05/25  Crisis Assessment Start Time: 1415  Crisis Assessment Stop Time: 1445  Patient location: [facility] Emergency Department ED16A    Referral Data and Chief Complaint  Jonny Gramajo presents to the ED via EMS, per community partner(s). Patient is presenting to the ED for the following concerns: Significant behavioral change, Anxiety, Paranoia. Factors that make the mental health crisis life threatening or complex are: Signs/symptoms of psychosis/paranoia.    Informed Consent and Assessment Methods  Explained the crisis assessment process, including applicable information disclosures and limits to confidentiality, assessed understanding of the process, and obtained consent to proceed with the assessment.  Assessment methods included conducting a formal interview with patient, review of medical records, collaboration with medical staff, and obtaining relevant collateral information from family and community providers when available: done     History of the Crisis   Pt was brought in via EMS from his home where he lives with his parents, after mom called [facility] Crisis team with concern for psychosis symptoms. Pt has hx Schizoaffective Disorder and works with psychiatry. Per collateral from [facility] and mother, pt has been exhibiting increasing symptoms of paranoia and delusional thoughts related to being hacked and being in harm's way from people who are out to get him. Overall, he remains under emotional and behavioral control in the ED. He presents with hyperverbal speech and disorganized thought process. He is focused on technology and the ways in which he believes he is being 'hacked'  at his home stating that 'they have full control of [employer], [employer] accounts, I'm locked out of my social security account.' He states that he is being 'brute force attacked.' Writer attempts to challenge pt to consider if any of this could be related to his diagnosis and he states that it is likely '80% reality and 20% his imagination.' He admits that he has not been showering or leaving the house due to fear that he will be attacked. He has been disconnecting technology and devices in the house. He minimizes current symptoms stating 'I was minding my own business and my parents had called and had me taken away. Now you guys have to babysit me.' He states that his mom has been threatening to kick him out of the house for a while. He states that she and his dad were gone on a vacation and he admits that he house was a 'disaster' when they got home with messes everywhere. He understands why his mom was upset but states that they have had ongoing conflict for many years. He denies SI/HI/plan/intent. He commits to safety and wants to discharge. He states that he is taking his medications as prescribed. Talked with him about report that he may be misusing his Adderall he states that he did not take it correctly, first he states it was due to being used to a higher dose previously and needing to take more, then later states that he 'just lost track of time' and 'lost count.' He denies illicit substance use. He denies experiencing hallucinations. Significant collateral information is provided below from mom.    Brief Psychosocial History  Family:  , Children no  Support System:  Parent(s)  Employment Status:  disabled  Source of Income:  disability  Financial Environmental Concerns:  none  Current Hobbies:   none identified  Barriers in Personal Life:   mental illness    Significant Clinical History  Current Anxiety Symptoms:  racing thoughts, excessive worry, anxious  Current Depression/Trauma:  difficulty  concentrating, sense of doom, impaired decision making  Current Somatic Symptoms:  racing thoughts, excessive worry  Current Psychosis/Thought Disturbance:     Current Eating Symptoms:     Chemical Use History:  Alcohol: None  Benzodiazepines: None  Opiates: None  Cocaine: None  Marijuana: None  Other Use: None   Past diagnosis:  Other (Schizoaffective Disorder)  Family history:  No known history of mental health or chemical health concerns  Past treatment:  Inpatient Hospitalization, Psychiatric Medication Management  Details of most recent treatment:  [facility] 2022, currently working with OP psychiatry and case management  Other relevant history:   n/a    Have there been any medication changes in the past two weeks:          Is the patient compliant with medications:   no      Collateral Information  Is there collateral information: Yes     Collateral information name, relationship, phone number:  Leilani Gramajo: [phone]    What happened today: 'We came back from being gone for several days and we could hardly get in the house. He had the doors barricaded shut with furniture and items. The kitchen looked it was ransacked. There was shaving cream all over the windows so people couldn't see in. He's not okay.'     What is different about patient's functioning: Mom reports pt has diagnosis of Schizoaffective Disorder. She states 'Has been increasingly out of touch with reality, paranoid that he's being hacked and followed all the time, doesn't want to leave the house, disconnecting our wifi, Joelle, garage door openers, barricading self in house, always working on science experiments, destroys our house if we leave him alone.' 'He is secretive about medications, doesn't seem to be following orders, abusing his Adderall, taking large amounts of it. He follows us around, talking non-stop constantly, won't take out his contact lenses so his eyes are infected. He hasn't showered in a month. He needs to  be in a supervised setting. He is not violent. He loves his family very much but he can't live here. He was in treatment for alcohol and cannabis in his teen years, no alcohol in 2-3 years. The last few months it's been pretty constant, he spends hours a day with [employer] or [employer], trying to track down people who are hacking him. He rarely leaves the house. Drinks coffee and energy drinks all day long. Sleeps erratically on a couch in the middle of the living room.'     Has patient made comments about wanting to kill themselves/others: no    If d/c is recommended, can they take part in safety/aftercare planning:  no     Risk Assessment  [place] Suicide Severity Rating Scale Full Clinical Version:  Suicidal Ideation  Q6 Suicide Behavior (Lifetime): no    [place] Suicide Severity Rating Scale Recent:   Suicidal Ideation (Recent)  Q1 Wished to be Dead (Past Month): no  Q2 Suicidal Thoughts (Past Month): no  Level of Risk per Screen: no risks indicated  Intensity of Ideation (Recent)  Most Severe Ideation Rating (Past 1 Month):  (n/a)  Frequency (Past 1 Month):  (n/a)  Duration (Past 1 Month):  (n/a)  Controllability (Past 1 Month):  (n/a)  Deterrents (Past 1 Month):  (n/a)  Reasons for Ideation (Past 1 Month): Does not apply  Suicidal Behavior (Recent)  Actual Attempt (Past 3 Months): No  Total Number of Actual Attempts (Past 3 Months): 0  Has subject engaged in non-suicidal self-injurious behavior? (Past 3 Months): No  Interrupted Attempts (Past 3 Months): No  Total Number of Interrupted Attempts (Past 3 Months): 0  Aborted or Self-Interrupted Attempt (Past 3 Months): No  Total Number of Aborted or Self-Interrupted Attempts (Past 3 Months): 0  Preparatory Acts or Behavior (Past 3 Months): No  Total Number of Preparatory Acts (Past 3 Months): 0    Environmental or Psychosocial Events: impulsivity/recklessness, other use of prescription medication, neither working nor attending school, social isolation, challenging  interpersonal relationships  Protective Factors: Protective Factors: strong bond to family unit, community support, or employment, able to access care without barriers, supportive ongoing medical and mental health care relationships, optimistic outlook - identification of future goals    Does the patient have thoughts of harming others? Feels Like Hurting Others: no  Previous Attempt to Hurt Others: no  Is the patient engaging in sexually inappropriate behavior?: no  Does Patient have a known history of aggressive behavior: No  Has aggression occurred as a result of MH concerns/diagnosis: n/a  Does patient have history of aggression in hospital: n/a    Is the patient engaging in sexually inappropriate behavior?  no        Mental Status Exam   Affect: Dramatic  Appearance: Disheveled  Attention Span/Concentration: Attentive  Eye Contact: Intense    Fund of Knowledge: Appropriate   Language /Speech Content: Fluent, Expressive Speech  Language /Speech Volume: Loud  Language /Speech Rate/Productions: Hyperverbal  Recent Memory: Variable  Remote Memory: Variable  Mood: Anxious  Orientation to Person: Yes   Orientation to Place: Yes  Orientation to Time of Day: Yes  Orientation to Date: Yes     Situation (Do they understand why they are here?): Yes  Psychomotor Behavior: Normal, Hyperactive, Agitated  Thought Content: Delusions, Paranoia  Thought Form: Paranoia, Goal Directed, Obsessive/Perseverative     Medication  Psychotropic medications:   Medication Orders - Psychiatric (From admission, onward)      Start     Dose/Rate Route Frequency Ordered Stop    02/05/25 2200  OLANZapine (zyPREXA) tablet 30 mg         30 mg Oral AT BEDTIME 02/05/25 1423               Current Care Team  Patient Care Team:  Maryanne Mitchell DO as PCP - General (Family Medicine)    Diagnosis  Patient Active Problem List   Diagnosis Code    Benzodiazepine withdrawal, uncomplicated (H) F13.930    Alcohol intoxication, uncomplicated F10.920    Chemical  dependency (H) F19.20    Atypical pneumonia J18.9    Acute lung injury associated with vaping U07.0    Schizoaffective disorder, bipolar type (H) F25.0       Primary Problem This Admission  Active Hospital Problems    *Schizoaffective disorder, bipolar type (H)      Clinical Summary and Substantiation of Recommendations   Clinical Substantiation:  Pt presents with signs/symptoms of significant paranoia, delusional thoughts about being hacked and his life being in danger. These beliefs do not appear to be based in reality according to family and they are affecting his ability to care for himself and function day to day. He is not leaving the home, showering or sleeping well. He is mis-using his precribed Adderall and drinking large amounts of coffee and energy drinks resulting in increased energy and lack of sleep. He fears for his life and safety and had barricaded himself in his house, covered windows etc. His insight and judgment is impaired. Recommend IP for safety and stabilization.    Treatment Objectives Addressed:  rapport building, processing feelings    Has a specific means been identified for suicidal/homicide actions:  (n/a)    Disposition  Recommended referrals:          Reviewed case and recommendations with attending provider. Attending Name:         Attending concurs with disposition: yes       Patient and/or validated legal guardian concurs with disposition:   no       Final disposition:  inpatient mental health      Severe psychiatric, behavioral or other comorbid conditions are appropriate for management at inpatient mental health as indicated by at least one of the following: Symptoms of impact to function, Impaired impulse control, judgement, or insight  Severe dysfunction in daily living is present as indicated by at least one of the following: Incapacitation because of grave disability, Complete inability to maintain any appropriate aspect of personal responsibility in any adult roles,  Complete neglect of self care with associated impairment in physical status  Situation and expectations are appropriate for inpatient care: Voluntary treatment at lower level of care is not feasible, Biopsychosocial stresses potentially contributing to clinical presentation (co morbidities) have been assessed and are absent or manageable at proposed level of care, Patient management/treatment at lower level of care is not feasible or is inappropriate  Inpatient mental health services are necessary to meet patient needs and at least one of the following: Specific condition related to admission diagnosis is present and judged likely to further improve at proposed level of care, Specific condition related to admission diagnosis is present and judged likely to deteriorate in absence of treatment at proposed level of care    Legal status:      Assessment Details   Total duration spent with the patient: 30 min     CPT code(s) utilized: 26985 - Psychotherapy for Crisis - 60 (30-74*) min    MADISON Ramos, Psychotherapist  [facility] - Triage & Transition Services  Callback: [phone]

## 2025-02-06 ENCOUNTER — TELEPHONE (OUTPATIENT)
Dept: BEHAVIORAL HEALTH | Facility: CLINIC | Age: 40
End: 2025-02-06
Payer: COMMERCIAL

## 2025-02-06 VITALS
DIASTOLIC BLOOD PRESSURE: 93 MMHG | RESPIRATION RATE: 20 BRPM | OXYGEN SATURATION: 97 % | TEMPERATURE: 98.4 F | SYSTOLIC BLOOD PRESSURE: 145 MMHG | HEART RATE: 109 BPM

## 2025-02-06 PROBLEM — F22 DELUSIONS (H): Status: ACTIVE | Noted: 2025-02-06

## 2025-02-06 PROBLEM — F22 PARANOIA (H): Status: ACTIVE | Noted: 2025-02-06

## 2025-02-06 LAB
ALBUMIN SERPL BCG-MCNC: 3.9 G/DL (ref 3.5–5.2)
ALP SERPL-CCNC: 80 U/L (ref 40–150)
ALT SERPL W P-5'-P-CCNC: 29 U/L (ref 0–70)
ANION GAP SERPL CALCULATED.3IONS-SCNC: 10 MMOL/L (ref 7–15)
AST SERPL W P-5'-P-CCNC: 21 U/L (ref 0–45)
BASOPHILS # BLD AUTO: 0 10E3/UL (ref 0–0.2)
BASOPHILS NFR BLD AUTO: 0 %
BILIRUB SERPL-MCNC: 0.2 MG/DL
BUN SERPL-MCNC: 9.6 MG/DL (ref 6–20)
CALCIUM SERPL-MCNC: 8.7 MG/DL (ref 8.8–10.4)
CHLORIDE SERPL-SCNC: 104 MMOL/L (ref 98–107)
CREAT SERPL-MCNC: 0.76 MG/DL (ref 0.67–1.17)
EGFRCR SERPLBLD CKD-EPI 2021: >90 ML/MIN/1.73M2
EOSINOPHIL # BLD AUTO: 0 10E3/UL (ref 0–0.7)
EOSINOPHIL NFR BLD AUTO: 0 %
ERYTHROCYTE [DISTWIDTH] IN BLOOD BY AUTOMATED COUNT: 13.2 % (ref 10–15)
GLUCOSE SERPL-MCNC: 120 MG/DL (ref 70–99)
HCO3 SERPL-SCNC: 26 MMOL/L (ref 22–29)
HCT VFR BLD AUTO: 43.1 % (ref 40–53)
HGB BLD-MCNC: 15.3 G/DL (ref 13.3–17.7)
IMM GRANULOCYTES # BLD: 0 10E3/UL
IMM GRANULOCYTES NFR BLD: 0 %
LYMPHOCYTES # BLD AUTO: 2.8 10E3/UL (ref 0.8–5.3)
LYMPHOCYTES NFR BLD AUTO: 30 %
MCH RBC QN AUTO: 31.7 PG (ref 26.5–33)
MCHC RBC AUTO-ENTMCNC: 35.5 G/DL (ref 31.5–36.5)
MCV RBC AUTO: 89 FL (ref 78–100)
MONOCYTES # BLD AUTO: 0.6 10E3/UL (ref 0–1.3)
MONOCYTES NFR BLD AUTO: 7 %
NEUTROPHILS # BLD AUTO: 6 10E3/UL (ref 1.6–8.3)
NEUTROPHILS NFR BLD AUTO: 63 %
NRBC # BLD AUTO: 0 10E3/UL
NRBC BLD AUTO-RTO: 0 /100
PLATELET # BLD AUTO: 192 10E3/UL (ref 150–450)
POTASSIUM SERPL-SCNC: 3.8 MMOL/L (ref 3.4–5.3)
PROT SERPL-MCNC: 6.3 G/DL (ref 6.4–8.3)
RBC # BLD AUTO: 4.83 10E6/UL (ref 4.4–5.9)
SARS-COV-2 RNA RESP QL NAA+PROBE: NEGATIVE
SODIUM SERPL-SCNC: 140 MMOL/L (ref 135–145)
WBC # BLD AUTO: 9.4 10E3/UL (ref 4–11)

## 2025-02-06 PROCEDURE — 250N000013 HC RX MED GY IP 250 OP 250 PS 637

## 2025-02-06 PROCEDURE — 250N000013 HC RX MED GY IP 250 OP 250 PS 637: Performed by: PSYCHIATRY & NEUROLOGY

## 2025-02-06 PROCEDURE — 99207 PR NO BILLABLE SERVICE THIS VISIT: CPT | Performed by: PSYCHIATRY & NEUROLOGY

## 2025-02-06 PROCEDURE — 82310 ASSAY OF CALCIUM: CPT | Performed by: EMERGENCY MEDICINE

## 2025-02-06 PROCEDURE — 124N000002 HC R&B MH UMMC

## 2025-02-06 PROCEDURE — 36415 COLL VENOUS BLD VENIPUNCTURE: CPT | Performed by: EMERGENCY MEDICINE

## 2025-02-06 PROCEDURE — 97150 GROUP THERAPEUTIC PROCEDURES: CPT | Mod: GO

## 2025-02-06 PROCEDURE — 99222 1ST HOSP IP/OBS MODERATE 55: CPT | Performed by: PSYCHIATRY & NEUROLOGY

## 2025-02-06 PROCEDURE — 85025 COMPLETE CBC W/AUTO DIFF WBC: CPT | Performed by: EMERGENCY MEDICINE

## 2025-02-06 PROCEDURE — 250N000013 HC RX MED GY IP 250 OP 250 PS 637: Performed by: EMERGENCY MEDICINE

## 2025-02-06 PROCEDURE — 87635 SARS-COV-2 COVID-19 AMP PRB: CPT | Performed by: EMERGENCY MEDICINE

## 2025-02-06 RX ORDER — HYDROXYZINE HYDROCHLORIDE 25 MG/1
25 TABLET, FILM COATED ORAL EVERY 4 HOURS PRN
Status: DISPENSED | OUTPATIENT
Start: 2025-02-06

## 2025-02-06 RX ORDER — NAPROXEN 500 MG/1
500 TABLET ORAL 2 TIMES DAILY WITH MEALS
Status: DISPENSED | OUTPATIENT
Start: 2025-02-06

## 2025-02-06 RX ORDER — CETIRIZINE HYDROCHLORIDE 10 MG/1
10 TABLET ORAL DAILY
Status: DISPENSED | OUTPATIENT
Start: 2025-02-06

## 2025-02-06 RX ORDER — METFORMIN HYDROCHLORIDE 500 MG/1
1000 TABLET, EXTENDED RELEASE ORAL
Status: DISPENSED | OUTPATIENT
Start: 2025-02-06

## 2025-02-06 RX ORDER — TRAZODONE HYDROCHLORIDE 50 MG/1
50 TABLET ORAL
Status: DISPENSED | OUTPATIENT
Start: 2025-02-06

## 2025-02-06 RX ORDER — MAGNESIUM HYDROXIDE/ALUMINUM HYDROXICE/SIMETHICONE 120; 1200; 1200 MG/30ML; MG/30ML; MG/30ML
30 SUSPENSION ORAL EVERY 4 HOURS PRN
Status: DISPENSED | OUTPATIENT
Start: 2025-02-06

## 2025-02-06 RX ORDER — OLANZAPINE 10 MG/2ML
10 INJECTION, POWDER, FOR SOLUTION INTRAMUSCULAR 3 TIMES DAILY PRN
Status: ACTIVE | OUTPATIENT
Start: 2025-02-06

## 2025-02-06 RX ORDER — OLANZAPINE 10 MG/1
10 TABLET ORAL 3 TIMES DAILY PRN
Status: DISPENSED | OUTPATIENT
Start: 2025-02-06

## 2025-02-06 RX ORDER — TESTOSTERONE 20.25 MG/1.25G
60.75 GEL TOPICAL EVERY MORNING
Status: DISPENSED | OUTPATIENT
Start: 2025-02-07

## 2025-02-06 RX ORDER — ACETAMINOPHEN 325 MG/1
650 TABLET ORAL EVERY 4 HOURS PRN
Status: DISPENSED | OUTPATIENT
Start: 2025-02-06

## 2025-02-06 RX ORDER — AMOXICILLIN 250 MG
1 CAPSULE ORAL 2 TIMES DAILY PRN
Status: ACTIVE | OUTPATIENT
Start: 2025-02-06

## 2025-02-06 RX ORDER — NICOTINE 21 MG/24HR
1 PATCH, TRANSDERMAL 24 HOURS TRANSDERMAL DAILY
Status: DISPENSED | OUTPATIENT
Start: 2025-02-06

## 2025-02-06 RX ADMIN — ACETAMINOPHEN 650 MG: 325 TABLET, FILM COATED ORAL at 20:22

## 2025-02-06 RX ADMIN — CLONAZEPAM 2 MG: 1 TABLET ORAL at 09:35

## 2025-02-06 RX ADMIN — GABAPENTIN 300 MG: 300 CAPSULE ORAL at 20:21

## 2025-02-06 RX ADMIN — NICOTINE POLACRILEX 4 MG: 4 GUM, CHEWING BUCCAL at 17:21

## 2025-02-06 RX ADMIN — PANTOPRAZOLE SODIUM 40 MG: 40 TABLET, DELAYED RELEASE ORAL at 09:36

## 2025-02-06 RX ADMIN — METFORMIN ER 500 MG 1000 MG: 500 TABLET ORAL at 17:21

## 2025-02-06 RX ADMIN — NICOTINE 1 PATCH: 21 PATCH, EXTENDED RELEASE TRANSDERMAL at 16:02

## 2025-02-06 RX ADMIN — CETIRIZINE HYDROCHLORIDE 10 MG: 10 TABLET, FILM COATED ORAL at 16:04

## 2025-02-06 RX ADMIN — NICOTINE POLACRILEX 4 MG: 4 GUM, CHEWING BUCCAL at 16:04

## 2025-02-06 RX ADMIN — GABAPENTIN 300 MG: 300 CAPSULE ORAL at 09:35

## 2025-02-06 RX ADMIN — CLONAZEPAM 2 MG: 1 TABLET ORAL at 13:36

## 2025-02-06 RX ADMIN — OLANZAPINE 30 MG: 15 TABLET, FILM COATED ORAL at 23:17

## 2025-02-06 RX ADMIN — NICOTINE POLACRILEX 4 MG: 4 GUM, CHEWING BUCCAL at 20:01

## 2025-02-06 RX ADMIN — CLONAZEPAM 2 MG: 1 TABLET ORAL at 20:01

## 2025-02-06 RX ADMIN — NICOTINE POLACRILEX 4 MG: 4 GUM, CHEWING BUCCAL at 12:08

## 2025-02-06 RX ADMIN — GABAPENTIN 300 MG: 300 CAPSULE ORAL at 13:36

## 2025-02-06 RX ADMIN — NAPROXEN 500 MG: 500 TABLET ORAL at 17:21

## 2025-02-06 RX ADMIN — NICOTINE POLACRILEX 4 MG: 4 GUM, CHEWING BUCCAL at 23:17

## 2025-02-06 RX ADMIN — NICOTINE POLACRILEX 4 MG: 4 GUM, CHEWING BUCCAL at 13:36

## 2025-02-06 RX ADMIN — ACETAMINOPHEN 650 MG: 325 TABLET, FILM COATED ORAL at 16:07

## 2025-02-06 RX ADMIN — NICOTINE POLACRILEX 4 MG: 4 GUM, CHEWING BUCCAL at 18:30

## 2025-02-06 RX ADMIN — HYDROXYZINE HYDROCHLORIDE 25 MG: 25 TABLET ORAL at 16:04

## 2025-02-06 ASSESSMENT — ACTIVITIES OF DAILY LIVING (ADL)
ADLS_ACUITY_SCORE: 58
ADLS_ACUITY_SCORE: 45
HYGIENE/GROOMING: INDEPENDENT
ADLS_ACUITY_SCORE: 58
LAUNDRY: WITH SUPERVISION
ADLS_ACUITY_SCORE: 68
ADLS_ACUITY_SCORE: 58
ADLS_ACUITY_SCORE: 45
ADLS_ACUITY_SCORE: 45
ORAL_HYGIENE: INDEPENDENT
ADLS_ACUITY_SCORE: 58
ADLS_ACUITY_SCORE: 45
ADLS_ACUITY_SCORE: 58
ADLS_ACUITY_SCORE: 58
ADLS_ACUITY_SCORE: 45
ADLS_ACUITY_SCORE: 45
ADLS_ACUITY_SCORE: 58
ADLS_ACUITY_SCORE: 58
ADLS_ACUITY_SCORE: 45
DRESS: INDEPENDENT
ADLS_ACUITY_SCORE: 45
ADLS_ACUITY_SCORE: 58

## 2025-02-06 ASSESSMENT — LIFESTYLE VARIABLES: SKIP TO QUESTIONS 9-10: 0

## 2025-02-06 NOTE — PLAN OF CARE
Rehab Group    Start time: 13:15  End time: 14:05  Patient time total: 15 minutes    attended partial group    #7 attended   Group Type: occupational therapy   Group Topic Covered: cognitive activities, healthy leisure time, and social skills       Group Session Detail:  Patient engaged in a cognitive-based leisure activity (Scattergories) with peers. Therapeutic benefits and skills addressed during today's leisure activity include: recall of information, promoting attention/focus, improving social skills, and exploring healthy leisure opportunities.         Patient Response/Contribution:  socially appropriate and inattentive, drowsy      Patient Detail:  Patient arrived to this group on time. Upon arrival, patient introduced himself to this writer and expressed unfamiliarity with the group game. However, patient appeared to have a good understanding of game concepts and objectives after presentation of instructions from OT. Patient engaged in the game for one round and shared two items he had written down for the first round of the game with group members. However, during this time, patient was observed to close his eyes frequently and lean his head down (as if to fall asleep). OT asked patient if he needed to take a break and patient agreed and left the group. Affect appeared drowsy. However, patient remained polite and cooperative throughout time in the group.       [id number] OT Group (2 or more in attendance)      Patient Active Problem List   Diagnosis    Benzodiazepine withdrawal, uncomplicated (H)    Alcohol intoxication, uncomplicated    Chemical dependency (H)    Atypical pneumonia    Acute lung injury associated with vaping    Schizoaffective disorder, bipolar type (H)    Delusions (H)    Paranoia (H)

## 2025-02-06 NOTE — ED NOTES
Pt has been watching tv in his room. He was made aware he is on 72 hour hold, he understands this means he is going to be in the ED until there is mental health bed available.

## 2025-02-06 NOTE — TELEPHONE ENCOUNTER
R: JULIO CASTANEDA Access Inpatient Bed Call Log  2/6/2025 12:06 AM  Intake has called facilities that have not updated their bed status within the last 12 hours.    Adults:    *METRO:  [place] -- [facility]: @ Capacity.  [facility]: @ Posting 7 beds. Reporting no reviews overnight. -12:10 AM Per Johanna, they are capped.   [place] -- [facility]: @ Capacity. Low acuity   [place] -- [facility]: @ Capacity. Low acuity only - 12:11 AM Per Giancarlo, they can review low acuity.   [place] -- [facility]: @ Capacity.   [facility]: @ Capacity.   [facility]/ beds: @ Posting 6 beds. Ages 18-35, Voluntary only, NO aggression/physical/sexual assault, violence hx or drug abuse, or psychosis. Negative Covid - 12:07 AM Per Maryanne, Adult: 1, Adol: 1-M, Child: 0.  [place] -- [facility]: @ Capacity.  [place] -- [facility]: @ Capacity.  [place] -- [facility]: @ Capacity. Do not review overnight.     Pt remains on waitlist pending appropriate placement availability.    3:17 AM - Called Tre to request phone consult for [facility]  3:23 AM -  accepts pt to [facility]30. Placed pt in queue for 30     R: 30 / Arthur / LEONEL     2:24 AM - Notified unit RN who advised pt will admit on morning d/t prior admits and short staff  3:27 AM - Notified ED RN

## 2025-02-06 NOTE — PLAN OF CARE
"  Rehab Group    Start time: 10:20  End time: 11:00  Patient time total: 30 minutes    attended partial group    #9 attended   Group Type: OT Clinic   Group Topic Covered: balanced lifestyle, coping skills, healthy leisure time, relaxation , and social skills       Group Session Detail:  Pt actively participated in occupational therapy clinic to facilitate coping skill exploration, creative expression within personally meaningful activities, and clinical observation of social, cognitive, and kinesthetic performance skills.        Patient Response/Contribution:  cooperative with task, organized, socially appropriate, and actively engaged       Patient Detail:  Pt response: Patient arrived to this group on time and independently selected to complete a fuzzy color project. Patient was independent to initiate, gather materials, sequence, and adjust to workspace demands as needed. Demonstrated good focus, planning, and problem solving for selected fuzzy color task. Able to ask for assistance as needed. Patient socialized appropriately with peers and staff and worked in an organized and goal-oriented manner. Patient appeared to enjoy discussing his interest in music with peers and staff and offered a song suggestion for group members on one occasion. Patient also appeared to brighten somewhat when discussing his interest in \"damon\" with select peers. Patient elected to leave group roughly ten minutes early and noted wanting to go \"read.\" However, patient independently initiated and completed clean up of task supplies prior to leaving the group room. Affect appeared congruent with the activity and brightened during interactions. Patient remained calm and pleasant throughout this group.       [id number] OT Group (2 or more in attendance)      Patient Active Problem List   Diagnosis    Benzodiazepine withdrawal, uncomplicated (H)    Alcohol intoxication, uncomplicated    Chemical dependency (H)    Atypical pneumonia    Acute " lung injury associated with vaping    Schizoaffective disorder, bipolar type (H)

## 2025-02-06 NOTE — CONSULTS
Consulted to run a test claim for Vraylar.    Patient has pharmacy benefits through [employer]. Per insurance, the following are not covered and requires prior auth:     Vraylar 1.5mg caps  This process has been started--please see separate notes linked from Prior Authorization Encounter for details/updates regarding this PA.      Please feel free to contact me with any other test claims, prior authorizations, or insurance questions regarding outpatient medications.     Thanks!      Jovanna Fletcher [facility]  Discharge Pharmacy Liaison  [facility]  Pronouns: She/Her/Hers    Securely message with [employer], [employer] Secure Chat, or [employer]  Phone: [phone]  Fax: [phone]  [email or web address]

## 2025-02-06 NOTE — ED NOTES
Patient rolled out of bed. Denies any injury. Denies pain. No visible injuries observed. Vitals reassessed and documented.  notified and assessed patient at the bedside. Patient is currently resting in bed with eyes closed.

## 2025-02-06 NOTE — PLAN OF CARE
INITIAL PSYCHOSOCIAL ASSESSMENT AND NOTE    Information for assessment was obtained from:       [x]Patient     []Parent     []Community provider    [x]Hospital records   []Other     []Guardian       Presenting Problem:  Patient is a 39 year old male who uses he/him. Patient was admitted to [facility] on 2/5/2025 Station 30N on a 72 hour hold placed on 02/06/2025 at 1642  .    Presenting issues and presentation for admit: Significant behavioral change, Anxiety, Paranoia.     Per DEC Assessment 2/5/2025    Pt was brought in via EMS from his home where he lives with his  parents, after mom called [facility] team with concern  for psychosis symptoms. Pt has hx Schizoaffective Disorder and  works with psychiatry. Per collateral from [facility]  and mother, pt has been exhibiting increasing symptoms of  paranoia and delusional thoughts related to being hacked and  being in harm's way from people who are out to get him. Overall,  he remains under emotional and behavioral control in the ED. He  presents with hyperverbal speech and disorganized thought  process. He is focused on technology and the ways in which he  believes he is being 'hacked' at his home stating that 'they have  full control of [employer], [employer] accounts, I'm locked out of my  social security account.' He states that he is being 'brute force  attacked.' Writer attempts to challenge pt to consider if any of  this could be related to his diagnosis and he states that it is  likely '80% reality and 20% his imagination.' He admits that he  has not been showering or leaving the house due to fear that he  will be attacked. He has been disconnecting technology and  devices in the house. He minimizes current symptoms stating 'I  was minding my own business and my parents had called and had me  taken away. Now you guys have to babysit me.' He states that his  mom has been threatening to kick him out of  the house for a  while. He states that she and his dad were gone on a vacation and  he admits that he house was a 'disaster' when they got home with  messes everywhere. He understands why his mom was upset but  states that they have had ongoing conflict for many years. He  denies SI/HI/plan/intent. He commits to safety and wants to  discharge. He states that he is taking his medications as  prescribed. Talked with him about report that he may be misusing  his Adderall he states that he did not take it correctly, first  he states it was due to being used to a higher dose previously  and needing to take more, then later states that he 'just lost  track of time' and 'lost count.' He denies illicit substance use.  He denies experiencing hallucinations. Significant collateral  information is provided below from mom.     The following areas have been assessed:    History of Mental Health and Chemical Dependency:  Mental Health History:  Patient has a historical diagnosis of Schizoaffective Disorder .   The patient has not a history of suicide attempts.   Patient  has not a history of engaged in non-suicidal self-injury.     Previous psychiatric hospitalizations and treatments (including outpatient, residential, and inpatient care:  ***    Substance Use History  Denies using all substances      Patient's current relationship status is   .   Patient reported having zero child(sathish).       Family Description (Constellation, significant information and events, Family Psychiatric History):   ***    Significant Medical issues, Life events or Trauma history:   ***      Living Situation:  Patient's current living/housing situation is {OP BEH HOUSIN}. They live with *** and they report that housing {IS/IS NOT:9024} stable and they {ARE/ARE NOT:9034} able to return upon discharge.       Educational Background:    Patient's highest education level was {EDUCATIONAL LEVEL:972885}. Patient reports they are  able to  understand written materials.     Occupational and Financial Status:     Patient is currently disabled.  Patient reports  income is obtained through disability.  Patient does identify finances as a current stressor. They are insured under [employer]. Restrictions (No/Yes): No    Occupational History: ***    Legal Concerns (current or past history):       Current Concerns: ***    Past History: ***      Legal Status:  72 hour hold    Start and expiration date: 2/6/2025 @ 1642    Commitment History: ***       Service History: None     Ethnic/Cultural/Spiritual considerations:   The patient describes their cultural background as White/, heterosexual, male.  Contextual influences on patient's health include severity of symptoms, safety concerns, level of functioning, and medication adherence concerns.   Patient identified their preferred language to be English. Patient reported they do not need the assistance of an .  Spiritual considerations include: ***    Social Functioning (organizations, interests, support system):   In their free time, patient reports they like to ***.      Patient identified {OP BEH SUPPORT SYSTEM:773518} as part of their support system.  Patient identified the quality of these relationships as {OP BEH SUPPORT SYSTEM QUALITY:896030}.       Current Treatment Providers are:  Primary Care Provider:  Name/Clinic: ***   Number: ***    Medication Management/Psychiatry:  Name/Clinic:   Garfield Peres MD-Psychiatrist    Number: [phone]    Therapist:   Name/Clinic: ***  Number: ***    /ACT Team:  Name/Clinic: ***   Number: ***    Group Home:  Name/Clinic: ***   Number: ***    Other contact information (family, friends, SO) and KATRINA status: None       GOALS FOR HOSPITALIZATION:  What do patient want to accomplish during this hospitalization to make things better for the patient.?   Patient priorities:  The patient reported that what is most  important to them is ***. They identified *** as a goal of this hospitalization.    Social Service Assessment/Plan:  Patient view:   Patient reports it is important for the care team to know ***.  Upon discharge, they anticipate needing *** set up for them.      Strengths and Assets:  The patient uses these coping skills to help with stress and hard times: ***.          Patient will have psychiatric assessment and medication management by the psychiatrist. Medications will be reviewed and adjusted per DO/MD/APRN CNP as indicated. The treatment team will continue to assess and stabilize the patient's mental health symptoms with the use of medications and therapeutic programming. Hospital staff will provide a safe environment and a therapeutic milieu. Staff will continue to assess patient as needed. Patient will participate in unit groups and activities. Patient will receive individual and group support on the unit.      CTC will do individual inpatient treatment planning and after care planning. CTC will discuss options for increasing community supports with the patient. CTC will coordinate with outpatient providers and will place referrals to ensure appropriate follow up care is in place.           patient's mental health symptoms with the use of medications and therapeutic programming. Hospital staff will provide a safe environment and a therapeutic milieu. Staff will continue to assess patient as needed. Patient will participate in unit groups and activities. Patient will receive individual and group support on the unit.      CTC will do individual inpatient treatment planning and after care planning. CTC will discuss options for increasing community supports with the patient. CTC will coordinate with outpatient providers and will place referrals to ensure appropriate follow up care is in place.

## 2025-02-06 NOTE — ED PROVIDER NOTES
"Emergency Department I-PASS Sign-out      Illness Severity: \"Watcher\"    Patient Summary:  39 year old male with pertinent PMH of schizoaffective disorder who presented with paranoia and delusions    ED Course/treatment plan: seen by DEC and psych. Collateral was obtained and he was placed on a hold, IP MH bed requested    Clinical Impression:  (F22) Paranoia (H)    (F22) Delusions (H)      Edited by: Andriy Holman MD at 2025 623    Action List:  Tests to Follow-up:  None    Medications Reconciled/Ordered:  Yes    ED Mental Health Boarding Order Set Used for Diet/PRNs/Other:  Yes    DEC, Extended Care, Psych Consult Orders:  Extended Care and Psychiatry consult ordered.    Situational Awareness & Contingency Plannin Hour Hold Status:  On 72 hour hold.  Active Orders  Legal  Emergency Hospitalization Hold (72 Hr Hold)  Frequency: Effective Now  Start Date/Time: 25 1641   Number of Occurrences: Until Specified  Health Officer Authority to Detain ([place]ISON)  Frequency: Effective Now  Start Date/Time: 25 1331   Number of Occurrences: Until Specified  Order Comments:  Patient arrived on a health or  authority to detain.    Disposition:  Admit/Transfer to Behavioral Health, medically clear for admit/transfer    Boarding subsequent shift/day updates:      Edited by: Andriy Holman MD at [id number]    Synthesis & Events after sign-out:          Giancarlo Griffith MD   Emergency Medicine     Giancarlo Griffith MD  25 [phone]    "

## 2025-02-06 NOTE — PROGRESS NOTES
"Admission note:    Situation: Pt admitted to  30 from ED on a 72 Hour Hold.    Background: Brought to the hospital after being seen by the [facility] Crisis Team. Brought in after parents were concerned with his psychotic symptoms. He was convinced he was \"being hacked\" in his own words. Mother reports he has not in touch with reality recently. She says he paranoid he is being hacked. He is fearful to leave the house. He is not showering. He has been disconnecting wires such as the [employer], garage , doing science experiments and destroys the house when parents are gone. Parents recently returned from a vacation and the house was in horrible shape. Mother says he has been abusing his Adderall.    Assessment: Pt very talkative during interview. At times needing to be interrupted to answer the next question. He's very friendly and cooperative. Talks a lot about his mother yelling at him. No irritability shown. Does not complain about being in the hospital or being on a 72 hour hold.    Response: Pt was cooperative at all times. Cooperative in security check. He was given a tour of the unit. Pt agreeable to having a roommate.  "

## 2025-02-06 NOTE — H&P
[facility], [place]   Psychiatric History and Physical.    Chief complaint and reason for admission:     Symptoms of psychosis.    History of present illness: per ED note: Pt was brought in via EMS from his home where he lives with his parents, after mom called [facility] team with concern for psychosis symptoms. Pt has hx Schizoaffective Disorder and works with psychiatry. Per collateral from [facility] Crisis and mother, pt has been exhibiting increasing symptoms of paranoia and delusional thoughts related to being hacked and being in harm's way from people who are out to get him. Overall, he remains under emotional and behavioral control in the ED. He presents with hyperverbal speech and disorganized thought process. He is focused on technology and the ways in which he believes he is being 'hacked' at his home stating that 'they have full control of [employer], [employer] accounts, I'm locked out of my social security account.' He states that he is being 'brute force attacked.' Writer attempts to challenge pt to consider if any of this could be related to his diagnosis and he states that it is likely '80% reality and 20% his imagination.' He admits that he has not been showering or leaving the house due to fear that he will be attacked. He has been disconnecting technology and devices in the house. He minimizes current symptoms stating 'I was minding my own business and my parents had called and had me taken away. Now you guys have to babysit me.' He states that his mom has been threatening to kick him out of the house for a while. He states that she and his dad were gone on a vacation and he admits that he house was a 'disaster' when they got home with messes everywhere. He understands why his mom was upset but states that they have had ongoing conflict for many years. He denies SI/HI/plan/intent. He commits to safety and wants to discharge. He states that he is taking his  medications as prescribed. Talked with him about report that he may be misusing his Adderall he states that he did not take it correctly, first he states it was due to being used to a higher dose previously and needing to take more, then later states that he 'just lost track of time' and 'lost count.' He denies illicit substance use. He denies experiencing hallucinations. Significant collateral information is provided below from mom.    Current Anxiety Symptoms:  racing thoughts, excessive worry, anxious  Current Depression/Trauma:  difficulty concentrating, sense of doom, impaired decision making  Current Somatic Symptoms:  racing thoughts, excessive worry  Current Psychosis/Thought Disturbance:     Current Eating Symptoms:     Collateral information name, relationship, phone number:  Mom Lacey Gramajo: [phone]     What happened today: 'We came back from being gone for several days and we could hardly get in the house. He had the doors barricaded shut with furniture and items. The kitchen looked it was ransacked. There was shaving cream all over the windows so people couldn't see in. He's not okay.'      What is different about patient's functioning: Mom reports pt has diagnosis of Schizoaffective Disorder. She states 'Has been increasingly out of touch with reality, paranoid that he's being hacked and followed all the time, doesn't want to leave the house, disconnecting our wifi, Joelle, garage door openers, barricading self in house, always working on science experiments, destroys our house if we leave him alone.' 'He is secretive about medications, doesn't seem to be following orders, abusing his Adderall, taking large amounts of it. He follows us around, talking non-stop constantly, won't take out his contact lenses so his eyes are infected. He hasn't showered in a month. He needs to be in a supervised setting. He is not violent. He loves his family very much but he can't live here. He was in treatment for  alcohol and cannabis in his teen years, no alcohol in 2-3 years. The last few months it's been pretty constant, he spends hours a day with [employer] or [employer], trying to track down people who are hacking him. He rarely leaves the house. Drinks coffee and energy drinks all day long. Sleeps erratically on a couch in the middle of the living room.'      Has patient made comments about wanting to kill themselves/others: no     If d/c is recommended, can they take part in safety/aftercare planning:  no    During visit with this provider: patient was sleeping in his room, but woke up and went with myself and PA student to the conference room where he was seen. He appeared to be more alert as our visit progressed. Reported that he came to this hospital on the insistence of his mother. Said that parents left for a week long vacation and came back and were unhappy about mess and might kick him out. Told us that he felt that his electronics were hacked by [employer], FBI, [employer], possibly, other agencies. Talked about people following him and trying to harm him, about his I cloud being hacked and having multiple cases with [employer] customer services about that. Admitted that he had not been getting out too much, not taking his meds, neglecting personal hygiene. Said that he had disconnected [employer] and all electronic devices. He denied presence of Suicidal ideation, Homicidal thoughts, Auditory hallucinations or Visual hallucinations. Reported being diagnosed with schizoaffective disorder and not taking his meds for a long time. When asked about staying at this hospital, said that he would stay voluntarily for at least few days.     Past psychiatric history: Past treatment:  Inpatient Hospitalizations, Psychiatric Medication Management. Does have a hx of misusing his meds and taking higher than recommended doses of Adderall and, reportedly, Klonopin. Hx of Auditory hallucinations.  Details of most recent treatment:  [facility], [place], operated by [facility],  currently working with OP psychiatry and case management. Previous medication trials include Klonopin, Adderall, Vraylar, Clonidine, Focalin, Valium, Lunesta, Neurontin, melatonin, Concerta, Seroquel, Viibryd, Trintellix.     Past medical history:      Anxiety    Depression    History of alcohol abuse    Lyme disease    Lyme disease     Previous History of seizures or head injury: Reported history of withdrawal seizures when he abruptly stops benzodiazepines.     Surgeries:   Past Surgical History:   . Laterality Date    NO PREVIOUS SURGERY    ochrioplexy   age 10     Current Pain Issues: Chronic pain from Lyme's disease     Family and social history: FAMILY HISTORY  Psychiatric problems: Reports some anxiety and depression on both mom and dad's sides.  Chemical Dependency: Denies  Suicide Attempts/Completed Suicides: Denies     SOCIAL HISTORY   Jonny Gramajo was born in [place], [place] and raised in [place].   Patient's current housing is living alone in a house.   Educational: Some college.  Employment: Unemployed.   History: Denies.  Legal History: History of misdemeanor marijuana charge.   Patient's parents are alive, together, and supportive. Patient did not want to answer if he had any siblings. Patient has no children.          Medications:     Current Facility-Administered Medications   Medication Dose Route Frequency Provider Last Rate Last Admin    cariprazine (VRAYLAR) capsule 1.5 mg  1.5 mg Oral Daily Kenneth Gibson MD        cetirizine (zyrTEC) tablet 10 mg  10 mg Oral Daily Kenneth Gibson MD        clonazePAM (klonoPIN) tablet 2 mg  2 mg Oral TID Hardy Hoover APRN CNP   2 mg at 02/06/25 1336    gabapentin (NEURONTIN) capsule 300 mg  300 mg Oral TID Andriy Holman MD   300 mg at 02/06/25 1336    metFORMIN (GLUCOPHAGE XR) 24 hr tablet 1,000 mg  1,000 mg Oral Daily with supper Kenneth Gibson MD        naproxen (NAPROSYN) tablet 500 mg  500 mg Oral BID w/meals  "Kenneth Gibson MD        nicotine (NICODERM CQ) 21 MG/24HR 24 hr patch 1 patch  1 patch Transdermal Daily Kenneth Gibson MD        OLANZapine (zyPREXA) tablet 30 mg  30 mg Oral At Bedtime Andriy Holman MD   30 mg at 02/05/25 2210    pantoprazole (PROTONIX) EC tablet 40 mg  40 mg Oral Daily Andriy Holman MD   40 mg at 02/06/25 0936    [START ON 2/7/2025] Testosterone 1.62 % GEL 60.75 mg  60.75 mg Transdermal [facility] Kenneth Gibson MD              Allergies:     Allergies   Allergen Reactions    Abilify [Aripiprazole]      Patient reports tardive dyskinesia effect in 2004 but likely akathisia since patient reports the effects only occurred while on med and resolved with a few days after discontinuing med.     Wellbutrin [Bupropion] Anxiety     Patient reports felt \"reved\" up and anxious when taken in 2001.           Labs:     Recent Results (from the past 24 hours)   Comprehensive metabolic panel    Collection Time: 02/06/25  4:08 AM   Result Value Ref Range    Sodium 140 135 - 145 mmol/L    Potassium 3.8 3.4 - 5.3 mmol/L    Carbon Dioxide (CO2) 26 22 - 29 mmol/L    Anion Gap 10 7 - 15 mmol/L    Urea Nitrogen 9.6 6.0 - 20.0 mg/dL    Creatinine 0.76 0.67 - 1.17 mg/dL    GFR Estimate >90 >60 mL/min/1.73m2    Calcium 8.7 (L) 8.8 - 10.4 mg/dL    Chloride 104 98 - 107 mmol/L    Glucose 120 (H) 70 - 99 mg/dL    Alkaline Phosphatase 80 40 - 150 U/L    AST 21 0 - 45 U/L    ALT 29 0 - 70 U/L    Protein Total 6.3 (L) 6.4 - 8.3 g/dL    Albumin 3.9 3.5 - 5.2 g/dL    Bilirubin Total 0.2 <=1.2 mg/dL   COVID-19 Virus (Coronavirus) by PCR Nose    Collection Time: 02/06/25  4:08 AM    Specimen: Nose; Swab   Result Value Ref Range    SARS CoV2 PCR Negative Negative   CBC with platelets and differential    Collection Time: 02/06/25  4:08 AM   Result Value Ref Range    WBC Count 9.4 4.0 - 11.0 10e3/uL    RBC Count 4.83 4.40 - 5.90 10e6/uL    Hemoglobin 15.3 13.3 - 17.7 g/dL    Hematocrit 43.1 40.0 - 53.0 % "    MCV 89 78 - 100 fL    MCH 31.7 26.5 - 33.0 pg    MCHC 35.5 31.5 - 36.5 g/dL    RDW 13.2 10.0 - 15.0 %    Platelet Count 192 150 - 450 10e3/uL    % Neutrophils 63 %    % Lymphocytes 30 %    % Monocytes 7 %    % Eosinophils 0 %    % Basophils 0 %    % Immature Granulocytes 0 %    NRBCs per 100 WBC 0 <1 /100    Absolute Neutrophils 6.0 1.6 - 8.3 10e3/uL    Absolute Lymphocytes 2.8 0.8 - 5.3 10e3/uL    Absolute Monocytes 0.6 0.0 - 1.3 10e3/uL    Absolute Eosinophils 0.0 0.0 - 0.7 10e3/uL    Absolute Basophils 0.0 0.0 - 0.2 10e3/uL    Absolute Immature Granulocytes 0.0 <=0.4 10e3/uL    Absolute NRBCs 0.0 10e3/uL          Psychiatric Examination:     BP (!) 165/83   Pulse 112   Temp 97.2  F (36.2  C) (Oral)   Resp 20   SpO2 97%   Weight is 0 lbs 0 oz  There is no height or weight on file to calculate BMI.                                             Appearance: awake, alert, dressed in hospital scrubs, and moderately obese  Attitude:  cooperative  Eye Contact:  fair  Mood:  better  Affect:  appropriate and in normal range  Speech:  rambling  Psychomotor Behavior:  no evidence of tardive dyskinesia, dystonia, or tics  Throught Process:  disorganized  Associations:  loosening of associations present  Thought Content:  no evidence of suicidal ideation or homicidal ideation and delusional ideas present;  Insight:  limited  Judgement:  limited  Oriented to:  time, person, and place  Attention Span and Concentration:  fair  Recent and Remote Memory:  fair       Review of systems:     For physical examination and 12 point review of system please, see note of Andriy Neri MD from 2/5/2025.  I reviewed that note and agree with it.         DIagnoses:     Schizoaffective Disorder, bipolar disorder vs paranoid schizophrenia.  Polysubstance abuse is likely.   Class 1 obesity due to excess calories without serious comorbidity with body mass index (BMI) of 34.0 to 34.9 in adult          Plan:     Will be restarted on  previously successful med Vraylar. Will discuss with the patient tapering down his high Klonopin dose and signing himself as a voluntary patient. Will continue to provide support and structure.      Total time spent was 57minutes. Over 50% of times was spent counseling and coordination of care regarding coping skills, medication and discharge planning.

## 2025-02-06 NOTE — PLAN OF CARE
Goal Outcome Evaluation:     02/06/25 1126   Patient Belongings   Did you bring any home meds/supplements to the hospital?  No   Patient Belongings locker;sent to security per site process   Patient Belongings Put in Hospital Secure Location (Security or Locker, etc.) plastic bag;wallet;shoes   Belongings Search Yes   Clothing Search Yes   Second Staff Maynor REICH   Comment See Note     In locker: 1 winter jacket, 1 t-shirt, 1 short, and wallet/stated ID.   Sent to security: $72 (cash), EBT card #[id number], visa card #[id number] and visa card #[id number]  A               Admission:  I am responsible for any personal items that are not sent to the safe or pharmacy.  [facility] is not responsible for loss, theft or damage of any property in my possession.    Signature:  _________________________________ Date: _______  Time: _____                                              Staff Signature:  ____________________________ Date: ________  Time: _____      2nd Staff person, if patient is unable/unwilling to sign:    Signature: ________________________________ Date: ________  Time: _____     Discharge:  [facility] has returned all of my personal belongings:    Signature: _________________________________ Date: ________  Time: _____                                          Staff Signature:  ____________________________ Date: ________  Time: _____

## 2025-02-06 NOTE — ED PROVIDER NOTES
I was informed by the patient's nurse that he rolled out of bed in the night.  He was asleep when I went to assess him, but he awoke, denied any injury or complaints.  No tenderness with palpation to his chest, neck, extremities.    Dictation Disclaimer: Some of this Note has been completed with voice-recognition dictation software. Although errors are generally corrected real-time, there is the potential for a rare error to be present in the completed chart.       Roxann Coates MD  02/06/25 0768

## 2025-02-07 PROCEDURE — 250N000013 HC RX MED GY IP 250 OP 250 PS 637: Performed by: PSYCHIATRY & NEUROLOGY

## 2025-02-07 PROCEDURE — 250N000013 HC RX MED GY IP 250 OP 250 PS 637: Performed by: EMERGENCY MEDICINE

## 2025-02-07 PROCEDURE — 124N000002 HC R&B MH UMMC

## 2025-02-07 PROCEDURE — 250N000013 HC RX MED GY IP 250 OP 250 PS 637

## 2025-02-07 PROCEDURE — 97150 GROUP THERAPEUTIC PROCEDURES: CPT | Mod: GO

## 2025-02-07 PROCEDURE — 99232 SBSQ HOSP IP/OBS MODERATE 35: CPT | Performed by: PSYCHIATRY & NEUROLOGY

## 2025-02-07 RX ORDER — CLONIDINE HYDROCHLORIDE 0.1 MG/1
0.1 TABLET ORAL 2 TIMES DAILY
Status: DISCONTINUED | OUTPATIENT
Start: 2025-02-07 | End: 2025-02-07

## 2025-02-07 RX ORDER — CLONIDINE HYDROCHLORIDE 0.1 MG/1
0.1 TABLET ORAL AT BEDTIME
Status: DISPENSED | OUTPATIENT
Start: 2025-02-07

## 2025-02-07 RX ADMIN — NICOTINE POLACRILEX 4 MG: 4 GUM, CHEWING BUCCAL at 17:57

## 2025-02-07 RX ADMIN — OLANZAPINE 30 MG: 15 TABLET, FILM COATED ORAL at 21:15

## 2025-02-07 RX ADMIN — HYDROXYZINE HYDROCHLORIDE 25 MG: 25 TABLET ORAL at 23:30

## 2025-02-07 RX ADMIN — PANTOPRAZOLE SODIUM 40 MG: 40 TABLET, DELAYED RELEASE ORAL at 07:52

## 2025-02-07 RX ADMIN — CETIRIZINE HYDROCHLORIDE 10 MG: 10 TABLET, FILM COATED ORAL at 07:52

## 2025-02-07 RX ADMIN — NICOTINE POLACRILEX 4 MG: 4 GUM, CHEWING BUCCAL at 13:02

## 2025-02-07 RX ADMIN — CLONAZEPAM 2 MG: 1 TABLET ORAL at 07:51

## 2025-02-07 RX ADMIN — NICOTINE POLACRILEX 4 MG: 4 GUM, CHEWING BUCCAL at 22:31

## 2025-02-07 RX ADMIN — NICOTINE 1 PATCH: 21 PATCH, EXTENDED RELEASE TRANSDERMAL at 07:51

## 2025-02-07 RX ADMIN — NICOTINE POLACRILEX 4 MG: 4 GUM, CHEWING BUCCAL at 21:16

## 2025-02-07 RX ADMIN — CARIPRAZINE 1.5 MG: 1.5 CAPSULE, GELATIN COATED ORAL at 15:18

## 2025-02-07 RX ADMIN — METFORMIN ER 500 MG 1000 MG: 500 TABLET ORAL at 17:55

## 2025-02-07 RX ADMIN — TRAZODONE HYDROCHLORIDE 50 MG: 50 TABLET ORAL at 23:30

## 2025-02-07 RX ADMIN — ACETAMINOPHEN 650 MG: 325 TABLET, FILM COATED ORAL at 13:10

## 2025-02-07 RX ADMIN — TESTOSTERONE 60.75 MG: 20.25 GEL TOPICAL at 07:51

## 2025-02-07 RX ADMIN — Medication 3 MG: at 23:30

## 2025-02-07 RX ADMIN — CLONAZEPAM 2 MG: 1 TABLET ORAL at 13:01

## 2025-02-07 RX ADMIN — ACETAMINOPHEN 650 MG: 325 TABLET, FILM COATED ORAL at 23:30

## 2025-02-07 RX ADMIN — GABAPENTIN 300 MG: 300 CAPSULE ORAL at 19:29

## 2025-02-07 RX ADMIN — NICOTINE POLACRILEX 4 MG: 4 GUM, CHEWING BUCCAL at 16:10

## 2025-02-07 RX ADMIN — GABAPENTIN 300 MG: 300 CAPSULE ORAL at 07:51

## 2025-02-07 RX ADMIN — NAPROXEN 500 MG: 500 TABLET ORAL at 07:53

## 2025-02-07 RX ADMIN — NICOTINE POLACRILEX 4 MG: 4 GUM, CHEWING BUCCAL at 17:14

## 2025-02-07 RX ADMIN — NAPROXEN 500 MG: 500 TABLET ORAL at 17:55

## 2025-02-07 RX ADMIN — HYDROXYZINE HYDROCHLORIDE 25 MG: 25 TABLET ORAL at 16:10

## 2025-02-07 RX ADMIN — GABAPENTIN 300 MG: 300 CAPSULE ORAL at 13:01

## 2025-02-07 RX ADMIN — ACETAMINOPHEN 650 MG: 325 TABLET, FILM COATED ORAL at 02:52

## 2025-02-07 RX ADMIN — NICOTINE POLACRILEX 4 MG: 4 GUM, CHEWING BUCCAL at 07:51

## 2025-02-07 RX ADMIN — TRAZODONE HYDROCHLORIDE 50 MG: 50 TABLET ORAL at 22:31

## 2025-02-07 RX ADMIN — CLONAZEPAM 2 MG: 1 TABLET ORAL at 19:29

## 2025-02-07 RX ADMIN — CLONIDINE HYDROCHLORIDE 0.1 MG: 0.1 TABLET ORAL at 21:10

## 2025-02-07 RX ADMIN — NICOTINE POLACRILEX 4 MG: 4 GUM, CHEWING BUCCAL at 19:34

## 2025-02-07 ASSESSMENT — ACTIVITIES OF DAILY LIVING (ADL)
ADLS_ACUITY_SCORE: 45
ADLS_ACUITY_SCORE: 45
LAUNDRY: UNABLE TO COMPLETE
ADLS_ACUITY_SCORE: 45
HYGIENE/GROOMING: INDEPENDENT
ADLS_ACUITY_SCORE: 45
ADLS_ACUITY_SCORE: 45
DRESS: INDEPENDENT
ORAL_HYGIENE: INDEPENDENT
DRESS: INDEPENDENT
HYGIENE/GROOMING: INDEPENDENT
ADLS_ACUITY_SCORE: 45
ADLS_ACUITY_SCORE: 45
LAUNDRY: WITH SUPERVISION
ADLS_ACUITY_SCORE: 45
ORAL_HYGIENE: INDEPENDENT
ADLS_ACUITY_SCORE: 45

## 2025-02-07 NOTE — PLAN OF CARE
NOC Shift Report    Pt in bed at beginning of shift, breathing quiet and unlabored. Pt slept on and off through shift. Pt slept 4.75 hours.     Pt c/o unwitnessed fall this shift but Neuros and VS WNL    PRNs given. See MAR. Will continue to monitor.     Precautions: Fall

## 2025-02-07 NOTE — PLAN OF CARE
Problem: Adult Behavioral Health Plan of Care  Goal: Plan of Care Review  Recent Flowsheet Documentation  Taken 2/7/2025 1400 by Tish Benavides RN  Patient Agreement with Plan of Care: agrees   Goal Outcome Evaluation:    Plan of Care Reviewed With: patient               Presentation: The patient is observed in the milieu 50% of the shift.  The patient is dressed in scrubs.  His affect is flat, blunted, and restricted. Speech is clear, coherent, and poverty of content.  Thoughts are distracted and indicates poor concentration.  Mood is anxious.  The patient is cooperative and is absent of self harm and aggressive behaviors.     Mental health symptoms: The patient denies SI, SIB, HI, and hallucinations.  The patient denies depression and anxiety.        Medication compliance: The patient is compliant with all medications.        Medical Concerns: The patient c/o generalized pain 8/10. Received PRN's for pain control.  The patient is a fall risk.  He fell out of the bed, once in the ED and once yesterday.  The patient states his bed at home is a deniz size bed, he sleeps heavy, and rolls in his sleep.  The patient snores and gets up frequently during the night.  The patient no has a no roommate order.  The patient stated, he plans on putting the mattress on the floor to prevent falling out of bed and harm.      PRN's: Tylenol prn.      Precautions: falls.      Continue with plan of care

## 2025-02-07 NOTE — PROGRESS NOTES
"[facility], [place]   Psychiatric Progress Note        Interim History:     The patient's care was discussed with the treatment team during the daily team meeting and/or staff's chart notes were reviewed.  Staff report patient slept for about 4.75 hours last night: he reported unwitnessed fall out of bed, IM was called. Patient's neuro and VS were WNL. He had visible bump on his forehead, said that he tosses and turns at night and sleeps on deniz bed, see RN's note below:    \"Presentation: The patient is observed in the [facility] 50% of the shift.  The patient is dressed in scrubs.  His affect is flat, blunted, and restricted. Speech is clear, coherent, and poverty of content.  Thoughts are distracted and indicates poor concentration.  Mood is anxious.  The patient is cooperative and is absent of self harm and aggressive behaviors.      Mental health symptoms: The patient denies SI, SIB, HI, and hallucinations.  The patient denies depression and anxiety.       Medication compliance: The patient is compliant with all medications.       Medical Concerns: The patient c/o generalized pain 8/10. Received PRN's for pain control.  The patient is a fall risk.  He fell out of the bed, once in the ED and once yesterday.  The patient states his bed at home is a deniz size bed, he sleeps heavy, and rolls in his sleep.  The patient snores and gets up frequently during the night.  The patient no has a no roommate order.  The patient stated, he plans on putting the mattress on the floor to prevent falling out of bed and harm.       PRN's: Tylenol prn.     Precautions: falls.\"    Met with patient: Jonny was in [facility]. He walked out and went to the conference room where he was seen in presence of PA student. He had visible bump on his forehead. Denied having headache or any concerns. He requested to put him on Clonidine at hs time he was given in the past to decrease agitation and induce sleep, requested " permission to use guitar which we agreed to. He, then, asked to be put on a non controlled med for ADHD. We suggested to wait with it and for now to focus on his psychotic symptoms. During our visit we clarified that Jonny didn't receive his Vraylar dose because prior authorization was not done, however, it was done early in the morning. We told Jonny that he would get his dose of Vraylar later on today. He denied presence of Auditory hallucinations, Visual hallucinations, Suicidal ideation, Homicidal thoughts, didn't voice during our meeting clearly delusional ideas and had no questions or concerns.           Medications:     Current Facility-Administered Medications   Medication Dose Route Frequency Provider Last Rate Last Admin    cariprazine (VRAYLAR) capsule 1.5 mg  1.5 mg Oral Daily Kenneth Gibson MD   1.5 mg at 02/07/25 1518    cetirizine (zyrTEC) tablet 10 mg  10 mg Oral Daily Kenneth Gibson MD   10 mg at 02/07/25 0752    clonazePAM (klonoPIN) tablet 2 mg  2 mg Oral TID Hardy Hoover APRN CNP   2 mg at 02/07/25 1301    cloNIDine (CATAPRES) tablet 0.1 mg  0.1 mg Oral At Bedtime Kenneth Gibson MD        gabapentin (NEURONTIN) capsule 300 mg  300 mg Oral TID Andriy Holman MD   300 mg at 02/07/25 1301    metFORMIN (GLUCOPHAGE XR) 24 hr tablet 1,000 mg  1,000 mg Oral Daily with supper Kenneth Gibson MD   1,000 mg at 02/06/25 1721    naproxen (NAPROSYN) tablet 500 mg  500 mg Oral BID w/meals Kenneth Gibson MD   500 mg at 02/07/25 0753    nicotine (NICODERM CQ) 21 MG/24HR 24 hr patch 1 patch  1 patch Transdermal Daily Kenneth Gibson MD   1 patch at 02/07/25 0751    OLANZapine (zyPREXA) tablet 30 mg  30 mg Oral At Bedtime Andriy Holman MD   30 mg at 02/06/25 2317    pantoprazole (PROTONIX) EC tablet 40 mg  40 mg Oral Daily Andriy Holman MD   40 mg at 02/07/25 0752    testosterone (ANDROGEL) topical gel 60.75 mg  60.75 mg Transdermal LOLY Gibson,  "Kenneth WAITE MD   60.75 mg at 02/07/25 0751          Allergies:     Allergies   Allergen Reactions    Abilify [Aripiprazole]      Patient reports tardive dyskinesia effect in 2004 but likely akathisia since patient reports the effects only occurred while on med and resolved with a few days after discontinuing med.     Wellbutrin [Bupropion] Anxiety     Patient reports felt \"reved\" up and anxious when taken in 2001.           Labs:   No results found for this or any previous visit (from the past 24 hours).       Psychiatric Examination:     /83 (BP Location: Left arm)   Pulse 83   Temp 97.8  F (36.6  C) (Oral)   Resp 16   SpO2 98%   Weight is 0 lbs 0 oz  There is no height or weight on file to calculate BMI.    Orthostatic Vitals         Most Recent      Sitting Orthostatic /93 02/06 1705    Sitting Orthostatic Pulse (bpm) 109 02/06 1705    Standing Orthostatic /95 02/07 0800    Standing Orthostatic Pulse (bpm) 103 02/07 0800          Appearance: awake, alert, dressed in hospital scrubs, and moderately obese  Attitude:  cooperative  Eye Contact:  fair  Mood:  better  Affect:  intensity is exaggerated  Speech:  clear, coherent  Psychomotor Behavior:  no evidence of tardive dyskinesia, dystonia, or tics  Throught Process:  circumstantial  Associations:  loosening of associations present  Thought Content:  no evidence of suicidal ideation or homicidal ideation and no evidence of psychotic thought  Insight:  partial  Judgement:  limited  Oriented to:  time, person, and place  Attention Span and Concentration:  limited  Recent and Remote Memory:  fair    Clinical Global Impressions  First: 6/4 2/7/2025      Most recent:            Precautions:     Behavioral Orders   Procedures    Code 1 - Restrict to Unit    Fall precautions    Routine Programming     As clinically indicated    Status 15     Every 15 minutes.          DIagnoses:     Schizoaffective Disorder, bipolar disorder vs paranoid " schizophrenia.  Polysubstance abuse is likely.    Class 1 obesity due to excess calories without serious comorbidity with body mass index (BMI) of 34.0 to 34.9 in adult            Plan:    Vraylar will be started today after PA done, See discussion above. Will start on Clonidine 0.1 mg qhs. No other med changes. Will allow to use guitar. Will continue to provide support and structure.     Total time spent was 37 minutes. Over 50% of times was spent counseling and coordination of care regarding coping skills, medication and discharge planning.

## 2025-02-07 NOTE — PLAN OF CARE
Rehab Group    Start time: 13:15  End time: 14:00  Patient time total: 25 minutes    attended partial group    #8 attended   Group Type: occupational therapy   Group Topic Covered: cognitive activities, healthy leisure time, and social skills       Group Session Detail:  Patient engaged in an interactive game of Blank Slate to promote healthy leisure exploration, social skills, and cognitive activity. Participants of this group were instructed to effectively sequence turn taking, accurately recall and follow game instructions, sustain attention on game, and practice prosocial behaviors.      Patient Response/Contribution:  cooperative with task, supportive of peers, socially appropriate, attentive, and actively engaged       Patient Detail:  Patient Response: Patient arrived to this interactive group game of Blank Slate late. Upon arrival to group, patient expressed unfamiliarity with the game and game concepts. However, after presentation of game instructions, patient appeared to have a good understanding of game objectives. Patient did not require a repeat of instructions. During the game, patient demonstrated appropriate social skills. Patient was also able to independently sequence turn taking throughout the game and demonstrated good focus during the activity. Patient provided appropriate and relevant responses to game prompts. Affect appeared congruent with the activity with noted smiling and laughing throughout the game. Patient remained calm and pleasant throughout the group before leaving five minutes early to meet with a unit provider.       [id number] OT Group (2 or more in attendance)      Patient Active Problem List   Diagnosis    Benzodiazepine withdrawal, uncomplicated (H)    Alcohol intoxication, uncomplicated    Chemical dependency (H)    Atypical pneumonia    Acute lung injury associated with vaping    Schizoaffective disorder, bipolar type (H)    Delusions (H)    Paranoia (H)

## 2025-02-07 NOTE — PLAN OF CARE
Goal Outcome Evaluation:    Pt was observed in and out of his room presenting as restless with poor concentration and disorganized with thoughts at times. Observed to be socializing with peers  while in the lounge area and watching TV at times. Jonny continues to endorse anxiety and depression 8/10 this shift. He denied SI/SIB/HI/AVH this shift. Appears unkempt this shift but declined shower. Pt was medication compliant this shift and denied any side effects. Continues to c/o generalized pain this shift and utilized PRNs. He reported sleep as poor but appetite as adequate. Staff will continue with current POC.    Plan of Care Reviewed With: patient

## 2025-02-07 NOTE — PLAN OF CARE

## 2025-02-07 NOTE — SIGNIFICANT EVENT
Post Fall Assessment Note    S: Patient had an unwitnessed fall.    B: Patient's orientation/mental status at time of fall: Patient alert and oriented x 4.      Location of fall: Patient came out of his room and reported that he fell in his room on the floor, while sleeping.     A: Type of injury from fall:  Patient said he hit his head on the floor. Patient has a medium size raised area on the left side of his forehead, which appears to be red. No opened areas or bruises noted. Patient reported that he also, hit his right big toe. Patient refused for staff to assess right big toe. Patient complained of 7/10 headache pain to his forehead.   Patient status:  Patient is alert and oriented x 4.     Interventions: Assessed patient. PERRLA. Neuros intact. No complaint of dizziness, nausea or vomiting. No complaints of respiratory distress. Patient's gait is stable. Vital signs B/P: 135/91, T: 97.8, P: 78, R: 16  and O2 Sats. 97% on room air. Non-pharmacologic therapeutic interventions given. PRN Acetaminophen administered, with good effect. Pad placed on floor near patient's bed.    MD notified:  On-call doc notified. Doc came and assessed patient. Doc said to continue to monitor patient, and to continue non-pharmacologic and pharmacologic interventions as needed.     R: Falls assessment completed in [facility]:  Yes   Care Plan updated:  Yes

## 2025-02-07 NOTE — PROGRESS NOTES
Brief cross cover note:  Notified by RN that patient came by the nursing station and said he is felt.  Saw patient.  Patient was sleeping and fell off the bed and hit his head.  Exam is notable for a small bump on the left side of his forehead.  Patient denies any headache or significant pain.  He reports that he has a history of falling out of bed with sleep.  He did receive Tylenol from the nursing team.  He denied dizziness or lightheadedness.  Patient was able to ambulate without concerns.  He is oriented.  No focal neurologic findings.  He did not hit any other part of his body.  Given patient well-appearing, no imaging was ordered.  Nursing will provide pads on the sides of patient bed or move patient to a different room where he can have his bed on the floor.  If patient clinically changes low threshold to order head and neck imaging.     Allyn Andrew MD on 2/7/2025 at 6:43 AM

## 2025-02-08 ENCOUNTER — APPOINTMENT (OUTPATIENT)
Dept: GENERAL RADIOLOGY | Facility: CLINIC | Age: 40
End: 2025-02-08
Payer: COMMERCIAL

## 2025-02-08 PROCEDURE — 72040 X-RAY EXAM NECK SPINE 2-3 VW: CPT | Mod: 26 | Performed by: STUDENT IN AN ORGANIZED HEALTH CARE EDUCATION/TRAINING PROGRAM

## 2025-02-08 PROCEDURE — 250N000013 HC RX MED GY IP 250 OP 250 PS 637: Performed by: PSYCHIATRY & NEUROLOGY

## 2025-02-08 PROCEDURE — 99222 1ST HOSP IP/OBS MODERATE 55: CPT

## 2025-02-08 PROCEDURE — 250N000013 HC RX MED GY IP 250 OP 250 PS 637

## 2025-02-08 PROCEDURE — 250N000013 HC RX MED GY IP 250 OP 250 PS 637: Performed by: EMERGENCY MEDICINE

## 2025-02-08 PROCEDURE — 72040 X-RAY EXAM NECK SPINE 2-3 VW: CPT

## 2025-02-08 PROCEDURE — 124N000002 HC R&B MH UMMC

## 2025-02-08 PROCEDURE — 97150 GROUP THERAPEUTIC PROCEDURES: CPT | Mod: GO

## 2025-02-08 RX ADMIN — CLONAZEPAM 2 MG: 1 TABLET ORAL at 14:17

## 2025-02-08 RX ADMIN — ACETAMINOPHEN 650 MG: 325 TABLET, FILM COATED ORAL at 09:54

## 2025-02-08 RX ADMIN — GABAPENTIN 400 MG: 300 CAPSULE ORAL at 14:17

## 2025-02-08 RX ADMIN — TRAZODONE HYDROCHLORIDE 50 MG: 50 TABLET ORAL at 22:10

## 2025-02-08 RX ADMIN — CLONAZEPAM 2 MG: 1 TABLET ORAL at 19:39

## 2025-02-08 RX ADMIN — NICOTINE POLACRILEX 4 MG: 4 GUM, CHEWING BUCCAL at 11:26

## 2025-02-08 RX ADMIN — PANTOPRAZOLE SODIUM 40 MG: 40 TABLET, DELAYED RELEASE ORAL at 07:44

## 2025-02-08 RX ADMIN — NICOTINE POLACRILEX 4 MG: 4 GUM, CHEWING BUCCAL at 19:53

## 2025-02-08 RX ADMIN — HYDROXYZINE HYDROCHLORIDE 25 MG: 25 TABLET ORAL at 09:54

## 2025-02-08 RX ADMIN — NAPROXEN 500 MG: 500 TABLET ORAL at 18:06

## 2025-02-08 RX ADMIN — NICOTINE POLACRILEX 4 MG: 4 GUM, CHEWING BUCCAL at 09:54

## 2025-02-08 RX ADMIN — CLONIDINE HYDROCHLORIDE 0.1 MG: 0.1 TABLET ORAL at 21:25

## 2025-02-08 RX ADMIN — NICOTINE POLACRILEX 4 MG: 4 GUM, CHEWING BUCCAL at 18:12

## 2025-02-08 RX ADMIN — HYDROXYZINE HYDROCHLORIDE 25 MG: 25 TABLET ORAL at 16:53

## 2025-02-08 RX ADMIN — NICOTINE POLACRILEX 4 MG: 4 GUM, CHEWING BUCCAL at 08:18

## 2025-02-08 RX ADMIN — NICOTINE 1 PATCH: 21 PATCH, EXTENDED RELEASE TRANSDERMAL at 07:44

## 2025-02-08 RX ADMIN — NAPROXEN 500 MG: 500 TABLET ORAL at 07:44

## 2025-02-08 RX ADMIN — GABAPENTIN 400 MG: 300 CAPSULE ORAL at 19:39

## 2025-02-08 RX ADMIN — Medication 3 MG: at 22:17

## 2025-02-08 RX ADMIN — TESTOSTERONE 60.75 MG: 20.25 GEL TOPICAL at 07:44

## 2025-02-08 RX ADMIN — NICOTINE POLACRILEX 4 MG: 4 GUM, CHEWING BUCCAL at 16:49

## 2025-02-08 RX ADMIN — CETIRIZINE HYDROCHLORIDE 10 MG: 10 TABLET, FILM COATED ORAL at 07:44

## 2025-02-08 RX ADMIN — NICOTINE POLACRILEX 4 MG: 4 GUM, CHEWING BUCCAL at 22:08

## 2025-02-08 RX ADMIN — ACETAMINOPHEN 650 MG: 325 TABLET, FILM COATED ORAL at 16:53

## 2025-02-08 RX ADMIN — NICOTINE POLACRILEX 4 MG: 4 GUM, CHEWING BUCCAL at 20:53

## 2025-02-08 RX ADMIN — CARIPRAZINE 1.5 MG: 1.5 CAPSULE, GELATIN COATED ORAL at 07:44

## 2025-02-08 RX ADMIN — HYDROXYZINE HYDROCHLORIDE 25 MG: 25 TABLET ORAL at 20:54

## 2025-02-08 RX ADMIN — CLONAZEPAM 2 MG: 1 TABLET ORAL at 07:45

## 2025-02-08 RX ADMIN — OLANZAPINE 30 MG: 15 TABLET, FILM COATED ORAL at 21:25

## 2025-02-08 RX ADMIN — GABAPENTIN 300 MG: 300 CAPSULE ORAL at 07:44

## 2025-02-08 RX ADMIN — METFORMIN ER 500 MG 1000 MG: 500 TABLET ORAL at 18:06

## 2025-02-08 RX ADMIN — NICOTINE POLACRILEX 4 MG: 4 GUM, CHEWING BUCCAL at 14:17

## 2025-02-08 RX ADMIN — ACETAMINOPHEN 650 MG: 325 TABLET, FILM COATED ORAL at 20:53

## 2025-02-08 ASSESSMENT — ACTIVITIES OF DAILY LIVING (ADL)
ADLS_ACUITY_SCORE: 45
LAUNDRY: WITH SUPERVISION
ADLS_ACUITY_SCORE: 45
HYGIENE/GROOMING: INDEPENDENT
ADLS_ACUITY_SCORE: 45
ADLS_ACUITY_SCORE: 45
ORAL_HYGIENE: INDEPENDENT
DRESS: INDEPENDENT
ADLS_ACUITY_SCORE: 45
ADLS_ACUITY_SCORE: 45

## 2025-02-08 NOTE — PLAN OF CARE
Problem: Adult Behavioral Health Plan of Care  Goal: Plan of Care Review  Outcome: Progressing  Flowsheets (Taken 2/7/2025 1700)  Patient Agreement with Plan of Care: agrees     Problem: Psychotic Signs/Symptoms  Goal: Improved Behavioral Control (Psychotic Signs/Symptoms)  Outcome: Progressing   Goal Outcome Evaluation:    Plan of Care Reviewed With: patient        Pt endorsed anxiety and rated at 8/10, depression 6/10, but denied SI/SIB/HI/AVH, and contracted for safety. Pt is pleasant on approach.  Up and visible on the unit all shift interacting with peers and watching TV. Affect presents as flat, but brightens upon approach. Mood presents as calm and cooperative. Judgment and insight not appropriate to situation. Pt is eating and drinking adequately. He is medication compliant. No medication adverse effects noted or verbalized. Will continue with same plan of care.

## 2025-02-08 NOTE — PLAN OF CARE
Problem: Adult Behavioral Health Plan of Care  Goal: Plan of Care Review  2/8/2025 0654 by Ahsan Montgomery, RN  Outcome: Progressing  2/7/2025 2154 by Ahsan Montgomery, RN  Outcome: Progressing  Flowsheets (Taken 2/7/2025 1700)  Patient Agreement with Plan of Care: agrees     Problem: Sleep Disturbance  Goal: Adequate Sleep/Rest  Outcome: Progressing     Problem: Psychotic Signs/Symptoms  Goal: Improved Behavioral Control (Psychotic Signs/Symptoms)  Outcome: Progressing   Goal Outcome Evaluation:    Plan of Care Reviewed With: patient         Pt now has a medical bed for safety. However, pt was noted sitting up on the stationary bed and dosing. Pt was advised to go to bed and sleep. He was up and out of his room at about 04:30 and went to the lounge. He sat up in a chair and started sleeping. All attempts to get him return to his room were in vain. Pt slept for 5.25 hours. No other concerns noted or verbalized. Will continue with same plan of care.

## 2025-02-08 NOTE — PLAN OF CARE
"Team Note Due:  Friday    Assessment/Intervention/Current Symtoms and Care Coordination:  Chart review and met with team, discussed pt progress, symptomology, and response to treatment.  Discussed the discharge plan and any potential impediments to discharge.    Received a second phone call from Mom asking if we are going to commit him.  Patient does not want to sign KATRINA until \"has a plan.\"  Mom called yesterday and writer explained that family does not commit a patient, this is decided by the Doctor.  Mom says he needs to be committed and cannot live in their home she wants him to live in a group home.  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      Writer observed patient socializing with peers and giving advice on tuning the guitar.  Will discuss discharge plans once patient is more stabilized.     Discharge Plan or Goal:  TBD     Barriers to Discharge:  Severity of symptoms  Parents unwilling to take him back.     Referral Status:  None     Legal Status:  72 hour hold   Start: 2/6/2025 @ 1650  Expiration: 2/10 @ 1650    Contacts (include KATRINA status):  None-does not want staff giving parents updates.      Upcoming Meetings and Dates/Important Information and next steps:  None     "

## 2025-02-08 NOTE — PLAN OF CARE
02/07/25 1940   Individualization/Patient Specific Goals   Patient Personal Strengths independent living skills;positive vocational history   Patient Vulnerabilities traumatic event;occupational insecurity;history of unsuccessful treatment;lacks insight into illness;family/relationship conflict   Anxieties, Fears or Concerns Patient fell out of his bed.  Patient is in conflict with his parents who he lives with.   Individualized Care Needs Medication managment, individual therapy, group therapy, support from unit staff.   Patient/Family-Specific Goals (Include Timeframe) Stabilize and disharge TBD   Interprofessional Rounds   Summary Patient was admitted to [facility] yesterday and this is our first team discussion. Patient is on a 72-hour hold.  Patient fell out his bed and was assessed by nursing staff.  He is sharing a room currently.  Disussion of transferring him to [facility] and taking one of their patients.  Mom calls saying she wants him to be committed.  Patient does not have a history of commitments.  Patient reported to believe he has been hacked.  Patient destroyed areas of home he lives in with parents.   Behavioral Team Discussion   Participants Dr. Kenneth Gibson MD. Mya Bell LPC.  Tish Benavides RN. PA Student, OT   Progress Patient is social with peers.   Anticipated length of stay 1 week   Continued Stay Criteria/Rationale Stabilization   Medical/Physical None reported   Precautions Falls   Plan Stabilize and discharge TBD   Rationale for change in precautions or plan No changes   Safety Plan Safety plan to be completed by unit psychotherapist.     PRECAUTIONS AND SAFETY    Behavioral Orders   Procedures    Code 1 - Restrict to Unit    Fall precautions    Routine Programming     As clinically indicated    Status 15     Every 15 minutes.       Safety  Safety WDL: WDL  Patient Location: [facility]  Observed Behavior: calm, sitting  Observed Behavior (Comment): Playing a  ivory  Safety Measures: environmental rounds completed  Diversional Activity: structured exercise, television  Suicidality: Status 15, Behavioral scrubs (pajamas), Minimal personal belongings in room, Minimal furniture in room, Free time and quiet time in lounge  Seizure precautions: clutter free environment  Assault: status 15, minimal furniture in room, minimal personal belongings in room  Elopement Interventions: status 15, behavioral scrubs (pajamas), room away from unit doors

## 2025-02-08 NOTE — PLAN OF CARE
"  Problem: Adult Behavioral Health Plan of Care  Goal: Plan of Care Review  Recent Flowsheet Documentation  Taken 2/8/2025 1100 by Tish Benavides RN  Patient Agreement with Plan of Care: agrees   Goal Outcome Evaluation:    Plan of Care Reviewed With: patient            Presentation: The patient is observed in the milieu and his room.  The patient takes frequent small naps for approx. 15-20 minutes.  The patient is calm and cooperative.  Has a flat and blunted affect.  Speech is clear and coherent.  Thoughts are disorganized and the patient has difficulty concentrating and has memory problems.  The patient is given his am medications. An hour later he will ask for his nicotine patch and gum.  The patient is shown his nicotine patient and reassured he received his gum.  The patient patient is frequently observed in the milieu sleeping in the chair.  The patient is encouraged to go to his room and sleep.  The patient has a medical bed to prevent falls.  The patient is educated on how to lift the bed railing for safety. The patient verbalized understanding of the information.  However, the patient sleeps in the other bed.  Which is platform.  This is concerning because the patient has a history of falling off the platform bed.  The patient did not fall this shift.         Mental health symptoms: The patient denies SI, SIB, HI, hallucinations, depression and anxiety.       Medication compliance: The patient is compliant with medications.          Medical Concerns: The patient has a dx of chronic lyme disease.  Reported high pain 8/10.  \"This is the worst that it has ever been,\" said the patient.  The patient requested to be seen by the Internal Medicine.  A IM consult is placed. New orders are received. Please see the EMAR for details.  The patient has an order for Xray of his spine.       PRN's: Tylenol and hydroxyzine      Precautions: fall    Continue with plan of care            "

## 2025-02-09 PROCEDURE — 250N000013 HC RX MED GY IP 250 OP 250 PS 637: Performed by: PSYCHIATRY & NEUROLOGY

## 2025-02-09 PROCEDURE — 124N000002 HC R&B MH UMMC

## 2025-02-09 PROCEDURE — 250N000013 HC RX MED GY IP 250 OP 250 PS 637

## 2025-02-09 PROCEDURE — 999N000157 HC STATISTIC RCP TIME EA 10 MIN

## 2025-02-09 PROCEDURE — 272N000482 HC MASK, CPAP TOTAL HEADGEAR, LATEX FREE

## 2025-02-09 PROCEDURE — 99207 PR NO BILLABLE SERVICE THIS VISIT: CPT

## 2025-02-09 PROCEDURE — 272N000064 HC CIRCUIT HUMIDITY W/CPAP BIPAP

## 2025-02-09 PROCEDURE — 250N000013 HC RX MED GY IP 250 OP 250 PS 637: Performed by: EMERGENCY MEDICINE

## 2025-02-09 RX ORDER — CLONIDINE HYDROCHLORIDE 0.1 MG/1
0.1 TABLET ORAL DAILY PRN
Status: DISPENSED | OUTPATIENT
Start: 2025-02-09

## 2025-02-09 RX ORDER — CARBOXYMETHYLCELLULOSE SODIUM 5 MG/ML
1 SOLUTION/ DROPS OPHTHALMIC
Status: ACTIVE | OUTPATIENT
Start: 2025-02-09

## 2025-02-09 RX ADMIN — Medication 3 MG: at 21:07

## 2025-02-09 RX ADMIN — NICOTINE POLACRILEX 4 MG: 4 GUM, CHEWING BUCCAL at 18:08

## 2025-02-09 RX ADMIN — CETIRIZINE HYDROCHLORIDE 10 MG: 10 TABLET, FILM COATED ORAL at 08:34

## 2025-02-09 RX ADMIN — METFORMIN ER 500 MG 1000 MG: 500 TABLET ORAL at 18:05

## 2025-02-09 RX ADMIN — ACETAMINOPHEN 650 MG: 325 TABLET, FILM COATED ORAL at 19:05

## 2025-02-09 RX ADMIN — CLONIDINE HYDROCHLORIDE 0.1 MG: 0.1 TABLET ORAL at 20:05

## 2025-02-09 RX ADMIN — TRAZODONE HYDROCHLORIDE 50 MG: 50 TABLET ORAL at 22:32

## 2025-02-09 RX ADMIN — NAPROXEN 500 MG: 500 TABLET ORAL at 18:05

## 2025-02-09 RX ADMIN — CARIPRAZINE 1.5 MG: 1.5 CAPSULE, GELATIN COATED ORAL at 08:34

## 2025-02-09 RX ADMIN — CLONAZEPAM 2 MG: 1 TABLET ORAL at 20:04

## 2025-02-09 RX ADMIN — NICOTINE POLACRILEX 4 MG: 4 GUM, CHEWING BUCCAL at 22:33

## 2025-02-09 RX ADMIN — CLONAZEPAM 2 MG: 1 TABLET ORAL at 14:38

## 2025-02-09 RX ADMIN — HYDROXYZINE HYDROCHLORIDE 25 MG: 25 TABLET ORAL at 16:19

## 2025-02-09 RX ADMIN — NICOTINE POLACRILEX 4 MG: 4 GUM, CHEWING BUCCAL at 21:07

## 2025-02-09 RX ADMIN — GABAPENTIN 400 MG: 300 CAPSULE ORAL at 08:33

## 2025-02-09 RX ADMIN — NICOTINE POLACRILEX 4 MG: 4 GUM, CHEWING BUCCAL at 14:38

## 2025-02-09 RX ADMIN — GABAPENTIN 400 MG: 300 CAPSULE ORAL at 20:04

## 2025-02-09 RX ADMIN — NICOTINE POLACRILEX 4 MG: 4 GUM, CHEWING BUCCAL at 12:44

## 2025-02-09 RX ADMIN — NICOTINE POLACRILEX 4 MG: 4 GUM, CHEWING BUCCAL at 08:34

## 2025-02-09 RX ADMIN — HYDROXYZINE HYDROCHLORIDE 25 MG: 25 TABLET ORAL at 22:32

## 2025-02-09 RX ADMIN — HYDROXYZINE HYDROCHLORIDE 25 MG: 25 TABLET ORAL at 02:32

## 2025-02-09 RX ADMIN — NICOTINE POLACRILEX 4 MG: 4 GUM, CHEWING BUCCAL at 19:05

## 2025-02-09 RX ADMIN — NICOTINE POLACRILEX 4 MG: 4 GUM, CHEWING BUCCAL at 20:08

## 2025-02-09 RX ADMIN — CLONAZEPAM 2 MG: 1 TABLET ORAL at 08:34

## 2025-02-09 RX ADMIN — OLANZAPINE 30 MG: 15 TABLET, FILM COATED ORAL at 21:06

## 2025-02-09 RX ADMIN — ACETAMINOPHEN 650 MG: 325 TABLET, FILM COATED ORAL at 12:45

## 2025-02-09 RX ADMIN — NICOTINE POLACRILEX 4 MG: 4 GUM, CHEWING BUCCAL at 16:22

## 2025-02-09 RX ADMIN — TESTOSTERONE 60.75 MG: 20.25 GEL TOPICAL at 08:33

## 2025-02-09 RX ADMIN — NAPROXEN 500 MG: 500 TABLET ORAL at 08:34

## 2025-02-09 RX ADMIN — PANTOPRAZOLE SODIUM 40 MG: 40 TABLET, DELAYED RELEASE ORAL at 08:34

## 2025-02-09 RX ADMIN — NICOTINE 1 PATCH: 21 PATCH, EXTENDED RELEASE TRANSDERMAL at 08:33

## 2025-02-09 RX ADMIN — GABAPENTIN 400 MG: 300 CAPSULE ORAL at 14:38

## 2025-02-09 ASSESSMENT — ACTIVITIES OF DAILY LIVING (ADL)
ADLS_ACUITY_SCORE: 45
LAUNDRY: WITH SUPERVISION
ADLS_ACUITY_SCORE: 45
DRESS: INDEPENDENT
ADLS_ACUITY_SCORE: 45
ORAL_HYGIENE: INDEPENDENT
HYGIENE/GROOMING: INDEPENDENT
ADLS_ACUITY_SCORE: 45
HYGIENE/GROOMING: INDEPENDENT
ADLS_ACUITY_SCORE: 45
ORAL_HYGIENE: INDEPENDENT
DRESS: INDEPENDENT
ADLS_ACUITY_SCORE: 45
LAUNDRY: WITH SUPERVISION
ADLS_ACUITY_SCORE: 45

## 2025-02-09 NOTE — PROGRESS NOTES
Brief Medicine Note    /78   Pulse 90   Temp 97.4  F (36.3  C) (Oral)   Resp 18   Wt (!) 147.5 kg (325 lb 3.2 oz)   SpO2 97%   BMI 38.56 kg/m      Following up on C spine XR which demonstrated multilevel bilateral facet hypertrophy with no acute osseous abnormality.  Discussed with NSGY about the reports of c-spine XR, they recommended CT c-spine and MRI c-spine but reports these can be done outpatient and ordered by patients PCP, if there is an abnormality the PCP can then place NSGY referral. They report this is unlikely to be contributing to neuropathy. Please ensure patient has PCP follow-up at discharge and that PCP is aware of this. Discussed with bedside RN who had no current concerns regarding patients pain. Please notify medicine if any worsening pain or patient develops any weakness or other new symptoms. Medicine will sign off, thank you for allowing us to be involved in this patients care. Thank your for allowing us to be involved in this patients care, please do not hesitate to reach out should other new questions or concerns arise.      Dina Babb PA-C

## 2025-02-09 NOTE — CARE PLAN
Rehab Group    Start time: 1705  End time: 1755  Patient time total: 35 minutes    attended partial group    #9 attended   Group Type: occupational therapy   Group Topic Covered: balanced lifestyle, cognitive activities, coping skills, healthy leisure time, relaxation , self-esteem, and social skills       Group Session Detail:   Art Sampler with choices of How to Draw Activity, [employer], [place], Word Finds, or Free Art for concentration, creative expression, attention to detail, scanning, relaxation, follow through, coping, mood stabilization, reality-based activity, healthy leisure exploration, building self-esteem, and socialization.       Patient Response/Contribution:  socially appropriate and worked intermittently       Patient Detail:  Pt was pleasant upon presentation and chose a one step project to work on.  He worked neatly, though without much detail.  Pt left group stating he would be right back, but did not return until a good 20 minutes later.  He was social with peers superficially and left his project in the room when he left to go to dinner as group was ending.          [id number] OT Group (2 or more in attendance)      Patient Active Problem List   Diagnosis    Benzodiazepine withdrawal, uncomplicated (H)    Alcohol intoxication, uncomplicated    Chemical dependency (H)    Atypical pneumonia    Acute lung injury associated with vaping    Schizoaffective disorder, bipolar type (H)    Delusions (H)    Paranoia (H)

## 2025-02-09 NOTE — PLAN OF CARE
Problem: Adult Behavioral Health Plan of Care  Goal: Plan of Care Review  Outcome: Progressing     Problem: Sleep Disturbance  Goal: Adequate Sleep/Rest  Outcome: Progressing   Goal Outcome Evaluation:        Pt in bed sleeping upon arrival. He was up at 02:35 c/o anxiety. Hydroxyzine administered and pt returned to bed. He seems to go back to sleep very easily. He complained of dry mouth. Maybe he is a mouth breather because he snores. He gets up quite often to get ice. He could benefit from Biotene for dry mouth. He slept for 5.5 hours. He is using a hospital bed for safety. No other concerns noted or verbalized. Will continue with same plan of care.

## 2025-02-09 NOTE — PLAN OF CARE
"  Problem: Adult Behavioral Health Plan of Care  Goal: Plan of Care Review  Recent Flowsheet Documentation  Taken 2/9/2025 1000 by Tish Benavides RN  Patient Agreement with Plan of Care: agrees   Goal Outcome Evaluation:    Plan of Care Reviewed With: patient               Presentation: The patient is observed in the lounge for a majority of the shift.  The patient has a flat and blunted affect.  Thoughts are disorganized.  Mood is anxious and the patient reports frustration. When the patient is alert.  His speech is clear and coherent.  When the patient is overly tired.  He rambles when speaking.      The patient frequently sleeps for only 15 minutes at a time.  The patient is observed sleeping in the chair in the lounge.  Sleeping when walking down the [address].  He has fallen out of his bed twice this admission.  The patient has a history of sleep apnea.  The patient has fallen out of the bed twice this admission.  The patient reports he rolls in his sleep and has a deniz size bed at home.      He has a CPAP machine at home. He refuses to use it.  The patient stated, \"It makes me feel like I am drowning.\"  A recliner is brought to the unit.  The patient sits in the recliner.  He does not recline the chair, continues to sleep sitting up and is observed leaning forward or to the side of the chair.  Staff must continually wake the patient up to prevent the patient from falling.  The provider is updated on the patient status.  A new order is received for respiratory consult to bring a facility CPAP machine and help the patient apply a mask that he can fit.      The patient was moved into a room that can accommodate a medical bed.  The patient reports the medical bed is uncomfortable and does not want to sleep in it. This writer frequently ask the patient to take a nap in his bed.  The patient agrees and with this writer walks to his room.  The patient will get in the bed.  Within 15 mins, the patient is " [facility].      Mental health symptoms: The patient denies SI, SIB, HI, hallucinations and depression.  The patient endorse anxiety.  Per ED report, the patient made      Medication compliance: The patient is compliant with all of his medication.       Medical Concerns: The patient c/o pain. He reports the pain is d/t to lyme disease.  He stated he needed antibiotics not psych meds.      The patient has frequently c/o about his menu tray.  He reports that he has received the wrong items, stated he was a vegetarian and only eats hamburger and chicken.  The patient frequently request to have a different entree.  Staff reports after he eats his initial meal,  and asks for a different entree to get more food.  The patient should be assessed for double portions and a nutrition consult.      PRN's: Tylenol       Precautions: fall     Continue with plan of care

## 2025-02-09 NOTE — PLAN OF CARE
Problem: Anxiety  Goal: Anxiety Reduction or Resolution  Outcome: Progressing     Problem: Pain Acute  Goal: Optimal Pain Control and Function  Outcome: Progressing    Goal Outcome Evaluation:    Plan of Care Reviewed With: patient      Pt was resting in early evening. During check in, pt requested any medication available. Indicated he needed hydroxyzine for anxiety and tylenol for lower-mid back pain of 6.5/10. Informed pt of order to do XR and he reported not feeling safe enough to leave unit. Discussed reason for XR and pt did agree. Cooperative with X-Ray. Administered metformin and naproxen. Pain reduced 5.5/10. Pt often discusses his medications and possible changes, such as explaining he needs antibiotics for Lyme symptoms and feels he needs to be off olanzapine. Expressed concern of weight gain with olanzapine. Encouraged conversation with provider. Pt again asked for additional bedtime medication wondering what he had available.        Pt did agree to sleep in bed that was against wall which had padding on floor. Of note, pt snores loudly and sputters, gasps in sleep.     BP (!) 159/73   Pulse 100   Temp 98.2  F (36.8  C) (Oral)   Resp 16   SpO2 95%     BP (!) 152/102   Pulse 105  took clonidine after this BP check.

## 2025-02-10 PROCEDURE — 250N000013 HC RX MED GY IP 250 OP 250 PS 637: Performed by: EMERGENCY MEDICINE

## 2025-02-10 PROCEDURE — 250N000013 HC RX MED GY IP 250 OP 250 PS 637: Performed by: PSYCHIATRY & NEUROLOGY

## 2025-02-10 PROCEDURE — 99233 SBSQ HOSP IP/OBS HIGH 50: CPT | Performed by: PSYCHIATRY & NEUROLOGY

## 2025-02-10 PROCEDURE — 99207 PR NO BILLABLE SERVICE THIS VISIT: CPT | Performed by: PHYSICIAN ASSISTANT

## 2025-02-10 PROCEDURE — 97150 GROUP THERAPEUTIC PROCEDURES: CPT | Mod: GO

## 2025-02-10 PROCEDURE — 90853 GROUP PSYCHOTHERAPY: CPT | Performed by: COUNSELOR

## 2025-02-10 PROCEDURE — 250N000013 HC RX MED GY IP 250 OP 250 PS 637

## 2025-02-10 PROCEDURE — 124N000002 HC R&B MH UMMC

## 2025-02-10 RX ORDER — ATOMOXETINE 10 MG/1
10 CAPSULE ORAL DAILY
Status: DISCONTINUED | OUTPATIENT
Start: 2025-02-10 | End: 2025-02-19

## 2025-02-10 RX ORDER — CLONAZEPAM 0.5 MG/1
1.5 TABLET ORAL 3 TIMES DAILY
Status: DISPENSED | OUTPATIENT
Start: 2025-02-10

## 2025-02-10 RX ADMIN — GABAPENTIN 400 MG: 300 CAPSULE ORAL at 08:27

## 2025-02-10 RX ADMIN — HYDROXYZINE HYDROCHLORIDE 25 MG: 25 TABLET ORAL at 21:30

## 2025-02-10 RX ADMIN — ACETAMINOPHEN 650 MG: 325 TABLET, FILM COATED ORAL at 16:20

## 2025-02-10 RX ADMIN — ACETAMINOPHEN 650 MG: 325 TABLET, FILM COATED ORAL at 11:17

## 2025-02-10 RX ADMIN — NAPROXEN 500 MG: 500 TABLET ORAL at 08:26

## 2025-02-10 RX ADMIN — ATOMOXETINE 10 MG: 10 CAPSULE ORAL at 12:40

## 2025-02-10 RX ADMIN — Medication 3 MG: at 21:31

## 2025-02-10 RX ADMIN — CLONIDINE HYDROCHLORIDE 0.1 MG: 0.1 TABLET ORAL at 21:30

## 2025-02-10 RX ADMIN — NICOTINE POLACRILEX 4 MG: 4 GUM, CHEWING BUCCAL at 20:02

## 2025-02-10 RX ADMIN — NICOTINE POLACRILEX 4 MG: 4 GUM, CHEWING BUCCAL at 16:20

## 2025-02-10 RX ADMIN — CLONAZEPAM 1.5 MG: 0.5 TABLET ORAL at 20:00

## 2025-02-10 RX ADMIN — NICOTINE POLACRILEX 4 MG: 4 GUM, CHEWING BUCCAL at 14:07

## 2025-02-10 RX ADMIN — CLONAZEPAM 2 MG: 1 TABLET ORAL at 08:26

## 2025-02-10 RX ADMIN — NICOTINE POLACRILEX 4 MG: 4 GUM, CHEWING BUCCAL at 08:34

## 2025-02-10 RX ADMIN — NICOTINE 1 PATCH: 21 PATCH, EXTENDED RELEASE TRANSDERMAL at 08:30

## 2025-02-10 RX ADMIN — OLANZAPINE 30 MG: 15 TABLET, FILM COATED ORAL at 21:31

## 2025-02-10 RX ADMIN — GABAPENTIN 400 MG: 300 CAPSULE ORAL at 20:00

## 2025-02-10 RX ADMIN — CARIPRAZINE 1.5 MG: 1.5 CAPSULE, GELATIN COATED ORAL at 08:26

## 2025-02-10 RX ADMIN — NICOTINE POLACRILEX 4 MG: 4 GUM, CHEWING BUCCAL at 18:11

## 2025-02-10 RX ADMIN — TRAZODONE HYDROCHLORIDE 50 MG: 50 TABLET ORAL at 21:31

## 2025-02-10 RX ADMIN — CLONAZEPAM 1.5 MG: 0.5 TABLET ORAL at 14:06

## 2025-02-10 RX ADMIN — NAPROXEN 500 MG: 500 TABLET ORAL at 17:52

## 2025-02-10 RX ADMIN — HYDROXYZINE HYDROCHLORIDE 25 MG: 25 TABLET ORAL at 16:20

## 2025-02-10 RX ADMIN — GABAPENTIN 400 MG: 300 CAPSULE ORAL at 14:06

## 2025-02-10 RX ADMIN — NICOTINE POLACRILEX 4 MG: 4 GUM, CHEWING BUCCAL at 21:31

## 2025-02-10 RX ADMIN — NICOTINE POLACRILEX 4 MG: 4 GUM, CHEWING BUCCAL at 11:18

## 2025-02-10 RX ADMIN — ACETAMINOPHEN 650 MG: 325 TABLET, FILM COATED ORAL at 23:10

## 2025-02-10 RX ADMIN — CETIRIZINE HYDROCHLORIDE 10 MG: 10 TABLET, FILM COATED ORAL at 08:27

## 2025-02-10 RX ADMIN — TESTOSTERONE 60.75 MG: 20.25 GEL TOPICAL at 08:33

## 2025-02-10 RX ADMIN — HYDROXYZINE HYDROCHLORIDE 25 MG: 25 TABLET ORAL at 11:17

## 2025-02-10 RX ADMIN — METFORMIN ER 500 MG 1000 MG: 500 TABLET ORAL at 17:52

## 2025-02-10 RX ADMIN — PANTOPRAZOLE SODIUM 40 MG: 40 TABLET, DELAYED RELEASE ORAL at 08:27

## 2025-02-10 RX ADMIN — NICOTINE POLACRILEX 4 MG: 4 GUM, CHEWING BUCCAL at 12:44

## 2025-02-10 ASSESSMENT — ACTIVITIES OF DAILY LIVING (ADL)
ADLS_ACUITY_SCORE: 45

## 2025-02-10 NOTE — PLAN OF CARE
Goal Outcome Evaluation:    Initial meeting note:    Therapist introduced self to patient and discussed psychotherapy service available to patient.     Pt response: Pt expressed interest in meeting with therapist    Plan: Made plan to meet with pt again; began identifying goals     Pt would like 1:1 therapy this week.

## 2025-02-10 NOTE — PLAN OF CARE
"  Rehab Group    Start time: 13:15  End time: 14:00  Patient time total: 40 minutes    attended partial group    # 9 attended    Group Type: occupational therapy   Group Topic Covered: cognitive activities, healthy leisure time, self-care, and social skills       Group Session Detail:  Patient engaged in an interactive game of [employer] to promote and develop social skills, self-care skills, healthy leisure exploration, and cognitive activity. Group participants were instructed accurately recall information to answer game questions pertaining to global, science, history, pop culture, and self-care.      Patient Response/Contribution:  cooperative with task, listened actively, attentive, and actively engaged       Patient Detail:  Patient Response: Patient arrived to this group on time and was an active participant in the group activity. During check in, patient shared that he is familiar with the concepts of colleen and identified that \"journaling questions\" for the unit providers is a way that he has cared for himself recently. Patient primarily chose to answer game questions that were not related to self-care. Patient independently sequenced turn taking throughout the group game and socialized appropriately with peers and staff throughout. However, patient benefited from gentle redirection at times as he became somewhat tangential when answering game prompts. Patient also became someone frustrated with group members on more than one occasion when they were continuing conversation before his turn as he stated \"hey guys, can I answer the question?\" on one occasion. Despite this, patient remained calm during interactions. Patient left group on one occasion, however, later returned to the group activity. Patient identified \"music\" when asked which self-care skill he would implement. Affect appeared congruent with the activity. Patient remained calm and pleasant throughout the group.       [id number] OT Group (2 or more in " attendance)      Patient Active Problem List   Diagnosis    Benzodiazepine withdrawal, uncomplicated (H)    Alcohol intoxication, uncomplicated    Chemical dependency (H)    Atypical pneumonia    Acute lung injury associated with vaping    Schizoaffective disorder, bipolar type (H)    Delusions (H)    Paranoia (H)

## 2025-02-10 NOTE — PLAN OF CARE
"  Problem: Psychotic Signs/Symptoms  Goal: Improved Mood Symptoms (Psychotic Signs/Symptoms)  Outcome: Progressing    Told writer that he still felt that he has been hacked. He did say \"I believe 80% and 20% is probably paranoia\". He said he knows paranoia is a part of his diagnosis. He feels he is getting better with this. Very talkative with writer with several requests. Friendly and polite at all times. Falling sound asleep in a chair in the lounge after breakfast. Sleeping through community meeting. He did wake up more by late morning, and stayed awake the rest of the shift. He participated in groups. He said he got a better nights sleep last night. Very friendly and involved in others conversation. No irritability or anger shown. Took Hydroxyzine, 25 mg, prn at 1115 for high anxiety. This did help him calm.  "

## 2025-02-10 NOTE — CONSULTS
"Brief Hospital Medicine Note:     Medicine team following up on consult placed for \"patient request to be on Benacar\". Nursing notes, vitals, and labs reviewed.     Appears this was also addressed with patient by Medicine team when consulted on 02/08. Per review of vitals, patient is currently normotensive. Patient does not have Benicar listed as a PTA med nor is this found as a previous medication upon a search of his chart. Regardless, as patient is currently normotensive, there is no indication to start further anti-hypertensive at this time. Continue with clonidine 0.1mg daily.     Medicine will sign off at this time. Thank you for the consult. If further questions arise, please reach out to Medicine Team.    Marge Bowden PA-C  Hospitalist Service  Contact information available via [facility] Paging/Directory    "

## 2025-02-10 NOTE — PLAN OF CARE

## 2025-02-10 NOTE — PROGRESS NOTES
"[facility], [place]   Psychiatric Progress Note        Interim History:     The patient's care was discussed with the treatment team during the daily team meeting and/or staff's chart notes were reviewed.  Staff report patient slept for about 7 hours last night. He reported having a good weekend. Said that he was compliant with meds, denied med side effects. Per staff report patient was rather talkative and admitted to still having paranoia about being hacked, see RN's note below:    \"Told writer that he still felt that he has been hacked. He did say \"I believe 80% and 20% is probably paranoia\". He said he knows paranoia is a part of his diagnosis. He feels he is getting better with this. Very talkative with writer with several requests. Friendly and polite at all times. Falling sound asleep in a chair in the lounge after breakfast. Sleeping through community meeting. He did wake up more by late morning, and stayed awake the rest of the shift. He participated in groups. He said he got a better nights sleep last night. Very friendly and involved in others conversation. No irritability or anger shown. Took Hydroxyzine, 25 mg, prn at 1115 for high anxiety. This did help him calm. \"    Met with patient: Jonny was sitting in a chair and soundly asleep. Was snoring loudly. It took significant amount of time to wake him up and get him standing as he tended to close his eyes and fall asleep. He, eventually, woke up and talked to myself and 2 PA students in the conference room. He brought with himself a notebook and read through it while talking to us. Was pretty talkative and needed redirections because he tended to talk, even, after he exhausted all his questions and was not receptive to our words that we needed to see other patients. Requested to be put on a med for ADHD, he initially, requested Ritalin. When I told him that it was controlled substance and not recommended for patients with " "psychosis, he agreed with low dose of Strattera. He asked for double portions, agreed with recommendations to discuss his request with dietary. He agreed to stay at this hospital as a voluntary patient. Asked to be put on BP med Benicar, agreed to talk to IM, see note below:     \"Appears this was also addressed with patient by Medicine team when consulted on 02/08. Per review of vitals, patient is currently normotensive. Patient does not have Benicar listed as a PTA med nor is this found as a previous medication upon a search of his chart. Regardless, as patient is currently normotensive, there is no indication to start further anti-hypertensive at this time. Continue with clonidine 0.1mg daily.\"         Medications:     Current Facility-Administered Medications   Medication Dose Route Frequency Provider Last Rate Last Admin    atomoxetine (STRATTERA) capsule 10 mg  10 mg Oral Daily Kenneth Gibson MD   10 mg at 02/10/25 1240    cariprazine (VRAYLAR) capsule 1.5 mg  1.5 mg Oral Daily Kenneth Gibson MD   1.5 mg at 02/10/25 0826    cetirizine (zyrTEC) tablet 10 mg  10 mg Oral Daily Kenneth Gibson MD   10 mg at 02/10/25 0827    clonazePAM (klonoPIN) tablet 1.5 mg  1.5 mg Oral TID Kenneth Gibson MD   1.5 mg at 02/10/25 1406    cloNIDine (CATAPRES) tablet 0.1 mg  0.1 mg Oral At Bedtime Kenneth Gibson MD   0.1 mg at 02/09/25 2005    gabapentin (NEURONTIN) capsule 400 mg  400 mg Oral TID Ciara Lawson PA-C   400 mg at 02/10/25 1406    metFORMIN (GLUCOPHAGE XR) 24 hr tablet 1,000 mg  1,000 mg Oral Daily with supper Kenneth Gibson MD   1,000 mg at 02/09/25 1805    naproxen (NAPROSYN) tablet 500 mg  500 mg Oral BID w/meals Kenneth Gibson MD   500 mg at 02/10/25 0826    nicotine (NICODERM CQ) 21 MG/24HR 24 hr patch 1 patch  1 patch Transdermal Daily Kenneth Gibson MD   1 patch at 02/10/25 0830    OLANZapine (zyPREXA) tablet 30 mg  30 mg Oral At Bedtime Manda, " "MD Andriy   30 mg at 02/09/25 2106    pantoprazole (PROTONIX) EC tablet 40 mg  40 mg Oral Daily Andriy Holman MD   40 mg at 02/10/25 0827    testosterone (ANDROGEL) topical gel 60.75 mg  60.75 mg Transdermal Kenneth Rao MD   60.75 mg at 02/10/25 0833          Allergies:     Allergies   Allergen Reactions    Abilify [Aripiprazole]      Patient reports tardive dyskinesia effect in 2004 but likely akathisia since patient reports the effects only occurred while on med and resolved with a few days after discontinuing med.     Wellbutrin [Bupropion] Anxiety     Patient reports felt \"reved\" up and anxious when taken in 2001.           Labs:   No results found for this or any previous visit (from the past 24 hours).       Psychiatric Examination:     /85   Pulse 79   Temp 97.9  F (36.6  C) (Temporal)   Resp 18   Wt (!) 147.5 kg (325 lb 3.2 oz)   SpO2 97%   BMI 38.56 kg/m    Weight is 325 lbs 3.2 oz  Body mass index is 38.56 kg/m .    Orthostatic Vitals         Most Recent      Standing Orthostatic /97 02/10 0750    Standing Orthostatic Pulse (bpm) 92 02/10 0750           Appearance: awake, alert, dressed in hospital scrubs, and moderately obese  Attitude: mostly cooperative  Eye Contact:  fair  Mood:  better  Affect:  intensity is exaggerated  Speech:  clear, coherent, rapid, loud, not pressured  Psychomotor Behavior:  no evidence of tardive dyskinesia, dystonia, or tics  Throught Process:  circumstantial  Associations:  loosening of associations present  Thought Content:  no evidence of suicidal ideation or homicidal ideation, paranoid delusions are present;  Insight:  partial  Judgement:  limited  Oriented to:  time, person, and place  Attention Span and Concentration:  limited  Recent and Remote Memory:  fair    Clinical Global Impressions  First: 6/4 2/7/2025      Most recent:            Precautions:     Behavioral Orders   Procedures    Code 1 - Restrict to Unit    Code 2     For " X-ray only.    Fall precautions    Routine Programming     As clinically indicated    Status 15     Every 15 minutes.          DIagnoses:     Schizoaffective Disorder, bipolar disorder vs paranoid schizophrenia.  Polysubstance abuse is likely.    Class 1 obesity due to excess calories without serious comorbidity with body mass index (BMI) of 34.0 to 34.9 in adult            Plan:    Vraylar will be started on 2/7. Patient refuses to increase dose, See discussion above. Will start on Strattera. No other med changes 2/10/2025. Will allow to use guitar. Will continue to provide support and structure.     Total time spent was 52 minutes. Over 50% of times was spent counseling and coordination of care regarding coping skills, medication and discharge planning.

## 2025-02-10 NOTE — PLAN OF CARE
Rehab Group    Start time: 11:15  End time: 11:55  Patient time total: 20 minutes    attended partial group    #7 attended   Group Type: occupational therapy   Group Topic Covered: cognitive activities, healthy leisure time, and social skills       Group Session Detail:  Patient engaged in a cognitive-based leisure activity ([place]) with peers. Therapeutic benefits and skills addressed during today's leisure activity include: problem solving, exercise of visuospatial skills, promoting attention/focus, improving social skills, and exploring healthy leisure opportunities.       Patient Response/Contribution:  cooperative with task, socially appropriate, actively engaged, and required repetition of directions       Patient Detail:  Patient arrived to this group somewhat late. Upon arrival to group, patient expressed unfamiliarity with game concepts, however, appeared to have a fair understanding of game objectives after presentation of instructions. Patient required a repetition of instructions on more than one occasion. Patient also required moderate assistance to accurately match game tiles throughout the activity, though this did improve somewhat as the game progressed. Patient independently sequenced turn taking throughout the activity. Patient accurately tracked scoring with no additional assistance from OT and peers. Patient socialized appropriately with peers and staff throughout the game.  Patient demonstrated overall fair attention to the task. Patient left group for roughly ten minutes on one occasion, however, later returned to the group activity. Affect appeared congruent with the activity and brightened during interactions. Patient remained calm and cooperative throughout this group.       [id number] OT Group (2 or more in attendance)      Patient Active Problem List   Diagnosis    Benzodiazepine withdrawal, uncomplicated (H)    Alcohol intoxication, uncomplicated    Chemical dependency (H)    Atypical  pneumonia    Acute lung injury associated with vaping    Schizoaffective disorder, bipolar type (H)    Delusions (H)    Paranoia (H)

## 2025-02-10 NOTE — PLAN OF CARE
"Team Note Due:  Friday    Assessment/Intervention/Current Symtoms and Care Coordination:  Chart review and met with team, discussed pt progress, symptomology, and response to treatment.  Discussed the discharge plan and any potential impediments to discharge.    In team it was reported that Jonny has been falling asleep in chairs.  He reported to RN that \"the hackers\" are 80% real and 20%.      Received a call from Mom today asking when Jonny would be committed.  Writer shared that she cannot give any updates as there is no KATRINA.  Jonny requested to meet with writer and appeared disorganized.  Jonny said he could not go to a group home and needed to live in his own apartment.  Writer explained the difference between a group home and IRTS.  Jonny said he had lived in an IRTS before but someone was using drugs there and he went straight to his parents home.  Discussed how parents do not want him to return.      Jonny said he has a studio apartment and wants to discharge there.  Intermittently throughout our conversation he would talk about being hacked and how this was his largest concern.  Writer encouraged Jonny to sign an KATRINA from someone who knows him well.  Jonny said he would consider signing KATRINA for wife.  Writer discussed IRTS options with him and presented it as a safe discharge plan.  Will discuss options in team tomorrow.      Discharge Plan or Goal:  IRTS     Barriers to Discharge:  Severity of symptoms  Parents unwilling to take him back.     Referral Status:  None     Legal Status:  72 hour hold   Start: 2/6/2025 @ 1650  Expiration: 2/10 @ 1650    Contacts (include KATRINA status):  None-does not want staff giving parents updates.      Upcoming Meetings and Dates/Important Information and next steps:  None     "

## 2025-02-10 NOTE — PLAN OF CARE
Problem: Anxiety  Goal: Anxiety Reduction or Resolution  Outcome: Progressing  Intervention: Promote Anxiety Reduction  Recent Flowsheet Documentation  Taken 2/10/2025 1644 by Marita Howell RN  Supportive Measures: active listening utilized   Goal Outcome Evaluation:    Plan of Care Reviewed With: patient      Pt out in the milieu, interacting with peers and watching TV. Took short Naps in his room and in the lounge. Requested and received PRN Tylenol 650 mg X 2 for generalized pain with partial relief.PRN Hydroxyzine 25 mg X 2 for anxiety. Denies depression, SI/AH/AVH. Nicotine gum X 4.   PRN Melatonin 3 mg and Trazodone 50 mg with HS meds.  Medication compliant. Ate all his dinner. No safety concerns at this time. Pt states he is having a lot of breathing problems with his CPAP on. He is choosing not to use his CPAP at this time until further notice.   BP (!) 144/85 (BP Location: Left arm, Patient Position: Sitting, Cuff Size: Adult Large)   Pulse 79   Temp 97.9  F (36.6  C) (Temporal)   Resp 18   Wt (!) 147.5 kg (325 lb 3.2 oz)   SpO2 97%   BMI 38.56 kg/m

## 2025-02-10 NOTE — PLAN OF CARE
"Problem: Sleep Disturbance  Goal: Adequate Sleep/Rest  Outcome: Progressing     Goal Outcome Evaluation:    Plan of Care Reviewed With: patient      Pt had elevated BP, on-call psychiatrist notified and ordered PRN clonidine. Pt expressed concern about being fatigued from this during day, encouraged pt to take bedtime dose at 2030. Respiratory brought C-PAP device and fit mask on pt. Pt stated, \"I can't stand it\" and removed mask x1. Settings were adjusted and pt seemed more comfortable. Of note, was falling asleep while being fitted. Pt agreed to try this evening. May require further outpatient follow up if agreeable.     Pt requested review of available meds without explaining symptoms, endorsed pain in ankles, neck, low back of 6/10 with encouragement.  Requested klonopin which he had earlier. Administered 25 mg hydroxyzine and scheduled naproxen. [facility]. Grandiose, hyperverbal speech when reflecting on possible medication options. Encouraged pt to write list of concerns for provider. Administered melatonin with bedtime medications.     Pt has contacts in eyes. Endorses a history of conjunctivitis. Encouraged pt to store lens overnight but he said he is blind without them. At 2230, pt tried CPAP for 5-10 minutes. Stated he could not tolerate it. Mask was difficult to remove quickly and pt was hyperventilating. Reports he was struggling to exhale. Pt thought he might try a nasal mask but has congestion.      BP (!) 152/99   Pulse 105   Temp 98.6  F (37  C) (Temporal)   Resp 18   Wt (!) 147.5 kg (325 lb 3.2 oz)   SpO2 98%   BMI 38.56 kg/m      "

## 2025-02-10 NOTE — PLAN OF CARE
Goal Outcome Evaluation:      Group Attendance:  attended full group    Time session began: 2:20 pm  Time session ended: 3:00 pm  Patient's total time in group: 40    Total # Attendees   8   Group Type psychotherapeutic     Group Topic Covered mindfulness     Group Session Detail Process/ Mindfulness part 1     Patient's response to the group topic/interactions:  cooperative with task, listened actively, attentive, and actively engaged     Patient Details: Mood neutral. He was in and out of group, restless, needed some redirection and difficult to redirect about substance use in IRTS and why he hneeds his THC and Cannibas for lymes disease. Writer talked about talking about this in 1:1 therapy but coul dbe triggering for others , and he pushed the point a few times.    Also he accidentally drooled when he spoke and a lot of the group was laughing at him and told him why they were laughing. He handled it very well.             [id number] - Group psychotherapy - 1 Session  Patient Active Problem List   Diagnosis    Benzodiazepine withdrawal, uncomplicated (H)    Alcohol intoxication, uncomplicated    Chemical dependency (H)    Atypical pneumonia    Acute lung injury associated with vaping    Schizoaffective disorder, bipolar type (H)    Delusions (H)    Paranoia (H)

## 2025-02-11 PROCEDURE — 250N000013 HC RX MED GY IP 250 OP 250 PS 637: Performed by: EMERGENCY MEDICINE

## 2025-02-11 PROCEDURE — 99233 SBSQ HOSP IP/OBS HIGH 50: CPT | Performed by: PSYCHIATRY & NEUROLOGY

## 2025-02-11 PROCEDURE — 250N000013 HC RX MED GY IP 250 OP 250 PS 637

## 2025-02-11 PROCEDURE — 250N000013 HC RX MED GY IP 250 OP 250 PS 637: Performed by: PSYCHIATRY & NEUROLOGY

## 2025-02-11 PROCEDURE — 124N000002 HC R&B MH UMMC

## 2025-02-11 PROCEDURE — 97150 GROUP THERAPEUTIC PROCEDURES: CPT | Mod: GO

## 2025-02-11 RX ADMIN — CARIPRAZINE 1.5 MG: 1.5 CAPSULE, GELATIN COATED ORAL at 08:33

## 2025-02-11 RX ADMIN — NICOTINE 1 PATCH: 21 PATCH, EXTENDED RELEASE TRANSDERMAL at 08:38

## 2025-02-11 RX ADMIN — HYDROXYZINE HYDROCHLORIDE 25 MG: 25 TABLET ORAL at 18:54

## 2025-02-11 RX ADMIN — Medication 3 MG: at 21:43

## 2025-02-11 RX ADMIN — ACETAMINOPHEN 650 MG: 325 TABLET, FILM COATED ORAL at 21:43

## 2025-02-11 RX ADMIN — GABAPENTIN 400 MG: 300 CAPSULE ORAL at 20:38

## 2025-02-11 RX ADMIN — CLONAZEPAM 1.5 MG: 0.5 TABLET ORAL at 14:05

## 2025-02-11 RX ADMIN — NAPROXEN 500 MG: 500 TABLET ORAL at 08:33

## 2025-02-11 RX ADMIN — NICOTINE POLACRILEX 4 MG: 4 GUM, CHEWING BUCCAL at 14:05

## 2025-02-11 RX ADMIN — NICOTINE POLACRILEX 4 MG: 4 GUM, CHEWING BUCCAL at 13:03

## 2025-02-11 RX ADMIN — CLONIDINE HYDROCHLORIDE 0.1 MG: 0.1 TABLET ORAL at 21:28

## 2025-02-11 RX ADMIN — OLANZAPINE 30 MG: 15 TABLET, FILM COATED ORAL at 21:19

## 2025-02-11 RX ADMIN — CLONAZEPAM 1.5 MG: 0.5 TABLET ORAL at 08:33

## 2025-02-11 RX ADMIN — CETIRIZINE HYDROCHLORIDE 10 MG: 10 TABLET, FILM COATED ORAL at 08:33

## 2025-02-11 RX ADMIN — NICOTINE POLACRILEX 4 MG: 4 GUM, CHEWING BUCCAL at 18:05

## 2025-02-11 RX ADMIN — METFORMIN ER 500 MG 1000 MG: 500 TABLET ORAL at 18:02

## 2025-02-11 RX ADMIN — CLONAZEPAM 1.5 MG: 0.5 TABLET ORAL at 20:38

## 2025-02-11 RX ADMIN — CALCIUM POLYCARBOPHIL 625 MG: 625 TABLET, FILM COATED ORAL at 18:52

## 2025-02-11 RX ADMIN — ATOMOXETINE 10 MG: 10 CAPSULE ORAL at 08:33

## 2025-02-11 RX ADMIN — NICOTINE POLACRILEX 4 MG: 4 GUM, CHEWING BUCCAL at 21:44

## 2025-02-11 RX ADMIN — TESTOSTERONE 60.75 MG: 20.25 GEL TOPICAL at 08:45

## 2025-02-11 RX ADMIN — NICOTINE POLACRILEX 4 MG: 4 GUM, CHEWING BUCCAL at 10:28

## 2025-02-11 RX ADMIN — ACETAMINOPHEN 650 MG: 325 TABLET, FILM COATED ORAL at 14:05

## 2025-02-11 RX ADMIN — NAPROXEN 500 MG: 500 TABLET ORAL at 18:03

## 2025-02-11 RX ADMIN — NICOTINE POLACRILEX 4 MG: 4 GUM, CHEWING BUCCAL at 08:40

## 2025-02-11 RX ADMIN — PANTOPRAZOLE SODIUM 40 MG: 40 TABLET, DELAYED RELEASE ORAL at 08:33

## 2025-02-11 RX ADMIN — GABAPENTIN 400 MG: 300 CAPSULE ORAL at 08:33

## 2025-02-11 RX ADMIN — GABAPENTIN 400 MG: 300 CAPSULE ORAL at 18:02

## 2025-02-11 RX ADMIN — ACETAMINOPHEN 650 MG: 325 TABLET, FILM COATED ORAL at 18:53

## 2025-02-11 ASSESSMENT — ACTIVITIES OF DAILY LIVING (ADL)
ADLS_ACUITY_SCORE: 45

## 2025-02-11 NOTE — PLAN OF CARE
Problem: Psychotic Signs/Symptoms  Goal: Improved Mood Symptoms (Psychotic Signs/Symptoms)  Outcome: Progressing    10:00: Pt says he just can't wear his CPAP because it's too uncomfortable and he can't sleep. Did tell writer that he got a good nights sleep. This morning pt has no complaints of depression or anxiety. Denies SI/SIB or thoughts of self harm. Feels as though his thoughts are organized. Writer noted his concentration was good this morning. No unusual comments or behaviors this morning. Pleasant and cooperative this morning. No PRN's asked for.    1500: Pt has remained calm and cooperative throughout the shift. Received Tylenol, 650 mg po, prn at 1405 for general body aches. He has been sleeping since shortly after the PRN's.

## 2025-02-11 NOTE — PLAN OF CARE
BEH IP Unit Acuity Rating Score (UARS)  Patient is given one point for every criteria they meet.    CRITERIA SCORING   On a 72 hour hold, court hold, committed, stay of commitment, or revocation. 0   Patient LOS on BEH unit exceeds 20 days. 0  LOS: 5   Patient under guardianship, 55+, otherwise medically complex, or under age 11. 0   Suicide ideation without relief of precipitating factors. 0   Current plan for suicide. 0   Current plan for homicide. 0   Imminent risk or actual attempt to seriously harm another without relief of factors precipitating the attempt. 0   Severe dysfunction in daily living (ex: complete neglect for self care, extreme disruption in vegetative function, extreme deterioration in social interactions). 1   Recent (last 7 days) or current physical aggression in the ED or on unit. 0   Restraints or seclusion episode in past 72 hours. 0   Recent (last 7 days) or current verbal aggression, agitation, yelling, etc., while in the ED or unit. 0   Active psychosis. 1   Need for constant or near constant redirection (from leaving, from others, etc).  0   Intrusive or disruptive behaviors. 0   Patient requires 3 or more hours of individualized nursing care per 8-hour shift (i.e. for ADLs, meds, therapeutic interventions). 0   TOTAL 2

## 2025-02-11 NOTE — PLAN OF CARE
Team Note Due:  Friday    Assessment/Intervention/Current Symtoms and Care Coordination:  Chart review and met with team, discussed pt progress, symptomology, and response to treatment.  Discussed the discharge plan and any potential impediments to discharge.    In team it was reported that Jonny slept for only 5.5 hours.  He has had poor sleep and refuses to use a CPAP for sleep apnea.  RN reports that he is drooling and has been falling asleep in chairs.  OT reports he has also been sleeping in group.      Provider, writer and 2 PA students met with Jonny today about discharge planning.  Persuaded Jonny to sign KATRINA to speak with parents.  Jonny agreed to sign it.  Discussed IRTS with Jonny and how this would be beneficial for him, he appeared more open with this.  Will contact parents to get collateral on living arrangements.       Discharge Plan or Goal:  IRTS     Barriers to Discharge:  Severity of symptoms  Parents unwilling to take him back.     Referral Status:  None     Legal Status:  Voluntary    Contacts (include KATRINA status):  Lacey Gramajo (Mom): [phone]  Tomas Gramajo (Dad): [phone]     Upcoming Meetings and Dates/Important Information and next steps:  None

## 2025-02-11 NOTE — PROGRESS NOTES
"[facility], [place]   Psychiatric Progress Note        Interim History:     The patient's care was discussed with the treatment team during the daily team meeting and/or staff's chart notes were reviewed.  Staff report patient slept for about 5.5 hours last night. Apnea was observed with strong snoring, patient declined to use CPAP machine. OT reported that patient was falling asleep during groups, he was offered and accepted to take naps in his room, see RN's note below:    \"Pt out in the milieu, interacting with peers and watching TV. Took short Naps in his room and in the lounge. Requested and received PRN Tylenol 650 mg X 2 for generalized pain with partial relief.PRN Hydroxyzine 25 mg X 2 for anxiety. Denies depression, SI/AH/AVH. Nicotine gum X 4.   PRN Melatonin 3 mg and Trazodone 50 mg with HS meds.  Medication compliant. Ate all his dinner. No safety concerns at this time. Pt states he is having a lot of breathing problems with his CPAP on. He is choosing not to use his CPAP at this time until further notice.   BP (!) 144/85 (BP Location: Left arm, Patient Position: Sitting, Cuff Size: Adult Large)   Pulse 79   Temp 97.9  F (36.6  C) (Temporal)   Resp 18   Wt (!) 147.5 kg (325 lb 3.2 oz)   SpO2 97%   BMI 38.56 kg/m  \"    Met with patient: Jonny was seen in presence of CTC and PA students. He walked into the conference room willingly with his notebook where he writes his requests for this treatment team. We told him that we would like to discuss discharge plans in advance, though, we do not suggest that he needed to be discharged, yet. Jonny appeared to be aware that his family was against him returning back to them and said that he have a small studio that his parents pay for and he could go there. We told him that we and his parents would prefer him to go to IR or another supervised housing program. Jonny said that he could not share room with anyone because of his " "sleep apnea, snoring and need to have certain degree of freedom. Said that he doubted that IRTS would approve his use of medical marijuana, his work on computer and his plans to have a fish bowl and work on [employer] on his personal computer. He did agree with us, however, that his parents might stop paying for his studio and very reluctantly, agreed to sign KATRINA to talk to his parents. He again asked for Benicar and I read for him yesterday IM note stating that his BP was normotensive and he didn't need any additional BP meds. We agreed to prescribe fiber, advised him to talk to nutritionist about his request for double portions, promised to talk to staff about his request to have instant coffee (we told patient that this request would be unlikely supported as it would be unfair towards the rest of our patients). When asked about feeling that people were trying to hack him, Jonny again told us that some of communication he had was from [place], [place] and \"this is a center of hacking). He was not so focused on this topic and would, probably, not even, mention it if we didn't ask him. He had no other questions or concerns.          Medications:     Current Facility-Administered Medications   Medication Dose Route Frequency Provider Last Rate Last Admin    atomoxetine (STRATTERA) capsule 10 mg  10 mg Oral Daily Kenneth Gibson MD   10 mg at 02/11/25 0833    cariprazine (VRAYLAR) capsule 1.5 mg  1.5 mg Oral Daily Kenneth Gibson MD   1.5 mg at 02/11/25 0833    cetirizine (zyrTEC) tablet 10 mg  10 mg Oral Daily Kenneth Gibson MD   10 mg at 02/11/25 0833    clonazePAM (klonoPIN) tablet 1.5 mg  1.5 mg Oral TID Kenneth Gibson MD   1.5 mg at 02/11/25 1405    cloNIDine (CATAPRES) tablet 0.1 mg  0.1 mg Oral At Bedtime Kenneth Gibson MD   0.1 mg at 02/10/25 2130    gabapentin (NEURONTIN) capsule 400 mg  400 mg Oral TID Ciara Lawson PA-C   400 mg at 02/11/25 0833    metFORMIN " "(GLUCOPHAGE XR) 24 hr tablet 1,000 mg  1,000 mg Oral Daily with supper Kenneth Gibson MD   1,000 mg at 02/10/25 1752    naproxen (NAPROSYN) tablet 500 mg  500 mg Oral BID w/meals Kenneth Gibson MD   500 mg at 02/11/25 0833    nicotine (NICODERM CQ) 21 MG/24HR 24 hr patch 1 patch  1 patch Transdermal Daily Kenneth Gibson MD   1 patch at 02/11/25 0838    OLANZapine (zyPREXA) tablet 30 mg  30 mg Oral At Bedtime Andriy Holman MD   30 mg at 02/10/25 2131    pantoprazole (PROTONIX) EC tablet 40 mg  40 mg Oral Daily Andriy Holman MD   40 mg at 02/11/25 0833    testosterone (ANDROGEL) topical gel 60.75 mg  60.75 mg Transdermal QAKenneth Lea MD   60.75 mg at 02/11/25 0845          Allergies:     Allergies   Allergen Reactions    Abilify [Aripiprazole]      Patient reports tardive dyskinesia effect in 2004 but likely akathisia since patient reports the effects only occurred while on med and resolved with a few days after discontinuing med.     Wellbutrin [Bupropion] Anxiety     Patient reports felt \"reved\" up and anxious when taken in 2001.           Labs:   No results found for this or any previous visit (from the past 24 hours).       Psychiatric Examination:     BP (!) 153/114   Pulse 102   Temp 98.1  F (36.7  C) (Temporal)   Resp 18   Wt (!) 149.2 kg (328 lb 14.4 oz)   SpO2 98%   BMI 38.99 kg/m    Weight is 328 lbs 14.4 oz  Body mass index is 38.99 kg/m .    Orthostatic Vitals         Most Recent      Standing Orthostatic /126 02/11 0700    Standing Orthostatic Pulse (bpm) 105 02/11 0700           Appearance: awake, alert, dressed in hospital scrubs, and moderately obese  Attitude: more cooperative  Eye Contact:  fair  Mood:  better  Affect: more restricted today  Speech:  clear, coherent,    Psychomotor Behavior:  no evidence of tardive dyskinesia, dystonia, or tics  Throught Process:  circumstantial  Associations: mild loosening of associations present  Thought " Content:  no evidence of suicidal ideation or homicidal ideation, paranoid delusions are present;  Insight:  partial  Judgement:  limited  Oriented to:  time, person, and place  Attention Span and Concentration:  limited  Recent and Remote Memory:  fair    Clinical Global Impressions  First: 6/4 2/7/2025      Most recent:            Precautions:     Behavioral Orders   Procedures    Code 1 - Restrict to Unit    Code 2     For X-ray only.    Fall precautions    Routine Programming     As clinically indicated    Status 15     Every 15 minutes.          DIagnoses:     Schizoaffective Disorder, bipolar disorder vs paranoid schizophrenia.  Polysubstance abuse is likely.    Class 1 obesity due to excess calories without serious comorbidity with body mass index (BMI) of 34.0 to 34.9 in adult            Plan:    Vraylar will be started on 2/7. Patient refuses to increase dose, See discussion above. Was started on Strattera. Will order fiber, he agreed to sign KATRINA for his family. No other med changes 2/11/2025. Will allow to use guitar. Will continue to provide support and structure.     Total time spent was 50 minutes. Over 50% of times was spent counseling and coordination of care regarding coping skills, medication and discharge planning.

## 2025-02-11 NOTE — PLAN OF CARE
"  Rehab Group    Start time: 10:15  End time: 11:00  Patient time total: 20 minutes    attended partial group    #8 attended   Group Type: OT Clinic   Group Topic Covered: balanced lifestyle, coping skills, healthy leisure time, relaxation , and social skills       Group Session Detail:  Pt actively participated in occupational therapy clinic to facilitate coping skill exploration, creative expression within personally meaningful activities, and clinical observation of social, cognitive, and kinesthetic performance skills.     Patient Response/Contribution:  cooperative with task, socially appropriate, attentive, and worked intermittently       Patient Detail:  Pt response: Patient arrived to this group late, however, independently selected to work on a free-hand drawing task. When asked how he is doing today, patient replied \"pretty good.\" Patient was independent to initiate, sequence, and adjust to workspace demands as needed after set up of materials was provided. Demonstrated fair/good focus, planning, and problem solving for selected drawing task as patient frequently disengaged from the task to socialize with peers. Patient appeared to enjoy discussing his interest in \"[employer]\" and \"rockets\" with peers and staff. Able to ask for assistance as needed. Patient left group after roughly twenty minutes of participation to \"use the bathroom.\" However, patient did not return to group after this. Affect appeared congruent with the activity and brightened during interactions. Patient remained calm and pleasant throughout this group.       [id number] OT Group (2 or more in attendance)      Patient Active Problem List   Diagnosis    Benzodiazepine withdrawal, uncomplicated (H)    Alcohol intoxication, uncomplicated    Chemical dependency (H)    Atypical pneumonia    Acute lung injury associated with vaping    Schizoaffective disorder, bipolar type (H)    Delusions (H)    Paranoia (H)       "

## 2025-02-11 NOTE — PLAN OF CARE
"  Rehab Group    Start time: 13:15  End time: 14:05  Patient time total: 30 minutes    attended partial group    #6 attended   Group Type: occupational therapy   Group Topic Covered: healthy leisure time, self-esteem, and social skills, fine motor skills     Group Session Detail:  Patient engaged in a manual dexterity-based leisure activity (Jenga) with peers. Therapeutic benefits and skills addressed during today's leisure activity include: problem solving, promoting attention/focus, improving social skills, exercise of fine motor skills, building self-esteem, and exploring healthy leisure opportunities.      Patient Response/Contribution:  cooperative with task, socially appropriate, and worked intermittently       Patient Detail:  Patient arrived to this group on time. Upon arrival, patient expressed familiarity with the concepts of Jenga. Patient independently sequenced turn taking throughout the group game and did not require a repeat of instructions. When prompted to identify how he stefan when someone is mean to him, patient replied that he \"blocks it out\" and \"pray[s] for them.\" Patient socialized appropriately with peers throughout the activity, however, demonstrated fair attention as he was observed frequently falling asleep and stepped out of group on one occasion. Patient also benefited from gentle redirection as he frequently became distracted with side conversations throughout the group activity. Patient ultimately elected to leave group for the final time roughly ten minutes early. Affect appeared congruent with the activity, however, somewhat drowsy. Patient remained calm and pleasant throughout this group.       [id number] OT Group (2 or more in attendance)      Patient Active Problem List   Diagnosis    Benzodiazepine withdrawal, uncomplicated (H)    Alcohol intoxication, uncomplicated    Chemical dependency (H)    Atypical pneumonia    Acute lung injury associated with vaping    Schizoaffective " disorder, bipolar type (H)    Delusions (H)    Paranoia (H)

## 2025-02-11 NOTE — PLAN OF CARE
Problem: Sleep Disturbance  Goal: Adequate Sleep/Rest  Outcome: Progressing  Intervention: Promote Sleep/Rest  Recent Flowsheet Documentation  Taken 2/10/2025 1644 by Marita Howell RN  Sleep/Rest Enhancement: other (see comments)   Goal Outcome Evaluation:    Plan of Care Reviewed With: patient      Pt awake at start of shift. 15 minutes safety checks done throughout the shift. Pt awake once for snack. Was observed having his snack and falling asleep. Writer assisted Pt back to his room. Observed apnea during sleep. Slept for a total of 5.5 hours.

## 2025-02-12 PROCEDURE — 250N000013 HC RX MED GY IP 250 OP 250 PS 637: Performed by: PSYCHIATRY & NEUROLOGY

## 2025-02-12 PROCEDURE — 250N000013 HC RX MED GY IP 250 OP 250 PS 637: Performed by: EMERGENCY MEDICINE

## 2025-02-12 PROCEDURE — 124N000002 HC R&B MH UMMC

## 2025-02-12 PROCEDURE — 250N000013 HC RX MED GY IP 250 OP 250 PS 637

## 2025-02-12 RX ADMIN — CLONAZEPAM 1.5 MG: 0.5 TABLET ORAL at 20:12

## 2025-02-12 RX ADMIN — GABAPENTIN 400 MG: 300 CAPSULE ORAL at 14:13

## 2025-02-12 RX ADMIN — GABAPENTIN 400 MG: 300 CAPSULE ORAL at 08:07

## 2025-02-12 RX ADMIN — NICOTINE POLACRILEX 4 MG: 4 GUM, CHEWING BUCCAL at 14:13

## 2025-02-12 RX ADMIN — ATOMOXETINE 10 MG: 10 CAPSULE ORAL at 11:42

## 2025-02-12 RX ADMIN — NICOTINE POLACRILEX 4 MG: 4 GUM, CHEWING BUCCAL at 08:07

## 2025-02-12 RX ADMIN — GABAPENTIN 400 MG: 300 CAPSULE ORAL at 20:12

## 2025-02-12 RX ADMIN — METFORMIN ER 500 MG 1000 MG: 500 TABLET ORAL at 17:51

## 2025-02-12 RX ADMIN — NICOTINE 1 PATCH: 21 PATCH, EXTENDED RELEASE TRANSDERMAL at 08:08

## 2025-02-12 RX ADMIN — ACETAMINOPHEN 650 MG: 325 TABLET, FILM COATED ORAL at 22:41

## 2025-02-12 RX ADMIN — Medication 3 MG: at 22:41

## 2025-02-12 RX ADMIN — CLONAZEPAM 1.5 MG: 0.5 TABLET ORAL at 08:07

## 2025-02-12 RX ADMIN — TESTOSTERONE 60.75 MG: 20.25 GEL TOPICAL at 08:08

## 2025-02-12 RX ADMIN — OLANZAPINE 10 MG: 10 TABLET, FILM COATED ORAL at 12:59

## 2025-02-12 RX ADMIN — CALCIUM POLYCARBOPHIL 625 MG: 625 TABLET, FILM COATED ORAL at 08:09

## 2025-02-12 RX ADMIN — OLANZAPINE 30 MG: 15 TABLET, FILM COATED ORAL at 21:07

## 2025-02-12 RX ADMIN — NICOTINE POLACRILEX 4 MG: 4 GUM, CHEWING BUCCAL at 12:59

## 2025-02-12 RX ADMIN — CLONAZEPAM 1.5 MG: 0.5 TABLET ORAL at 14:13

## 2025-02-12 RX ADMIN — HYDROXYZINE HYDROCHLORIDE 25 MG: 25 TABLET ORAL at 11:55

## 2025-02-12 RX ADMIN — ACETAMINOPHEN 650 MG: 325 TABLET, FILM COATED ORAL at 12:58

## 2025-02-12 RX ADMIN — CLONIDINE HYDROCHLORIDE 0.1 MG: 0.1 TABLET ORAL at 21:02

## 2025-02-12 RX ADMIN — HYDROXYZINE HYDROCHLORIDE 25 MG: 25 TABLET ORAL at 18:43

## 2025-02-12 RX ADMIN — TRAZODONE HYDROCHLORIDE 50 MG: 50 TABLET ORAL at 22:41

## 2025-02-12 RX ADMIN — NAPROXEN 500 MG: 500 TABLET ORAL at 17:51

## 2025-02-12 RX ADMIN — NICOTINE POLACRILEX 4 MG: 4 GUM, CHEWING BUCCAL at 11:55

## 2025-02-12 RX ADMIN — CETIRIZINE HYDROCHLORIDE 10 MG: 10 TABLET, FILM COATED ORAL at 08:07

## 2025-02-12 RX ADMIN — PANTOPRAZOLE SODIUM 40 MG: 40 TABLET, DELAYED RELEASE ORAL at 08:08

## 2025-02-12 RX ADMIN — NICOTINE POLACRILEX 4 MG: 4 GUM, CHEWING BUCCAL at 22:41

## 2025-02-12 RX ADMIN — CARIPRAZINE 1.5 MG: 1.5 CAPSULE, GELATIN COATED ORAL at 08:07

## 2025-02-12 RX ADMIN — NICOTINE POLACRILEX 4 MG: 4 GUM, CHEWING BUCCAL at 18:17

## 2025-02-12 RX ADMIN — NAPROXEN 500 MG: 500 TABLET ORAL at 08:07

## 2025-02-12 ASSESSMENT — ACTIVITIES OF DAILY LIVING (ADL)
ADLS_ACUITY_SCORE: 45
ORAL_HYGIENE: INDEPENDENT
ADLS_ACUITY_SCORE: 45
HYGIENE/GROOMING: INDEPENDENT
ADLS_ACUITY_SCORE: 45
ADLS_ACUITY_SCORE: 45
LAUNDRY: UNABLE TO COMPLETE
DRESS: INDEPENDENT
ADLS_ACUITY_SCORE: 45
ADLS_ACUITY_SCORE: 45
HYGIENE/GROOMING: INDEPENDENT
ADLS_ACUITY_SCORE: 45
LAUNDRY: WITH SUPERVISION
ORAL_HYGIENE: INDEPENDENT
ADLS_ACUITY_SCORE: 45
DRESS: INDEPENDENT

## 2025-02-12 NOTE — PLAN OF CARE
BEH IP Unit Acuity Rating Score (UARS)  Patient is given one point for every criteria they meet.    CRITERIA SCORING   On a 72 hour hold, court hold, committed, stay of commitment, or revocation. 0   Patient LOS on BEH unit exceeds 20 days. 0  LOS: 6   Patient under guardianship, 55+, otherwise medically complex, or under age 11. 0   Suicide ideation without relief of precipitating factors. 0   Current plan for suicide. 0   Current plan for homicide. 0   Imminent risk or actual attempt to seriously harm another without relief of factors precipitating the attempt. 0   Severe dysfunction in daily living (ex: complete neglect for self care, extreme disruption in vegetative function, extreme deterioration in social interactions). 1   Recent (last 7 days) or current physical aggression in the ED or on unit. 0   Restraints or seclusion episode in past 72 hours. 0   Recent (last 7 days) or current verbal aggression, agitation, yelling, etc., while in the ED or unit. 0   Active psychosis. 1   Need for constant or near constant redirection (from leaving, from others, etc).  0   Intrusive or disruptive behaviors. 0   Patient requires 3 or more hours of individualized nursing care per 8-hour shift (i.e. for ADLs, meds, therapeutic interventions). 0   TOTAL 2

## 2025-02-12 NOTE — PLAN OF CARE
"Goal Outcome Evaluation:    Initial meeting note:    Therapist introduced self to patient and discussed psychotherapy service available to patient.     Pt response: Pt expressed interest in meeting with therapist    Plan: Made plan to meet with pt again; began identifying goals     Pt and writer met briefly to complete safety plan and he expressed desire for therapy. He was hyperverbal and still quite delusional about cyber hacking. He ws talking about aerospace studies with [employer].      He denies any mental health issues except anxiety . He said the effects of Lymes disease are the cause of his issues and narcolepsy. He said hs father is the person he likes to spend quality time with.     When writer questioned the delusions , he started saying he couldn't breathe and looking to be having the start of panic attack. We need the meeting and let his nurse know, he wanted medication.    He said the safety plan questions made him anxious, so we discontinued interview.    He also had the narrative that he wants a \" laid back\" IRTS, doesn't want 7-8 groups per day, he knows all the material. He also has been vocal in groups about his use of CBD and THC and not wanting to cease this for IRTS placement as well.                        "

## 2025-02-12 NOTE — PLAN OF CARE
Team Note Due:  Friday    Assessment/Intervention/Current Symtoms and Care Coordination:  Chart review and met with team, discussed pt progress, symptomology, and response to treatment.  Discussed the discharge plan and any potential impediments to discharge.    In team it was reported that Jonny reported he is doing okay.  He appears more sedated due his sleep apnea.  He says he cannot tolerate CPAP.  He rates his pain at a 8/10.  He still has not met with a nutritionist but will hopefully today.    Spoke with parents to give them updates.  They shared that it is not an option for him to return home.  Writer shared that he agreed to go to IRTS and they thought this was a great plan.  They shared that he does not have a studio apartment but they would be willing to finance the apartment if he is stable.  Parents said that he does not have independent living skills or friends.  They are hoping he would develop these skills in IRTS.  Mom shared CM phone number with writer.  Writer explained that CM would be able to work with him on housing after IRTS.  Writer described [facility] customized living since they said he has a CADI waiver.  They again said they would help finance him as they may be living in assisted living at that point.  Writer encouraged them to contact her again with any questions or updates.    Discharge Plan or Goal:  IRTS     Barriers to Discharge:  Severity of symptoms  Parents unwilling to take him back.     Referral Status:  None     Legal Status:  Voluntary    Contacts (include KATRINA status):  Lacey Gramajo (Mom): [phone]  Tomas Gramajo (Dad): [phone]  Cinthia Morgan ([facility]): [phone]     Upcoming Meetings and Dates/Important Information and next steps:  None

## 2025-02-12 NOTE — PLAN OF CARE
Problem: Psychotic Signs/Symptoms  Goal: Improved Mood Symptoms (Psychotic Signs/Symptoms)  Outcome: Progressing  Intervention: Optimize Emotion and Mood  Recent Flowsheet Documentation  Taken 2/12/2025 1002 by Francisca Banks RN  Diversional Activity: television     Pt was isolative to room for some of the shift, resting in bed, minimally social with peers. Affect was blunted, flat. Mood was anxious, tense, suspicious at times. Pt ate meals and was compliant with medications. Pt reported chronic pain d/t ongoing lyme disease inflammation that he rated 8/10, took scheduled naproxen for pain. Pt was somnolent in the lounge and had to be awoken with vigorous shaking. Pt became more irritable and agitated after meeting with the unit therapist and requested prn hydroxyzine 25 mg which was minimally effective. Pt then requested prn zyprexa 10 mg for agitation and prn tylenol 650 mg for pain. Pt says that he doesn't think he has schizophrenia, only lyme disease and that insurance companies and doctors are colluding to prevent people from getting treatment for Lyme disease. Pt is also irritable about having to go to an [facility] first instead of a group home and says that the residents do drugs and are not allowed to sleep because the staff force them to go to groups. He has been somatically preoccupied today and wanted to discuss his CPAP mask and his diet options. This RN reached out to nutrition and RT who will consult with pt at some point today. No SI/SIB noted or observed. Will continue to monitor.

## 2025-02-12 NOTE — PROGRESS NOTES
CLINICAL NUTRITION SERVICES - BRIEF NOTE    Writer attempted to visit w/ pt per RN request regarding snack/supplement options. Pt was not in a state to visit. Will attempted to revisit pt as able.    Lala Early PhD, RD, LD  Clinical Dietitian II  Available by name via [employer]

## 2025-02-12 NOTE — PLAN OF CARE
Problem: Sleep Disturbance  Goal: Adequate Sleep/Rest  Outcome: Progressing   Goal Outcome Evaluation:    Pt was awake at the start of the shift, he was out in the lounge couple times for snacks. He was sleeping at the lounge but was able to be redirectable to his room. Safety checks maintained throughout the shift, pt appeared to have slept for 5.5 hrs

## 2025-02-12 NOTE — PLAN OF CARE
Problem: Anxiety  Goal: Anxiety Reduction or Resolution  Outcome: Progressing   Goal Outcome Evaluation:       Patient is A&O x 4, flat affect , brightens on approach. Interacting with peers in milieu.Endorsed anxiety 8/10 at 1850, PRN Hydroxyzine 25 mg given C/O pain rating it at 8/10. PRN  Tylenol 650 mg given for pain.  Pt has good appetite ate 100% at dinner, hydrating adequately.  BP (!) 156/98 (BP Location: Right arm, Patient Position: Sitting, Cuff Size: Adult Large)   Pulse 106   Temp 98.3  F (36.8  C) (Oral)   Resp 18   Wt (!) 149.2 kg (328 lb 14.4 oz)   SpO2 98%   BMI 38.99 kg/m

## 2025-02-12 NOTE — PROGRESS NOTES
CLINICAL NUTRITION SERVICES - BRIEF NOTE    Writer received consult for pt's request for double portions. Per chart review, pt is meeting estimated energy needs. Pt's weight is also trending up.    Wt Readings from Last 15 Encounters:   02/11/25 (!) 149.2 kg (328 lb 14.4 oz)   06/09/24 142.9 kg (315 lb)   10/10/17 (!) 150.6 kg (332 lb)   10/04/17 (!) 151 kg (333 lb)   09/19/17 (!) 149.7 kg (330 lb)   08/30/17 (!) 144.2 kg (318 lb)   08/04/17 (!) 149.7 kg (330 lb)   07/15/17 (!) 145.2 kg (320 lb)   06/22/17 (!) 140.2 kg (309 lb)   06/21/17 (!) 140.2 kg (309 lb)   05/12/17 (!) 136.2 kg (300 lb 4.3 oz)   05/10/17 (!) 136.2 kg (300 lb 4.3 oz)   03/03/17 (!) 137.4 kg (303 lb)   02/25/17 (!) 137.4 kg (303 lb)   10/21/16 (!) 142 kg (313 lb)     Double portions are not in the patient's best interest at this time. If pt is hungry after eating 100% of meals, please consider a snack or supplement.    Lala Early PhD, RD, LD  Clinical Dietitian II  Available by name via [employer]

## 2025-02-12 NOTE — PLAN OF CARE
Problem: Anxiety  Goal: Anxiety Reduction or Resolution  Outcome: Progressing     Goal Outcome Evaluation:    Patient out in the milieu watching TV and interacting with peers. Compliant with HS scheduled medications. Pt c/o back pain which he rated as 7/10. Requested and received PRN's tylenol 650 mg, nicotine gum 4 mg and melatonin 3 mg. Patient denies depression,SI and all other mental health symptoms. Pt declined to use the CPAP tonight stating that he feels like suffocating when he uses it. Pt contracted for safety.

## 2025-02-13 VITALS
RESPIRATION RATE: 18 BRPM | BODY MASS INDEX: 38.99 KG/M2 | OXYGEN SATURATION: 95 % | SYSTOLIC BLOOD PRESSURE: 163 MMHG | WEIGHT: 315 LBS | TEMPERATURE: 97.7 F | HEART RATE: 101 BPM | DIASTOLIC BLOOD PRESSURE: 100 MMHG

## 2025-02-13 PROCEDURE — 250N000013 HC RX MED GY IP 250 OP 250 PS 637: Performed by: EMERGENCY MEDICINE

## 2025-02-13 PROCEDURE — 250N000013 HC RX MED GY IP 250 OP 250 PS 637

## 2025-02-13 PROCEDURE — 250N000013 HC RX MED GY IP 250 OP 250 PS 637: Performed by: PSYCHIATRY & NEUROLOGY

## 2025-02-13 PROCEDURE — 250N000013 HC RX MED GY IP 250 OP 250 PS 637: Performed by: CLINICAL NURSE SPECIALIST

## 2025-02-13 PROCEDURE — 124N000002 HC R&B MH UMMC

## 2025-02-13 RX ADMIN — ACETAMINOPHEN 650 MG: 325 TABLET, FILM COATED ORAL at 08:40

## 2025-02-13 RX ADMIN — NICOTINE POLACRILEX 4 MG: 4 GUM, CHEWING BUCCAL at 12:05

## 2025-02-13 RX ADMIN — TESTOSTERONE 60.75 MG: 20.25 GEL TOPICAL at 08:43

## 2025-02-13 RX ADMIN — NAPROXEN 500 MG: 500 TABLET ORAL at 08:40

## 2025-02-13 RX ADMIN — NICOTINE 1 PATCH: 21 PATCH, EXTENDED RELEASE TRANSDERMAL at 08:43

## 2025-02-13 RX ADMIN — CLONAZEPAM 1.5 MG: 0.5 TABLET ORAL at 08:40

## 2025-02-13 RX ADMIN — GABAPENTIN 400 MG: 300 CAPSULE ORAL at 08:40

## 2025-02-13 RX ADMIN — GABAPENTIN 400 MG: 300 CAPSULE ORAL at 19:45

## 2025-02-13 RX ADMIN — CLONAZEPAM 1.5 MG: 0.5 TABLET ORAL at 14:10

## 2025-02-13 RX ADMIN — NAPROXEN 500 MG: 500 TABLET ORAL at 17:15

## 2025-02-13 RX ADMIN — NICOTINE POLACRILEX 4 MG: 4 GUM, CHEWING BUCCAL at 16:25

## 2025-02-13 RX ADMIN — PANTOPRAZOLE SODIUM 40 MG: 40 TABLET, DELAYED RELEASE ORAL at 08:41

## 2025-02-13 RX ADMIN — HYDROXYZINE HYDROCHLORIDE 25 MG: 25 TABLET ORAL at 16:28

## 2025-02-13 RX ADMIN — NICOTINE POLACRILEX 4 MG: 4 GUM, CHEWING BUCCAL at 19:58

## 2025-02-13 RX ADMIN — CLONIDINE HYDROCHLORIDE 0.1 MG: 0.1 TABLET ORAL at 21:22

## 2025-02-13 RX ADMIN — CARIPRAZINE 1.5 MG: 1.5 CAPSULE, GELATIN COATED ORAL at 08:40

## 2025-02-13 RX ADMIN — NICOTINE POLACRILEX 4 MG: 4 GUM, CHEWING BUCCAL at 08:40

## 2025-02-13 RX ADMIN — NICOTINE POLACRILEX 4 MG: 4 GUM, CHEWING BUCCAL at 14:14

## 2025-02-13 RX ADMIN — GABAPENTIN 400 MG: 300 CAPSULE ORAL at 14:11

## 2025-02-13 RX ADMIN — CARIPRAZINE 1.5 MG: 1.5 CAPSULE, GELATIN COATED ORAL at 12:05

## 2025-02-13 RX ADMIN — Medication 3 MG: at 20:55

## 2025-02-13 RX ADMIN — CLONIDINE HYDROCHLORIDE 0.1 MG: 0.1 TABLET ORAL at 19:46

## 2025-02-13 RX ADMIN — CALCIUM POLYCARBOPHIL 625 MG: 625 TABLET, FILM COATED ORAL at 08:40

## 2025-02-13 RX ADMIN — ATOMOXETINE 10 MG: 10 CAPSULE ORAL at 08:44

## 2025-02-13 RX ADMIN — ACETAMINOPHEN 650 MG: 325 TABLET, FILM COATED ORAL at 16:25

## 2025-02-13 RX ADMIN — METFORMIN ER 500 MG 1000 MG: 500 TABLET ORAL at 16:25

## 2025-02-13 RX ADMIN — OLANZAPINE 25 MG: 15 TABLET, FILM COATED ORAL at 20:55

## 2025-02-13 RX ADMIN — CETIRIZINE HYDROCHLORIDE 10 MG: 10 TABLET, FILM COATED ORAL at 08:42

## 2025-02-13 RX ADMIN — TRAZODONE HYDROCHLORIDE 50 MG: 50 TABLET ORAL at 20:55

## 2025-02-13 RX ADMIN — CLONAZEPAM 1.5 MG: 0.5 TABLET ORAL at 19:45

## 2025-02-13 ASSESSMENT — ACTIVITIES OF DAILY LIVING (ADL)
HYGIENE/GROOMING: INDEPENDENT
ADLS_ACUITY_SCORE: 45
ORAL_HYGIENE: INDEPENDENT
ADLS_ACUITY_SCORE: 45
ADLS_ACUITY_SCORE: 45
ORAL_HYGIENE: INDEPENDENT
ADLS_ACUITY_SCORE: 45
DRESS: SCRUBS (BEHAVIORAL HEALTH)
ADLS_ACUITY_SCORE: 45
HYGIENE/GROOMING: INDEPENDENT
ADLS_ACUITY_SCORE: 45
ADLS_ACUITY_SCORE: 45
DRESS: INDEPENDENT
ADLS_ACUITY_SCORE: 45

## 2025-02-13 NOTE — PLAN OF CARE
BEH IP Unit Acuity Rating Score (UARS)  Patient is given one point for every criteria they meet.    CRITERIA SCORING   On a 72 hour hold, court hold, committed, stay of commitment, or revocation. 0   Patient LOS on BEH unit exceeds 20 days. 0  LOS: 7   Patient under guardianship, 55+, otherwise medically complex, or under age 11. 0   Suicide ideation without relief of precipitating factors. 0   Current plan for suicide. 0   Current plan for homicide. 0   Imminent risk or actual attempt to seriously harm another without relief of factors precipitating the attempt. 0   Severe dysfunction in daily living (ex: complete neglect for self care, extreme disruption in vegetative function, extreme deterioration in social interactions). 1   Recent (last 7 days) or current physical aggression in the ED or on unit. 0   Restraints or seclusion episode in past 72 hours. 0   Recent (last 7 days) or current verbal aggression, agitation, yelling, etc., while in the ED or unit. 0   Active psychosis. 1   Need for constant or near constant redirection (from leaving, from others, etc).  0   Intrusive or disruptive behaviors. 0   Patient requires 3 or more hours of individualized nursing care per 8-hour shift (i.e. for ADLs, meds, therapeutic interventions). 0   TOTAL 2

## 2025-02-13 NOTE — PLAN OF CARE
Team Note Due:  Friday    Assessment/Intervention/Current Symtoms and Care Coordination:  Chart review and met with team, discussed pt progress, symptomology, and response to treatment.  Discussed the discharge plan and any potential impediments to discharge.    In team it was reported that Jonny      Discharge Plan or Goal:  IRTS     Barriers to Discharge:  Severity of symptoms  Parents unwilling to take him back.     Referral Status:  None     Legal Status:  Voluntary    Contacts (include KATRINA status):  Lacey Gramajo (Mom): [phone]  Tomas Gramajo (Dad): [phone]     Upcoming Meetings and Dates/Important Information and next steps:  None

## 2025-02-13 NOTE — PROGRESS NOTES
"[facility], [place]   Psychiatric Progress Note        Interim History:   The patient's care was discussed with the treatment team during the daily team meeting and/or staff's chart notes were reviewed.  Staff report patient slept for approximately 5.5 hours last night. Yesterday, 2/12, pt was isolative and withdrawn to room all shift, but came out for meals. Pt was eating and drinking adequately. Endorsed anxiety and rated at 8.5/10, but denied depression, SI/SIB/HI/AVH, and contracted for safety. Hydroxyzine administered with some relief. RT stopped by with new CPAP face mask on 2/12 and pt reported that \"it fits well\" but he refused to wear it except for \"two hours\" in the early morning 2/13. RT was called again and they said they have no other mask alternatives and that they recommend that his CPAP mask from home be brought in.    Met with patient who explains that he is frustrated with how sleepy he is. He endorses using CPAP for two hours last night- he adds that he only used CPAP a few times at home PTA after it was prescribed following a sleep study. When we brought up the potential for decreasing olanzapine and increasing vraylar to better support his energy needs and cognitive functioning, he was open to this. He continued to repeat how bad for him was decreasing his Klonopin dose, was not receptive when we reminded him that Klonopin is a sedative med and not recommended for patients with severe apnea.    He talked to his family and they said that if he is willing to do 90 days in an IRTS facility, they would be willing to help pay for a studio apartment. Patient appeared pleased with this information and says that he \"can make it through 90 days\".         Medications:     Current Facility-Administered Medications   Medication Dose Route Frequency Provider Last Rate Last Admin    atomoxetine (STRATTERA) capsule 10 mg  10 mg Oral Daily Kenneth Gibson MD   10 mg at " "02/13/25 0844    calcium polycarbophil (FIBERCON) tablet 625 mg  625 mg Oral Daily Kenneth Gibson MD   625 mg at 02/13/25 0840    [START ON 2/14/2025] cariprazine (VRAYLAR) capsule 3 mg  3 mg Oral Daily Kenneth Gibson MD        cetirizine (zyrTEC) tablet 10 mg  10 mg Oral Daily Kenneth Gibson MD   10 mg at 02/13/25 0842    clonazePAM (klonoPIN) tablet 1.5 mg  1.5 mg Oral TID Kenneth Gibson MD   1.5 mg at 02/13/25 1410    cloNIDine (CATAPRES) tablet 0.1 mg  0.1 mg Oral At Bedtime Kenneth Gibson MD   0.1 mg at 02/12/25 2102    gabapentin (NEURONTIN) capsule 400 mg  400 mg Oral TID Ciara Lawson PA-C   400 mg at 02/13/25 1411    metFORMIN (GLUCOPHAGE XR) 24 hr tablet 1,000 mg  1,000 mg Oral Daily with supper Kenneth Gibson MD   1,000 mg at 02/12/25 1751    naproxen (NAPROSYN) tablet 500 mg  500 mg Oral BID w/meals Kenneth Gibson MD   500 mg at 02/13/25 0840    nicotine (NICODERM CQ) 21 MG/24HR 24 hr patch 1 patch  1 patch Transdermal Daily Kenneth Gibson MD   1 patch at 02/13/25 0843    OLANZapine (zyPREXA) tablet 25 mg  25 mg Oral At Bedtime Kenneth Gibson MD        pantoprazole (PROTONIX) EC tablet 40 mg  40 mg Oral Daily Andriy Holman MD   40 mg at 02/13/25 0841    testosterone (ANDROGEL) topical gel 60.75 mg  60.75 mg Transdermal Kenneth Rao MD   60.75 mg at 02/13/25 0843          Allergies:     Allergies   Allergen Reactions    Abilify [Aripiprazole]      Patient reports tardive dyskinesia effect in 2004 but likely akathisia since patient reports the effects only occurred while on med and resolved with a few days after discontinuing med.     Wellbutrin [Bupropion] Anxiety     Patient reports felt \"reved\" up and anxious when taken in 2001.           Labs:   No results found for this or any previous visit (from the past 24 hours).       Psychiatric Examination:     BP (!) 144/98   Pulse 109   Temp 98.5  F (36.9  C) " (Temporal)   Resp 18   Wt (!) 149.2 kg (328 lb 14.4 oz)   SpO2 96%   BMI 38.99 kg/m    Weight is 328 lbs 14.4 oz  Body mass index is 38.99 kg/m .    Orthostatic Vitals         Most Recent      Sitting Orthostatic /97 02/12 0832    Sitting Orthostatic Pulse (bpm) 85 02/12 0832    Standing Orthostatic /114 02/12 0832    Standing Orthostatic Pulse (bpm) 90 02/12 0832           Appearance: dressed in hospital scrubs, fatigued, less somnolent, moderately obese, and slightly unkempt  Attitude:  more cooperative  Eye Contact:  fair, occasionally intense  Mood:  better, less angry, less anxious, and less depressed  Affect: less reactive and labile. Slightly impatient.  Speech:  normal prosody, no rambling. Voice at normal volume.  Psychomotor Behavior:  no evidence of tardive dyskinesia, dystonia, or tics  Throught Process:  mostly logical, linear, and goal oriented. Less tangential   Associations:  no loose associations  Thought Content:  no evidence of suicidal ideation or homicidal ideation, no auditory hallucinations present, and no visual hallucinations present  Insight:  impaired  Judgement:  limited  Oriented to:  time, person, and place  Attention Span and Concentration:  fair  Recent and Remote Memory:  fair    Clinical Global Impressions  First: 6/4 2/7/2025      Most recent:            Precautions:     Behavioral Orders   Procedures    Code 1 - Restrict to Unit    Code 2     For X-ray only.    Fall precautions    Routine Programming     As clinically indicated    Status 15     Every 15 minutes.          Diagnoses:     Schizoaffective disorder, bipolar disorder vs paranoid schizophrenia   Polysubstance abuse is likely.  Class 1 obesity due to excess calories without serious comorbidity with body mass index of 34.0 to 34.9 in adult          Plan:     Decrease olanzapine from 30 mg to 25 mg and increase cariprazine from 1.5 mg to 3 mg 2/13/25. Patient has already received one dose of 1.5 mg  cariprazine, will receive an additional dose of 1.5 mg today 2/13 to reach 3 mg. Allowed to use guitar. Continue to provide support and structure while determining where Jonny would benefit the most from after being discharged here from station 30.     I was present with PA student who participated in the service and in the documentation of the note. I have verified the history and personally performed the physical exam and medical decision making. I agree with the assessment and plan of care as documented in the note.     Kenneth Gibson MD  [facility] Psychiatry     Total time spent was 37 minutes. Over 50% of times was spent counseling and coordination of care regarding coping skills, medication and discharge planning.

## 2025-02-13 NOTE — CONSULTS
SPIRITUAL HEALTH SERVICES  SPIRITUAL ASSESSMENT consult Note  [facility] ([facility]) 30[facility]     REFERRAL SOURCE: Routine consult    Unable to meet with patient as he was sleeping.     PLAN: Will check in with Jonny tomorrow. Spiritual health services remains available for any follow-up or requests. For further visits please place spiritual health consults.      Thea Shepherd [facility]  Associate   Pager: [phone]

## 2025-02-13 NOTE — PLAN OF CARE
Problem: Psychotic Signs/Symptoms  Goal: Optimal Cognitive Function (Psychotic Signs/Symptoms)  Intervention: Support and Promote Cognitive Ability  Recent Flowsheet Documentation  Taken 2/13/2025 1003 by Yaz Ng RN  Communication Support Strategies: active listening utilized   Goal Outcome Evaluation:           RT visited pt. Yesterday to replaced his C - PAP mask, spoke with RT today no other mask available. Pt reports the mask being tight, but is willing to give it another try         Pt presents with a anxious and tense affect, pleasant on approach. Visible in the lounge watching TV and for meals adequate appetite. Pt  Has poor insight into his mental illness, reports excessive sleeping and being over medicated, stating that he is not Schizophrenic, and needs a ABX for his Lyme's disease. Speech is clear, mumble at times, maintains eye contact. Thought process is organized when answering questions, periods of being restless and having poor concentration. Long periods of resting in bed without the use of his C - CAP             Pt endorses anxiety and depression 9/10, denies SI/HI/SIB and hallucinations, C/O of headache PRN Tylenol helpful, medication compliance PRN Nicotine gum x 3. Contracted for safety.    Blood pressure (!) 144/98, pulse 109, temperature 98.5  F (36.9  C), temperature source Temporal, resp. rate 18, weight (!) 149.2 kg (328 lb 14.4 oz), SpO2 96%.

## 2025-02-13 NOTE — PLAN OF CARE
"  Problem: Adult Behavioral Health Plan of Care  Goal: Plan of Care Review  Outcome: Progressing  Flowsheets (Taken 2/12/2025 1701)  Patient Agreement with Plan of Care: agrees     Problem: Psychotic Signs/Symptoms  Goal: Improved Behavioral Control (Psychotic Signs/Symptoms)  Outcome: Progressing   Goal Outcome Evaluation:    Plan of Care Reviewed With: patient        Pt endorsed anxiety and rated at 8.5/10, but denied depression, SI/SIB/HI/AVH, and contracted for safety. Hydroxyzine administered with some relief. Pt is isolative and withdrawn to room all shift, but came out for meals. Pt is eating and drinking adequately. He presents as unkempt. Pt is pleasant with a flat affect. Mood presents as calm and cooperative. Judgment and insight not appropriate to situation. RT stopped by with a new face mask which she fitted on the pt and pt stated \"It fits well\". Pt has been in bed sleeping the majority of the shift. At 8:00 pm, writer assisted pt to put on the CPAP  An hour later, this writer noted that the face mask was laying on the pillow instead of being on the pt's face. When asked why he is not wearing his CPAP face mask, pt stated \"It's uncomfortable\". Education provided and this writer encouraged pt to put on his face mask, but pt declined and stated \"let me put it on at my own pace\". Pt is presently sleeping without using his CPAP. No other concerns noted or verbalized.               "

## 2025-02-13 NOTE — PLAN OF CARE
"SHIFT PLAN OF CARE   Problem: Sleep Disturbance  Goal: Adequate Sleep/Rest  Outcome: Progressing   Goal Outcome Evaluation:    At the start of the shift pt was sleeping on a chair in the lounge; encouraged to go back to his room and sleep in his medical bed; pt follow direction but declined to put on his CPAP; pt said, \" Don't force me to use it, I feel like I am dying, I will finger it out by myself, don't open my door and come in  to my room\" attempt was made to educate pt but was not receptive. Appeared to have slept for 6 hrs.   "

## 2025-02-14 PROCEDURE — 250N000013 HC RX MED GY IP 250 OP 250 PS 637: Performed by: PSYCHIATRY & NEUROLOGY

## 2025-02-14 PROCEDURE — 250N000013 HC RX MED GY IP 250 OP 250 PS 637: Performed by: EMERGENCY MEDICINE

## 2025-02-14 PROCEDURE — 250N000013 HC RX MED GY IP 250 OP 250 PS 637: Performed by: CLINICAL NURSE SPECIALIST

## 2025-02-14 PROCEDURE — 250N000013 HC RX MED GY IP 250 OP 250 PS 637

## 2025-02-14 PROCEDURE — 99222 1ST HOSP IP/OBS MODERATE 55: CPT | Performed by: PHYSICIAN ASSISTANT

## 2025-02-14 PROCEDURE — 124N000002 HC R&B MH UMMC

## 2025-02-14 RX ADMIN — ACETAMINOPHEN 650 MG: 325 TABLET, FILM COATED ORAL at 16:25

## 2025-02-14 RX ADMIN — NICOTINE 1 PATCH: 21 PATCH, EXTENDED RELEASE TRANSDERMAL at 09:41

## 2025-02-14 RX ADMIN — CLONAZEPAM 1.5 MG: 0.5 TABLET ORAL at 14:23

## 2025-02-14 RX ADMIN — GABAPENTIN 400 MG: 300 CAPSULE ORAL at 14:23

## 2025-02-14 RX ADMIN — TESTOSTERONE 60.75 MG: 20.25 GEL TOPICAL at 09:39

## 2025-02-14 RX ADMIN — CARIPRAZINE 3 MG: 1.5 CAPSULE, GELATIN COATED ORAL at 09:39

## 2025-02-14 RX ADMIN — CALCIUM POLYCARBOPHIL 625 MG: 625 TABLET, FILM COATED ORAL at 09:40

## 2025-02-14 RX ADMIN — NAPROXEN 500 MG: 500 TABLET ORAL at 09:40

## 2025-02-14 RX ADMIN — CETIRIZINE HYDROCHLORIDE 10 MG: 10 TABLET, FILM COATED ORAL at 09:40

## 2025-02-14 RX ADMIN — PANTOPRAZOLE SODIUM 40 MG: 40 TABLET, DELAYED RELEASE ORAL at 09:41

## 2025-02-14 RX ADMIN — NAPROXEN 500 MG: 500 TABLET ORAL at 17:43

## 2025-02-14 RX ADMIN — METFORMIN ER 500 MG 1000 MG: 500 TABLET ORAL at 17:43

## 2025-02-14 RX ADMIN — GABAPENTIN 400 MG: 300 CAPSULE ORAL at 20:14

## 2025-02-14 RX ADMIN — NICOTINE POLACRILEX 4 MG: 4 GUM, CHEWING BUCCAL at 17:48

## 2025-02-14 RX ADMIN — NICOTINE POLACRILEX 4 MG: 4 GUM, CHEWING BUCCAL at 16:26

## 2025-02-14 RX ADMIN — NICOTINE POLACRILEX 4 MG: 4 GUM, CHEWING BUCCAL at 20:22

## 2025-02-14 RX ADMIN — CLONIDINE HYDROCHLORIDE 0.1 MG: 0.1 TABLET ORAL at 17:43

## 2025-02-14 RX ADMIN — CLONIDINE HYDROCHLORIDE 0.1 MG: 0.1 TABLET ORAL at 21:25

## 2025-02-14 RX ADMIN — HYDROXYZINE HYDROCHLORIDE 25 MG: 25 TABLET ORAL at 16:25

## 2025-02-14 RX ADMIN — ATOMOXETINE 10 MG: 10 CAPSULE ORAL at 09:42

## 2025-02-14 RX ADMIN — CLONAZEPAM 1.5 MG: 0.5 TABLET ORAL at 20:14

## 2025-02-14 RX ADMIN — CLONAZEPAM 1.5 MG: 0.5 TABLET ORAL at 09:40

## 2025-02-14 RX ADMIN — ACETAMINOPHEN 650 MG: 325 TABLET, FILM COATED ORAL at 09:40

## 2025-02-14 RX ADMIN — NICOTINE POLACRILEX 4 MG: 4 GUM, CHEWING BUCCAL at 09:40

## 2025-02-14 RX ADMIN — GABAPENTIN 400 MG: 300 CAPSULE ORAL at 09:40

## 2025-02-14 RX ADMIN — OLANZAPINE 25 MG: 15 TABLET, FILM COATED ORAL at 21:25

## 2025-02-14 ASSESSMENT — ACTIVITIES OF DAILY LIVING (ADL)
ADLS_ACUITY_SCORE: 45
ADLS_ACUITY_SCORE: 45
DRESS: INDEPENDENT
ADLS_ACUITY_SCORE: 45
ORAL_HYGIENE: INDEPENDENT
DRESS: SCRUBS (BEHAVIORAL HEALTH);INDEPENDENT
ADLS_ACUITY_SCORE: 45
ORAL_HYGIENE: INDEPENDENT
ADLS_ACUITY_SCORE: 45
LAUNDRY: WITH SUPERVISION
HYGIENE/GROOMING: INDEPENDENT
HYGIENE/GROOMING: INDEPENDENT
ADLS_ACUITY_SCORE: 45

## 2025-02-14 NOTE — PLAN OF CARE
BEH IP Unit Acuity Rating Score (UARS)  Patient is given one point for every criteria they meet.    CRITERIA SCORING   On a 72 hour hold, court hold, committed, stay of commitment, or revocation. 0   Patient LOS on BEH unit exceeds 20 days. 0  LOS: 8   Patient under guardianship, 55+, otherwise medically complex, or under age 11. 0   Suicide ideation without relief of precipitating factors. 0   Current plan for suicide. 0   Current plan for homicide. 0   Imminent risk or actual attempt to seriously harm another without relief of factors precipitating the attempt. 0   Severe dysfunction in daily living (ex: complete neglect for self care, extreme disruption in vegetative function, extreme deterioration in social interactions). 1   Recent (last 7 days) or current physical aggression in the ED or on unit. 0   Restraints or seclusion episode in past 72 hours. 0   Recent (last 7 days) or current verbal aggression, agitation, yelling, etc., while in the ED or unit. 0   Active psychosis. 1   Need for constant or near constant redirection (from leaving, from others, etc).  0   Intrusive or disruptive behaviors. 0   Patient requires 3 or more hours of individualized nursing care per 8-hour shift (i.e. for ADLs, meds, therapeutic interventions). 0   TOTAL 2

## 2025-02-14 NOTE — PLAN OF CARE
Team Note Due:  Friday    Assessment/Intervention/Current Symtoms and Care Coordination:  Chart review and met with team, discussed pt progress, symptomology, and response to treatment.  Discussed the discharge plan and any potential impediments to discharge.    In team it was reported that Jonny is not wearing CPAP and is visibly exhausted. Had conversation with parents, they will not allow him to come home. Parents are requesting he go to IRTS for further stabilization. If he is stable they are willing to finance a studio apartment for him.      Discharge Plan or Goal:  IRTS     Barriers to Discharge:  Severity of symptoms  Parents unwilling to take him back.     Referral Status:  None     Legal Status:  Voluntary    Contacts (include KATRINA status):  Lacey Gramajo (Mom): [phone]  Tomas Gramajo (Dad): [phone]     Upcoming Meetings and Dates/Important Information and next steps:  None

## 2025-02-14 NOTE — PLAN OF CARE
Problem: Psychotic Signs/Symptoms  Goal: Improved Behavioral Control (Psychotic Signs/Symptoms)  Outcome: Progressing  Intervention: Manage Behavior  Recent Flowsheet Documentation  Taken 2/14/2025 7274 by Francisca Banks RN  De-Escalation Techniques: diversional activity encouraged    Pt was isolative to room for majority of shift, sleeping in bed, minimally social with peers and not attending groups except for community meeting which he was intermittently asleep during. Affect was blunted, flat. Mood was anxious, somewhat tense. Pt ate meals and was compliant with medications, requested prn tyelnol 650 mg for pain. Pt was seen by internal medicine for elevated BPs but is likely going to continue with his clonidine, no other med changes made. Pt has a no roommate order d/t disruptiveness at night, medical bed. No SI/SIB noted or observed. Will continue to monitor.

## 2025-02-14 NOTE — PLAN OF CARE
Problem: Sleep Disturbance  Goal: Adequate Sleep/Rest  Outcome: Progressing    The Patient slept for 6.5 hours and did not take any medications overnight. Ongoing 15-minute safety checks are being conducted, and no incidents have been related to his fall precautions. Additionally, there were no respiratory problems, even though he removed his CPAP during the night and refused to put it back on.

## 2025-02-14 NOTE — PLAN OF CARE
02/14/25 1056   Individualization/Patient Specific Goals   Patient Personal Strengths independent living skills;positive vocational history   Patient Vulnerabilities traumatic event;occupational insecurity;history of unsuccessful treatment;lacks insight into illness;family/relationship conflict   Anxieties, Fears or Concerns Patient is not wearing CPAP.  He only slept for 4.5 hours.   Individualized Care Needs Medication management, individual therapy, group therapy, support from unit staff.   Patient/Family-Specific Goals (Include Timeframe) Stabilize and discharge to IRTS.   Interprofessional Rounds   Summary Patient is not wearing CPAP and is visibly exhausted.  CTC reports conversation with parents, they will not allow him to come home.  Parents are requesting he go to IRTS for further stabilization.  If he is stable they are willing to finance a studio apartment for him.   Participants nursing;CTC;psychiatrist;other (see comments)  (PA Students)   Behavioral Team Discussion   Participants Dr. Kenneth Gibson MD. Mya Bell LPC.  Francisca Banks RN.  PA Students, Pharmacy   Progress Patient is willing to go to IRTS.   Anticipated length of stay 1 week   Continued Stay Criteria/Rationale Stabilization   Medical/Physical Sleep apnea   Precautions Falls   Plan Stabilize and discharge to IRTS.   Rationale for change in precautions or plan No changes   Safety Plan Safety plan to be completed by unit psychotherapist.   Anticipated Discharge Disposition IRTS     PRECAUTIONS AND SAFETY    Behavioral Orders   Procedures    Code 1 - Restrict to Unit    Code 2     For X-ray only.    Fall precautions    Routine Programming     As clinically indicated    Status 15     Every 15 minutes.       Safety  Safety WDL: WDL  Patient Location: patient room, own  Observed Behavior: sleeping  Observed Behavior (Comment): watching TV  Safety Measures: environmental rounds completed, safety rounds completed,  self-directed behavior promoted, suicide assessment completed, suicide check-in completed  Diversional Activity: television  De-Escalation Techniques: appropriate behavior reinforced, diversional activity encouraged, quiet time facilitated  Suicidality: Status 15  Seizure precautions: clutter free environment  Assault: status 15  Elopement Interventions: status 15, signs posted on unit entrance / exit doors  Sexual: status 15

## 2025-02-14 NOTE — CONSULTS
"[facility]  Consult Note - Hospitalist Service  Date of Admission:  2/5/2025  Consult Requested by: Psychiatry   Reason for Consult: \"please evaluated need for changing blood pressure meds\"    Assessment & Plan   Jonny Gramajo is a 39 year old male with PMH significant for prediabetes, severe RASHEED, chronic Lyme disease, HTN, obesity, GERd, hypogonadism, schizoaffective disorder, panic attacks, ADHD, depression, who was admitted on 2/5/2025 to inpatient Psychiatry for paranoia.     Medicine was consulted for elevated blood pressure     HTN  Patient has history of HTN and reports previously being on atenolol or amlodipine in the past, but patient was not on any anti-hypertensive meds on admission. Patient asks about starting Benacar due to it's \"ability to block angiotension II receptors and reduce his pain\". Per discussion with RN, patient continues to have episodes of anxiety and agitation, which patient also reports himself. He notes that he has felt more anxious in the past few days with medication adjustment. Most recent blood pressure was 135/101, but also appears this has fluctuated. Suspect anxiety and agitation contributing to elevation. Would favor to continue to monitor blood pressure. Asymptomatic from current pressures. If blood pressure remains persistently elevated >180/>105 and anxiety and agitation have improved, would consider initiation of an agent.   -Continue mental health tx at this time   -Continue clonidine 0.1mg at bedtime  -Monitor blood pressure   -Please reach out to Medicine team if BP is persistently >180 / >105     The patient's care was discussed with the Bedside Nurse, Patient, and Primary team via this note.    Medicine will sign off at this time. Please reach out to Medicine team if further questions arise.     Clinically Significant Risk Factors                                  # Financial/Environmental Concerns: none         Marge RAZA" "YURI Llamas  Hospitalist Service  Securely message with [employer] (more info)  Text page via [employer] Paging/Directory   ______________________________________________________________________    Chief Complaint   \"I think I need to be on Benacar\"    History is obtained from the patient    History of Present Illness   Jonny Gramajo is a 39 year old male with PMH significant for prediabetes, severe RASHEED, chronic Lyme disease, HTN, obesity, GERd, hypogonadism, schizoaffective disorder, panic attacks, ADHD, depression, who was admitted on 2/5/2025 to inpatient Psychiatry for paranoia.     Medicine was consulted for elevated blood pressure.     Notes he has concerns that his blood pressure is too high and feels that he needs to be on Benacar due to it's ability to block angiotensin II receptors which he tells me will help with his blood pressure and his history of chronic Lyme's disease for which he still has pain from. He tells me that he has been on atenolol and amlodipine in the past. He has only been on one agent in the past. Has never been on Benacar. No chest pain. No headaches or vision changes. Abdomen ok. He reports that he is very fatigued. Reports that his anxiety has been up since psychiatry medications have been adjusted. Comments that \"I have been on these meds for 17 years, I think I know what works for me\". Discussed monitoring blood pressure, which he agrees with.       Past Medical History    Past Medical History:   Diagnosis Date    Anxiety     Class 1 obesity due to excess calories without serious comorbidity with body mass index (BMI) of 34.0 to 34.9 in adult     Depression     Depressive disorder     Elevated ALT measurement     Hypertension     Lyme disease     Lyme disease        Past Surgical History   Past Surgical History:   Procedure Laterality Date    GENITOURINARY SURGERY      testical recessed    PICC  1/19/2017            Medications   I have reviewed this patient's current medications " "      Review of Systems    Mentioned in HPI    Social History   I have reviewed this patient's social history and updated it with pertinent information if needed.  Social History     Tobacco Use    Smoking status: Former     Current packs/day: 0.00     Average packs/day: 2.0 packs/day for 15.0 years (30.0 ttl pk-yrs)     Types: Cigarettes     Start date: 3/20/2006     Quit date: 3/20/2021     Years since quitting: 3.9    Smokeless tobacco: Former     Quit date: 3/20/2021    Tobacco comments:     want to continue smoking   Substance Use Topics    Alcohol use: Not Currently     Comment: Alcoholic Drinks/day: denies drinking alcohol    Drug use: Not Currently     Comment: Drug use: denies         Family History           Allergies   Allergies   Allergen Reactions    Abilify [Aripiprazole]      Patient reports tardive dyskinesia effect in 2004 but likely akathisia since patient reports the effects only occurred while on med and resolved with a few days after discontinuing med.     Wellbutrin [Bupropion] Anxiety     Patient reports felt \"reved\" up and anxious when taken in 2001.         Physical Exam   Vital Signs: Temp: 98.3  F (36.8  C) Temp src: Temporal BP: (!) 135/101 Pulse: 101   Resp: 16 SpO2: 96 % O2 Device: None (Room air)    Weight: 328 lbs 14.4 oz    General: laying in bed, alert, cooperative, awake, in no acute distress  HEENT: normocephalic, atraumatic, anicteric sclera  Respiratory: breathing comfortably on room air, clear to auscultation bilaterally without wheezing, crackles, or rhonchi appreciated  Cardiac: regular rate and rhythm with normal S1/S2 without murmurs, clicks, rubs or gallops  GI: soft, non-distended, normoactive bowel sounds  Neuro: grossly non-focal, alert and oriented, normal speech   Psych: speech pressured, tangential thought   MSK: no bony deformities, moving all extremities independently  Skin: no rashes or lesions on uncovered surfaces, no jaundice      Medical Decision Making "       50 MINUTES SPENT BY ME on the date of service doing chart review, history, exam, documentation & further activities per the note.      Data   NOTE: Data reviewed over the past 24 hrs contributes toward MDM complexity

## 2025-02-14 NOTE — PROGRESS NOTES
"[facility], [place]   Psychiatric Progress Note        Interim History:   The patient's care was discussed with the treatment team during the daily team meeting and/or staff's chart notes were reviewed.  Staff report patient slept for approximately 6.5 hours last night. He removed his CPAP mask during the night and refused to put it back. Was reported to be compliant with meds and cares. Was seen at times sleeping during the day in common areas. CTC talked to his parents who didn't comment on Jonny's progress or if he was close to baseline, but confirmed that they would not be able to take him home. They said that they might start paying for his independent apartment after he spent time at [facility], but not now. Jonny was seen by IM today (appreciate IM help and suggestions), see note below:    \"HTN  Patient has history of HTN and reports previously being on atenolol or amlodipine in the past, but patient was not on any anti-hypertensive meds on admission. Patient asks about starting Benacar due to it's \"ability to block angiotension II receptors and reduce his pain\". Per discussion with RN, patient continues to have episodes of anxiety and agitation, which patient also reports himself. He notes that he has felt more anxious in the past few days with medication adjustment. Most recent blood pressure was 135/101, but also appears this has fluctuated. Suspect anxiety and agitation contributing to elevation. Would favor to continue to monitor blood pressure. Asymptomatic from current pressures. If blood pressure remains persistently elevated >180/>105 and anxiety and agitation have improved, would consider initiation of an agent.   -Continue mental health tx at this time   -Continue clonidine 0.1mg at bedtime  -Monitor blood pressure   -Please reach out to Medicine team if BP is persistently >180 / >105\"    Met with patient in presence of 2 PA students. He went to the conference room willingly " and talked to us. Reported being in a fair mood, denied having any side effects after yesterday increase in his Vraylar dose. Appeared to be somewhat more alert and asked about plans for discharge. He confirmed that he was willing to go to [facility]. When asked about his fears of people hacking his phone, computer, accounts, told us that it was not a paranoia, that his concerns were legitimate. We asked him how would he react to going to a different place with new people. Jonny agreed that this might be difficult, said that prior to going anywhere he would like to make as much research as possible. We confirmed that we would discharge him to [facility] when bed is available and he is ready. He voiced concern about elevated blood pressure, said that he would like to be switched to Benicar. We promised to contact IM and let them know about his consistently elevated diastolic blood pressure. He thanked us and had no other questions or concerns.          Medications:     Current Facility-Administered Medications   Medication Dose Route Frequency Provider Last Rate Last Admin    atomoxetine (STRATTERA) capsule 10 mg  10 mg Oral Daily Kenneth Gibson MD   10 mg at 02/14/25 0942    calcium polycarbophil (FIBERCON) tablet 625 mg  625 mg Oral Daily Kenneth Gibson MD   625 mg at 02/14/25 0940    cariprazine (VRAYLAR) capsule 3 mg  3 mg Oral Daily Kenneth Gibson MD   3 mg at 02/14/25 0939    cetirizine (zyrTEC) tablet 10 mg  10 mg Oral Daily Kenneth Gibson MD   10 mg at 02/14/25 0940    clonazePAM (klonoPIN) tablet 1.5 mg  1.5 mg Oral TID Kenneth Gibson MD   1.5 mg at 02/14/25 1423    cloNIDine (CATAPRES) tablet 0.1 mg  0.1 mg Oral At Bedtime Kenneth Gibson MD   0.1 mg at 02/13/25 1946    gabapentin (NEURONTIN) capsule 400 mg  400 mg Oral TID Ciara Lawson PA-C   400 mg at 02/14/25 1423    metFORMIN (GLUCOPHAGE XR) 24 hr tablet 1,000 mg  1,000 mg Oral Daily with supper Kenneth Gibson  "MD MARIANN   1,000 mg at 02/13/25 1625    naproxen (NAPROSYN) tablet 500 mg  500 mg Oral BID w/meals Kenneth Gibson MD   500 mg at 02/14/25 0940    nicotine (NICODERM CQ) 21 MG/24HR 24 hr patch 1 patch  1 patch Transdermal Daily Kenneth Gibson MD   1 patch at 02/14/25 0941    OLANZapine (zyPREXA) tablet 25 mg  25 mg Oral At Bedtime Kenneth Gibson MD   25 mg at 02/13/25 2055    pantoprazole (PROTONIX) EC tablet 40 mg  40 mg Oral Daily Andriy Holman MD   40 mg at 02/14/25 0941    testosterone (ANDROGEL) topical gel 60.75 mg  60.75 mg Transdermal Kenneth Rao MD   60.75 mg at 02/14/25 0939          Allergies:     Allergies   Allergen Reactions    Abilify [Aripiprazole]      Patient reports tardive dyskinesia effect in 2004 but likely akathisia since patient reports the effects only occurred while on med and resolved with a few days after discontinuing med.     Wellbutrin [Bupropion] Anxiety     Patient reports felt \"reved\" up and anxious when taken in 2001.           Labs:   No results found for this or any previous visit (from the past 24 hours).       Psychiatric Examination:     BP (!) 135/101 (Patient Position: Sitting, Cuff Size: Adult Large)   Pulse 101   Temp 98.3  F (36.8  C)   Resp 16   Wt (!) 149.2 kg (328 lb 14.4 oz)   SpO2 96%   BMI 38.99 kg/m    Weight is 328 lbs 14.4 oz  Body mass index is 38.99 kg/m .    Orthostatic Vitals       None           Appearance: dressed in hospital scrubs, fatigued, less somnolent, moderately obese, and slightly unkempt  Attitude:  more cooperative  Eye Contact:  fair,    Mood:  better, less anxious, and less depressed  Affect: less reactive and labile. Slightly impatient.  Speech:  normal prosody, no rambling. Voice at normal volume.  Psychomotor Behavior:  no evidence of tardive dyskinesia, dystonia, or tics  Throught Process:  mostly logical, linear, and goal oriented. Less tangential   Associations:  no loose associations  Thought " Content:  no evidence of suicidal ideation or homicidal ideation, no auditory hallucinations present, and no visual hallucinations present  Insight: still impaired  Judgement:  limited  Oriented to:  time, person, and place  Attention Span and Concentration:  fair  Recent and Remote Memory:  fair    Clinical Global Impressions  First: 6/4 2/7/2025      Most recent:            Precautions:     Behavioral Orders   Procedures    Code 1 - Restrict to Unit    Code 2     For X-ray only.    Fall precautions    Routine Programming     As clinically indicated    Status 15     Every 15 minutes.          Diagnoses:     Schizoaffective disorder, bipolar disorder vs paranoid schizophrenia   Polysubstance abuse is likely.  Class 1 obesity due to excess calories without serious comorbidity with body mass index of 34.0 to 34.9 in adult          Plan:     We decreased olanzapine from 30 mg to 25 mg and increased cariprazine from 1.5 mg to 3 mg 2/13/25. No medication changes today 2/14/2025. Continue to provide support and structure with plan to discharge to [facility], proper referrals will still have to be made.     I was present with PA student who participated in the service and in the documentation of the note. I have verified the history and personally performed the physical exam and medical decision making. I agree with the assessment and plan of care as documented in the note.     Kenneth Gibson MD  [facility] Psychiatry     Total time spent was 38 minutes. Over 50% of times was spent counseling and coordination of care regarding coping skills, medication and discharge planning.

## 2025-02-14 NOTE — PLAN OF CARE
Problem: Anxiety  Goal: Anxiety Reduction or Resolution  Outcome: Not Progressing   Goal Outcome Evaluation:    Plan of Care Reviewed With: patient      Pt denied all psych symptoms except that he rated his anxiety level at 9/10, and depression at 7/10. Pt took prn hydroxyzine, tylenol, and trazodone per his requests. He was also given clonidine for BP of 163/100 after speaking with the provider on call. Please, see flow sheet. BP was noted to be high during the shift. Pt said that his anxiety, and BP has not been under control since his medications that he has been taking for a long time were recently changed. Pt agreed to have CPAP on at night due to sleep apnea, but refused to put it on for naps. He uses hospital bed. Pt had good food, and fluid intake. He spent the most part of his evening in his room. He came out occasionally. Pt was med compliant. Staff will continue to monitor.

## 2025-02-14 NOTE — PROGRESS NOTES
"SPIRITUAL HEALTH SERVICES  SPIRITUAL ASSESSMENT progres Note  [facility] ([facility]) [facility]     REFERRAL SOURCE: Follow-up    Met with Jonny in his room, we discussed how he was \"raised [place]\" and is looking into getting back \"to going to [employer] again\".     Jonny had some questions about the book of Revelation in the bible, which I answered and reassured him that it was a metaphor for what the early christians were going through at the time it was written.     Jonny shared some of his medical history and why he was in the hospital and how it was a stressful time for him.     He is interested in finding a new [employer] to attend and requested that I look for some near his house, which I assured him was possible.     I provided a self-love mercado.     I oriented pt to spiritual health services and they know they can request a visit at any time.     PLAN: Will provide a list of churches near his house per his request. Spiritual health services remains available for any follow-up or requests. For further visits please place spiritual health consults.    Thea Shepherd [facility]   Pager: [phone]    "

## 2025-02-15 PROCEDURE — 124N000002 HC R&B MH UMMC

## 2025-02-15 PROCEDURE — 97150 GROUP THERAPEUTIC PROCEDURES: CPT | Mod: GO

## 2025-02-15 PROCEDURE — 250N000013 HC RX MED GY IP 250 OP 250 PS 637: Performed by: EMERGENCY MEDICINE

## 2025-02-15 PROCEDURE — 250N000013 HC RX MED GY IP 250 OP 250 PS 637: Performed by: PSYCHIATRY & NEUROLOGY

## 2025-02-15 PROCEDURE — 250N000013 HC RX MED GY IP 250 OP 250 PS 637

## 2025-02-15 RX ADMIN — NICOTINE POLACRILEX 4 MG: 4 GUM, CHEWING BUCCAL at 21:49

## 2025-02-15 RX ADMIN — HYDROXYZINE HYDROCHLORIDE 25 MG: 25 TABLET ORAL at 16:52

## 2025-02-15 RX ADMIN — METFORMIN ER 500 MG 1000 MG: 500 TABLET ORAL at 16:52

## 2025-02-15 RX ADMIN — HYDROXYZINE HYDROCHLORIDE 25 MG: 25 TABLET ORAL at 21:49

## 2025-02-15 RX ADMIN — CLONAZEPAM 1.5 MG: 0.5 TABLET ORAL at 14:12

## 2025-02-15 RX ADMIN — TESTOSTERONE 60.75 MG: 20.25 GEL TOPICAL at 10:00

## 2025-02-15 RX ADMIN — TRAZODONE HYDROCHLORIDE 50 MG: 50 TABLET ORAL at 21:49

## 2025-02-15 RX ADMIN — GABAPENTIN 400 MG: 300 CAPSULE ORAL at 14:12

## 2025-02-15 RX ADMIN — ACETAMINOPHEN 650 MG: 325 TABLET, FILM COATED ORAL at 22:58

## 2025-02-15 RX ADMIN — NICOTINE 1 PATCH: 21 PATCH, EXTENDED RELEASE TRANSDERMAL at 12:08

## 2025-02-15 RX ADMIN — GABAPENTIN 400 MG: 300 CAPSULE ORAL at 09:42

## 2025-02-15 RX ADMIN — CALCIUM POLYCARBOPHIL 625 MG: 625 TABLET, FILM COATED ORAL at 09:41

## 2025-02-15 RX ADMIN — NAPROXEN 500 MG: 500 TABLET ORAL at 20:16

## 2025-02-15 RX ADMIN — CETIRIZINE HYDROCHLORIDE 10 MG: 10 TABLET, FILM COATED ORAL at 09:43

## 2025-02-15 RX ADMIN — ALUMINUM HYDROXIDE, MAGNESIUM HYDROXIDE, AND SIMETHICONE 30 ML: 200; 200; 20 SUSPENSION ORAL at 05:32

## 2025-02-15 RX ADMIN — GABAPENTIN 400 MG: 300 CAPSULE ORAL at 20:11

## 2025-02-15 RX ADMIN — CARIPRAZINE 3 MG: 1.5 CAPSULE, GELATIN COATED ORAL at 09:42

## 2025-02-15 RX ADMIN — CLONAZEPAM 1.5 MG: 0.5 TABLET ORAL at 20:11

## 2025-02-15 RX ADMIN — CLONAZEPAM 1.5 MG: 0.5 TABLET ORAL at 09:43

## 2025-02-15 RX ADMIN — NICOTINE POLACRILEX 4 MG: 4 GUM, CHEWING BUCCAL at 16:52

## 2025-02-15 RX ADMIN — ATOMOXETINE 10 MG: 10 CAPSULE ORAL at 09:41

## 2025-02-15 RX ADMIN — NICOTINE POLACRILEX 4 MG: 4 GUM, CHEWING BUCCAL at 20:16

## 2025-02-15 RX ADMIN — PANTOPRAZOLE SODIUM 40 MG: 40 TABLET, DELAYED RELEASE ORAL at 09:42

## 2025-02-15 RX ADMIN — Medication 3 MG: at 22:58

## 2025-02-15 RX ADMIN — NAPROXEN 500 MG: 500 TABLET ORAL at 09:41

## 2025-02-15 RX ADMIN — OLANZAPINE 25 MG: 15 TABLET, FILM COATED ORAL at 20:11

## 2025-02-15 RX ADMIN — CLONIDINE HYDROCHLORIDE 0.1 MG: 0.1 TABLET ORAL at 20:11

## 2025-02-15 RX ADMIN — NICOTINE POLACRILEX 4 MG: 4 GUM, CHEWING BUCCAL at 10:00

## 2025-02-15 ASSESSMENT — ACTIVITIES OF DAILY LIVING (ADL)
ADLS_ACUITY_SCORE: 45
ORAL_HYGIENE: INDEPENDENT
LAUNDRY: WITH SUPERVISION
ADLS_ACUITY_SCORE: 45
DRESS: INDEPENDENT
HYGIENE/GROOMING: INDEPENDENT
ADLS_ACUITY_SCORE: 45

## 2025-02-15 NOTE — PLAN OF CARE
RN: Pt A/Ox4. Pt was visible in the milieu intermittently, pt was withdrawn to self, no interactions with peers. Pt presented as flat affect, mood was calm. Pt endorsed high anxiety and mild depression earlier during this shift, PRN Atarax was give with good effect. Pt denied SI/SIB/HI, hallucinations. Pt is medication compliant, no side effects observed or reported. Pt continued to have generalized joints pain, pain was well controlled with scheduled and PRN medication. Pt is eating and drinking adequately, reports no issues with bowel or bladder. Pt uses CPAP at night.    Pt's /103 @1630, pt reported asymptomatic at that time, BP rechecked 163/113 @1740, pt remained asymptomatic, PRN cloNIDine was given as ordered. /109 @ 2015. 155/110 @ 2125, pt remained asymptomatic, scheduled cloNIDine was given. Continue to monitor pt's BP closely.    Goal Outcome Evaluation:    Plan of Care Reviewed With: patient

## 2025-02-15 NOTE — CARE PLAN
Rehab Group    Start time: 1115  End time: 1200  Patient time total: 42 minutes    attended full group    #8 attended   Group Type: occupational therapy   Group Topic Covered: balanced lifestyle, cognitive activities, coping skills, educational support, healthy leisure time, self-esteem, and social skills       Group Session Detail:     Education and group discussion provided on STM techniques with hands on Directive Sampler including social questions, cognitive processing and memory activities for concentration, cognitive challenge, follow through, frustration tolerance, coping, mood stabilization, reality based activity, creativity, lateral thinking skills, healthy leisure exploration and socialization.     Patient Response/Contribution:  socially appropriate and worked intermittently       Patient Detail:  Pt was quiet, speaking mainly only when spoken to first.  Pt sat on the periphery of the group, but did participate when it was his turn.  Pt did well with the first two activities, but as soon as the short term memory activity became more challenging he bowed out, choosing to observe only.          [id number] OT Group (2 or more in attendance)      Patient Active Problem List   Diagnosis    Benzodiazepine withdrawal, uncomplicated (H)    Alcohol intoxication, uncomplicated    Chemical dependency (H)    Atypical pneumonia    Acute lung injury associated with vaping    Schizoaffective disorder, bipolar type (H)    Delusions (H)    Paranoia (H)

## 2025-02-15 NOTE — PLAN OF CARE
Problem: Sleep Disturbance  Goal: Adequate Sleep/Rest  Outcome: Progressing   Goal Outcome Evaluation:     Pt is on fall precautions , uses a medical bed, slept for 5.5 hrs, came out once to request for headphones, pt snores, encouraged to use CPAP but refused.Safety checks completed Q15 mins. PRN Maalox administered at around 0545. Pt B/P- 144/88, P-90, Pt B/p has been high, he is being followed up by IM, pt is asymptomatic, staff will continue to monitor.

## 2025-02-15 NOTE — PLAN OF CARE
Problem: Anxiety  Goal: Anxiety Reduction or Resolution  Outcome: Progressing    Pt has spent almost all day in his room sleeping. He has been out for awhile at meal time and attended morning group. Says he feels oversedated and wants to discuss his med's with his regular provider. When asked about his mental health symptoms he said he definitely notices a positive difference. Especially in his manic type symptoms. He was noted to not make any grandiose statements. He didn't make any unusual comments. He just seemed tired. No physical complaints. Says his depression and anxiety are minimal. Says it's hard to use his CPAP due to discomfort. Very unkempt.

## 2025-02-16 PROCEDURE — 250N000013 HC RX MED GY IP 250 OP 250 PS 637

## 2025-02-16 PROCEDURE — 250N000013 HC RX MED GY IP 250 OP 250 PS 637: Performed by: PSYCHIATRY & NEUROLOGY

## 2025-02-16 PROCEDURE — 250N000013 HC RX MED GY IP 250 OP 250 PS 637: Performed by: EMERGENCY MEDICINE

## 2025-02-16 PROCEDURE — 124N000002 HC R&B MH UMMC

## 2025-02-16 RX ADMIN — ACETAMINOPHEN 650 MG: 325 TABLET, FILM COATED ORAL at 21:24

## 2025-02-16 RX ADMIN — CETIRIZINE HYDROCHLORIDE 10 MG: 10 TABLET, FILM COATED ORAL at 08:13

## 2025-02-16 RX ADMIN — OLANZAPINE 25 MG: 15 TABLET, FILM COATED ORAL at 21:24

## 2025-02-16 RX ADMIN — CLONAZEPAM 1.5 MG: 0.5 TABLET ORAL at 14:12

## 2025-02-16 RX ADMIN — CARIPRAZINE 3 MG: 1.5 CAPSULE, GELATIN COATED ORAL at 08:13

## 2025-02-16 RX ADMIN — GABAPENTIN 400 MG: 300 CAPSULE ORAL at 21:24

## 2025-02-16 RX ADMIN — TESTOSTERONE 60.75 MG: 20.25 GEL TOPICAL at 08:58

## 2025-02-16 RX ADMIN — NICOTINE POLACRILEX 4 MG: 4 GUM, CHEWING BUCCAL at 19:17

## 2025-02-16 RX ADMIN — NAPROXEN 500 MG: 500 TABLET ORAL at 18:15

## 2025-02-16 RX ADMIN — CLONAZEPAM 1.5 MG: 0.5 TABLET ORAL at 08:13

## 2025-02-16 RX ADMIN — NICOTINE POLACRILEX 4 MG: 4 GUM, CHEWING BUCCAL at 14:12

## 2025-02-16 RX ADMIN — METFORMIN ER 500 MG 1000 MG: 500 TABLET ORAL at 18:15

## 2025-02-16 RX ADMIN — NICOTINE 1 PATCH: 21 PATCH, EXTENDED RELEASE TRANSDERMAL at 09:06

## 2025-02-16 RX ADMIN — HYDROXYZINE HYDROCHLORIDE 25 MG: 25 TABLET ORAL at 15:25

## 2025-02-16 RX ADMIN — NAPROXEN 500 MG: 500 TABLET ORAL at 08:13

## 2025-02-16 RX ADMIN — CLONAZEPAM 1.5 MG: 0.5 TABLET ORAL at 21:25

## 2025-02-16 RX ADMIN — ATOMOXETINE 10 MG: 10 CAPSULE ORAL at 08:13

## 2025-02-16 RX ADMIN — HYDROXYZINE HYDROCHLORIDE 25 MG: 25 TABLET ORAL at 19:17

## 2025-02-16 RX ADMIN — GABAPENTIN 400 MG: 300 CAPSULE ORAL at 08:13

## 2025-02-16 RX ADMIN — TRAZODONE HYDROCHLORIDE 50 MG: 50 TABLET ORAL at 21:27

## 2025-02-16 RX ADMIN — CALCIUM POLYCARBOPHIL 625 MG: 625 TABLET, FILM COATED ORAL at 08:13

## 2025-02-16 RX ADMIN — Medication 3 MG: at 21:27

## 2025-02-16 RX ADMIN — CLONIDINE HYDROCHLORIDE 0.1 MG: 0.1 TABLET ORAL at 21:25

## 2025-02-16 RX ADMIN — GABAPENTIN 400 MG: 300 CAPSULE ORAL at 14:12

## 2025-02-16 RX ADMIN — NICOTINE POLACRILEX 4 MG: 4 GUM, CHEWING BUCCAL at 15:25

## 2025-02-16 RX ADMIN — ACETAMINOPHEN 650 MG: 325 TABLET, FILM COATED ORAL at 15:25

## 2025-02-16 RX ADMIN — NICOTINE POLACRILEX 4 MG: 4 GUM, CHEWING BUCCAL at 21:24

## 2025-02-16 RX ADMIN — PANTOPRAZOLE SODIUM 40 MG: 40 TABLET, DELAYED RELEASE ORAL at 08:13

## 2025-02-16 RX ADMIN — NICOTINE POLACRILEX 4 MG: 4 GUM, CHEWING BUCCAL at 09:07

## 2025-02-16 ASSESSMENT — ACTIVITIES OF DAILY LIVING (ADL)
ADLS_ACUITY_SCORE: 45
ADLS_ACUITY_SCORE: 55
HYGIENE/GROOMING: SHOWER;INDEPENDENT;PROMPTS
ADLS_ACUITY_SCORE: 45
ADLS_ACUITY_SCORE: 45
LAUNDRY: WITH SUPERVISION
ADLS_ACUITY_SCORE: 45
ORAL_HYGIENE: INDEPENDENT;PROMPTS
ADLS_ACUITY_SCORE: 45
ADLS_ACUITY_SCORE: 55
ADLS_ACUITY_SCORE: 55
ADLS_ACUITY_SCORE: 45
ADLS_ACUITY_SCORE: 55
ADLS_ACUITY_SCORE: 45
ADLS_ACUITY_SCORE: 45
DRESS: INDEPENDENT

## 2025-02-16 NOTE — PLAN OF CARE
Goal Outcome Evaluation:    Pt was observed in the milieu briefly for meals otherwise isolative and withdrawn to his room resting in bed. Pt presented with bright affect and anxious mood. Dylon denied SI/SIB/HI/AVH but endorsed anxiety and depression 7/10 this shift. He appears unkempt and shower offered but declined. Pt was medication compliant this shift and denied any side effects. Reported appetite and sleep as adequate. Staff will continue to monitor and support with current POC.      Plan of Care Reviewed With: patient

## 2025-02-16 NOTE — PLAN OF CARE
Problem: Anxiety  Goal: Anxiety Reduction or Resolution  Outcome: Progressing    Pt continues to say he is very tired. Wants to review med's with psychiatrist tomorrow. Says his mental status is great. Very low anxiety and depression.  Denies SI/HI or thought of SIB. Mood is stable during interactions. Pleasant and friendly. Keeps to himself, but mostly not in the milieu. No unusual comments or behaviors.  Alert and oriented x 4. Appetite normal. He had a very nice visit with his parents.

## 2025-02-16 NOTE — PLAN OF CARE
Problem: Adult Behavioral Health Plan of Care  Goal: Adheres to Safety Considerations for Self and Others  Outcome: Progressing     Problem: Sleep Disturbance  Goal: Adequate Sleep/Rest  Outcome: Progressing     Problem: Depressive Signs/Symptoms  Goal: Optimized Energy Level (Depressive Signs/Symptoms)  Outcome: Progressing   Goal Outcome Evaluation:  Patient is alert, and oriented, and able to communicate his needs without any difficulty. Patient denied pain, depression, anxiety, SI/SIB, hallucinations, and other mental illness symptoms. Patient came out of his room and requested for a snack which he was given, and went back to his room and slept. Patient slept on, and off, and had 6.75  hours of sleep  this shift. No other concerns at this time, monitoring continues.

## 2025-02-17 PROCEDURE — 250N000013 HC RX MED GY IP 250 OP 250 PS 637: Performed by: EMERGENCY MEDICINE

## 2025-02-17 PROCEDURE — 250N000013 HC RX MED GY IP 250 OP 250 PS 637: Performed by: PSYCHIATRY & NEUROLOGY

## 2025-02-17 PROCEDURE — 250N000013 HC RX MED GY IP 250 OP 250 PS 637

## 2025-02-17 PROCEDURE — 250N000013 HC RX MED GY IP 250 OP 250 PS 637: Performed by: STUDENT IN AN ORGANIZED HEALTH CARE EDUCATION/TRAINING PROGRAM

## 2025-02-17 PROCEDURE — 124N000002 HC R&B MH UMMC

## 2025-02-17 PROCEDURE — 99232 SBSQ HOSP IP/OBS MODERATE 35: CPT | Performed by: STUDENT IN AN ORGANIZED HEALTH CARE EDUCATION/TRAINING PROGRAM

## 2025-02-17 RX ORDER — GABAPENTIN 300 MG/1
300 CAPSULE ORAL DAILY PRN
Status: DISPENSED | OUTPATIENT
Start: 2025-02-17

## 2025-02-17 RX ORDER — GABAPENTIN 600 MG/1
600 TABLET ORAL 3 TIMES DAILY
Status: DISPENSED | OUTPATIENT
Start: 2025-02-17

## 2025-02-17 RX ADMIN — NICOTINE POLACRILEX 4 MG: 4 GUM, CHEWING BUCCAL at 17:44

## 2025-02-17 RX ADMIN — METFORMIN ER 500 MG 1000 MG: 500 TABLET ORAL at 17:42

## 2025-02-17 RX ADMIN — TESTOSTERONE 60.75 MG: 20.25 GEL TOPICAL at 08:50

## 2025-02-17 RX ADMIN — NAPROXEN 500 MG: 500 TABLET ORAL at 08:52

## 2025-02-17 RX ADMIN — GABAPENTIN 600 MG: 600 TABLET, FILM COATED ORAL at 14:50

## 2025-02-17 RX ADMIN — HYDROXYZINE HYDROCHLORIDE 25 MG: 25 TABLET ORAL at 19:17

## 2025-02-17 RX ADMIN — PANTOPRAZOLE SODIUM 40 MG: 40 TABLET, DELAYED RELEASE ORAL at 08:52

## 2025-02-17 RX ADMIN — NICOTINE POLACRILEX 4 MG: 4 GUM, CHEWING BUCCAL at 21:00

## 2025-02-17 RX ADMIN — GABAPENTIN 400 MG: 300 CAPSULE ORAL at 08:51

## 2025-02-17 RX ADMIN — CLONAZEPAM 1.5 MG: 0.5 TABLET ORAL at 13:35

## 2025-02-17 RX ADMIN — GABAPENTIN 600 MG: 600 TABLET, FILM COATED ORAL at 20:50

## 2025-02-17 RX ADMIN — CARIPRAZINE 3 MG: 1.5 CAPSULE, GELATIN COATED ORAL at 08:51

## 2025-02-17 RX ADMIN — ACETAMINOPHEN 650 MG: 325 TABLET, FILM COATED ORAL at 19:17

## 2025-02-17 RX ADMIN — CLONIDINE HYDROCHLORIDE 0.1 MG: 0.1 TABLET ORAL at 20:46

## 2025-02-17 RX ADMIN — NICOTINE 1 PATCH: 21 PATCH, EXTENDED RELEASE TRANSDERMAL at 08:51

## 2025-02-17 RX ADMIN — CALCIUM POLYCARBOPHIL 625 MG: 625 TABLET, FILM COATED ORAL at 08:51

## 2025-02-17 RX ADMIN — OLANZAPINE 25 MG: 15 TABLET, FILM COATED ORAL at 20:50

## 2025-02-17 RX ADMIN — ATOMOXETINE 10 MG: 10 CAPSULE ORAL at 08:53

## 2025-02-17 RX ADMIN — CETIRIZINE HYDROCHLORIDE 10 MG: 10 TABLET, FILM COATED ORAL at 08:51

## 2025-02-17 RX ADMIN — ACETAMINOPHEN 650 MG: 325 TABLET, FILM COATED ORAL at 13:36

## 2025-02-17 RX ADMIN — GABAPENTIN 300 MG: 300 CAPSULE ORAL at 17:59

## 2025-02-17 RX ADMIN — NAPROXEN 500 MG: 500 TABLET ORAL at 17:42

## 2025-02-17 RX ADMIN — CLONAZEPAM 1.5 MG: 0.5 TABLET ORAL at 08:51

## 2025-02-17 RX ADMIN — TRAZODONE HYDROCHLORIDE 50 MG: 50 TABLET ORAL at 21:58

## 2025-02-17 RX ADMIN — CLONAZEPAM 1.5 MG: 0.5 TABLET ORAL at 20:50

## 2025-02-17 RX ADMIN — HYDROXYZINE HYDROCHLORIDE 25 MG: 25 TABLET ORAL at 13:34

## 2025-02-17 RX ADMIN — NICOTINE POLACRILEX 4 MG: 4 GUM, CHEWING BUCCAL at 19:19

## 2025-02-17 ASSESSMENT — ACTIVITIES OF DAILY LIVING (ADL)
ADLS_ACUITY_SCORE: 45
ADLS_ACUITY_SCORE: 55
DRESS: INDEPENDENT;SCRUBS (BEHAVIORAL HEALTH)
ADLS_ACUITY_SCORE: 45
ADLS_ACUITY_SCORE: 55
ADLS_ACUITY_SCORE: 45
ADLS_ACUITY_SCORE: 45
HYGIENE/GROOMING: INDEPENDENT
ADLS_ACUITY_SCORE: 45
ADLS_ACUITY_SCORE: 55
ADLS_ACUITY_SCORE: 45
HYGIENE/GROOMING: INDEPENDENT
ADLS_ACUITY_SCORE: 55
ADLS_ACUITY_SCORE: 45
LAUNDRY: UNABLE TO COMPLETE
ADLS_ACUITY_SCORE: 55
ADLS_ACUITY_SCORE: 45
ADLS_ACUITY_SCORE: 45
ADLS_ACUITY_SCORE: 55
ADLS_ACUITY_SCORE: 45
ADLS_ACUITY_SCORE: 45
ADLS_ACUITY_SCORE: 55
ADLS_ACUITY_SCORE: 45
ORAL_HYGIENE: INDEPENDENT
ORAL_HYGIENE: INDEPENDENT

## 2025-02-17 NOTE — PLAN OF CARE
Problem: Adult Behavioral Health Plan of Care  Goal: Optimized Coping Skills in Response to Life Stressors  Outcome: Progressing   Goal Outcome Evaluation:               Pt. Reports feeling frustrated and hopeless over his hospital stay, pt has poor insight into his mental illness and is upset that his parents put him here,  Pt. is unorganized, forgetful at times blames it on his lymes disease. pt. Hyper verbal, restless, presents with a anxious affect, pleasant on approach. Pt spent half the shift resting in bed with out his C-CAP, easily awoken for meals and medication, adequate appetite, visible in the lounge late afternoon, declined groups.      Pt endorses high depression and high anxiety PRN Hydroxyzine helpful, C/O of generalized body pain PRN Tylenol administered, denies SI/HI/SIB and hallucinations, medication compliance and contracted for safety.     Blood pressure (!) 160/81, pulse 87, temperature 98.3  F (36.8  C), temperature source Temporal, resp. rate 16, weight (!) 145.2 kg (320 lb), SpO2 97%.

## 2025-02-17 NOTE — PLAN OF CARE
Team Note Due:  Friday    Assessment/Intervention/Current Symtoms and Care Coordination:  Chart review and met with team, discussed pt progress, symptomology, and response to treatment.  Discussed the discharge plan and any potential impediments to discharge.    In team it was reported that Jonny is in denial about mental illness and has poor insight.    Made [facility] referrals to: 2 locations of [facility].    Discharge Plan or Goal:  [facility]     Barriers to Discharge:  Severity of symptoms  Parents unwilling to take him back.     Referral Status:  [employer] [facility]      Legal Status:  Voluntary    Contacts (include KATRINA status):  Lacey Gramajo (Mom): [phone]  Tomas Gramajo (Dad): [phone]     Upcoming Meetings and Dates/Important Information and next steps:  None

## 2025-02-17 NOTE — PLAN OF CARE
"Goal Outcome Evaluation:    Pt was observed in the milieu briefly for meals, medications and snacks otherwise isolative and withdrawn to his room resting in bed. Did not attend unit group this shift. Pt presented with bright affect and anxious mood. Dylon denied SI/SIB/HI/AVH but endorsed anxiety and depression 7.5/10 this shift. Pt voiced frustration on how the treatment  team is recommending 90 days IRTS treatment and how he feels he is sedated and tired all the time. Informed him to voice concerns to attending tomorrow. He appears unkempt and shower offered but declined stating : I will shower tomorrow\" Pt was medication compliant this shift and denied any side effects. Continues to endorse Joint pain this shift and PRNs and scheduled meds utilized. Reported appetite and sleep as adequate. Staff will continue to monitor and support with current POC.     Plan of Care Reviewed With: patient        "

## 2025-02-17 NOTE — PLAN OF CARE
Problem: Adult Behavioral Health Plan of Care  Goal: Adheres to Safety Considerations for Self and Others  Outcome: Progressing     Problem: Suicide Risk  Goal: Absence of Self-Harm  Outcome: Progressing     Problem: Sleep Disturbance  Goal: Adequate Sleep/Rest  Outcome: Progressing   Goal Outcome Evaluation:  Patient is alert, oriented, and able to articulate needs well. Patient came out of his room and requested for snacks which was provided, patient went back to his room and slept well. Denied pain, depression, anxiety, hallucinations, SI/SIB and commits to safety. Patient slept for 6.75  hours this shift. No other concerns at this, team will continue to implement plan of care, and support patient for needs.

## 2025-02-17 NOTE — PROGRESS NOTES
"  -------------------------------------------------------------------------------------  [facility], [place]   Psychiatric Progress Note  Hospital Day #11  Date of Service: 02/17/2025    Interval History:  The patient's care was discussed with the treatment team and chart notes were reviewed.    Sleep: 6.75 hours (02/17/25 0700)  PRN medications:   Last 24H PRN:     acetaminophen (TYLENOL) tablet 650 mg, 650 mg at 02/17/25 1336    hydrOXYzine HCl (ATARAX) tablet 25 mg, 25 mg at 02/17/25 1334    melatonin tablet 3 mg, 3 mg at 02/16/25 2127    nicotine polacrilex (NICORETTE) gum 4 mg, 4 mg at 02/16/25 2124    traZODone (DESYREL) tablet 50 mg, 50 mg at 02/16/25 2127  Staff Report:   No acute safety concerns. See staff notes for additional details.     Patient Interview:   Today, Jonny was interviewed in his room. When asked how he is doing, he says that he has chronic Lyme disease. He also says that he needs his Klonopin for his mood, and that he has been on it since 2007. He notes feeling like he falls asleep all the time because of his chronic lyme, and that he has many tick born illnesses from growing [place] in the [place]. He says that he has encephalopathy right now. He says that his sleep has been good and that he \"sleeps like a rock.\" When asked about his sleep apnea, he notes that the CPAP here does not fit his face right and the nose option isn't good either. He says it makes him feel like he is suffocating. The team mentioned the possibility of a nighttime mouthguard for sleep apnea. It was advised that he needs his sleep apnea under control and that his Klonopin be reduced for his safety. Jonny also expresses concerns regarding going to an [employer] as he is afraid of violent people. He says that people were having methamphetamine parties at his previous IRTS. He mentions multiple of his accounts being hacked and seeing scary AI generated pictures of himself. He says his mother doesn't " "believe him and thinks it is \"all schizoaffective stuff.\" He says he feels safe in the hospital, but that \"they could get in here through the doors with a card flipper.\"    Physical Examination:  BP (!) 160/81   Pulse 87   Temp 98.3  F (36.8  C) (Temporal)   Resp 16   Wt (!) 145.2 kg (320 lb)   SpO2 97%   BMI 37.94 kg/m    Weight is 320 lbs 0 oz  Body mass index is 37.94 kg/m .    Appearance: dressed in hospital scrubs, fatigued, less somnolent, moderately obese, and slightly unkempt  Attitude:  more cooperative  Eye Contact:  fair,    Mood:  better, less anxious, and less depressed  Affect: less reactive and labile. Slightly impatient.  Speech:  normal prosody, no rambling. Voice at normal volume.  Psychomotor Behavior:  no evidence of tardive dyskinesia, dystonia, or tics  Throught Process:  mostly logical, linear, and goal oriented. Less tangential   Associations:  no loose associations  Thought Content:  no evidence of suicidal ideation or homicidal ideation, no auditory hallucinations present, and no visual hallucinations present  Insight: still impaired  Judgement:  limited  Oriented to:  time, person, and place  Attention Span and Concentration:  fair  Recent and Remote Memory:  fair    Liver/kidney function Metabolic CBC   Recent Labs   Lab Test 02/06/25  0408 06/07/24  0705 06/05/24  1242 06/04/24  1024   CR 0.76 0.79   < > 0.66*   AST 21  --   --  19   ALT 29  --   --  23   ALKPHOS 80  --   --  79    < > = values in this interval not displayed.    Recent Labs   Lab Test 06/03/24  1029 03/22/21  1646 10/12/17  0804   A1C 5.7*   < >  --    TSH  --   --  1.54    < > = values in this interval not displayed.    Recent Labs   Lab Test 02/06/25  0408   WBC 9.4   HGB 15.3   HCT 43.1   MCV 89             Lab results in the last 24 hours:  No results found for this or any previous visit (from the past 24 hours).    Assessment:  Diagnoses:  Schizoaffective disorder, bipolar disorder vs paranoid " schizophrenia   Polysubstance abuse is likely.  Class 1 obesity due to excess calories without serious comorbidity with body mass index of 34.0 to 34.9 in adult     Clinically Significant Risk Factors   # Financial/Environmental Concerns: none      Jonny is a 39 year old male with schizoaffective disorder, currently admitted on hospital day 11 for psychosis in the context of unclear triggers. Predominant symptoms are delusional beliefs and paranoia.     Today, Jonny was seen for the first time by this provider. He reports physical symptoms related to chronic Lyme disease and denies mental illness, though he does report that the recent increase in cariprazine was helpful. We discussed the importance of managing his sleep apnea to address fatigue. We also raised the topic of potentially switching to clozapine. He is reluctant but seems willing to continue the discussion. He reports anxiety and frustration with the recent reduction in clonazepam. We agreed to an increase in gabapentin.    Plan:  - Olanzapine 25mg qhs  - Cariprazine 3mg daily  - Atomoxetine 10mg daily  - Clonazepam 1.5mg TID  - Clonidine 0.1mg at bedtime  - Gabapentin: increase from 400mg TID to 600mg TID + 300mg daily PRN    Patient will be treated in therapeutic milieu with appropriate individual and group therapies as described.    Medical diagnoses to be addressed this admission:    Continue PTA cetirizine, metformin, naproxen, pantoprazole, testosterone gel     Consults:   Internal medicine for hypertension    Psychiatric Hospital course:   Jonny Gramajo was [facility] to [facility] as a voluntary patient on 2/6/2025. His PTA medications were continued.  - 2/7: clonidine 0.1mg qhs initiated, cariprazine 1.5mg daily initiated  - 2/8: gabapentin 400mg TID initiated  - 2/10: decrease clonazepam from 2mg TID to 1.5mg TID, initiate atomoxetine 10mg daily  - 2/13: increase cariprazine to 3mg daily, decrease olanzapine from 30mg qhs to 25mg qhs  - 2/17:  increase gabapentin to 600mg TID    Legal Status:   Orders Placed This Encounter      Legal status Voluntary      Disposition: TBD, pending stabilization & development of a safe discharge plan.       Safety Assessment:   Behavioral Orders   Procedures    Code 1 - Restrict to Unit    Code 2     For X-ray only.    Fall precautions    Routine Programming     As clinically indicated    Status 15     Every 15 minutes.       Current Facility-Administered Medications   Medication Dose Route Frequency Provider Last Rate Last Admin    atomoxetine (STRATTERA) capsule 10 mg  10 mg Oral Daily Kenneth Gibson MD   10 mg at 02/17/25 0853    calcium polycarbophil (FIBERCON) tablet 625 mg  625 mg Oral Daily Kenneth Gibson MD   625 mg at 02/17/25 0851    cariprazine (VRAYLAR) capsule 3 mg  3 mg Oral Daily Kenneth Gibson MD   3 mg at 02/17/25 0851    cetirizine (zyrTEC) tablet 10 mg  10 mg Oral Daily Kenneth Gibson MD   10 mg at 02/17/25 0851    clonazePAM (klonoPIN) tablet 1.5 mg  1.5 mg Oral TID Kenneth Gibson MD   1.5 mg at 02/17/25 1335    cloNIDine (CATAPRES) tablet 0.1 mg  0.1 mg Oral At Bedtime Kenneth Gibson MD   0.1 mg at 02/16/25 2125    gabapentin (NEURONTIN) tablet 600 mg  600 mg Oral TID Irina Du MD   600 mg at 02/17/25 1450    metFORMIN (GLUCOPHAGE XR) 24 hr tablet 1,000 mg  1,000 mg Oral Daily with supper Kenneth Gibson MD   1,000 mg at 02/16/25 1815    naproxen (NAPROSYN) tablet 500 mg  500 mg Oral BID w/meals Kenneth Gibson MD   500 mg at 02/17/25 0852    nicotine (NICODERM CQ) 21 MG/24HR 24 hr patch 1 patch  1 patch Transdermal Daily Kenneth Gibson MD   1 patch at 02/17/25 0851    OLANZapine (zyPREXA) tablet 25 mg  25 mg Oral At Bedtime Kenneth Gibson MD   25 mg at 02/16/25 2124    pantoprazole (PROTONIX) EC tablet 40 mg  40 mg Oral Daily Andriy Holman MD   40 mg at 02/17/25 0884    testosterone (ANDROGEL) topical gel 60.75 mg  " 60.75 mg Transdermal Kenneth Rao MD   60.75 mg at 02/17/25 0850     Current Facility-Administered Medications   Medication Dose Route Frequency Provider Last Rate Last Admin    acetaminophen (TYLENOL) tablet 650 mg  650 mg Oral Q4H PRN Rubi Shukla MD   650 mg at 02/17/25 1336    alum & mag hydroxide-simethicone (MAALOX) suspension 30 mL  30 mL Oral Q4H PRN Rubi Shukla MD   30 mL at 02/15/25 0532    carboxymethylcellulose PF (REFRESH PLUS) 0.5 % ophthalmic solution 1 drop  1 drop Both Eyes Q1H PRN Naegele, Debra Ann, APRN CNS        cloNIDine (CATAPRES) tablet 0.1 mg  0.1 mg Oral Daily PRN Naegele, Debra Ann, APRN CNS   0.1 mg at 02/14/25 1743    gabapentin (NEURONTIN) capsule 300 mg  300 mg Oral Daily PRN Irina Du MD        hydrOXYzine HCl (ATARAX) tablet 25 mg  25 mg Oral Q4H PRN Rubi Shukla MD   25 mg at 02/17/25 1334    melatonin tablet 3 mg  3 mg Oral At Bedtime PRN Kenneth Gibson MD   3 mg at 02/16/25 2127    nicotine polacrilex (NICORETTE) gum 4 mg  4 mg Buccal Q1H PRN Kenneth Gibson MD   4 mg at 02/16/25 2124    OLANZapine (zyPREXA) tablet 10 mg  10 mg Oral TID PRN Rubi Shukla MD   10 mg at 02/12/25 1259    Or    OLANZapine (zyPREXA) injection 10 mg  10 mg Intramuscular TID PRN Rubi Shukla MD        senna-docusate (SENOKOT-S/PERICOLACE) 8.6-50 MG per tablet 1 tablet  1 tablet Oral BID PRN Rubi Shukla MD        traZODone (DESYREL) tablet 50 mg  50 mg Oral At Bedtime PRN Rubi Shukla MD   50 mg at 02/16/25 2127       Allergies   Allergen Reactions    Abilify [Aripiprazole]      Patient reports tardive dyskinesia effect in 2004 but likely akathisia since patient reports the effects only occurred while on med and resolved with a few days after discontinuing med.     Wellbutrin [Bupropion] Anxiety     Patient reports felt \"reved\" up and anxious when taken in 2001.        Irina Du MD, PhD  02/17/2025    "

## 2025-02-18 PROCEDURE — 250N000013 HC RX MED GY IP 250 OP 250 PS 637: Performed by: EMERGENCY MEDICINE

## 2025-02-18 PROCEDURE — 250N000013 HC RX MED GY IP 250 OP 250 PS 637: Performed by: PSYCHIATRY & NEUROLOGY

## 2025-02-18 PROCEDURE — 124N000002 HC R&B MH UMMC

## 2025-02-18 PROCEDURE — 250N000013 HC RX MED GY IP 250 OP 250 PS 637: Performed by: STUDENT IN AN ORGANIZED HEALTH CARE EDUCATION/TRAINING PROGRAM

## 2025-02-18 PROCEDURE — 99232 SBSQ HOSP IP/OBS MODERATE 35: CPT | Performed by: STUDENT IN AN ORGANIZED HEALTH CARE EDUCATION/TRAINING PROGRAM

## 2025-02-18 RX ADMIN — ACETAMINOPHEN 650 MG: 325 TABLET, FILM COATED ORAL at 21:36

## 2025-02-18 RX ADMIN — CALCIUM POLYCARBOPHIL 625 MG: 625 TABLET, FILM COATED ORAL at 08:12

## 2025-02-18 RX ADMIN — NICOTINE 1 PATCH: 21 PATCH, EXTENDED RELEASE TRANSDERMAL at 08:12

## 2025-02-18 RX ADMIN — GABAPENTIN 600 MG: 600 TABLET, FILM COATED ORAL at 20:20

## 2025-02-18 RX ADMIN — METFORMIN ER 500 MG 1000 MG: 500 TABLET ORAL at 17:56

## 2025-02-18 RX ADMIN — OLANZAPINE 25 MG: 15 TABLET, FILM COATED ORAL at 20:20

## 2025-02-18 RX ADMIN — NICOTINE POLACRILEX 4 MG: 4 GUM, CHEWING BUCCAL at 14:08

## 2025-02-18 RX ADMIN — CLONAZEPAM 1.5 MG: 0.5 TABLET ORAL at 08:12

## 2025-02-18 RX ADMIN — CLONIDINE HYDROCHLORIDE 0.1 MG: 0.1 TABLET ORAL at 20:20

## 2025-02-18 RX ADMIN — NAPROXEN 500 MG: 500 TABLET ORAL at 08:12

## 2025-02-18 RX ADMIN — ACETAMINOPHEN 650 MG: 325 TABLET, FILM COATED ORAL at 02:03

## 2025-02-18 RX ADMIN — PANTOPRAZOLE SODIUM 40 MG: 40 TABLET, DELAYED RELEASE ORAL at 08:12

## 2025-02-18 RX ADMIN — CLONAZEPAM 1.5 MG: 0.5 TABLET ORAL at 14:08

## 2025-02-18 RX ADMIN — CETIRIZINE HYDROCHLORIDE 10 MG: 10 TABLET, FILM COATED ORAL at 08:12

## 2025-02-18 RX ADMIN — NICOTINE POLACRILEX 4 MG: 4 GUM, CHEWING BUCCAL at 21:36

## 2025-02-18 RX ADMIN — GABAPENTIN 600 MG: 600 TABLET, FILM COATED ORAL at 14:08

## 2025-02-18 RX ADMIN — TESTOSTERONE 60.75 MG: 20.25 GEL TOPICAL at 08:12

## 2025-02-18 RX ADMIN — NICOTINE POLACRILEX 4 MG: 4 GUM, CHEWING BUCCAL at 16:57

## 2025-02-18 RX ADMIN — Medication 3 MG: at 21:36

## 2025-02-18 RX ADMIN — NICOTINE POLACRILEX 4 MG: 4 GUM, CHEWING BUCCAL at 20:28

## 2025-02-18 RX ADMIN — HYDROXYZINE HYDROCHLORIDE 25 MG: 25 TABLET ORAL at 20:28

## 2025-02-18 RX ADMIN — NAPROXEN 500 MG: 500 TABLET ORAL at 17:56

## 2025-02-18 RX ADMIN — CLONAZEPAM 1.5 MG: 0.5 TABLET ORAL at 20:19

## 2025-02-18 RX ADMIN — TRAZODONE HYDROCHLORIDE 50 MG: 50 TABLET ORAL at 21:36

## 2025-02-18 RX ADMIN — ATOMOXETINE 10 MG: 10 CAPSULE ORAL at 08:13

## 2025-02-18 RX ADMIN — CARIPRAZINE 3 MG: 1.5 CAPSULE, GELATIN COATED ORAL at 08:12

## 2025-02-18 RX ADMIN — GABAPENTIN 600 MG: 600 TABLET, FILM COATED ORAL at 08:13

## 2025-02-18 ASSESSMENT — ACTIVITIES OF DAILY LIVING (ADL)
DRESS: INDEPENDENT
DRESS: INDEPENDENT
ADLS_ACUITY_SCORE: 45
ADLS_ACUITY_SCORE: 45
LAUNDRY: UNABLE TO COMPLETE
ADLS_ACUITY_SCORE: 45
ADLS_ACUITY_SCORE: 45
ORAL_HYGIENE: INDEPENDENT
ADLS_ACUITY_SCORE: 45
ADLS_ACUITY_SCORE: 45
HYGIENE/GROOMING: INDEPENDENT
ADLS_ACUITY_SCORE: 45
HYGIENE/GROOMING: INDEPENDENT
ADLS_ACUITY_SCORE: 45
LAUNDRY: UNABLE TO COMPLETE
ADLS_ACUITY_SCORE: 45
ORAL_HYGIENE: INDEPENDENT
ADLS_ACUITY_SCORE: 45
ADLS_ACUITY_SCORE: 45

## 2025-02-18 NOTE — PLAN OF CARE
Goal Outcome Evaluation:    The patient was sleeping at the start of the shift. Patient woke up around 0200 for snack and asked for Tylenol for joint and back pain, rating it at 5/10. After eating snack the patient went back to bed and has been sleeping since than. Hours slept are: 6

## 2025-02-18 NOTE — PROGRESS NOTES
-------------------------------------------------------------------------------------  [facility], [place]   Psychiatric Progress Note  Hospital Day #12  Date of Service: 02/18/2025    Interval History:  The patient's care was discussed with the treatment team and chart notes were reviewed.    Sleep: 6.75 hours (02/17/25 0700)  PRN medications:   Last 24H PRN:     acetaminophen (TYLENOL) tablet 650 mg, 650 mg at 02/18/25 0203    gabapentin (NEURONTIN) capsule 300 mg, 300 mg at 02/17/25 1759    hydrOXYzine HCl (ATARAX) tablet 25 mg, 25 mg at 02/17/25 1917    nicotine polacrilex (NICORETTE) gum 4 mg, 4 mg at 02/18/25 1408    traZODone (DESYREL) tablet 50 mg, 50 mg at 02/17/25 3118  Staff Report:   No acute safety concerns. See staff notes for additional details.     Patient Interview:   Patient was interviewed in his room today. He says that he is feeling good, but drowsy, and that his mood is good. He also notes feeling  up beat.  He doesn't know why his CPAP was taken away, but he is willing to use his home CPAP if his mother brings it in. Team recommend goal of getting patient's sleep apnea under control and patient was agreeable for the sake of reducing variables for identifying causes of his symptoms.     Physical Examination:  BP (!) 160/109   Pulse 104   Temp 97.9  F (36.6  C) (Temporal)   Resp 16   Wt (!) 145.3 kg (320 lb 6.4 oz)   SpO2 96%   BMI 37.99 kg/m    Weight is 320 lbs 6.4 oz  Body mass index is 37.99 kg/m .    Appearance: dressed in hospital scrubs, fatigued, less somnolent, moderately obese, and slightly unkempt  Attitude:  more cooperative  Eye Contact:  fair,    Mood:  better, less anxious, and less depressed  Affect: less reactive and labile. Slightly impatient.  Speech:  normal prosody, no rambling. Voice at normal volume.  Psychomotor Behavior:  no evidence of tardive dyskinesia, dystonia, or tics  Throught Process:  mostly logical, linear, and goal  oriented. Less tangential   Associations:  no loose associations  Thought Content:  no evidence of suicidal ideation or homicidal ideation, no auditory hallucinations present, and no visual hallucinations present  Insight: still impaired  Judgement:  limited  Oriented to:  time, person, and place  Attention Span and Concentration:  fair  Recent and Remote Memory:  fair    Liver/kidney function Metabolic CBC   Recent Labs   Lab Test 02/06/25  0408 06/07/24  0705 06/05/24  1242 06/04/24  1024   CR 0.76 0.79   < > 0.66*   AST 21  --   --  19   ALT 29  --   --  23   ALKPHOS 80  --   --  79    < > = values in this interval not displayed.    Recent Labs   Lab Test 06/03/24  1029 03/22/21  1646 10/12/17  0804   A1C 5.7*   < >  --    TSH  --   --  1.54    < > = values in this interval not displayed.    Recent Labs   Lab Test 02/06/25  0408   WBC 9.4   HGB 15.3   HCT 43.1   MCV 89             Lab results in the last 24 hours:  No results found for this or any previous visit (from the past 24 hours).    Assessment:  Diagnoses:  Schizoaffective disorder, bipolar disorder vs paranoid schizophrenia   Polysubstance abuse is likely.  Class 1 obesity due to excess calories without serious comorbidity with body mass index of 34.0 to 34.9 in adult     Clinically Significant Risk Factors   # Financial/Environmental Concerns: none      Jonny is a 39 year old male with schizoaffective disorder, currently admitted on hospital day 12 for psychosis in the context of unclear triggers. Predominant symptoms are delusional beliefs and paranoia.     Today, Jonny appears less irritable and calmer. The CPAP machine that had been provided was removed from his room and we aren't sure why, though I suspect it was because they hadn't been able to obtain a mask that fit. He is willing to have his mother bring in his machine from home. No changes today.    Plan:  - Olanzapine 25mg qhs  - Cariprazine 3mg daily  - Atomoxetine 10mg daily  -  Clonazepam 1.5mg TID  - Clonidine 0.1mg at bedtime  - Gabapentin 600mg TID + 300mg daily PRN    Patient will be treated in therapeutic milieu with appropriate individual and group therapies as described.    Medical diagnoses to be addressed this admission:    Continue PTA cetirizine, metformin, naproxen, pantoprazole, testosterone gel     Consults:   Internal medicine for hypertension    Psychiatric Hospital course:   Jonny Gramajo was [facility] as a voluntary patient on 2/6/2025. His PTA medications were continued.  - 2/7: clonidine 0.1mg qhs initiated, cariprazine 1.5mg daily initiated  - 2/8: gabapentin 400mg TID initiated  - 2/10: decrease clonazepam from 2mg TID to 1.5mg TID, initiate atomoxetine 10mg daily  - 2/13: increase cariprazine to 3mg daily, decrease olanzapine from 30mg qhs to 25mg qhs  - 2/17: increase gabapentin to 600mg TID    Legal Status:   Orders Placed This Encounter      Legal status Voluntary      Disposition: TBD, pending stabilization & development of a safe discharge plan.       Safety Assessment:   Behavioral Orders   Procedures    Code 1 - Restrict to Unit    Code 2     For X-ray only.    Fall precautions    Routine Programming     As clinically indicated    Status 15     Every 15 minutes.       Current Facility-Administered Medications   Medication Dose Route Frequency Provider Last Rate Last Admin    atomoxetine (STRATTERA) capsule 10 mg  10 mg Oral Daily Kenneth Gibson MD   10 mg at 02/18/25 0813    calcium polycarbophil (FIBERCON) tablet 625 mg  625 mg Oral Daily Kenneth Gibson MD   625 mg at 02/18/25 0812    cariprazine (VRAYLAR) capsule 3 mg  3 mg Oral Daily Kenneth Gibson MD   3 mg at 02/18/25 0812    cetirizine (zyrTEC) tablet 10 mg  10 mg Oral Daily Kenneth Gibson MD   10 mg at 02/18/25 0812    clonazePAM (klonoPIN) tablet 1.5 mg  1.5 mg Oral TID Kenneth Gibson MD   1.5 mg at 02/18/25 1408    cloNIDine (CATAPRES) tablet 0.1  mg  0.1 mg Oral At Bedtime Kenneth Gibson MD   0.1 mg at 02/17/25 2046    gabapentin (NEURONTIN) tablet 600 mg  600 mg Oral TID Irina Du MD   600 mg at 02/18/25 1408    metFORMIN (GLUCOPHAGE XR) 24 hr tablet 1,000 mg  1,000 mg Oral Daily with supper Kenneth Gibson MD   1,000 mg at 02/17/25 1742    naproxen (NAPROSYN) tablet 500 mg  500 mg Oral BID w/meals Kenneth Gibson MD   500 mg at 02/18/25 0812    nicotine (NICODERM CQ) 21 MG/24HR 24 hr patch 1 patch  1 patch Transdermal Daily Kenneth Gibson MD   1 patch at 02/18/25 0812    OLANZapine (zyPREXA) tablet 25 mg  25 mg Oral At Bedtime Kenneth Gibson MD   25 mg at 02/17/25 2050    pantoprazole (PROTONIX) EC tablet 40 mg  40 mg Oral Daily Andriy Holman MD   40 mg at 02/18/25 0812    testosterone (ANDROGEL) topical gel 60.75 mg  60.75 mg Transdermal JESSICAM Kenneth Gibson MD   60.75 mg at 02/18/25 0812     Current Facility-Administered Medications   Medication Dose Route Frequency Provider Last Rate Last Admin    acetaminophen (TYLENOL) tablet 650 mg  650 mg Oral Q4H PRN Rubi Shukla MD   650 mg at 02/18/25 0203    alum & mag hydroxide-simethicone (MAALOX) suspension 30 mL  30 mL Oral Q4H PRN Rubi Shukla MD   30 mL at 02/15/25 0532    carboxymethylcellulose PF (REFRESH PLUS) 0.5 % ophthalmic solution 1 drop  1 drop Both Eyes Q1H PRN Naegele, Debra Ann, APRN CNS        cloNIDine (CATAPRES) tablet 0.1 mg  0.1 mg Oral Daily PRN Naegele, Debra Ann, APRN CNS   0.1 mg at 02/14/25 1743    gabapentin (NEURONTIN) capsule 300 mg  300 mg Oral Daily PRN Irina Du MD   300 mg at 02/17/25 1759    hydrOXYzine HCl (ATARAX) tablet 25 mg  25 mg Oral Q4H PRN Rubi Shukla MD   25 mg at 02/17/25 1917    melatonin tablet 3 mg  3 mg Oral At Bedtime PRN Kenneth Gibson MD   3 mg at 02/16/25 2127    nicotine polacrilex (NICORETTE) gum 4 mg  4 mg Buccal Q1H PRN Kenneth Gibson MD   4 mg at 02/18/25 1408    OLANZapine  "(zyPREXA) tablet 10 mg  10 mg Oral TID PRN Rubi Shukla MD   10 mg at 02/12/25 1259    Or    OLANZapine (zyPREXA) injection 10 mg  10 mg Intramuscular TID PRN Rubi Shukla MD        senna-docusate (SENOKOT-S/PERICOLACE) 8.6-50 MG per tablet 1 tablet  1 tablet Oral BID PRN Rubi Shukla MD        traZODone (DESYREL) tablet 50 mg  50 mg Oral At Bedtime PRN Rubi Shukla MD   50 mg at 02/17/25 8570       Allergies   Allergen Reactions    Abilify [Aripiprazole]      Patient reports tardive dyskinesia effect in 2004 but likely akathisia since patient reports the effects only occurred while on med and resolved with a few days after discontinuing med.     Wellbutrin [Bupropion] Anxiety     Patient reports felt \"reved\" up and anxious when taken in 2001.        Irina Du MD, PhD  02/18/2025    "

## 2025-02-18 NOTE — PLAN OF CARE
Problem: Adult Behavioral Health Plan of Care  Goal: Plan of Care Review  Outcome: Progressing  Flowsheets (Taken 2/17/2025 1700)  Patient Agreement with Plan of Care: agrees     Problem: Psychotic Signs/Symptoms  Goal: Improved Behavioral Control (Psychotic Signs/Symptoms)  Outcome: Progressing   Goal Outcome Evaluation:    Plan of Care Reviewed With: patient         Pt endorsed anxiety and rated at 8.5/10, denied depression, 9/10, but declined SI/SIB/HI/AVH, and contracted for safety. Gabapentin administered without relief. Hydroxyzine administered with some relief. Pt is isolative and withdrawn to room all shift, but came out for meals. However, when pt came out for snacks, he was observed interacting with peers and watching TV after eating snacks. Pt is eating and drinking adequately. He presents as unkempt. Pt is pleasant with a flat affect. Mood presents as calm and cooperative. Judgment and insight not appropriate to situation. Pt is medication compliant. No medication adverse effects noted or verbalized. Will continue to monitor and treat as ordered.

## 2025-02-18 NOTE — PLAN OF CARE
BEH IP Unit Acuity Rating Score (UARS)  Patient is given one point for every criteria they meet.    CRITERIA SCORING   On a 72 hour hold, court hold, committed, stay of commitment, or revocation. 0   Patient LOS on BEH unit exceeds 20 days. 0  LOS: 12   Patient under guardianship, 55+, otherwise medically complex, or under age 11. 0   Suicide ideation without relief of precipitating factors. 0   Current plan for suicide. 0   Current plan for homicide. 0   Imminent risk or actual attempt to seriously harm another without relief of factors precipitating the attempt. 0   Severe dysfunction in daily living (ex: complete neglect for self care, extreme disruption in vegetative function, extreme deterioration in social interactions). 1   Recent (last 7 days) or current physical aggression in the ED or on unit. 0   Restraints or seclusion episode in past 72 hours. 0   Recent (last 7 days) or current verbal aggression, agitation, yelling, etc., while in the ED or unit. 0   Active psychosis. 1   Need for constant or near constant redirection (from leaving, from others, etc).  0   Intrusive or disruptive behaviors. 0   Patient requires 3 or more hours of individualized nursing care per 8-hour shift (i.e. for ADLs, meds, therapeutic interventions). 0   TOTAL 2

## 2025-02-18 NOTE — PROGRESS NOTES
SPIRITUAL HEALTH SERVICES  SPIRITUAL ASSESSMENT progress Note  [facility] ([facility] [place]) [facility]     REFERRAL SOURCE: materials request    Provided Jonny with information on [employer]i beliefs and history and prayers, how to contact local [employer] temple, and a list of churches near his home per his request.     PLAN: Spiritual health services remains available for any follow-up or requests. For further visits please place spiritual health consults.    Thea Shepherd [facility]  Associate   Pager: [phone]

## 2025-02-18 NOTE — PLAN OF CARE
Team Note Due:  Friday    Assessment/Intervention/Current Symtoms and Care Coordination:  Chart review and met with team, discussed pt progress, symptomology, and response to treatment.  Discussed the discharge plan and any potential impediments to discharge.    In team it was reported that Jonny does not have his CPAP machine in his room anymore.  Will contact Mom to ask if she can bring his from home.      Mom is going to bring CPAP machine.  Need to contact [facility] to find out who his targeted mental health  is.  Contacted [facility] and he does have a  named Leo Temple.  Waiting for a call back for her phone number.      Discharge Plan or Goal:  IRTS     Barriers to Discharge:  Severity of symptoms  Parents unwilling to take him back.     Referral Status:  [employer]      Legal Status:  Voluntary    Contacts (include KATRINA status):  Lacey Gramajo (Mom): [phone]  Tomas Gramajo (Dad): [phone]  ([place] ): Leo Temple    Upcoming Meetings and Dates/Important Information and next steps:  None

## 2025-02-18 NOTE — PLAN OF CARE
Problem: Depressive Signs/Symptoms  Goal: Optimized Energy Level (Depressive Signs/Symptoms)  Intervention: Optimize Energy Level  Recent Flowsheet Documentation  Taken 2/18/2025 9144 by Yaz Ng, RN  Activity (Behavioral Health):   activity adjusted per tolerance   activity encouraged   Goal Outcome Evaluation:    Plan of Care Reviewed With: patient             Brief shift overview: Pt mom agreed to drop off his C - CAP machine at the hospital. RT removed current  C -PAP last evening. Pt. was thankful and agreed to use own machine from home. Spent majority of the shift sleeping in bed with headphones on,  easily awoken for meals and medications, adequate appetite.       Medications: compliance with scheduled medications, PRN Nicotine gum     Medical Issues: Sleep Apnea     Mental Health: Pt reports sleeping good last night and feeling okay, isolative and withdrawn to his room most of the shift, staff is encouraging group, pt denied all groups, brightens on approach, calm and cooperative. Thought process is organized when answering questions, speech is clear and coherent, hygiene is unkempt poor ADL. Pt endorses anxiety and depression, denies SI/HI/SIB and hallucinations. Contracted for safety.        Recommendations to oncoming staff: Encourage Pt. To use his C - PAP machine (mom drop off) and attend groups.    Blood pressure (!) 160/109, pulse 104, temperature 97.9  F (36.6  C), temperature source Temporal, resp. rate 16, weight (!) 145.3 kg (320 lb 6.4 oz), SpO2 96%.

## 2025-02-19 PROCEDURE — 250N000013 HC RX MED GY IP 250 OP 250 PS 637: Performed by: PSYCHIATRY & NEUROLOGY

## 2025-02-19 PROCEDURE — 250N000013 HC RX MED GY IP 250 OP 250 PS 637: Performed by: EMERGENCY MEDICINE

## 2025-02-19 PROCEDURE — 250N000013 HC RX MED GY IP 250 OP 250 PS 637: Performed by: STUDENT IN AN ORGANIZED HEALTH CARE EDUCATION/TRAINING PROGRAM

## 2025-02-19 PROCEDURE — 99232 SBSQ HOSP IP/OBS MODERATE 35: CPT | Performed by: STUDENT IN AN ORGANIZED HEALTH CARE EDUCATION/TRAINING PROGRAM

## 2025-02-19 PROCEDURE — 124N000002 HC R&B MH UMMC

## 2025-02-19 RX ADMIN — NICOTINE 1 PATCH: 21 PATCH, EXTENDED RELEASE TRANSDERMAL at 08:24

## 2025-02-19 RX ADMIN — TRAZODONE HYDROCHLORIDE 50 MG: 50 TABLET ORAL at 21:25

## 2025-02-19 RX ADMIN — METFORMIN ER 500 MG 1000 MG: 500 TABLET ORAL at 17:32

## 2025-02-19 RX ADMIN — CALCIUM POLYCARBOPHIL 625 MG: 625 TABLET, FILM COATED ORAL at 08:24

## 2025-02-19 RX ADMIN — ATOMOXETINE 10 MG: 10 CAPSULE ORAL at 08:25

## 2025-02-19 RX ADMIN — NICOTINE POLACRILEX 4 MG: 4 GUM, CHEWING BUCCAL at 20:39

## 2025-02-19 RX ADMIN — TESTOSTERONE 60.75 MG: 20.25 GEL TOPICAL at 08:24

## 2025-02-19 RX ADMIN — NICOTINE POLACRILEX 4 MG: 4 GUM, CHEWING BUCCAL at 13:54

## 2025-02-19 RX ADMIN — GABAPENTIN 600 MG: 600 TABLET, FILM COATED ORAL at 13:43

## 2025-02-19 RX ADMIN — CLONIDINE HYDROCHLORIDE 0.1 MG: 0.1 TABLET ORAL at 20:38

## 2025-02-19 RX ADMIN — NICOTINE POLACRILEX 4 MG: 4 GUM, CHEWING BUCCAL at 21:50

## 2025-02-19 RX ADMIN — NAPROXEN 500 MG: 500 TABLET ORAL at 08:24

## 2025-02-19 RX ADMIN — NICOTINE POLACRILEX 4 MG: 4 GUM, CHEWING BUCCAL at 08:25

## 2025-02-19 RX ADMIN — Medication 3 MG: at 21:25

## 2025-02-19 RX ADMIN — HYDROXYZINE HYDROCHLORIDE 25 MG: 25 TABLET ORAL at 21:51

## 2025-02-19 RX ADMIN — CARIPRAZINE 3 MG: 1.5 CAPSULE, GELATIN COATED ORAL at 08:25

## 2025-02-19 RX ADMIN — CLONAZEPAM 1.5 MG: 0.5 TABLET ORAL at 08:24

## 2025-02-19 RX ADMIN — GABAPENTIN 600 MG: 600 TABLET, FILM COATED ORAL at 20:39

## 2025-02-19 RX ADMIN — NICOTINE POLACRILEX 4 MG: 4 GUM, CHEWING BUCCAL at 16:27

## 2025-02-19 RX ADMIN — CETIRIZINE HYDROCHLORIDE 10 MG: 10 TABLET, FILM COATED ORAL at 08:25

## 2025-02-19 RX ADMIN — HYDROXYZINE HYDROCHLORIDE 25 MG: 25 TABLET ORAL at 16:27

## 2025-02-19 RX ADMIN — CLONAZEPAM 1.5 MG: 0.5 TABLET ORAL at 13:43

## 2025-02-19 RX ADMIN — PANTOPRAZOLE SODIUM 40 MG: 40 TABLET, DELAYED RELEASE ORAL at 08:25

## 2025-02-19 RX ADMIN — GABAPENTIN 600 MG: 600 TABLET, FILM COATED ORAL at 08:25

## 2025-02-19 RX ADMIN — ACETAMINOPHEN 650 MG: 325 TABLET, FILM COATED ORAL at 20:41

## 2025-02-19 RX ADMIN — NAPROXEN 500 MG: 500 TABLET ORAL at 17:32

## 2025-02-19 RX ADMIN — CLONAZEPAM 1.5 MG: 0.5 TABLET ORAL at 20:39

## 2025-02-19 RX ADMIN — OLANZAPINE 25 MG: 15 TABLET, FILM COATED ORAL at 20:38

## 2025-02-19 ASSESSMENT — ACTIVITIES OF DAILY LIVING (ADL)
ADLS_ACUITY_SCORE: 43
ADLS_ACUITY_SCORE: 45
ADLS_ACUITY_SCORE: 43
ADLS_ACUITY_SCORE: 43
ADLS_ACUITY_SCORE: 45
ADLS_ACUITY_SCORE: 43
ADLS_ACUITY_SCORE: 45
ADLS_ACUITY_SCORE: 45
ADLS_ACUITY_SCORE: 43
ADLS_ACUITY_SCORE: 45
ADLS_ACUITY_SCORE: 45
ADLS_ACUITY_SCORE: 43
HYGIENE/GROOMING: INDEPENDENT
DRESS: INDEPENDENT
ADLS_ACUITY_SCORE: 43
ADLS_ACUITY_SCORE: 45
ADLS_ACUITY_SCORE: 43
ORAL_HYGIENE: INDEPENDENT
ADLS_ACUITY_SCORE: 43
ADLS_ACUITY_SCORE: 45
ADLS_ACUITY_SCORE: 43
ADLS_ACUITY_SCORE: 45
ADLS_ACUITY_SCORE: 43
ADLS_ACUITY_SCORE: 43

## 2025-02-19 NOTE — PLAN OF CARE
Goal Outcome Evaluation:    Problem: Adult Inpatient Plan of Care  Goal: Optimal Comfort and Wellbeing  Outcome: Progressing     Patient appeared to sleep for 6.5 hours. No concerns voiced. We'll continue to monitor.

## 2025-02-19 NOTE — PLAN OF CARE
Problem: Adult Behavioral Health Plan of Care  Goal: Plan of Care Review  Outcome: Progressing  Flowsheets (Taken 2/18/2025 1703)  Patient Agreement with Plan of Care: agrees     Problem: Psychotic Signs/Symptoms  Goal: Improved Behavioral Control (Psychotic Signs/Symptoms)  Outcome: Progressing   Goal Outcome Evaluation:    Plan of Care Reviewed With: patient         Pt endorsed depression and rated at 2/10, but denied anxiety, SI/SIB/HI/AVH, and contracted for safety. Pt is isolative and withdrawn to room all shift, but came out for meals. He noted sleeping on and off during this entire shift. He stated that his sleep apnea is causing him to be tired. Pt stated that his parent are yet to bring his home CPAP. He chose not to use that from the hospital stating that it is not comfortable. Pt is eating and drinking adequately. He presents as unkempt. Pt is pleasant with a flat affect. He stated that his mood is ok. Judgment and insight not appropriate to situation. Pt is medication compliant. He requested for and received PRN Tylenol, Melatonin, and Trazodone for sleep.No medication adverse effects noted or verbalized. Will continue with same plan of care.

## 2025-02-19 NOTE — PLAN OF CARE
Problem: Adult Behavioral Health Plan of Care  Goal: Optimized Coping Skills in Response to Life Stressors  Intervention: Promote Effective Coping Strategies  Recent Flowsheet Documentation  Taken 2/19/2025 0909 by Yaz Ng RN  Supportive Measures:   active listening utilized   self-care encouraged   self-responsibility promoted   verbalization of feelings encouraged   decision-making supported   Goal Outcome Evaluation:    Plan of Care Reviewed With: patient           Mom dropped off Pt. Home C - CAP around noon    Pt. Reports sleeping well last night, but still feels tired. Pt has good insight and agreed to wear his C - CAP machine at night, declined wearing during the day.  Pt. Spent majority of the shift  resting in bed listening to music, easily awoken for meals and scheduled medications. Visible in the lounge for meals minimal engagement with peers, declined all groups. Pt presents with a depressed affect, brightens on approach, calm and cooperative. Speech is clear and coherent, thought process is organized when answering questions, forgetful at times with poor concentration.     Endorses anxiety and depression, denies SI/HI/SIB and hallucinations, denies pain. Medication compliance PRN nicotine gum x 2,  and contracted for safety.     Blood pressure (!) 142/102, pulse 104, temperature 98  F (36.7  C), temperature source Oral, resp. rate 16, weight (!) 145.3 kg (320 lb 6.4 oz), SpO2 97%.

## 2025-02-19 NOTE — PLAN OF CARE
Team Note Due:  Friday    Assessment/Intervention/Current Symtoms and Care Coordination:  Chart review and met with team, discussed pt progress, symptomology, and response to treatment.  Discussed the discharge plan and any potential impediments to discharge.    In team it was reported that Jonny is excited to have his CPAP machine from home.  He slept well but still feels tired.     Mom brought his CPAP machine in today.  Waiting on referrals from [facility].      Discharge Plan or Goal:  IRTS     Barriers to Discharge:  Severity of symptoms  Parents unwilling to take him back.     Referral Status:  [employer]      Legal Status:  Voluntary    Contacts (include KATRINA status):  Lacey Gramajo (Mom): [phone]  Tomas Gramajo (Dad): [phone]  ([place] ): Leo Temple    Upcoming Meetings and Dates/Important Information and next steps:  None

## 2025-02-19 NOTE — PLAN OF CARE
BEH IP Unit Acuity Rating Score (UARS)  Patient is given one point for every criteria they meet.    CRITERIA SCORING   On a 72 hour hold, court hold, committed, stay of commitment, or revocation. 0   Patient LOS on BEH unit exceeds 20 days. 0  LOS: 13   Patient under guardianship, 55+, otherwise medically complex, or under age 11. 0   Suicide ideation without relief of precipitating factors. 0   Current plan for suicide. 0   Current plan for homicide. 0   Imminent risk or actual attempt to seriously harm another without relief of factors precipitating the attempt. 0   Severe dysfunction in daily living (ex: complete neglect for self care, extreme disruption in vegetative function, extreme deterioration in social interactions). 1   Recent (last 7 days) or current physical aggression in the ED or on unit. 0   Restraints or seclusion episode in past 72 hours. 0   Recent (last 7 days) or current verbal aggression, agitation, yelling, etc., while in the ED or unit. 0   Active psychosis. 1   Need for constant or near constant redirection (from leaving, from others, etc).  0   Intrusive or disruptive behaviors. 0   Patient requires 3 or more hours of individualized nursing care per 8-hour shift (i.e. for ADLs, meds, therapeutic interventions). 0   TOTAL 2

## 2025-02-20 VITALS
RESPIRATION RATE: 16 BRPM | BODY MASS INDEX: 37.99 KG/M2 | WEIGHT: 315 LBS | TEMPERATURE: 97.9 F | HEART RATE: 96 BPM | SYSTOLIC BLOOD PRESSURE: 155 MMHG | DIASTOLIC BLOOD PRESSURE: 105 MMHG | OXYGEN SATURATION: 95 %

## 2025-02-20 PROCEDURE — 250N000013 HC RX MED GY IP 250 OP 250 PS 637: Performed by: PSYCHIATRY & NEUROLOGY

## 2025-02-20 PROCEDURE — 250N000013 HC RX MED GY IP 250 OP 250 PS 637: Performed by: STUDENT IN AN ORGANIZED HEALTH CARE EDUCATION/TRAINING PROGRAM

## 2025-02-20 PROCEDURE — 250N000013 HC RX MED GY IP 250 OP 250 PS 637: Performed by: EMERGENCY MEDICINE

## 2025-02-20 PROCEDURE — 124N000002 HC R&B MH UMMC

## 2025-02-20 PROCEDURE — 94660 CPAP INITIATION&MGMT: CPT

## 2025-02-20 PROCEDURE — 999N000157 HC STATISTIC RCP TIME EA 10 MIN

## 2025-02-20 RX ADMIN — NICOTINE POLACRILEX 4 MG: 4 GUM, CHEWING BUCCAL at 22:04

## 2025-02-20 RX ADMIN — NAPROXEN 500 MG: 500 TABLET ORAL at 08:32

## 2025-02-20 RX ADMIN — CLONIDINE HYDROCHLORIDE 0.1 MG: 0.1 TABLET ORAL at 19:33

## 2025-02-20 RX ADMIN — ACETAMINOPHEN 650 MG: 325 TABLET, FILM COATED ORAL at 16:16

## 2025-02-20 RX ADMIN — CLONAZEPAM 1.5 MG: 0.5 TABLET ORAL at 19:34

## 2025-02-20 RX ADMIN — CALCIUM POLYCARBOPHIL 625 MG: 625 TABLET, FILM COATED ORAL at 08:32

## 2025-02-20 RX ADMIN — HYDROXYZINE HYDROCHLORIDE 25 MG: 25 TABLET ORAL at 16:16

## 2025-02-20 RX ADMIN — GABAPENTIN 600 MG: 600 TABLET, FILM COATED ORAL at 19:34

## 2025-02-20 RX ADMIN — CARIPRAZINE 3 MG: 1.5 CAPSULE, GELATIN COATED ORAL at 08:33

## 2025-02-20 RX ADMIN — Medication 3 MG: at 22:04

## 2025-02-20 RX ADMIN — METFORMIN ER 500 MG 1000 MG: 500 TABLET ORAL at 18:12

## 2025-02-20 RX ADMIN — CLONAZEPAM 1.5 MG: 0.5 TABLET ORAL at 08:33

## 2025-02-20 RX ADMIN — TESTOSTERONE 60.75 MG: 20.25 GEL TOPICAL at 08:30

## 2025-02-20 RX ADMIN — GABAPENTIN 600 MG: 600 TABLET, FILM COATED ORAL at 08:32

## 2025-02-20 RX ADMIN — CLONAZEPAM 1.5 MG: 0.5 TABLET ORAL at 14:02

## 2025-02-20 RX ADMIN — NICOTINE POLACRILEX 4 MG: 4 GUM, CHEWING BUCCAL at 16:16

## 2025-02-20 RX ADMIN — GABAPENTIN 600 MG: 600 TABLET, FILM COATED ORAL at 14:02

## 2025-02-20 RX ADMIN — NICOTINE 1 PATCH: 21 PATCH, EXTENDED RELEASE TRANSDERMAL at 08:31

## 2025-02-20 RX ADMIN — OLANZAPINE 25 MG: 15 TABLET, FILM COATED ORAL at 22:04

## 2025-02-20 RX ADMIN — TRAZODONE HYDROCHLORIDE 50 MG: 50 TABLET ORAL at 22:04

## 2025-02-20 RX ADMIN — PANTOPRAZOLE SODIUM 40 MG: 40 TABLET, DELAYED RELEASE ORAL at 08:32

## 2025-02-20 RX ADMIN — NICOTINE POLACRILEX 4 MG: 4 GUM, CHEWING BUCCAL at 19:34

## 2025-02-20 RX ADMIN — NAPROXEN 500 MG: 500 TABLET ORAL at 18:12

## 2025-02-20 RX ADMIN — NICOTINE POLACRILEX 4 MG: 4 GUM, CHEWING BUCCAL at 18:24

## 2025-02-20 RX ADMIN — NICOTINE POLACRILEX 4 MG: 4 GUM, CHEWING BUCCAL at 14:13

## 2025-02-20 RX ADMIN — CETIRIZINE HYDROCHLORIDE 10 MG: 10 TABLET, FILM COATED ORAL at 08:33

## 2025-02-20 ASSESSMENT — ACTIVITIES OF DAILY LIVING (ADL)
HYGIENE/GROOMING: SHOWER;INDEPENDENT;PROMPTS
ADLS_ACUITY_SCORE: 43
DRESS: SCRUBS (BEHAVIORAL HEALTH)
ADLS_ACUITY_SCORE: 43
ORAL_HYGIENE: INDEPENDENT;PROMPTS
ADLS_ACUITY_SCORE: 43
LAUNDRY: UNABLE TO COMPLETE
ADLS_ACUITY_SCORE: 43
ADLS_ACUITY_SCORE: 53
ADLS_ACUITY_SCORE: 43

## 2025-02-20 NOTE — PLAN OF CARE
Problem: Sleep Disturbance  Goal: Adequate Sleep/Rest  Outcome: Progressing  Intervention: Promote Sleep/Rest  Sleep/Rest Enhancement:   awakenings minimized   regular sleep/rest pattern promoted   Goal Outcome Evaluation: Ongoing    Patient appeared to be asleep at the start of shift. No s/s of pain nor discomfort noted, intermittent body movements and even unlabored respirations were observed during the safety checks through the night. Patient slept for 6.25 hrs at night. No safety concerns noted.

## 2025-02-20 NOTE — PLAN OF CARE
Team Note Due:  Friday    Assessment/Intervention/Current Symtoms and Care Coordination:  Chart review and met with team, discussed pt progress, symptomology, and response to treatment.  Discussed the discharge plan and any potential impediments to discharge.    In team it was reported that Jonny did not want to use his CPAP machine last night.  He says he does not know how to use it, respiratory has been notified.    Interview with [employer] today.  He has been put on the waitlist for [facility].  He is also on the waitlist with [facility].  Made additional referrals to [facility] and [facility].     Discharge Plan or Goal:  IRTS     Barriers to Discharge:  Severity of symptoms  Parents unwilling to take him back.     Referral Status:  [employer]   [facility] 2/20/2025  [facility] 2/20/2025     Legal Status:  Voluntary    Contacts (include KATRINA status):  Lacey Gramajo (Mom): [phone]  Tomas Gramajo (Dad): [phone]  ([place] ): Leo Temple    Upcoming Meetings and Dates/Important Information and next steps:  None

## 2025-02-20 NOTE — PLAN OF CARE
Goal Outcome Evaluation:    Plan of Care Reviewed With: patient        Problem: Depressive Signs/Symptoms  Goal: Optimized Energy Level (Depressive Signs/Symptoms)  Outcome: Progressing  Intervention: Optimize Energy Level  Recent Flowsheet Documentation  Taken 2/20/2025 0920 by Tobias Bashir, GALDINO  Activity (Behavioral Health):   activity encouraged   up ad minerva     Pt was at 0900 hr , isolative in the room and social in the milieu.  Pt was alert and oriented x 4, pleasant and cooperative with staff. VS stable. Affect is labile.  Pt reports okay appetite, ate about 100% of breakfast and 75 % of lunch.     Pt checked in as doing okay, rated anxiety at 3  and depression at 5  with 10 being worst. Pt rated overall mood  is calm.  Pt denies SI/SIB/HI. Denies visual and auditory hallucinations. Pt is on the medical bed, need to learn how to se the CPAP per provider.       Pt was medication compliant, denied pain, medication side effects and medical concerns. Pt requested nicotine lozenge x1  BP (!) 155/107   Pulse 96   Temp 97.9  F (36.6  C) (Temporal)   Resp 16   Wt (!) 145.3 kg (320 lb 6.4 oz)   SpO2 95%   BMI 37.99 kg/m

## 2025-02-20 NOTE — PROGRESS NOTES
-------------------------------------------------------------------------------------  [facility], Lizella   Psychiatric Progress Note  Hospital Day #14  Date of Service: 02/20/2025    Interval History:  The patient's care was discussed with the treatment team and chart notes were reviewed.    Sleep: 6.25 hours (02/20/25 0610)  PRN medications:   Last 24H PRN:     acetaminophen (TYLENOL) tablet 650 mg, 650 mg at 02/19/25 2041    hydrOXYzine HCl (ATARAX) tablet 25 mg, 25 mg at 02/19/25 2151    melatonin tablet 3 mg, 3 mg at 02/19/25 2125    nicotine polacrilex (NICORETTE) gum 4 mg, 4 mg at 02/19/25 2150    traZODone (DESYREL) tablet 50 mg, 50 mg at 02/19/25 2125  Staff Report:   No acute safety concerns. See staff notes for additional details.     Patient Interview:   Patient was interviewed in his room. He says that he slept pretty well last night, but he didn't use his CPAP as respiratory therapy wasn't here to help him set it up. He says that he didn't remember how it works. He notes that things have been fine with him, but that he wishes he could get out of here. He feels like his symptoms are under control apart from exhaustion.    Physical Examination:  BP (!) 155/107   Pulse 96   Temp 97.9  F (36.6  C) (Temporal)   Resp 16   Wt (!) 145.3 kg (320 lb 6.4 oz)   SpO2 95%   BMI 37.99 kg/m    Weight is 320 lbs 6.4 oz  Body mass index is 37.99 kg/m .    Appearance: dressed in hospital scrubs, fatigued, less somnolent, moderately obese, and slightly unkempt  Attitude:  more cooperative  Eye Contact:  fair,    Mood:  better, less anxious, and less depressed  Affect: less reactive and labile. Slightly impatient.  Speech:  normal prosody, no rambling. Voice at normal volume.  Psychomotor Behavior:  no evidence of tardive dyskinesia, dystonia, or tics  Throught Process:  mostly logical, linear, and goal oriented. Less tangential   Associations:  no loose associations  Thought Content:   no evidence of suicidal ideation or homicidal ideation, no auditory hallucinations present, and no visual hallucinations present  Insight: still impaired  Judgement:  limited  Oriented to:  time, person, and place  Attention Span and Concentration:  fair  Recent and Remote Memory:  fair    Liver/kidney function Metabolic CBC   Recent Labs   Lab Test 02/06/25  0408 06/07/24  0705 06/05/24  1242 06/04/24  1024   CR 0.76 0.79   < > 0.66*   AST 21  --   --  19   ALT 29  --   --  23   ALKPHOS 80  --   --  79    < > = values in this interval not displayed.    Recent Labs   Lab Test 06/03/24  1029 03/22/21  1646 10/12/17  0804   A1C 5.7*   < >  --    TSH  --   --  1.54    < > = values in this interval not displayed.    Recent Labs   Lab Test 02/06/25  0408   WBC 9.4   HGB 15.3   HCT 43.1   MCV 89             Lab results in the last 24 hours:  No results found for this or any previous visit (from the past 24 hours).    Assessment:  Diagnoses:  Schizoaffective disorder, bipolar disorder vs paranoid schizophrenia   Polysubstance abuse is likely.  Class 1 obesity due to excess calories without serious comorbidity with body mass index of 34.0 to 34.9 in adult     Clinically Significant Risk Factors   # Financial/Environmental Concerns: none      Jonny is a 39 year old male with schizoaffective disorder, currently admitted on hospital day 14 for psychosis in the context of unclear triggers. Predominant symptoms are delusional beliefs and paranoia.     Today, Jonny appears calm and agreeable. He is willing to try his CPAP machine as soon as he gets some assistance setting it up and learning how to use it (he says it has been ~9 months since he got it and never used it routinely). No medication changes today.    Plan:  - Olanzapine 25mg qhs  - Cariprazine 3mg daily  - Clonazepam 1.5mg TID  - Clonidine 0.1mg at bedtime  - Gabapentin 600mg TID + 300mg daily PRN    Patient will be treated in therapeutic milieu with appropriate  individual and group therapies as described.    Medical diagnoses to be addressed this admission:    Continue PTA cetirizine, metformin, naproxen, pantoprazole, testosterone gel     Consults:   Internal medicine for hypertension    Psychiatric Hospital course:   Jonny Gramaoj was admitted to [facility] as a voluntary patient on 2/6/2025. His PTA medications were continued.  - 2/7: clonidine 0.1mg qhs initiated, cariprazine 1.5mg daily initiated  - 2/8: gabapentin 400mg TID initiated  - 2/10: decrease clonazepam from 2mg TID to 1.5mg TID, initiate atomoxetine 10mg daily  - 2/13: increase cariprazine to 3mg daily, decrease olanzapine from 30mg qhs to 25mg qhs  - 2/17: increase gabapentin to 600mg TID  - 2/19: discontinue atomoxetine 10mg daily    Legal Status:   Orders Placed This Encounter      Legal status Voluntary      Disposition: TBD, pending stabilization & development of a safe discharge plan.       Safety Assessment:   Behavioral Orders   Procedures    Code 1 - Restrict to Unit    Code 2     For X-ray only.    Fall precautions    Routine Programming     As clinically indicated    Status 15     Every 15 minutes.       Current Facility-Administered Medications   Medication Dose Route Frequency Provider Last Rate Last Admin    calcium polycarbophil (FIBERCON) tablet 625 mg  625 mg Oral Daily Kenneth Gibson MD   625 mg at 02/20/25 0832    cariprazine (VRAYLAR) capsule 3 mg  3 mg Oral Daily Kenneth Gibson MD   3 mg at 02/20/25 0833    cetirizine (zyrTEC) tablet 10 mg  10 mg Oral Daily Kenneth Gibson MD   10 mg at 02/20/25 0833    clonazePAM (klonoPIN) tablet 1.5 mg  1.5 mg Oral TID Kenneth Gibson MD   1.5 mg at 02/20/25 0833    cloNIDine (CATAPRES) tablet 0.1 mg  0.1 mg Oral At Bedtime Kenneth Gibson MD   0.1 mg at 02/19/25 2038    gabapentin (NEURONTIN) tablet 600 mg  600 mg Oral TID Irina Du MD   600 mg at 02/20/25 0832    metFORMIN (GLUCOPHAGE XR) 24 hr tablet  1,000 mg  1,000 mg Oral Daily with supper Kenneth Gibson MD   1,000 mg at 02/19/25 1732    naproxen (NAPROSYN) tablet 500 mg  500 mg Oral BID w/meals Kenneth Gibson MD   500 mg at 02/20/25 0832    nicotine (NICODERM CQ) 21 MG/24HR 24 hr patch 1 patch  1 patch Transdermal Daily Kenneth Gibson MD   1 patch at 02/20/25 0831    OLANZapine (zyPREXA) tablet 25 mg  25 mg Oral At Bedtime Kenneth Gibson MD   25 mg at 02/19/25 2038    pantoprazole (PROTONIX) EC tablet 40 mg  40 mg Oral Daily Andriy Holman MD   40 mg at 02/20/25 0832    testosterone (ANDROGEL) topical gel 60.75 mg  60.75 mg Transdermal QAM Kenneth Gibson MD   60.75 mg at 02/20/25 0830     Current Facility-Administered Medications   Medication Dose Route Frequency Provider Last Rate Last Admin    acetaminophen (TYLENOL) tablet 650 mg  650 mg Oral Q4H PRN Rubi Shukla MD   650 mg at 02/19/25 2041    alum & mag hydroxide-simethicone (MAALOX) suspension 30 mL  30 mL Oral Q4H PRN Rubi Shukla MD   30 mL at 02/15/25 0532    carboxymethylcellulose PF (REFRESH PLUS) 0.5 % ophthalmic solution 1 drop  1 drop Both Eyes Q1H PRN Naegele, Debra Ann, APRN CNS        cloNIDine (CATAPRES) tablet 0.1 mg  0.1 mg Oral Daily PRN Naegele, Debra Ann, APRN CNS   0.1 mg at 02/14/25 1743    gabapentin (NEURONTIN) capsule 300 mg  300 mg Oral Daily PRN Irina Du MD   300 mg at 02/17/25 1759    hydrOXYzine HCl (ATARAX) tablet 25 mg  25 mg Oral Q4H PRN Rubi Shukla MD   25 mg at 02/19/25 2151    melatonin tablet 3 mg  3 mg Oral At Bedtime PRN Kenneth Gibson MD   3 mg at 02/19/25 2125    nicotine polacrilex (NICORETTE) gum 4 mg  4 mg Buccal Q1H PRN Kenneth Gibson MD   4 mg at 02/19/25 2150    OLANZapine (zyPREXA) tablet 10 mg  10 mg Oral TID PRN Rubi Shukla MD   10 mg at 02/12/25 1259    Or    OLANZapine (zyPREXA) injection 10 mg  10 mg Intramuscular TID PRN Rubi Shukla MD        senna-docusate (SENOKOT-S/PERICOLACE)  "8.6-50 MG per tablet 1 tablet  1 tablet Oral BID PRN Rubi Shukla MD        traZODone (DESYREL) tablet 50 mg  50 mg Oral At Bedtime PRN Rubi Shukla MD   50 mg at 02/19/25 2125       Allergies   Allergen Reactions    Abilify [Aripiprazole]      Patient reports tardive dyskinesia effect in 2004 but likely akathisia since patient reports the effects only occurred while on med and resolved with a few days after discontinuing med.     Wellbutrin [Bupropion] Anxiety     Patient reports felt \"reved\" up and anxious when taken in 2001.        Irina Du MD, PhD  02/20/2025    "

## 2025-02-20 NOTE — PROGRESS NOTES
"  -------------------------------------------------------------------------------------  [facility], [place]   Psychiatric Progress Note  Hospital Day #13  Date of Service: 02/19/2025    Interval History:  The patient's care was discussed with the treatment team and chart notes were reviewed.    Sleep: 6.5 hours (02/19/25 0600)  PRN medications:   Last 24H PRN:     acetaminophen (TYLENOL) tablet 650 mg, 650 mg at 02/18/25 2136    hydrOXYzine HCl (ATARAX) tablet 25 mg, 25 mg at 02/19/25 1627    melatonin tablet 3 mg, 3 mg at 02/18/25 2136    nicotine polacrilex (NICORETTE) gum 4 mg, 4 mg at 02/19/25 1627    traZODone (DESYREL) tablet 50 mg, 50 mg at 02/18/25 2136  Staff Report:   No acute safety concerns. See staff notes for additional details.     Patient Interview:   Patient was interviewed in his room today. He feels really \"wiped out\". He reports having slept a lot during the day yesterday and last night. He has been worried about falling out of bed and hitting his head. He notes almost hitting his head due to starting to fall asleep during the day. Consequently, he is scared to get out of bed. He mentions that his mother is bringing in his CPAP machine today. Patient is agreeable to discontinuing his Strattera as he hasn't noticed any improvement in his daytime somnolence.    Physical Examination:  BP (!) 142/102   Pulse 104   Temp 98  F (36.7  C) (Oral)   Resp 16   Wt (!) 145.3 kg (320 lb 6.4 oz)   SpO2 97%   BMI 37.99 kg/m    Weight is 320 lbs 6.4 oz  Body mass index is 37.99 kg/m .    Appearance: dressed in hospital scrubs, fatigued, less somnolent, moderately obese, and slightly unkempt  Attitude:  more cooperative  Eye Contact:  fair,    Mood:  better, less anxious, and less depressed  Affect: less reactive and labile. Slightly impatient.  Speech:  normal prosody, no rambling. Voice at normal volume.  Psychomotor Behavior:  no evidence of tardive dyskinesia, dystonia, or " tics  Throught Process:  mostly logical, linear, and goal oriented. Less tangential   Associations:  no loose associations  Thought Content:  no evidence of suicidal ideation or homicidal ideation, no auditory hallucinations present, and no visual hallucinations present  Insight: still impaired  Judgement:  limited  Oriented to:  time, person, and place  Attention Span and Concentration:  fair  Recent and Remote Memory:  fair    Liver/kidney function Metabolic CBC   Recent Labs   Lab Test 02/06/25  0408 06/07/24  0705 06/05/24  1242 06/04/24  1024   CR 0.76 0.79   < > 0.66*   AST 21  --   --  19   ALT 29  --   --  23   ALKPHOS 80  --   --  79    < > = values in this interval not displayed.    Recent Labs   Lab Test 06/03/24  1029 03/22/21  1646 10/12/17  0804   A1C 5.7*   < >  --    TSH  --   --  1.54    < > = values in this interval not displayed.    Recent Labs   Lab Test 02/06/25  0408   WBC 9.4   HGB 15.3   HCT 43.1   MCV 89             Lab results in the last 24 hours:  No results found for this or any previous visit (from the past 24 hours).    Assessment:  Diagnoses:  Schizoaffective disorder, bipolar disorder vs paranoid schizophrenia   Polysubstance abuse is likely.  Class 1 obesity due to excess calories without serious comorbidity with body mass index of 34.0 to 34.9 in adult     Clinically Significant Risk Factors   # Financial/Environmental Concerns: none      Jonny is a 39 year old male with schizoaffective disorder, currently admitted on hospital day 13 for psychosis in the context of unclear triggers. Predominant symptoms are delusional beliefs and paranoia.     Today, Jonny appears calm and agreeable. He is willing to try his CPAP machine tonight (his mother was able to bring it in). We discussed increasing vs. discontinuing atomoxetine as it was at an introductory dose, and he is agreeable to discontinuing it. This was not an adequate trial of the medication and it may be worth trying again.  We are discontinuing it due to polypharmacy and unclear indication in the context of untreated sleep apnea.    Plan:  - Olanzapine 25mg qhs  - Cariprazine 3mg daily  - Discontinue atomoxetine 10mg daily  - Clonazepam 1.5mg TID  - Clonidine 0.1mg at bedtime  - Gabapentin 600mg TID + 300mg daily PRN    Patient will be treated in therapeutic milieu with appropriate individual and group therapies as described.    Medical diagnoses to be addressed this admission:    Continue PTA cetirizine, metformin, naproxen, pantoprazole, testosterone gel     Consults:   Internal medicine for hypertension    Psychiatric Hospital course:   Jonny Gramajo was admitted to [facility] as a voluntary patient on 2/6/2025. His PTA medications were continued.  - 2/7: clonidine 0.1mg qhs initiated, cariprazine 1.5mg daily initiated  - 2/8: gabapentin 400mg TID initiated  - 2/10: decrease clonazepam from 2mg TID to 1.5mg TID, initiate atomoxetine 10mg daily  - 2/13: increase cariprazine to 3mg daily, decrease olanzapine from 30mg qhs to 25mg qhs  - 2/17: increase gabapentin to 600mg TID  - 2/19: discontinue atomoxetine 10mg daily    Legal Status:   Orders Placed This Encounter      Legal status Voluntary      Disposition: TBD, pending stabilization & development of a safe discharge plan.       Safety Assessment:   Behavioral Orders   Procedures    Code 1 - Restrict to Unit    Code 2     For X-ray only.    Fall precautions    Routine Programming     As clinically indicated    Status 15     Every 15 minutes.       Current Facility-Administered Medications   Medication Dose Route Frequency Provider Last Rate Last Admin    calcium polycarbophil (FIBERCON) tablet 625 mg  625 mg Oral Daily Kenneth Gibson MD   625 mg at 02/19/25 0824    cariprazine (VRAYLAR) capsule 3 mg  3 mg Oral Daily Kenneth Gibson MD   3 mg at 02/19/25 0825    cetirizine (zyrTEC) tablet 10 mg  10 mg Oral Daily Kenneth Gibson MD   10 mg at 02/19/25 0825     clonazePAM (klonoPIN) tablet 1.5 mg  1.5 mg Oral TID Kenneth Gibson MD   1.5 mg at 02/19/25 1343    cloNIDine (CATAPRES) tablet 0.1 mg  0.1 mg Oral At Bedtime Kenneth Gibson MD   0.1 mg at 02/18/25 2020    gabapentin (NEURONTIN) tablet 600 mg  600 mg Oral TID Irina Du MD   600 mg at 02/19/25 1343    metFORMIN (GLUCOPHAGE XR) 24 hr tablet 1,000 mg  1,000 mg Oral Daily with supper Kenneth Gibson MD   1,000 mg at 02/19/25 1732    naproxen (NAPROSYN) tablet 500 mg  500 mg Oral BID w/meals Kenneth Gibson MD   500 mg at 02/19/25 1732    nicotine (NICODERM CQ) 21 MG/24HR 24 hr patch 1 patch  1 patch Transdermal Daily Kenneth Gibson MD   1 patch at 02/19/25 0824    OLANZapine (zyPREXA) tablet 25 mg  25 mg Oral At Bedtime Kenneth Gibson MD   25 mg at 02/18/25 2020    pantoprazole (PROTONIX) EC tablet 40 mg  40 mg Oral Daily Andriy Holman MD   40 mg at 02/19/25 0825    testosterone (ANDROGEL) topical gel 60.75 mg  60.75 mg Transdermal Kenneth Rao MD   60.75 mg at 02/19/25 0824     Current Facility-Administered Medications   Medication Dose Route Frequency Provider Last Rate Last Admin    acetaminophen (TYLENOL) tablet 650 mg  650 mg Oral Q4H PRN Rubi Shukla MD   650 mg at 02/18/25 2136    alum & mag hydroxide-simethicone (MAALOX) suspension 30 mL  30 mL Oral Q4H PRN Rubi Shukla MD   30 mL at 02/15/25 0532    carboxymethylcellulose PF (REFRESH PLUS) 0.5 % ophthalmic solution 1 drop  1 drop Both Eyes Q1H PRN Naegele, Debra Ann, APRJUSTIN CNS        cloNIDine (CATAPRES) tablet 0.1 mg  0.1 mg Oral Daily PRN Naegele, Debra Ann, APRN CNS   0.1 mg at 02/14/25 1743    gabapentin (NEURONTIN) capsule 300 mg  300 mg Oral Daily PRN Irina Du MD   300 mg at 02/17/25 1759    hydrOXYzine HCl (ATARAX) tablet 25 mg  25 mg Oral Q4H PRN Rbui Shukla MD   25 mg at 02/19/25 1627    melatonin tablet 3 mg  3 mg Oral At Bedtime PRN Kenneth Gibson MD   3 mg at  "02/18/25 2136    nicotine polacrilex (NICORETTE) gum 4 mg  4 mg Buccal Q1H PRN Kenneth Gibson MD   4 mg at 02/19/25 1627    OLANZapine (zyPREXA) tablet 10 mg  10 mg Oral TID PRN Rubi Shukla MD   10 mg at 02/12/25 1259    Or    OLANZapine (zyPREXA) injection 10 mg  10 mg Intramuscular TID PRN Rubi Shukla MD        senna-docusate (SENOKOT-S/PERICOLACE) 8.6-50 MG per tablet 1 tablet  1 tablet Oral BID PRN Rubi Shukla MD        traZODone (DESYREL) tablet 50 mg  50 mg Oral At Bedtime PRN Rubi Shukla MD   50 mg at 02/18/25 2136       Allergies   Allergen Reactions    Abilify [Aripiprazole]      Patient reports tardive dyskinesia effect in 2004 but likely akathisia since patient reports the effects only occurred while on med and resolved with a few days after discontinuing med.     Wellbutrin [Bupropion] Anxiety     Patient reports felt \"reved\" up and anxious when taken in 2001.        Irina Du MD, PhD  02/19/2025    "

## 2025-02-20 NOTE — PLAN OF CARE
BEH IP Unit Acuity Rating Score (UARS)  Patient is given one point for every criteria they meet.    CRITERIA SCORING   On a 72 hour hold, court hold, committed, stay of commitment, or revocation. 0   Patient LOS on BEH unit exceeds 20 days. 0  LOS: 14   Patient under guardianship, 55+, otherwise medically complex, or under age 11. 0   Suicide ideation without relief of precipitating factors. 0   Current plan for suicide. 0   Current plan for homicide. 0   Imminent risk or actual attempt to seriously harm another without relief of factors precipitating the attempt. 0   Severe dysfunction in daily living (ex: complete neglect for self care, extreme disruption in vegetative function, extreme deterioration in social interactions). 1   Recent (last 7 days) or current physical aggression in the ED or on unit. 0   Restraints or seclusion episode in past 72 hours. 0   Recent (last 7 days) or current verbal aggression, agitation, yelling, etc., while in the ED or unit. 0   Active psychosis. 1   Need for constant or near constant redirection (from leaving, from others, etc).  0   Intrusive or disruptive behaviors. 0   Patient requires 3 or more hours of individualized nursing care per 8-hour shift (i.e. for ADLs, meds, therapeutic interventions). 0   TOTAL 2

## 2025-02-20 NOTE — PLAN OF CARE
Problem: Adult Behavioral Health Plan of Care  Goal: Plan of Care Review  Outcome: Progressing  Flowsheets (Taken 2/19/2025 1700)  Patient Agreement with Plan of Care: agrees     Problem: Psychotic Signs/Symptoms  Goal: Improved Behavioral Control (Psychotic Signs/Symptoms)  Outcome: Progressing   Goal Outcome Evaluation:    Plan of Care Reviewed With: patient         Pt rated anxiety at 7.5, depression 6, but denied SI/SIB/HI/AVH, and contracted for safety. Pt is isolative and withdrawn to room the majority of the shift, but came out for meals. He is noted to be sleeping on and off during this entire shift. His parents brought in his personal CPAP today. He was RT to come and set it up for him. RT paged twice, but not calls received from them. Writer attempted to set it up for pt, but pt stated that he will like the professionals to set it up for him. Pt is eating and drinking adequately. Pt is pleasant with a flat affect. He stated that his mood was good. Judgment and insight not appropriate to situation. Pt is medication compliant. He requested for and received PRN Tylenol, Melatonin, and Trazodone for sleep.No medication adverse effects noted or verbalized. Will continue with same plan of care.

## 2025-02-21 NOTE — PLAN OF CARE
Goal Outcome Evaluation:    Pt was observed in the milieu briefly for meals, medications and snacks, otherwise isolative and withdrawn to his room resting in bed listening to music on headphones. Voices some hope with discharge planning to IRTS soon.  Pt presented with bright affect and anxious mood. Dylon denied SI/SIB/HI/AVH but endorsed anxiety and depression 5/10 this shift. He appears unkempt and shower offered but declined. He was provided with clean scrubs and he changed and shaved. Pt was medication compliant this shift and denied any side effects. Reported appetite and sleep as adequate. CPAP set up with help of RT this shift. Staff will continue to monitor and support with current POC.     Plan of Care Reviewed With: patient

## 2025-03-04 ENCOUNTER — PATIENT OUTREACH (OUTPATIENT)
Dept: CARE COORDINATION | Facility: CLINIC | Age: 40
End: 2025-03-04
Payer: COMMERCIAL

## 2025-03-04 NOTE — PROGRESS NOTES
[facility]    Background: Transitional Care Management program identified per system criteria and reviewed by [facility] team for possible outreach.    Assessment: Upon chart review, [facility] Team member will not proceed with patient outreach related to this episode of Transitional Care Management program due to reason below:    Patient discharged to IRTS facility where all needs will be met    Plan: Transitional Care Management episode addressed appropriately per reason noted above.      CYNTHIA Maharaj  [facility], [facility]    *Connected Care Resource Team does NOT follow patient ongoing. Referrals are identified based on internal discharge reports and the outreach is to ensure patient has an understanding of their discharge instructions.

## 2025-03-06 ENCOUNTER — OFFICE VISIT (OUTPATIENT)
Dept: FAMILY MEDICINE | Facility: OTHER | Age: 40
End: 2025-03-06
Payer: COMMERCIAL

## 2025-03-06 VITALS
WEIGHT: 315 LBS | OXYGEN SATURATION: 96 % | HEIGHT: 77 IN | RESPIRATION RATE: 22 BRPM | HEART RATE: 115 BPM | BODY MASS INDEX: 37.19 KG/M2 | TEMPERATURE: 97.3 F

## 2025-03-06 DIAGNOSIS — J30.2 SEASONAL ALLERGIC RHINITIS, UNSPECIFIED TRIGGER: ICD-10-CM

## 2025-03-06 DIAGNOSIS — E34.9 TESTOSTERONE DEFICIENCY: ICD-10-CM

## 2025-03-06 DIAGNOSIS — F25.0 SCHIZOAFFECTIVE DISORDER, BIPOLAR TYPE (H): Primary | ICD-10-CM

## 2025-03-06 DIAGNOSIS — F41.9 ANXIETY: ICD-10-CM

## 2025-03-06 DIAGNOSIS — K21.9 GASTROESOPHAGEAL REFLUX DISEASE WITHOUT ESOPHAGITIS: ICD-10-CM

## 2025-03-06 DIAGNOSIS — G47.33 OSA (OBSTRUCTIVE SLEEP APNEA): ICD-10-CM

## 2025-03-06 DIAGNOSIS — I10 BENIGN ESSENTIAL HYPERTENSION: ICD-10-CM

## 2025-03-06 DIAGNOSIS — R73.03 PREDIABETES: ICD-10-CM

## 2025-03-06 DIAGNOSIS — F17.210 CIGARETTE NICOTINE DEPENDENCE WITHOUT COMPLICATION: ICD-10-CM

## 2025-03-06 DIAGNOSIS — R52 PAIN: ICD-10-CM

## 2025-03-06 DIAGNOSIS — Z11.4 SCREENING FOR HIV (HUMAN IMMUNODEFICIENCY VIRUS): ICD-10-CM

## 2025-03-06 DIAGNOSIS — K59.00 CONSTIPATION, UNSPECIFIED CONSTIPATION TYPE: ICD-10-CM

## 2025-03-06 DIAGNOSIS — E66.812 CLASS 2 OBESITY WITH BODY MASS INDEX (BMI) OF 39.0 TO 39.9 IN ADULT, UNSPECIFIED OBESITY TYPE, UNSPECIFIED WHETHER SERIOUS COMORBIDITY PRESENT: ICD-10-CM

## 2025-03-06 PROCEDURE — 1111F DSCHRG MED/CURRENT MED MERGE: CPT

## 2025-03-06 PROCEDURE — 1126F AMNT PAIN NOTED NONE PRSNT: CPT

## 2025-03-06 PROCEDURE — 99204 OFFICE O/P NEW MOD 45 MIN: CPT

## 2025-03-06 RX ORDER — PANTOPRAZOLE SODIUM 40 MG/1
40 TABLET, DELAYED RELEASE ORAL DAILY
Qty: 30 TABLET | Refills: 1 | Status: SHIPPED | OUTPATIENT
Start: 2025-03-06

## 2025-03-06 RX ORDER — ACETAMINOPHEN 325 MG/1
650 TABLET ORAL EVERY 4 HOURS PRN
Qty: 60 TABLET | Refills: 0 | Status: SHIPPED | OUTPATIENT
Start: 2025-03-06

## 2025-03-06 RX ORDER — GABAPENTIN 300 MG/1
300 CAPSULE ORAL DAILY PRN
Qty: 30 CAPSULE | Refills: 0 | Status: SHIPPED | OUTPATIENT
Start: 2025-03-06

## 2025-03-06 RX ORDER — METFORMIN HYDROCHLORIDE 500 MG/1
1000 TABLET, EXTENDED RELEASE ORAL
Qty: 60 TABLET | Refills: 0 | Status: SHIPPED | OUTPATIENT
Start: 2025-03-06

## 2025-03-06 RX ORDER — NAPROXEN 500 MG/1
500 TABLET ORAL 2 TIMES DAILY WITH MEALS
Qty: 60 TABLET | Refills: 0 | Status: SHIPPED | OUTPATIENT
Start: 2025-03-06

## 2025-03-06 RX ORDER — AMOXICILLIN 250 MG
1 CAPSULE ORAL 2 TIMES DAILY PRN
Qty: 60 TABLET | Refills: 0 | Status: SHIPPED | OUTPATIENT
Start: 2025-03-06

## 2025-03-06 RX ORDER — CHLORAL HYDRATE 500 MG
1 CAPSULE ORAL DAILY
Qty: 90 CAPSULE | Refills: 1 | Status: SHIPPED | OUTPATIENT
Start: 2025-03-06

## 2025-03-06 RX ORDER — TADALAFIL 10 MG/1
10 TABLET ORAL DAILY PRN
Qty: 10 TABLET | Refills: 1 | Status: SHIPPED | OUTPATIENT
Start: 2025-03-06

## 2025-03-06 RX ORDER — TESTOSTERONE 20.25 MG/1.25G
60.75 GEL TOPICAL EVERY MORNING
Qty: 60.75 G | Refills: 0 | Status: CANCELLED | OUTPATIENT
Start: 2025-03-06

## 2025-03-06 RX ORDER — NICOTINE 21 MG/24HR
1 PATCH, TRANSDERMAL 24 HOURS TRANSDERMAL DAILY
Qty: 30 PATCH | Refills: 0 | Status: SHIPPED | OUTPATIENT
Start: 2025-03-06

## 2025-03-06 RX ORDER — CETIRIZINE HYDROCHLORIDE 10 MG/1
10 TABLET ORAL DAILY
Qty: 30 TABLET | Refills: 0 | Status: SHIPPED | OUTPATIENT
Start: 2025-03-06

## 2025-03-06 RX ORDER — AMLODIPINE BESYLATE 2.5 MG/1
2.5 TABLET ORAL DAILY
Qty: 90 TABLET | Refills: 2 | Status: SHIPPED | OUTPATIENT
Start: 2025-03-06

## 2025-03-06 RX ORDER — HYDROXYZINE PAMOATE 50 MG/1
50 CAPSULE ORAL 2 TIMES DAILY PRN
Qty: 60 CAPSULE | Refills: 1 | Status: SHIPPED | OUTPATIENT
Start: 2025-03-06

## 2025-03-06 ASSESSMENT — PAIN SCALES - GENERAL: PAINLEVEL_OUTOF10: NO PAIN (0)

## 2025-03-06 NOTE — PROGRESS NOTES
Assessment & Plan     Schizoaffective disorder, bipolar type (H)  Recent hospitalization, may need to re-establish care with psychiatry. Continue current medications vraylar and zyprexa.  - Adult Mental Health  Referral; Future    RASHEED (obstructive sleep apnea)  - Adult Sleep Eval & Management Referral; Future    Benign essential hypertension  - amLODIPine (NORVASC) 2.5 MG tablet; Take 1 tablet (2.5 mg) by mouth daily.    Constipation, unspecified constipation type  - calcium polycarbophil (FIBERCON) 625 MG tablet; Take 1 tablet (625 mg) by mouth daily.  - senna-docusate (SENOKOT-S/PERICOLACE) 8.6-50 MG tablet; Take 1 tablet by mouth 2 times daily as needed for constipation.    Class 2 obesity with body mass index (BMI) of 39.0 to 39.9 in adult, unspecified obesity type, unspecified whether serious comorbidity present  - fish oil-omega-3 fatty acids 1000 MG capsule; Take 1 capsule (1 g) by mouth daily.    Seasonal allergic rhinitis, unspecified trigger  - ALLERGY RELIEF CETIRIZINE 10 MG tablet; Take 1 tablet (10 mg) by mouth daily.    Anxiety  - gabapentin (NEURONTIN) 300 MG capsule; Take 1 capsule (300 mg) by mouth daily as needed for neuropathic pain.  - hydrOXYzine susanne (VISTARIL) 50 MG capsule; Take 1 capsule (50 mg) by mouth 2 times daily as needed for anxiety.    Prediabetes  - metFORMIN (GLUCOPHAGE XR) 500 MG 24 hr tablet; Take 2 tablets (1,000 mg) by mouth daily (with dinner).    Cigarette nicotine dependence without complication  - nicotine (NICODERM CQ) 21 MG/24HR 24 hr patch; Place 1 patch over 24 hours onto the skin daily.    Gastroesophageal reflux disease without esophagitis  - pantoprazole (PROTONIX) 40 MG EC tablet; Take 1 tablet (40 mg) by mouth daily.    Pain  - acetaminophen (TYLENOL) 325 MG tablet; Take 2 tablets (650 mg) by mouth every 4 hours as needed for mild pain (to moderate pain).  - naproxen (NAPROSYN) 500 MG tablet; Take 1 tablet (500 mg) by mouth 2 times daily (with  "meals).    Testosterone deficiency  - tadalafil (CIALIS) 10 MG tablet; Take 1 tablet (10 mg) by mouth daily as needed.      MED REC REQUIRED  Post Medication Reconciliation Status: discharge medications reconciled and changed, per note/orders  BMI  Estimated body mass index is 39.49 kg/m  as calculated from the following:    Height as of this encounter: 1.956 m (6' 5\").    Weight as of this encounter: 151 kg (333 lb).             Liam Fuller is a 39 year old, presenting for the following health issues:  Hospital F/U (Wants to switch to bipap )    HPI          Hospital Follow-up Visit:    Hospital/Nursing Home/IP Rehab Facility: [facility]  Date of Admission: 02/05/2025   Date of Discharge: 03/03/2025  Reason(s) for Admission: Scizoaffective disorder, bipolar type   Was the patient in the ICU or did the patient experience delirium during hospitalization?  No  Do you have any other stressors you would like to discuss with your provider? OTHER: hard settling into home. Very hard making friends.    Problems taking medications regularly:  None  Medication changes since discharge: None  Problems adhering to non-medication therapy:  None    Summary of hospitalization:  [facility] discharge summary reviewed  Diagnostic Tests/Treatments reviewed.  Follow up needed: none  Other Healthcare Providers Involved in Patient s Care:         None  Update since discharge: improved.         Plan of care communicated with patient                       Objective    Pulse 115   Temp 97.3  F (36.3  C) (Temporal)   Resp 22   Ht 1.956 m (6' 5\")   Wt (!) 151 kg (333 lb)   SpO2 96%   BMI 39.49 kg/m    Body mass index is 39.49 kg/m .  Physical Exam  Constitutional:       General: He is not in acute distress.     Appearance: Normal appearance. He is obese. He is not ill-appearing.   Cardiovascular:      Rate and Rhythm: Normal rate and regular rhythm.   Pulmonary:      " Effort: Pulmonary effort is normal.      Breath sounds: Normal breath sounds.   Neurological:      Mental Status: He is alert.   Psychiatric:         Mood and Affect: Mood normal.         Behavior: Behavior normal.                    Signed Electronically by: Steve Ryan DO

## 2025-04-01 DIAGNOSIS — K59.00 CONSTIPATION, UNSPECIFIED CONSTIPATION TYPE: ICD-10-CM

## 2025-04-01 RX ORDER — CALCIUM POLYCARBOPHIL 625 MG/1
1 TABLET, FILM COATED ORAL DAILY
Qty: 30 TABLET | Refills: 0 | Status: SHIPPED | OUTPATIENT
Start: 2025-04-01

## 2025-04-03 DIAGNOSIS — F41.9 ANXIETY: ICD-10-CM

## 2025-04-03 DIAGNOSIS — E34.9 TESTOSTERONE DEFICIENCY: ICD-10-CM

## 2025-04-03 RX ORDER — GABAPENTIN 600 MG/1
600 TABLET ORAL 3 TIMES DAILY
Qty: 90 TABLET | Refills: 2 | Status: SHIPPED | OUTPATIENT
Start: 2025-04-03

## 2025-04-03 RX ORDER — TESTOSTERONE 20.25 MG/1.25G
60.75 GEL TOPICAL EVERY MORNING
Qty: 60.75 G | Refills: 0 | OUTPATIENT
Start: 2025-04-03

## 2025-04-03 NOTE — TELEPHONE ENCOUNTER
Patient's home care nurse calling to get 2 refills. Please call her once sent or if there are questions at [phone]. Did not catch nurse's name.

## 2025-04-15 DIAGNOSIS — J30.2 SEASONAL ALLERGIC RHINITIS, UNSPECIFIED TRIGGER: ICD-10-CM

## 2025-04-15 RX ORDER — CETIRIZINE HYDROCHLORIDE 10 MG/1
10 TABLET ORAL DAILY
Qty: 90 TABLET | Refills: 0 | Status: SHIPPED | OUTPATIENT
Start: 2025-04-15

## 2025-04-16 DIAGNOSIS — E34.9 TESTOSTERONE DEFICIENCY: ICD-10-CM

## 2025-04-16 RX ORDER — TESTOSTERONE 20.25 MG/1.25G
60.75 GEL TOPICAL EVERY MORNING
Qty: 60.75 G | Refills: 0 | OUTPATIENT
Start: 2025-04-16

## 2025-04-16 NOTE — TELEPHONE ENCOUNTER
Facility where patient is staying calling to get a refill for patient. They would like a callback once sent at [phone].

## 2025-04-16 NOTE — TELEPHONE ENCOUNTER
Patient needs to follow-up with PCP or endocrinology as they should be prescribing provider for testosterone treatment. Otherwise can be scheduled for a visit here.

## 2025-04-28 DIAGNOSIS — K21.9 GASTROESOPHAGEAL REFLUX DISEASE WITHOUT ESOPHAGITIS: ICD-10-CM

## 2025-04-28 RX ORDER — PANTOPRAZOLE SODIUM 40 MG/1
40 TABLET, DELAYED RELEASE ORAL DAILY
Qty: 30 TABLET | Refills: 2 | Status: SHIPPED | OUTPATIENT
Start: 2025-04-28

## 2025-05-05 DIAGNOSIS — R73.03 PREDIABETES: ICD-10-CM

## 2025-05-05 DIAGNOSIS — K59.00 CONSTIPATION, UNSPECIFIED CONSTIPATION TYPE: ICD-10-CM

## 2025-05-05 DIAGNOSIS — R52 PAIN: ICD-10-CM

## 2025-05-05 RX ORDER — METFORMIN HYDROCHLORIDE 500 MG/1
1000 TABLET, EXTENDED RELEASE ORAL
Qty: 60 TABLET | Refills: 0 | Status: SHIPPED | OUTPATIENT
Start: 2025-05-05

## 2025-05-06 RX ORDER — CALCIUM POLYCARBOPHIL 625 MG/1
1 TABLET, FILM COATED ORAL DAILY
Qty: 30 TABLET | Refills: 0 | Status: SHIPPED | OUTPATIENT
Start: 2025-05-06

## 2025-05-06 RX ORDER — NAPROXEN 500 MG/1
500 TABLET ORAL 2 TIMES DAILY WITH MEALS
Qty: 60 TABLET | Refills: 1 | Status: SHIPPED | OUTPATIENT
Start: 2025-05-06

## 2025-05-28 ENCOUNTER — OFFICE VISIT (OUTPATIENT)
Dept: FAMILY MEDICINE | Facility: OTHER | Age: 40
End: 2025-05-28
Payer: MEDICAID

## 2025-05-28 VITALS
OXYGEN SATURATION: 94 % | WEIGHT: 315 LBS | DIASTOLIC BLOOD PRESSURE: 78 MMHG | SYSTOLIC BLOOD PRESSURE: 118 MMHG | RESPIRATION RATE: 18 BRPM | HEART RATE: 83 BPM | TEMPERATURE: 98.8 F | HEIGHT: 76 IN | BODY MASS INDEX: 38.36 KG/M2

## 2025-05-28 DIAGNOSIS — I10 HYPERTENSION, UNSPECIFIED TYPE: Primary | ICD-10-CM

## 2025-05-28 PROCEDURE — 3078F DIAST BP <80 MM HG: CPT

## 2025-05-28 PROCEDURE — 99213 OFFICE O/P EST LOW 20 MIN: CPT

## 2025-05-28 PROCEDURE — 3074F SYST BP LT 130 MM HG: CPT

## 2025-05-28 PROCEDURE — 1125F AMNT PAIN NOTED PAIN PRSNT: CPT

## 2025-05-28 RX ORDER — AMLODIPINE BESYLATE 5 MG/1
5 TABLET ORAL DAILY
Qty: 90 TABLET | Refills: 1 | Status: SHIPPED | OUTPATIENT
Start: 2025-05-28

## 2025-05-28 ASSESSMENT — PAIN SCALES - GENERAL: PAINLEVEL_OUTOF10: MODERATE PAIN (4)

## 2025-05-28 NOTE — PATIENT INSTRUCTIONS
We have increased your amlodipine to 5 mg, try to take this consistently at the same time every day.  You can take 2 pills of the old prescription until they run out and then start new prescription  Watch for signs of low blood pressure such as dizziness and lightheadedness  We will have you meet with one of the nurses in the next 2 weeks to recheck your BP

## 2025-05-28 NOTE — PROGRESS NOTES
"  Assessment & Plan     Hypertension, unspecified type  Jonny reports he has had BP as high as 190s systolic at home but typically this is fairly normal. Today, 118/80. Will increase amlodipine cautiously to 5 mg from 2.5 mg. No LE edema. We discussed monitoring for symptoms of hypotension. Nurse bp check in 2 weeks.   - amLODIPine (NORVASC) 5 MG tablet; Take 1 tablet (5 mg) by mouth daily.    Patient declines HCM labs today.      Nicotine/Tobacco Cessation  He reports that he has been smoking cigarettes. He started smoking about 19 years ago. He has a 30 pack-year smoking history. He quit smokeless tobacco use about 4 years ago.    BMI  Estimated body mass index is 38.15 kg/m  as calculated from the following:    Height as of this encounter: 1.943 m (6' 4.5\").    Weight as of this encounter: 144 kg (317 lb 8 oz).             Subjective   Jonny is a 40 year old, presenting for the following health issues:  Hypertension      5/28/2025    12:57 PM   Additional Questions   Roomed by noble   Accompanied by self     History of Present Illness       Reason for visit:  High blood pressure  Symptom onset:  More than a month  Symptom intensity:  Moderate  Symptom progression:  Worsening  Had these symptoms before:  Yes  Has tried/received treatment for these symptoms:  Yes  Previous treatment was successful:  Yes  Prior treatment description:  Atenenal   He is taking medications regularly.      Checked BP @ home today 120s/80s                  Objective    /78   Pulse 83   Temp 98.8  F (37.1  C) (Temporal)   Resp 18   Ht 1.943 m (6' 4.5\")   Wt (!) 144 kg (317 lb 8 oz)   SpO2 94%   BMI 38.15 kg/m    Body mass index is 38.15 kg/m .  Physical Exam  Constitutional:       General: He is not in acute distress.     Appearance: Normal appearance. He is obese. He is not ill-appearing.   Cardiovascular:      Rate and Rhythm: Normal rate and regular rhythm.   Pulmonary:      Effort: Pulmonary effort is normal.      Breath " sounds: Normal breath sounds.   Neurological:      General: No focal deficit present.      Mental Status: He is alert and oriented to person, place, and time.                    Signed Electronically by: Steve Ryan DO

## 2025-06-02 DIAGNOSIS — F41.9 ANXIETY: ICD-10-CM

## 2025-06-02 DIAGNOSIS — K59.00 CONSTIPATION, UNSPECIFIED CONSTIPATION TYPE: ICD-10-CM

## 2025-06-02 DIAGNOSIS — R73.03 PREDIABETES: ICD-10-CM

## 2025-06-02 RX ORDER — METFORMIN HYDROCHLORIDE 500 MG/1
1000 TABLET, EXTENDED RELEASE ORAL
Qty: 60 TABLET | Refills: 0 | Status: SHIPPED | OUTPATIENT
Start: 2025-06-02

## 2025-06-03 RX ORDER — CALCIUM POLYCARBOPHIL 625 MG/1
1 TABLET, FILM COATED ORAL DAILY
Qty: 30 TABLET | Refills: 0 | Status: SHIPPED | OUTPATIENT
Start: 2025-06-03

## 2025-06-03 RX ORDER — GABAPENTIN 300 MG/1
CAPSULE ORAL
Qty: 30 CAPSULE | Refills: 0 | Status: SHIPPED | OUTPATIENT
Start: 2025-06-03